# Patient Record
Sex: FEMALE | Race: BLACK OR AFRICAN AMERICAN | Employment: OTHER | URBAN - METROPOLITAN AREA
[De-identification: names, ages, dates, MRNs, and addresses within clinical notes are randomized per-mention and may not be internally consistent; named-entity substitution may affect disease eponyms.]

---

## 2017-10-19 ENCOUNTER — HOSPITAL ENCOUNTER (INPATIENT)
Facility: HOSPITAL | Age: 67
LOS: 10 days | Discharge: HOME HEALTH | DRG: 234 | End: 2017-10-31
Attending: EMERGENCY MEDICINE | Admitting: THORACIC SURGERY (CARDIOTHORACIC VASCULAR SURGERY)
Payer: MEDICARE

## 2017-10-19 ENCOUNTER — APPOINTMENT (OUTPATIENT)
Dept: GENERAL RADIOLOGY | Facility: HOSPITAL | Age: 67
DRG: 234 | End: 2017-10-19
Payer: MEDICARE

## 2017-10-19 DIAGNOSIS — R07.9 ACUTE CHEST PAIN: Primary | ICD-10-CM

## 2017-10-19 DIAGNOSIS — I48.91 ATRIAL FIBRILLATION, UNSPECIFIED TYPE (HCC): ICD-10-CM

## 2017-10-19 DIAGNOSIS — I48.0 PAROXYSMAL ATRIAL FIBRILLATION (HCC): ICD-10-CM

## 2017-10-19 PROCEDURE — 71010 XR CHEST AP PORTABLE  (CPT=71010): CPT | Performed by: EMERGENCY MEDICINE

## 2017-10-20 ENCOUNTER — APPOINTMENT (OUTPATIENT)
Dept: INTERVENTIONAL RADIOLOGY/VASCULAR | Facility: HOSPITAL | Age: 67
DRG: 234 | End: 2017-10-20
Attending: INTERNAL MEDICINE
Payer: MEDICARE

## 2017-10-20 ENCOUNTER — APPOINTMENT (OUTPATIENT)
Dept: CV DIAGNOSTICS | Facility: HOSPITAL | Age: 67
DRG: 234 | End: 2017-10-20
Attending: HOSPITALIST
Payer: MEDICARE

## 2017-10-20 PROCEDURE — 99222 1ST HOSP IP/OBS MODERATE 55: CPT | Performed by: HOSPITALIST

## 2017-10-20 PROCEDURE — B2111ZZ FLUOROSCOPY OF MULTIPLE CORONARY ARTERIES USING LOW OSMOLAR CONTRAST: ICD-10-PCS | Performed by: INTERNAL MEDICINE

## 2017-10-20 PROCEDURE — 4A023N7 MEASUREMENT OF CARDIAC SAMPLING AND PRESSURE, LEFT HEART, PERCUTANEOUS APPROACH: ICD-10-PCS | Performed by: INTERNAL MEDICINE

## 2017-10-20 PROCEDURE — B41F1ZZ FLUOROSCOPY OF RIGHT LOWER EXTREMITY ARTERIES USING LOW OSMOLAR CONTRAST: ICD-10-PCS | Performed by: INTERNAL MEDICINE

## 2017-10-20 PROCEDURE — 93306 TTE W/DOPPLER COMPLETE: CPT | Performed by: HOSPITALIST

## 2017-10-20 RX ORDER — HEPARIN SODIUM 5000 [USP'U]/ML
INJECTION, SOLUTION INTRAVENOUS; SUBCUTANEOUS
Status: COMPLETED
Start: 2017-10-20 | End: 2017-10-20

## 2017-10-20 RX ORDER — MORPHINE SULFATE 4 MG/ML
1 INJECTION, SOLUTION INTRAMUSCULAR; INTRAVENOUS EVERY 2 HOUR PRN
Status: DISCONTINUED | OUTPATIENT
Start: 2017-10-20 | End: 2017-10-22

## 2017-10-20 RX ORDER — DIPHENHYDRAMINE HYDROCHLORIDE 50 MG/ML
INJECTION INTRAMUSCULAR; INTRAVENOUS
Status: COMPLETED
Start: 2017-10-20 | End: 2017-10-20

## 2017-10-20 RX ORDER — MULTIVITAMIN
1 TABLET ORAL DAILY
COMMUNITY
End: 2017-11-09

## 2017-10-20 RX ORDER — ATORVASTATIN CALCIUM 20 MG/1
20 TABLET, FILM COATED ORAL NIGHTLY
Status: DISCONTINUED | OUTPATIENT
Start: 2017-10-20 | End: 2017-10-21

## 2017-10-20 RX ORDER — HEPARIN SODIUM AND DEXTROSE 10000; 5 [USP'U]/100ML; G/100ML
12 INJECTION INTRAVENOUS ONCE
Status: COMPLETED | OUTPATIENT
Start: 2017-10-20 | End: 2017-10-20

## 2017-10-20 RX ORDER — CYCLOBENZAPRINE HCL 5 MG
5 TABLET ORAL NIGHTLY
COMMUNITY
End: 2017-11-09

## 2017-10-20 RX ORDER — TRAZODONE HYDROCHLORIDE 50 MG/1
50 TABLET ORAL NIGHTLY PRN
Status: DISCONTINUED | OUTPATIENT
Start: 2017-10-20 | End: 2017-10-24

## 2017-10-20 RX ORDER — FLUTICASONE PROPIONATE 50 MCG
1 SPRAY, SUSPENSION (ML) NASAL DAILY
Status: DISCONTINUED | OUTPATIENT
Start: 2017-10-20 | End: 2017-10-31

## 2017-10-20 RX ORDER — ENOXAPARIN SODIUM 100 MG/ML
40 INJECTION SUBCUTANEOUS DAILY
Status: DISCONTINUED | OUTPATIENT
Start: 2017-10-20 | End: 2017-10-20

## 2017-10-20 RX ORDER — CHLORTHALIDONE 25 MG/1
25 TABLET ORAL NIGHTLY
COMMUNITY
End: 2017-10-31

## 2017-10-20 RX ORDER — AMLODIPINE BESYLATE 5 MG/1
5 TABLET ORAL DAILY
Status: DISCONTINUED | OUTPATIENT
Start: 2017-10-20 | End: 2017-10-24

## 2017-10-20 RX ORDER — CLONIDINE HYDROCHLORIDE 0.1 MG/1
0.1 TABLET ORAL NIGHTLY
Status: DISCONTINUED | OUTPATIENT
Start: 2017-10-20 | End: 2017-10-24

## 2017-10-20 RX ORDER — FLECAINIDE ACETATE 100 MG/1
100 TABLET ORAL 2 TIMES DAILY
COMMUNITY
End: 2017-10-31

## 2017-10-20 RX ORDER — LANSOPRAZOLE 30 MG/1
30 CAPSULE, DELAYED RELEASE ORAL
COMMUNITY
End: 2017-11-09

## 2017-10-20 RX ORDER — ACETAMINOPHEN 325 MG/1
650 TABLET ORAL EVERY 6 HOURS PRN
Status: DISCONTINUED | OUTPATIENT
Start: 2017-10-20 | End: 2017-10-22

## 2017-10-20 RX ORDER — FLECAINIDE ACETATE 100 MG/1
100 TABLET ORAL DAILY
Status: DISCONTINUED | OUTPATIENT
Start: 2017-10-20 | End: 2017-10-20

## 2017-10-20 RX ORDER — MIDAZOLAM HYDROCHLORIDE 1 MG/ML
INJECTION INTRAMUSCULAR; INTRAVENOUS
Status: COMPLETED
Start: 2017-10-20 | End: 2017-10-20

## 2017-10-20 RX ORDER — METOLAZONE 2.5 MG/1
2.5 TABLET ORAL DAILY
Status: DISCONTINUED | OUTPATIENT
Start: 2017-10-20 | End: 2017-10-21

## 2017-10-20 RX ORDER — MORPHINE SULFATE 4 MG/ML
4 INJECTION, SOLUTION INTRAMUSCULAR; INTRAVENOUS EVERY 2 HOUR PRN
Status: DISCONTINUED | OUTPATIENT
Start: 2017-10-20 | End: 2017-10-22

## 2017-10-20 RX ORDER — ASPIRIN 81 MG/1
81 TABLET, CHEWABLE ORAL DAILY
Status: DISCONTINUED | OUTPATIENT
Start: 2017-10-20 | End: 2017-10-20

## 2017-10-20 RX ORDER — ASPIRIN 81 MG/1
324 TABLET, CHEWABLE ORAL DAILY
Status: DISCONTINUED | OUTPATIENT
Start: 2017-10-20 | End: 2017-10-29

## 2017-10-20 RX ORDER — SODIUM CHLORIDE 9 MG/ML
INJECTION, SOLUTION INTRAVENOUS CONTINUOUS
Status: DISCONTINUED | OUTPATIENT
Start: 2017-10-20 | End: 2017-10-20

## 2017-10-20 RX ORDER — CALCIUM CARBONATE 200(500)MG
500 TABLET,CHEWABLE ORAL 3 TIMES DAILY PRN
Status: DISCONTINUED | OUTPATIENT
Start: 2017-10-20 | End: 2017-10-24

## 2017-10-20 RX ORDER — CLONIDINE HYDROCHLORIDE 0.1 MG/1
0.1 TABLET ORAL 2 TIMES DAILY
COMMUNITY
End: 2017-10-31

## 2017-10-20 RX ORDER — SODIUM CHLORIDE 9 MG/ML
INJECTION, SOLUTION INTRAVENOUS CONTINUOUS
Status: ACTIVE | OUTPATIENT
Start: 2017-10-20 | End: 2017-10-20

## 2017-10-20 RX ORDER — POTASSIUM CHLORIDE 20 MEQ/1
40 TABLET, EXTENDED RELEASE ORAL EVERY 4 HOURS
Status: DISPENSED | OUTPATIENT
Start: 2017-10-20 | End: 2017-10-20

## 2017-10-20 RX ORDER — ASPIRIN 81 MG/1
324 TABLET, CHEWABLE ORAL ONCE
Status: DISCONTINUED | OUTPATIENT
Start: 2017-10-20 | End: 2017-10-24 | Stop reason: HOSPADM

## 2017-10-20 RX ORDER — ONDANSETRON 2 MG/ML
8 INJECTION INTRAMUSCULAR; INTRAVENOUS EVERY 6 HOURS PRN
Status: DISCONTINUED | OUTPATIENT
Start: 2017-10-20 | End: 2017-10-24

## 2017-10-20 RX ORDER — MORPHINE SULFATE 4 MG/ML
2 INJECTION, SOLUTION INTRAMUSCULAR; INTRAVENOUS EVERY 2 HOUR PRN
Status: DISCONTINUED | OUTPATIENT
Start: 2017-10-20 | End: 2017-10-22

## 2017-10-20 RX ORDER — HEPARIN SODIUM AND DEXTROSE 10000; 5 [USP'U]/100ML; G/100ML
INJECTION INTRAVENOUS CONTINUOUS
Status: DISCONTINUED | OUTPATIENT
Start: 2017-10-20 | End: 2017-10-21

## 2017-10-20 RX ORDER — CYCLOBENZAPRINE HCL 10 MG
5 TABLET ORAL 3 TIMES DAILY PRN
Status: DISCONTINUED | OUTPATIENT
Start: 2017-10-20 | End: 2017-10-31

## 2017-10-20 RX ORDER — LIDOCAINE HYDROCHLORIDE 10 MG/ML
INJECTION, SOLUTION INFILTRATION; PERINEURAL
Status: COMPLETED
Start: 2017-10-20 | End: 2017-10-20

## 2017-10-20 RX ORDER — ATENOLOL 50 MG/1
50 TABLET ORAL
Status: DISCONTINUED | OUTPATIENT
Start: 2017-10-20 | End: 2017-10-21

## 2017-10-20 RX ORDER — HEPARIN SODIUM 5000 [USP'U]/ML
60 INJECTION INTRAVENOUS; SUBCUTANEOUS ONCE
Status: COMPLETED | OUTPATIENT
Start: 2017-10-20 | End: 2017-10-20

## 2017-10-20 RX ORDER — CHLORTHALIDONE 50 MG/1
25 TABLET ORAL NIGHTLY
Status: DISCONTINUED | OUTPATIENT
Start: 2017-10-20 | End: 2017-10-24

## 2017-10-20 RX ORDER — HYDRALAZINE HYDROCHLORIDE 20 MG/ML
10 INJECTION INTRAMUSCULAR; INTRAVENOUS
Status: DISCONTINUED | OUTPATIENT
Start: 2017-10-20 | End: 2017-10-22

## 2017-10-20 NOTE — ED NOTES
Pt sleeping on the cart, pt and family informed of transfer to floor, pt condition stable, waiting test results, report to [employer].

## 2017-10-20 NOTE — PROGRESS NOTES
10/20/17 0218 10/20/17 0220 10/20/17 0222   Vital Signs   BP (!) 154/104 (!) 153/102 (!) 168/107   BP Location Left arm Left arm Left arm   BP Method Automatic Automatic Automatic   Patient Position Lying Sitting Standing   admission orthos

## 2017-10-20 NOTE — PLAN OF CARE
CARDIOVASCULAR - ADULT    • Maintains optimal cardiac output and hemodynamic stability Not Progressing          CARDIOVASCULAR - ADULT    • Absence of cardiac arrhythmias or at baseline Progressing        Patient/Family Goals    • Patient/Family Long Term

## 2017-10-20 NOTE — H&P
[facility]  History and Physical     Anselm Manny Patient Status:  Emergency    1950 MRN [id number]   Location [facility] Attending Marina Still MD   Hosp Day # 0 PCP Yee Goldman M.D.      Chief Complaint: comprehensive 14 point review of systems was completed. Pertinent positives and negatives noted in the HPI.     Physical Exam:    BP (!) 167/117   Pulse 79   Temp (!) 97 °F (36.1 °C)   Resp 18   Ht 5' 3\" (1.6 m)   Wt 167 lb (75.8 kg)   SpO2 97%   BMI 29 full  · Rangel: no    Plan of care discussed with patient and er md Cyn Ca MD  10/20/2017    Addendum:  Trop elevated on repeat lab; start heparin drip

## 2017-10-20 NOTE — CONSULTS
Cardiothoracic Surgery Consult   Consult  10/20/17  Dr Marco A Chavez  PCP: Dr Sandhya Cline  Diagnosis:CAD  73year old with CP  PMH: cerebral aneurysm coiled , a-fib, HTN, HTN, OA, sleep apnea  PSH: , hysterectomy, herb  Meds: see chart for full listi

## 2017-10-20 NOTE — ED PROVIDER NOTES
Patient Seen in: [facility] Emergency Department    History   Patient presents with:  Chest Pain Angina (cardiovascular)    Stated Complaint: chest pain     HPI    Patient is a 81-year-old woman with history of paroxysmal atrial fibrillation intracr Review of Systems    Positive for stated complaint: chest pain   Other systems are as noted in HPI. Constitutional and vital signs reviewed. All other systems reviewed and negative except as noted above.     PSFH elements reviewed from today and Normal    Narrative: The aPTT Heparin Therapeutic Range is approximately 65- 104 seconds. The therapeutic range has been validated against 0.3-0.7 heparin anti-Xa units/mL. CBC WITH DIFFERENTIAL WITH PLATELET    Narrative:      The following orders

## 2017-10-20 NOTE — PROGRESS NOTES
Cardiac cath with 95% mLAD; functionally occluded OM1 and 80-90% mRCA  Start statin and check lipid panel  Cont ASA and BB  [facility]S to see for consideration of CABG

## 2017-10-20 NOTE — PROCEDURES
[facility]    PATIENT'S NAME: Toribio Pugh   ATTENDING PHYSICIAN: Linell Mcburney, M.D.    OPERATING PHYSICIAN: Susana Guy MD   PATIENT ACCOUNT#:   [id number]    LOCATION:  [address] [place] [address]  MEDICAL RECORD #:   [id number]       DATE OF BIRTH:  [birth year] Perclose device was deployed for hemostasis. The IV was maintained by the RN and moderate conscious sedation of Versed and fentanyl was given.   The patient was assessed with monitoring of oxygen, heart rate, and blood pressure by the nurse and myself du

## 2017-10-20 NOTE — PLAN OF CARE
Assumed care of patient around 0230, alert and oriented X4, no c/o CP/SOB, but still has a headache, SpO2 98 % on RA  Rhythm/HR: NSR/SB 50-60s    Nursing navigator completed  Oriented into room  Medication list updated, many medications were changed and be

## 2017-10-20 NOTE — HISTORICAL OFFICE NOTE
KRISTEN SULTANA  : 1950  ACCOUNT:  [id number]  [phone]  PCP: Dr. Mishel Palmer     TODAY'S DATE: 2016  DICTATED BY:  Jacque Ramos M.D.]    CHIEF COMPLAINT: Cindi Sharma fibrillation.]    HPI:  [On 2016, Stacey Fuller, a 60year old female, discomfort. NEURO: no localized deficits. HEM/LYMPH: denies easy bruising. ALL: no new food or environmental allergies.       PAST HISTORY: cholecystectomy, , hysterectomy and cerebral aneurysm repair     PAST CV HISTORY: hypertension and left fibrillation.   We also discussed the role for ablation therapy as well.]    PRESCRIPTIONS:   01/27/16 Aspirin               325MG     one tablet daily                         01/27/16 Atenolol              50MG      one tablet twice daily

## 2017-10-20 NOTE — ED INITIAL ASSESSMENT (HPI)
Pt to ed from home with c/o cp,sob, tingly feeling to face, pt sx started tonight, pt has hx of present sx.

## 2017-10-20 NOTE — CONSULTS
[facility]  Report of Consultation    Delayfanny Hunag Patient Status:  Observation    1950 MRN [id number]   [facility] 8NE-A Attending Vasyl Malone MD   Hosp Day # 0 PCP Bonny Jain M.D.     Reason for Consultation:  Chest p Beta Blocker  •  AmLODIPine Besylate (NORVASC) tab 5 mg, 5 mg, Oral, Daily  •  Fluticasone Propionate (FLONASE) 50 MCG/ACT nasal spray 1 spray, 1 spray, Nasal, Daily  •  metolazone (ZAROXOLYN) tab 2.5 mg, 2.5 mg, Oral, Daily  •  Calcium Carbonate Antacid ( carotids 2+ no bruits. Cardiac: Regular rate and rhythm, S1, S2 normal,no rub or gallop. 1/6 SM  Lungs: Clear without wheezes, rales, rhonchi or dullness. Normal excursions and effort. Abdomen: Soft, non-tender.    Extremities: Without clubbing, cyanosis

## 2017-10-21 ENCOUNTER — APPOINTMENT (OUTPATIENT)
Dept: ULTRASOUND IMAGING | Facility: HOSPITAL | Age: 67
DRG: 234 | End: 2017-10-21
Attending: INTERNAL MEDICINE
Payer: MEDICARE

## 2017-10-21 ENCOUNTER — PRIOR ORIGINAL RECORDS (OUTPATIENT)
Dept: OTHER | Age: 67
End: 2017-10-21

## 2017-10-21 PROCEDURE — 93880 EXTRACRANIAL BILAT STUDY: CPT | Performed by: INTERNAL MEDICINE

## 2017-10-21 PROCEDURE — 99233 SBSQ HOSP IP/OBS HIGH 50: CPT | Performed by: HOSPITALIST

## 2017-10-21 RX ORDER — POTASSIUM CHLORIDE 20 MEQ/1
40 TABLET, EXTENDED RELEASE ORAL EVERY 4 HOURS
Status: COMPLETED | OUTPATIENT
Start: 2017-10-21 | End: 2017-10-21

## 2017-10-21 RX ORDER — MAGNESIUM OXIDE 400 MG (241.3 MG MAGNESIUM) TABLET
400 TABLET ONCE
Status: COMPLETED | OUTPATIENT
Start: 2017-10-21 | End: 2017-10-21

## 2017-10-21 RX ORDER — ATORVASTATIN CALCIUM 40 MG/1
40 TABLET, FILM COATED ORAL NIGHTLY
Status: DISCONTINUED | OUTPATIENT
Start: 2017-10-21 | End: 2017-10-31

## 2017-10-21 RX ORDER — ENOXAPARIN SODIUM 100 MG/ML
40 INJECTION SUBCUTANEOUS DAILY
Status: DISCONTINUED | OUTPATIENT
Start: 2017-10-21 | End: 2017-10-24

## 2017-10-21 NOTE — RESPIRATORY THERAPY NOTE
EITAN - Equipment Use Daily Summary:  · Set Mode CFLEX  · Usage in hours: 3:06  · 90% Pressure (EPAP) level: 8.0  · 90% Insp Pressure (IPAP):   · AHI: 12.6  · Supplemental Oxygen:   · Comments:

## 2017-10-21 NOTE — PROGRESS NOTES
[facility]  Progress Note     Main Joshrenata Patient Status:  Observation    1950 MRN [id number]   [facility] 8NE-A Attending Gerry Quiles MD   Hosp Day # 0 PCP Chapis Donohue M.D.      Chief Complaint: NSTEMI    S: Patient atorvastatin  20 mg Oral Nightly       ASSESSMENT / PLAN:     1. NSTEMI/CAD  1. Cath 10/20 with MVD  2. CVS saw: rec CABG in near future  3. Cont. ASA/BB/statin  4. Carotid US today  2. HTN  3. DL  1. statin  4. EITAN  5. PAF  1. Cont. Rate control  2.  Moises Ivan

## 2017-10-21 NOTE — PROGRESS NOTES
[facility]  Progress Note    Sonia Bryant Patient Status:  Observation    1950 MRN [id number]   [facility] 8NE-A Attending Melania Gonzalez MD   Hosp Day # 0 PCP Yee Goldman M.D.        Assessment and Plan:  Patient Active Pro oriented in no apparent distress. HEENT: No focal deficits. Neck: No JVD  Cardiac: Regular rate and rhythm, S1, S2 EGHWAQ,0/8 systolic  Lungs: Clear  Abdomen: Soft, non-tender. Extremities: no edema  Neurologic: Alert and oriented, normal affect.   Skin

## 2017-10-21 NOTE — PLAN OF CARE
B/P ELEVATED. METOPROLOL BID STARTED. WILL CONTINUE TO MONITOR. PRN HYDRALAZINE AS NEEDED.  CONTINUE TO MONITOR CLOSELY

## 2017-10-22 ENCOUNTER — ANESTHESIA EVENT (OUTPATIENT)
Dept: CARDIAC SURGERY | Facility: HOSPITAL | Age: 67
DRG: 234 | End: 2017-10-22
Payer: MEDICARE

## 2017-10-22 PROCEDURE — 99232 SBSQ HOSP IP/OBS MODERATE 35: CPT | Performed by: HOSPITALIST

## 2017-10-22 RX ORDER — HYDRALAZINE HYDROCHLORIDE 20 MG/ML
10 INJECTION INTRAMUSCULAR; INTRAVENOUS EVERY 6 HOURS PRN
Status: DISCONTINUED | OUTPATIENT
Start: 2017-10-22 | End: 2017-10-24

## 2017-10-22 RX ORDER — MAGNESIUM OXIDE 400 MG (241.3 MG MAGNESIUM) TABLET
400 TABLET ONCE
Status: COMPLETED | OUTPATIENT
Start: 2017-10-22 | End: 2017-10-22

## 2017-10-22 RX ORDER — IBUPROFEN 400 MG/1
400 TABLET ORAL EVERY 6 HOURS PRN
Status: DISCONTINUED | OUTPATIENT
Start: 2017-10-22 | End: 2017-10-24

## 2017-10-22 RX ORDER — IBUPROFEN 600 MG/1
600 TABLET ORAL EVERY 6 HOURS PRN
Status: DISCONTINUED | OUTPATIENT
Start: 2017-10-22 | End: 2017-10-24

## 2017-10-22 RX ORDER — SODIUM CHLORIDE 9 MG/ML
INJECTION, SOLUTION INTRAVENOUS CONTINUOUS
Status: DISCONTINUED | OUTPATIENT
Start: 2017-10-22 | End: 2017-10-24

## 2017-10-22 RX ORDER — ACETAMINOPHEN 500 MG
1000 TABLET ORAL EVERY 8 HOURS PRN
Status: DISCONTINUED | OUTPATIENT
Start: 2017-10-22 | End: 2017-10-24

## 2017-10-22 NOTE — PLAN OF CARE
PT HEADACHE IMPROVED WITH IBUPROFEN. C/O OF MILD NECK STIFFNESS AS WELL, HEATING PAD PLACED TO NECK. PT REPORTS NOT SLEEPING WELL LAST 2 NIGHTS. CONTINUE TO MONITOR CLOSELY.

## 2017-10-22 NOTE — PROGRESS NOTES
[facility]  Progress Note     Jose Carly Patient Status:  Observation    1950 MRN [id number]   [facility] 8NE-A Attending Roswell Sicard, MD   Hosp Day # 1 PCP Dariusz Kapoor M.D.      Chief Complaint: NSTEMI    S: Patient Oral Daily   • Fluticasone Propionate  1 spray Nasal Daily   • CloNIDine HCl  0.1 mg Oral Nightly   • chlorthalidone  25 mg Oral Nightly   • aspirin  324 mg Oral Daily   • aspirin  324 mg Oral Once       ASSESSMENT / PLAN:     1. NSTEMI/CAD  1.  Cath 10/20

## 2017-10-22 NOTE — PLAN OF CARE
Assumed care of patient around 1930, alert and oriented X4, no c/o CP/SOB, SpO2 97 % on RA, uses CPAP at night.   Rhythm/HR: NSR 60s  Right groin site-MARGIE,soft    Resting comfortably, will continue to monitor      CARDIOVASCULAR - ADULT    • Maintains optim

## 2017-10-22 NOTE — PROGRESS NOTES
[facility] Cardiology Progress Note    Subjective:  Has a terrible HA, has not slept. May be related to lack of caffeine.      Objective:  /72 (BP Location: Right arm)   Pulse 69   Temp 98.1 °F (36.7 °C) (Oral)   Resp 18   Ht 160 cm (5' 3\")   Wt 164 lb

## 2017-10-22 NOTE — RESPIRATORY THERAPY NOTE
EITAN - Equipment Use Daily Summary:  · Set Mode CFLEX  · Usage in hours: 6:41  · 90% Pressure (EPAP) level:   · 90% Insp Pressure (IPAP): 10.7  · AHI: 18.8  · Supplemental Oxygen:  · Comments:

## 2017-10-23 ENCOUNTER — ANESTHESIA (OUTPATIENT)
Dept: CARDIAC SURGERY | Facility: HOSPITAL | Age: 67
DRG: 234 | End: 2017-10-23
Payer: MEDICARE

## 2017-10-23 PROCEDURE — 99232 SBSQ HOSP IP/OBS MODERATE 35: CPT | Performed by: HOSPITALIST

## 2017-10-23 RX ORDER — ASPIRIN 81 MG/1
81 TABLET, CHEWABLE ORAL DAILY
COMMUNITY
End: 2017-10-31

## 2017-10-23 RX ORDER — POTASSIUM CHLORIDE 20 MEQ/1
40 TABLET, EXTENDED RELEASE ORAL 3 TIMES DAILY
COMMUNITY
End: 2017-11-09 | Stop reason: DRUGHIGH

## 2017-10-23 RX ORDER — MAGNESIUM OXIDE 400 MG (241.3 MG MAGNESIUM) TABLET
400 TABLET ONCE
Status: COMPLETED | OUTPATIENT
Start: 2017-10-23 | End: 2017-10-23

## 2017-10-23 NOTE — PROGRESS NOTES
[facility]  Progress Note     Marlin Cervantes Patient Status:  Observation    1950 MRN [id number]   [facility] 8NE-A Attending Sylvain Stearns MD   Hosp Day # 2 PCP Diandra Kaur M.D.      Chief Complaint: NSTEMI    S: Patient Once       ASSESSMENT / PLAN:     1. NSTEMI/CAD  1. Cath 10/20 with MVD  2. CVS saw: rec CABG in near future  3. Cont. ASA/BB/statin  4. Carotid US neg for sig stenosis  2. HTN  1. Cont.  Lopressor, norvac, hygroten and clonidine  2. stable  3. DL  1. stati

## 2017-10-23 NOTE — PROGRESS NOTES
[facility]  Cardiology Progress Note    Subjective:  No chest pain or shortness of breath.     Objective:  /81 (BP Location: Right arm)   Pulse 67   Temp 98 °F (36.7 °C) (Oral)   Resp 16   Ht 160 cm (5' 3\")   Wt 164 lb 10.9 oz (74.7 kg)   SpO2 9

## 2017-10-23 NOTE — PROGRESS NOTES
Met with patient and  to discuss pre op teaching, post op expectations, all questions answered, binder given. Discussed all consents, pt had no further questions, consents signed. D/w Dr. Franky Barriga for CABG in am, first case.   Vamsi Ko RN  Clin

## 2017-10-23 NOTE — ANESTHESIA PREPROCEDURE EVALUATION
PRE-OP EVALUATION    Patient Name: Burton Mohs    Pre-op Diagnosis: CAD    Procedure(s):  1st case, 0730 incision time    Surgeon(s) and Role:     * Payam Pradhan MD - Primary    Pre-op vitals reviewed.   Temp: 98 °F (36.7 °C)  Pulse: 67  Resp: 18  BP: Intravenous Once   [COMPLETED] heparin (PORCINE) 22975jitrr/250mL infusion INITIAL DOSE 12 Units/kg/hr Intravenous Once   aspirin chewable tab 324 mg 324 mg Oral Daily   aspirin chewable tab 324 mg 324 mg Oral Once   [COMPLETED] Lidocaine HCl (XYLOCAINE) 1 reviewed.   Exercise tolerance: good     MET: >4      (+) hypertension   (+) hyperlipidemia  (+) CAD  (+) past MI           (+) dysrhythmias (pafib with bursts of A tach) and atrial fibrillation                  Endo/Other                           (+) arth on Admission:  **None**

## 2017-10-24 ENCOUNTER — SURGERY (OUTPATIENT)
Age: 67
End: 2017-10-24

## 2017-10-24 ENCOUNTER — APPOINTMENT (OUTPATIENT)
Dept: GENERAL RADIOLOGY | Facility: HOSPITAL | Age: 67
DRG: 234 | End: 2017-10-24
Attending: THORACIC SURGERY (CARDIOTHORACIC VASCULAR SURGERY)
Payer: MEDICARE

## 2017-10-24 ENCOUNTER — ANESTHESIA EVENT (OUTPATIENT)
Dept: CARDIAC SURGERY | Facility: HOSPITAL | Age: 67
DRG: 234 | End: 2017-10-24
Payer: MEDICARE

## 2017-10-24 ENCOUNTER — ANESTHESIA (OUTPATIENT)
Dept: CARDIAC SURGERY | Facility: HOSPITAL | Age: 67
DRG: 234 | End: 2017-10-24
Payer: MEDICARE

## 2017-10-24 ENCOUNTER — APPOINTMENT (OUTPATIENT)
Dept: GENERAL RADIOLOGY | Facility: HOSPITAL | Age: 67
DRG: 234 | End: 2017-10-24
Attending: PHYSICIAN ASSISTANT
Payer: MEDICARE

## 2017-10-24 PROCEDURE — 30233N1 TRANSFUSION OF NONAUTOLOGOUS RED BLOOD CELLS INTO PERIPHERAL VEIN, PERCUTANEOUS APPROACH: ICD-10-PCS | Performed by: THORACIC SURGERY (CARDIOTHORACIC VASCULAR SURGERY)

## 2017-10-24 PROCEDURE — 06BQ4ZZ EXCISION OF LEFT SAPHENOUS VEIN, PERCUTANEOUS ENDOSCOPIC APPROACH: ICD-10-PCS | Performed by: THORACIC SURGERY (CARDIOTHORACIC VASCULAR SURGERY)

## 2017-10-24 PROCEDURE — 30233R1 TRANSFUSION OF NONAUTOLOGOUS PLATELETS INTO PERIPHERAL VEIN, PERCUTANEOUS APPROACH: ICD-10-PCS | Performed by: THORACIC SURGERY (CARDIOTHORACIC VASCULAR SURGERY)

## 2017-10-24 PROCEDURE — 02JA0ZZ INSPECTION OF HEART, OPEN APPROACH: ICD-10-PCS | Performed by: THORACIC SURGERY (CARDIOTHORACIC VASCULAR SURGERY)

## 2017-10-24 PROCEDURE — 5A1221Z PERFORMANCE OF CARDIAC OUTPUT, CONTINUOUS: ICD-10-PCS | Performed by: THORACIC SURGERY (CARDIOTHORACIC VASCULAR SURGERY)

## 2017-10-24 PROCEDURE — 71010 XR CHEST AP PORTABLE  (CPT=71010): CPT | Performed by: PHYSICIAN ASSISTANT

## 2017-10-24 PROCEDURE — 71010 XR CHEST AP PORTABLE  (CPT=71010): CPT | Performed by: THORACIC SURGERY (CARDIOTHORACIC VASCULAR SURGERY)

## 2017-10-24 PROCEDURE — 021209W BYPASS CORONARY ARTERY, THREE ARTERIES FROM AORTA WITH AUTOLOGOUS VENOUS TISSUE, OPEN APPROACH: ICD-10-PCS | Performed by: THORACIC SURGERY (CARDIOTHORACIC VASCULAR SURGERY)

## 2017-10-24 PROCEDURE — 06BP0ZZ EXCISION OF RIGHT SAPHENOUS VEIN, OPEN APPROACH: ICD-10-PCS | Performed by: THORACIC SURGERY (CARDIOTHORACIC VASCULAR SURGERY)

## 2017-10-24 PROCEDURE — 99232 SBSQ HOSP IP/OBS MODERATE 35: CPT | Performed by: HOSPITALIST

## 2017-10-24 PROCEDURE — 30233K1 TRANSFUSION OF NONAUTOLOGOUS FROZEN PLASMA INTO PERIPHERAL VEIN, PERCUTANEOUS APPROACH: ICD-10-PCS | Performed by: THORACIC SURGERY (CARDIOTHORACIC VASCULAR SURGERY)

## 2017-10-24 RX ORDER — MORPHINE SULFATE 4 MG/ML
8 INJECTION, SOLUTION INTRAMUSCULAR; INTRAVENOUS
Status: DISCONTINUED | OUTPATIENT
Start: 2017-10-24 | End: 2017-10-25

## 2017-10-24 RX ORDER — DEXTROSE AND SODIUM CHLORIDE 5; .45 G/100ML; G/100ML
INJECTION, SOLUTION INTRAVENOUS CONTINUOUS
Status: ACTIVE | OUTPATIENT
Start: 2017-10-24 | End: 2017-10-25

## 2017-10-24 RX ORDER — ONDANSETRON 2 MG/ML
4 INJECTION INTRAMUSCULAR; INTRAVENOUS EVERY 6 HOURS PRN
Status: DISCONTINUED | OUTPATIENT
Start: 2017-10-24 | End: 2017-10-31

## 2017-10-24 RX ORDER — ALBUMIN, HUMAN INJ 5% 5 %
250 SOLUTION INTRAVENOUS ONCE AS NEEDED
Status: COMPLETED | OUTPATIENT
Start: 2017-10-24 | End: 2017-10-24

## 2017-10-24 RX ORDER — BACITRACIN 50000 [USP'U]/1
INJECTION, POWDER, LYOPHILIZED, FOR SOLUTION INTRAMUSCULAR AS NEEDED
Status: DISCONTINUED | OUTPATIENT
Start: 2017-10-24 | End: 2017-10-24 | Stop reason: HOSPADM

## 2017-10-24 RX ORDER — MIDAZOLAM HYDROCHLORIDE 1 MG/ML
1 INJECTION INTRAMUSCULAR; INTRAVENOUS EVERY 30 MIN PRN
Status: DISCONTINUED | OUTPATIENT
Start: 2017-10-24 | End: 2017-10-24

## 2017-10-24 RX ORDER — HYDROCODONE BITARTRATE AND ACETAMINOPHEN 10; 325 MG/1; MG/1
2 TABLET ORAL EVERY 4 HOURS PRN
Status: DISCONTINUED | OUTPATIENT
Start: 2017-10-24 | End: 2017-10-25

## 2017-10-24 RX ORDER — POLYETHYLENE GLYCOL 3350 17 G/17G
1 POWDER, FOR SOLUTION ORAL DAILY PRN
Status: DISCONTINUED | OUTPATIENT
Start: 2017-10-24 | End: 2017-10-31

## 2017-10-24 RX ORDER — CHLORHEXIDINE GLUCONATE 0.12 MG/ML
15 RINSE ORAL
Status: DISCONTINUED | OUTPATIENT
Start: 2017-10-24 | End: 2017-10-24

## 2017-10-24 RX ORDER — NITROGLYCERIN 20 MG/100ML
INJECTION INTRAVENOUS CONTINUOUS PRN
Status: DISCONTINUED | OUTPATIENT
Start: 2017-10-24 | End: 2017-10-26

## 2017-10-24 RX ORDER — CHLORHEXIDINE GLUCONATE 0.12 MG/ML
15 RINSE ORAL
Status: DISCONTINUED | OUTPATIENT
Start: 2017-10-24 | End: 2017-10-25

## 2017-10-24 RX ORDER — SODIUM CHLORIDE 9 MG/ML
INJECTION, SOLUTION INTRAVENOUS CONTINUOUS
Status: DISCONTINUED | OUTPATIENT
Start: 2017-10-24 | End: 2017-10-27

## 2017-10-24 RX ORDER — SCOLOPAMINE TRANSDERMAL SYSTEM 1 MG/1
1 PATCH, EXTENDED RELEASE TRANSDERMAL ONCE
Status: COMPLETED | OUTPATIENT
Start: 2017-10-24 | End: 2017-10-27

## 2017-10-24 RX ORDER — SODIUM CHLORIDE 9 MG/ML
INJECTION, SOLUTION INTRAVENOUS ONCE
Status: DISCONTINUED | OUTPATIENT
Start: 2017-10-24 | End: 2017-10-26

## 2017-10-24 RX ORDER — POTASSIUM CHLORIDE 29.8 MG/ML
40 INJECTION INTRAVENOUS AS NEEDED
Status: DISCONTINUED | OUTPATIENT
Start: 2017-10-24 | End: 2017-10-31

## 2017-10-24 RX ORDER — MORPHINE SULFATE 4 MG/ML
4 INJECTION, SOLUTION INTRAMUSCULAR; INTRAVENOUS
Status: DISCONTINUED | OUTPATIENT
Start: 2017-10-24 | End: 2017-10-25

## 2017-10-24 RX ORDER — ASPIRIN 325 MG
325 TABLET ORAL ONCE
Status: COMPLETED | OUTPATIENT
Start: 2017-10-24 | End: 2017-10-26

## 2017-10-24 RX ORDER — MORPHINE SULFATE 4 MG/ML
2 INJECTION, SOLUTION INTRAMUSCULAR; INTRAVENOUS
Status: DISCONTINUED | OUTPATIENT
Start: 2017-10-24 | End: 2017-10-25

## 2017-10-24 RX ORDER — DOCUSATE SODIUM 100 MG/1
100 CAPSULE, LIQUID FILLED ORAL 2 TIMES DAILY
Status: DISCONTINUED | OUTPATIENT
Start: 2017-10-24 | End: 2017-10-31

## 2017-10-24 RX ORDER — DOBUTAMINE HYDROCHLORIDE 200 MG/100ML
INJECTION INTRAVENOUS CONTINUOUS PRN
Status: DISCONTINUED | OUTPATIENT
Start: 2017-10-24 | End: 2017-10-26

## 2017-10-24 RX ORDER — TEMAZEPAM 15 MG/1
15 CAPSULE ORAL NIGHTLY PRN
Status: DISCONTINUED | OUTPATIENT
Start: 2017-10-24 | End: 2017-10-25

## 2017-10-24 RX ORDER — MIDAZOLAM HYDROCHLORIDE 1 MG/ML
1 INJECTION INTRAMUSCULAR; INTRAVENOUS EVERY 30 MIN PRN
Status: DISCONTINUED | OUTPATIENT
Start: 2017-10-24 | End: 2017-10-26

## 2017-10-24 RX ORDER — PANTOPRAZOLE SODIUM 40 MG/1
40 TABLET, DELAYED RELEASE ORAL
Status: DISCONTINUED | OUTPATIENT
Start: 2017-10-24 | End: 2017-10-31

## 2017-10-24 RX ORDER — POTASSIUM CHLORIDE 14.9 MG/ML
20 INJECTION INTRAVENOUS AS NEEDED
Status: DISCONTINUED | OUTPATIENT
Start: 2017-10-24 | End: 2017-10-31

## 2017-10-24 RX ORDER — HYDROCODONE BITARTRATE AND ACETAMINOPHEN 10; 325 MG/1; MG/1
1 TABLET ORAL EVERY 4 HOURS PRN
Status: DISCONTINUED | OUTPATIENT
Start: 2017-10-24 | End: 2017-10-25

## 2017-10-24 RX ORDER — DEXTROSE MONOHYDRATE 25 G/50ML
50 INJECTION, SOLUTION INTRAVENOUS
Status: DISCONTINUED | OUTPATIENT
Start: 2017-10-24 | End: 2017-10-25

## 2017-10-24 RX ORDER — DEXMEDETOMIDINE HYDROCHLORIDE 4 UG/ML
INJECTION, SOLUTION INTRAVENOUS CONTINUOUS
Status: DISCONTINUED | OUTPATIENT
Start: 2017-10-24 | End: 2017-10-25

## 2017-10-24 RX ORDER — CEFAZOLIN SODIUM 1 G/3ML
INJECTION, POWDER, FOR SOLUTION INTRAMUSCULAR; INTRAVENOUS
Status: DISCONTINUED | OUTPATIENT
Start: 2017-10-24 | End: 2017-10-24 | Stop reason: HOSPADM

## 2017-10-24 RX ORDER — BISACODYL 10 MG
10 SUPPOSITORY, RECTAL RECTAL
Status: DISCONTINUED | OUTPATIENT
Start: 2017-10-24 | End: 2017-10-31

## 2017-10-24 RX ORDER — DEXTROSE AND SODIUM CHLORIDE 5; .45 G/100ML; G/100ML
INJECTION, SOLUTION INTRAVENOUS CONTINUOUS
Status: ACTIVE | OUTPATIENT
Start: 2017-10-24 | End: 2017-10-24

## 2017-10-24 RX ORDER — DEXMEDETOMIDINE HYDROCHLORIDE 4 UG/ML
INJECTION, SOLUTION INTRAVENOUS CONTINUOUS
Status: DISCONTINUED | OUTPATIENT
Start: 2017-10-24 | End: 2017-10-24 | Stop reason: SDUPTHER

## 2017-10-24 RX ORDER — ASPIRIN 300 MG
300 SUPPOSITORY, RECTAL RECTAL ONCE
Status: COMPLETED | OUTPATIENT
Start: 2017-10-24 | End: 2017-10-26

## 2017-10-24 RX ORDER — IPRATROPIUM BROMIDE AND ALBUTEROL SULFATE 2.5; .5 MG/3ML; MG/3ML
3 SOLUTION RESPIRATORY (INHALATION) EVERY 4 HOURS PRN
Status: DISCONTINUED | OUTPATIENT
Start: 2017-10-24 | End: 2017-10-31

## 2017-10-24 NOTE — PROGRESS NOTES
[facility]  Progress Note    Sandi Mathew Patient Status:  Inpatient    1950 MRN [id number]   [facility] 6NE-A Attending Stephane Wagoner MD   Hosp Day # 3 PCP Kate Alvarez M.D. Subjective:   Intubated, sedated    Objecti Transdermal Once   • sodium chloride   Intravenous Once   • atorvastatin  40 mg Oral Nightly   • metoprolol tartrate  25 mg Oral 2x Daily(Beta Blocker)   • Fluticasone Propionate  1 spray Nasal Daily   • aspirin  324 mg Oral Daily     • EPINEPHrine (ADRENA bedside.   CCT 35 minutes    Martha Rojas MD

## 2017-10-24 NOTE — ANESTHESIA POSTPROCEDURE EVALUATION
[address] Patient Status:  Inpatient   Age/Gender 73year old female MRN [id number]   [facility] 6NE-A Attending Bibi Pro MD   [facility] Day # 3 PCP Sheila Flannery M.D.        Anesthesia Post-op Note    Procedure(s):

## 2017-10-24 NOTE — PLAN OF CARE
CARDIOVASCULAR - ADULT    • Maintains optimal cardiac output and hemodynamic stability Progressing          A/O.   RA. CPAP noc. IS 1000. SR on tele. Continent B & B . Up ad agustín. Pt c/o pain to R ac IV site.  IV site changed per pt request.  Plans for C

## 2017-10-24 NOTE — ANESTHESIA PREPROCEDURE EVALUATION
PRE-OP EVALUATION    Patient Name: Venkat Loo    Pre-op Diagnosis: post op bleed    Procedure(s):  Mediastinal exploration    Surgeon(s) and Role:     * Fabiola Ulloa MD - Primary    Pre-op vitals reviewed.   Temp: 97.8 °F (36.6 °C)  Pulse: 107  Resp BID   [MAR Hold] magnesium hydroxide (MILK OF MAGNESIA) 400 MG/5ML suspension 10 mL 10 mL Oral BID PRN   [MAR Hold] PEG 3350 (MIRALAX) powder packet 17 g 1 packet Oral Daily PRN   [MAR Hold] bisacodyl (DULCOLAX) rectal suppository 10 mg 10 mg Rectal Daily Hold] glucose (DEX4) oral liquid 30 g 30 g Oral Q15 Min PRN   Or      [MAR Hold] Glucose-Vitamin C (DEX-4) 4-0.006 g chewable tab 8 tablet 8 tablet Oral Q15 Min PRN   scopolamine (TRANSDERM-SCOP) 1.5 mg patch 1 patch Transdermal Once   [MAR Hold] 0.9%  NaC (SUBLIMAZE) 0.05 MG/ML injection      [COMPLETED] Midazolam HCl (VERSED) 2 MG/2ML injection      [COMPLETED] iohexol (OMNIPAQUE) 350 MG/ML injection 150 mL 150 mL Injection ONCE PRN   [] 0.9%  NaCl infusion  Intravenous Continuous       Outpatient M tobacco: Never Used    Alcohol use No       Drug use: No     Available pre-op labs reviewed.     Lab Results  Component Value Date   WBC 11.5 10/24/2017   RBC 2.86 (L) 10/24/2017   HGB 9.0 (L) 10/24/2017   HCT 25.4 (L) 10/24/2017   MCV 88.8 10/24/2017   [facility]

## 2017-10-24 NOTE — DIETARY NOTE
Nutrition Short Note    Dietitian consult received per cardiac rehab protocol. Pt to be educated by cardiac rehab staff and encouraged to attend outpatient classes taught by RD. RD available PRN.     Jay Hester, LIS, LDN

## 2017-10-24 NOTE — RESPIRATORY THERAPY NOTE
EITAN - Equipment Use Daily Summary:                  . Set Mode:                . Usage in hours:                . 90% Pressure (EPAP) level:                . 90% Insp. Pressure (IPAP): Dolly Wayne AHI:                .  Supplemental Oxygen:

## 2017-10-24 NOTE — PROGRESS NOTES
[facility]  Progress Note     Genaro Sesay Patient Status:  Observation    1950 MRN [id number]   [facility] 8NE-A Attending Virginia Lott MD   Hosp Day # 3 PCP Ricardo Messina M.D.      Chief Complaint: NSTEMI    S: Patient (PROTONIX) IV push  40 mg Intravenous QAM AC    Or   • Pantoprazole Sodium  40 mg Oral QAM AC   • Chlorhexidine Gluconate  15 mL Mouth/[email or web address] [email or web address]   • scopolamine  1 patch Transdermal Once   • sodium chloride   Intravenous Once   • coagulation fac

## 2017-10-24 NOTE — ANESTHESIA POSTPROCEDURE EVALUATION
[address] Patient Status:  Inpatient   Age/Gender 73year old female MRN [id number]   [facility] 6NE-A Attending Malcolm Sanchez MD   [address] Day # 3 PCP Anna Roth M.D.        Anesthesia Post-op Note    Procedure(s):

## 2017-10-25 ENCOUNTER — APPOINTMENT (OUTPATIENT)
Dept: GENERAL RADIOLOGY | Facility: HOSPITAL | Age: 67
DRG: 234 | End: 2017-10-25
Attending: THORACIC SURGERY (CARDIOTHORACIC VASCULAR SURGERY)
Payer: MEDICARE

## 2017-10-25 ENCOUNTER — APPOINTMENT (OUTPATIENT)
Dept: GENERAL RADIOLOGY | Facility: HOSPITAL | Age: 67
DRG: 234 | End: 2017-10-25
Attending: PHYSICIAN ASSISTANT
Payer: MEDICARE

## 2017-10-25 PROBLEM — Z95.1 S/P CABG (CORONARY ARTERY BYPASS GRAFT): Status: ACTIVE | Noted: 2017-10-25

## 2017-10-25 PROCEDURE — 71010 XR CHEST AP PORTABLE  (CPT=71010): CPT | Performed by: THORACIC SURGERY (CARDIOTHORACIC VASCULAR SURGERY)

## 2017-10-25 PROCEDURE — 71010 XR CHEST AP PORTABLE  (CPT=71010): CPT | Performed by: PHYSICIAN ASSISTANT

## 2017-10-25 PROCEDURE — 99232 SBSQ HOSP IP/OBS MODERATE 35: CPT | Performed by: HOSPITALIST

## 2017-10-25 RX ORDER — FUROSEMIDE 10 MG/ML
40 INJECTION INTRAMUSCULAR; INTRAVENOUS
Status: DISCONTINUED | OUTPATIENT
Start: 2017-10-25 | End: 2017-10-26

## 2017-10-25 RX ORDER — AMIODARONE HYDROCHLORIDE 200 MG/1
400 TABLET ORAL DAILY
Status: DISCONTINUED | OUTPATIENT
Start: 2017-10-27 | End: 2017-10-29

## 2017-10-25 RX ORDER — DILTIAZEM HCL-SODIUM CHLORIDE IV SOLN 125 MG/125ML-0.9% 125-0.9/125 MG/ML-%
10 SOLUTION INTRAVENOUS CONTINUOUS
Status: DISCONTINUED | OUTPATIENT
Start: 2017-10-25 | End: 2017-10-27

## 2017-10-25 RX ORDER — DEXTROSE MONOHYDRATE 25 G/50ML
50 INJECTION, SOLUTION INTRAVENOUS
Status: DISCONTINUED | OUTPATIENT
Start: 2017-10-25 | End: 2017-10-31

## 2017-10-25 RX ORDER — ACETAMINOPHEN 10 MG/ML
1000 INJECTION, SOLUTION INTRAVENOUS EVERY 6 HOURS
Status: COMPLETED | OUTPATIENT
Start: 2017-10-25 | End: 2017-10-26

## 2017-10-25 RX ORDER — DILTIAZEM HYDROCHLORIDE 5 MG/ML
10 INJECTION INTRAVENOUS ONCE
Status: COMPLETED | OUTPATIENT
Start: 2017-10-25 | End: 2017-10-25

## 2017-10-25 RX ORDER — AMIODARONE HYDROCHLORIDE 200 MG/1
400 TABLET ORAL EVERY 8 HOURS
Status: COMPLETED | OUTPATIENT
Start: 2017-10-25 | End: 2017-10-26

## 2017-10-25 NOTE — CONSULTS
[facility]  Progress Note    Main Kaye Patient Status:  Inpatient    1950 MRN [id number]   [facility] 6NE-A Attending Kiersten Cody, [address] [facility] Day # 4 PCP Chapis Donohue M.D.        Assessment/Plan: 74 yo admitted with NSTEMI 7. 45   ISTAT Blood Gas PCO2 34 (L) 35 - 45 mmHg   ISTAT Blood Gas PO2 41 (L) 80 - 105 mmHg   ISTAT Blood Gas TCO2 23 22 - 32 mmol/L   ISTAT Blood Gas HCO3 21.7 (L) 22.0 - 26.0 mEq/L   ISTAT Blood Gas O2 Saturation 87 (L) 92 - 100 %   ISTAT Sample Type Mixe ISTAT ACTIVATED CLOTTING TIME   Collection Time: 10/24/17 11:09 AM   Result Value Ref Range   ISAT Activated Clotting Time 120 74 - 137 seconds   -POCT ISTAT CG8 CARTRIDGE   Collection Time: 10/24/17 11:10 AM   Result Value Ref Range   ISTAT Sodium 141 136 DIFFERENTIAL   Collection Time: 10/24/17 12:00 PM   Result Value Ref Range   WBC 11.5 4.0 - 13.0 x10(3) uL   RBC 2.86 (L) 3.80 - 5.10 x10(6)uL   HGB 9.0 (L) 12.0 - 16.0 g/dL   HCT 25.4 (L) 34.0 - 50.0 %   PLT 96.0 (L) 150.0 - 450.0 10(3)uL   MCV 88.8 81.0 Hematocrit 30 (L) 37 - 54 %   ISTAT Glucose 260 (H) 65 - 99 mg/dL   ISTAT Blood Gas pH 7.24 (L) 7.35 - 7.45   ISTAT Blood Gas PCO2 36 35 - 45 mmHg   ISTAT Blood Gas PO2 287 (H) 80 - 105 mmHg   ISTAT Blood Gas TCO2 16 (L) 22 - 32 mmol/L   ISTAT Blood Gas HC 0.60 x10(3) uL   Eosinophil Absolute 0.01 0.00 - 0.30 x10(3) uL   Basophil Absolute 0.03 0.00 - 0.10 x10(3) uL   Immature Granulocyte Absolute 0.08 0.00 - 1.00 x10(3) uL   Neutrophil % 85.5 %   Lymphocyte % 9.8 %   Monocyte % 3.9 %   Eosinophil % 0.1 %   B Collection Time: 10/24/17  9:05 PM   Result Value Ref Range   ABG pH 7.48 (H) 7.35 - 7.45   ABG pCO2 34 (L) 35 - 45 mm Hg   ABG pO2 108 (H) 80 - 105 mm Hg   ABG HCO3 24.6 22.0 - 26.0 mEq/L   ABG Base Excess 1.3    ABG O2 Saturation 97 92 - 100 %   Calcul Product Status Presumed Transfused    Expiration Date [id number]    Blood Type Barcode 9500    -PREPARE RBC   Collection Time: 10/25/17 12:30 AM   Result Value Ref Range   Blood Product [id number]    Unit Number [id number]    UNIT ABO O    UNIT RH Basophil % 0.1 %   Immature Granulocyte % 0.5 %   -SCAN SLIDE   Collection Time: 10/25/17  3:59 AM   Result Value Ref Range   Slide Review Platelets reviewed on smear    -POCT GLUCOSE   Collection Time: 10/25/17  4:02 AM   Result Value Ref Range   POC Gl

## 2017-10-25 NOTE — OCCUPATIONAL THERAPY NOTE
OCCUPATIONAL THERAPY EVALUATION - INPATIENT     Room Number: 0963/8796-M  Evaluation Date: 10/25/2017  Type of Evaluation: Initial  Presenting Problem: CABG    Physician Order: IP Consult to Occupational Therapy  Reason for Therapy: ADL/IADL Dysfunction an techniques;Relaxation;Repositioning    COGNITION  Overall Cognitive Status:  WFL - within functional limits    VISION  Current Vision: no visual deficits    PERCEPTION  Overall Perception Status:   WFL - within functional limits    SENSATION  Light touch: mobility, education, and t/f. Patient End of Session: Up in chair;Needs met;Call light within reach;RN aware of session/findings; All patient questions and concerns addressed; Family present;SCDs in place    ASSESSMENT     Patient is a 73year old female ad will perform all ADLs: with supervision    Functional Transfer Goals  Patient will perform all functional transfers:  with supervision    UE Exercise Program Goal  Patient will be independent with bilateral AROM HEP (home exercise program).     Additional G

## 2017-10-25 NOTE — PROGRESS NOTES
[facility]  CV Surgery Progress Note    Pina Hu Patient Status:  Inpatient    1950 MRN [id number]   [facility] 6NE-A Attending Lazarus Handy, [address] [facility] Day # 4 PCP Susan Landa M.D.      Subjective:  Pt seen sitting up in

## 2017-10-25 NOTE — PROGRESS NOTES
[facility]  Progress Note    Magda Miriams Patient Status:  Inpatient    1950 MRN [id number]   [facility] 6NE-A Attending Jessenia Reed, [address] [facility] Day # 4 PCP Lorri Najjar, M.D.        Assessment and Plan:  Patient Active Proble non-tender. Extremities: tr edema  Neurologic: Alert and oriented, normal affect. Skin: Warm and dry.      Laboratory/Data:    Labs:    Lab Results  Component Value Date   WBC 15.4 10/25/2017   HGB 10.1 10/25/2017   HCT 28.0 10/25/2017   PLT 93.0 10/25/2 0.1-4 mcg/kg/min Intravenous Continuous PRN   aspirin 300 MG rectal suppository 300 mg 300 mg Rectal Once   Or      aspirin tab 325 mg 325 mg Per NG Tube Once   docusate sodium (COLACE) cap 100 mg 100 mg Oral BID   Or      docusate sodium (COLACE) liquid 1 Cyclobenzaprine HCl (cyclobenzaprine) tab 5 mg 5 mg Oral TID PRN   aspirin chewable tab 324 mg 324 mg Oral Daily       Kelli Live MD  10/25/2017  2:58 PM

## 2017-10-25 NOTE — OPERATIVE REPORT
[facility]    PATIENT'S NAME: Comfort Barakat   ATTENDING PHYSICIAN: Rayna Escalera.  Sharron Carpio PHYSICIAN: Afua Martinez MD   PATIENT ACCOUNT#:   [id number]    LOCATION:  [address] [place] [address]  MEDICAL RECORD #:   [id number]       DATE OF BIRTH:  [birth year] reclosed. She was returned to the ICU. As of this dictation at 5:15 p.m, she is stable without any further bleeding. The patient's family was updated by me.       Dictated By Stephan Jacobs MD  d: 10/24/2017 17:19:06  t: 10/25/2017 10:11:12  [place] [id number]

## 2017-10-25 NOTE — PROGRESS NOTES
[facility]  Progress Note     Vero Huang Patient Status:  Observation    1950 MRN [id number]   [facility] 8NE-A Attending Vasyl Malone MD   Hosp Day # 4 PCP Bonny Jain M.D.      Chief Complaint: NSTEMI    S: Patient 1 Application Nasal BID   • ceFAZolin  2 g Intravenous Q8H   • pantoprazole (PROTONIX) IV push  40 mg Intravenous QAM AC    Or   • Pantoprazole Sodium  40 mg Oral QAM AC   • scopolamine  1 patch Transdermal Once   • sodium chloride   Intravenous Once   • s

## 2017-10-25 NOTE — OPERATIVE REPORT
[address]    PATIENT'S NAME: Kristine Ta   ATTENDING PHYSICIAN: Lina Olivera.  [facility] PHYSICIAN: Matthew Jane MD   PATIENT ACCOUNT#:   [id number]    LOCATION:  [address] [place] [address]  MEDICAL RECORD #:   [id number]       DATE OF BIRTH:  [birth year]23 cardiopulmonary bypass. On bypass, the patient was cooled to 32 degrees centigrade. The aorta was crossclamped. Blood cardioplegia was infused to achieve electromechanical arrest of the heart. The right coronary artery was dissected out extensively.   Gary Meza Pranay Nunes MD  d: 10/24/2017 17:19:06  t: 10/25/2017 09:58:28  [facility] [id number]  /

## 2017-10-25 NOTE — CONSULTS
[address]    PATIENT'S NAME: Rakesh Cortes   ATTENDING PHYSICIAN: Brandt Zheng. Emma Epstein PHYSICIAN: Jarod Adams M.D.    PATIENT ACCOUNT#:   [de-identified]    LOCATION:  [address] [place] [address]  MEDICAL RECORD #:   [id number]       DATE OF BIRTH Patient has a face mask on. Normocephalic, atraumatic. NECK:  Supple. LUNGS:  Clear bilaterally anteriorly. HEART:  Regular rate and rhythm. ABDOMEN:  Soft, nontender. Normoactive bowel sounds. EXTREMITIES:  Trace edema diffusely.   NEUROLOGIC:  Kika

## 2017-10-25 NOTE — PHYSICAL THERAPY NOTE
PHYSICAL THERAPY EVALUATION - INPATIENT     Room Number: 0042/7507-L  Evaluation Date: 10/25/2017  Type of Evaluation: Initial  Physician Order: PT Eval and Treat    Presenting Problem: s/p CABG 10/24/17  Reason for Therapy: Mobility Dysfunction and Berdie Conquest STRENGTH ASSESSMENT  Upper extremity ROM and strength are within functional limits     Lower extremity ROM is within functional limits     Lower extremity strength is within functional limits except for the following:    Right Hip flexion  4+/5  Left Hip f transfer set up to maintain sternal precautions. Pt sit-stand with HHA/MOD assist for force generation. Pt ambulated 3ft with HHA/MIN assist for balance.  Pt ambulates with slow george, shuffle gait pattern, poor foot clearance, and decreased limb advancem functional mobility deficits in order to return to independent prior level of function. DISCHARGE RECOMMENDATIONS  PT Discharge Recommendations: Home with home health PT    PLAN  PT Treatment Plan: Bed mobility; Endurance; Energy conservation;Patient educa

## 2017-10-26 PROCEDURE — 99232 SBSQ HOSP IP/OBS MODERATE 35: CPT | Performed by: HOSPITALIST

## 2017-10-26 PROCEDURE — 05H533Z INSERTION OF INFUSION DEVICE INTO RIGHT SUBCLAVIAN VEIN, PERCUTANEOUS APPROACH: ICD-10-PCS | Performed by: PHYSICIAN ASSISTANT

## 2017-10-26 RX ORDER — WARFARIN SODIUM 2.5 MG/1
2.5 TABLET ORAL
Status: COMPLETED | OUTPATIENT
Start: 2017-10-26 | End: 2017-10-26

## 2017-10-26 RX ORDER — SCOLOPAMINE TRANSDERMAL SYSTEM 1 MG/1
1 PATCH, EXTENDED RELEASE TRANSDERMAL
Status: DISCONTINUED | OUTPATIENT
Start: 2017-10-27 | End: 2017-10-26

## 2017-10-26 RX ORDER — METOPROLOL TARTRATE 50 MG/1
50 TABLET, FILM COATED ORAL
Status: DISCONTINUED | OUTPATIENT
Start: 2017-10-26 | End: 2017-10-27

## 2017-10-26 RX ORDER — DIPHENHYDRAMINE HYDROCHLORIDE 50 MG/ML
12.5 INJECTION INTRAMUSCULAR; INTRAVENOUS EVERY 4 HOURS PRN
Status: DISCONTINUED | OUTPATIENT
Start: 2017-10-26 | End: 2017-10-31

## 2017-10-26 RX ORDER — MORPHINE SULFATE 4 MG/ML
INJECTION, SOLUTION INTRAMUSCULAR; INTRAVENOUS
Status: DISPENSED
Start: 2017-10-26 | End: 2017-10-27

## 2017-10-26 RX ORDER — DIPHENHYDRAMINE HCL 25 MG
25 CAPSULE ORAL EVERY 4 HOURS PRN
Status: DISCONTINUED | OUTPATIENT
Start: 2017-10-26 | End: 2017-10-31

## 2017-10-26 RX ORDER — SODIUM CHLORIDE 0.9 % (FLUSH) 0.9 %
10 SYRINGE (ML) INJECTION EVERY 12 HOURS
Status: DISCONTINUED | OUTPATIENT
Start: 2017-10-26 | End: 2017-10-31

## 2017-10-26 RX ORDER — MORPHINE SULFATE 4 MG/ML
2 INJECTION, SOLUTION INTRAMUSCULAR; INTRAVENOUS EVERY 4 HOURS PRN
Status: DISCONTINUED | OUTPATIENT
Start: 2017-10-26 | End: 2017-10-26

## 2017-10-26 RX ORDER — SCOLOPAMINE TRANSDERMAL SYSTEM 1 MG/1
1 PATCH, EXTENDED RELEASE TRANSDERMAL
Status: DISCONTINUED | OUTPATIENT
Start: 2017-10-26 | End: 2017-10-31

## 2017-10-26 NOTE — CDS QUERY
Potential for Impaired Tissue Perfusion  CLINICAL DOCUMENTATION CLARIFICATION FORM    Dear Provider,   Clinical information (provided below) suggests Potential for Impaired Tissue Perfusion.  For accurate ICD-10-CM code assignment to reflect severity of ill

## 2017-10-26 NOTE — PHYSICAL THERAPY NOTE
PHYSICAL THERAPY TREATMENT NOTE - INPATIENT    Room Number: 1201/7712-I     Session: 1  Number of Visits to Meet Established Goals: 5    Presenting Problem: s/p CABG 10/24/17     History related to current admission: Pt is a 73 y.o.  Female admitted 10/19/ (including adjusting bedclothes, sheets and blankets)?: A Lot   -   Sitting down on and standing up from a chair with arms (e.g., wheelchair, bedside commode, etc.): A Little   -   Moving from lying on back to sitting on the side of the bed?: A Little   Ho recommendations with /    ASSESSMENT   Pt progressing with functional mobility and endurance. Pt with dizziness limiting mobility this session. Pt able to recall 1/3 sternal precautions.  Pt continues to present with the following i

## 2017-10-26 NOTE — PROGRESS NOTES
[facility]  Progress Note    Marielle Don Patient Status:  Inpatient    1950 MRN [id number]   [facility] 6NE-A Attending Yana Ordaz, [address] [facility] Day # 5 PCP Kaylin Brown M.D. Subjective:  Pt with some nausea still.  Pain POD#2   - HD stable   - mild Vol OL - will hold diuretics today given Cr bump and positional dizziness.    - PAF - rates better on Cardizem, Amio initiated   - Poor IV access - Midline   - TCP - stable, HIPA pending   - Pain control/IS/ambulation      Yefri

## 2017-10-26 NOTE — OCCUPATIONAL THERAPY NOTE
OCCUPATIONAL THERAPY TREATMENT NOTE - INPATIENT     Room Number: 1642/2021-N  Session: 1   Number of Visits to Meet Established Goals: 5    Presenting Problem: CABG    History related to current admission: Pt is a 73year old female admit on 10/19/2017 for as brushing teeth?: None  -   Eating meals?: None    AM-PAC Score:  Score: 18  Approx Degree of Impairment: 46.65%  Standardized Score (AM-PAC Scale): 38.66  CMS Modifier (G-Code): CK    FUNCTIONAL TRANSFER ASSESSMENT  Supine to Sit : Minimum assistance  S independent with bilateral AROM HEP (home exercise program).     Additional Goals:  Pt will verbalize knowledge of sternal precautions independently  Pt will demonstrate ability to follow sternal precautions with independently  Pt will complete all ADLs an

## 2017-10-26 NOTE — CONSULTS
[facility]  Progress Note    Main Kaye Patient Status:  Inpatient    1950 MRN [id number]   [facility] 6NE-A Attending Kiersten Cody, [address] [facility] Day # 5 PCP Chapis Donohue M.D.        Assessment/Plan: 72 yo admitted with NSTEMI Duration 90 ms   Q-T Interval 354 ms   QTC Calculation (Bezet) 546 ms   P Axis  degrees   R Axis 6 degrees   T Axis 217 degrees   -POCT GLUCOSE   Collection Time: 10/25/17  8:25 PM   Result Value Ref Range   POC Glucose 191 (H) 65 - 99 mg/dL   -POCT GLUCOS Eosinophil Absolute 0.00 0.00 - 0.30 x10(3) uL   Basophil Absolute 0.02 0.00 - 0.10 x10(3) uL   Immature Granulocyte Absolute 0.17 0.00 - 1.00 x10(3) uL   Neutrophil % 86.7 %   Lymphocyte % 8.7 %   Monocyte % 3.6 %   Eosinophil % 0.0 %   Basophil % 0.1 %

## 2017-10-26 NOTE — PROGRESS NOTES
[facility]  Progress Note    Jeremias Piña Patient Status:  Inpatient    1950 MRN [id number]   [facility] 6NE-A Attending Shanna Ackerman MD   [facility] Day # 5 PCP Ty Mares M.D. Subjective:  Denies any complaints.  States docusate sodium  100 mg Oral BID   • mupirocin  1 Application Nasal BID   • pantoprazole (PROTONIX) IV push  40 mg Intravenous QAM AC    Or   • Pantoprazole Sodium  40 mg Oral QAM AC   • scopolamine  1 patch Transdermal Once   • sodium chloride   Intraveno

## 2017-10-26 NOTE — PROGRESS NOTES
[facility]  Progress Note     Marielle Don Patient Status:  Observation    1950 MRN [id number]   [facility] 8NE-A Attending Cheri To MD   Hosp Day # 5 PCP Kaylin Brown M.D.      Chief Complaint: NSTEMI    S: Patient mg Intravenous BID (Diuretic)   • Insulin Aspart Pen  1-20 Units Subcutaneous TID AC   • [START ON 10/27/2017] amiodarone HCl  400 mg Oral Daily   • docusate sodium  100 mg Oral BID    Or   • docusate sodium  100 mg Oral BID   • mupirocin  1 Application Na

## 2017-10-26 NOTE — CM/SW NOTE
Received order for [facility] post surgery. Pt came in with cp and had open heart surgery    Met with pt and  at bedside. Pt normally independent with adl's. No hx of rehab or HH.  will be home with pt. Gave CV binder to pt.   said he got one b

## 2017-10-27 PROCEDURE — 99232 SBSQ HOSP IP/OBS MODERATE 35: CPT | Performed by: HOSPITALIST

## 2017-10-27 RX ORDER — SODIUM CHLORIDE 9 MG/ML
INJECTION, SOLUTION INTRAVENOUS ONCE
Status: COMPLETED | OUTPATIENT
Start: 2017-10-27 | End: 2017-10-27

## 2017-10-27 RX ORDER — WARFARIN SODIUM 2.5 MG/1
2.5 TABLET ORAL
Status: DISCONTINUED | OUTPATIENT
Start: 2017-10-27 | End: 2017-10-27

## 2017-10-27 RX ORDER — WARFARIN SODIUM 5 MG/1
5 TABLET ORAL
Status: COMPLETED | OUTPATIENT
Start: 2017-10-27 | End: 2017-10-27

## 2017-10-27 RX ORDER — ACETAMINOPHEN 500 MG
1000 TABLET ORAL EVERY 6 HOURS PRN
Status: DISCONTINUED | OUTPATIENT
Start: 2017-10-27 | End: 2017-10-31

## 2017-10-27 RX ORDER — ACETAMINOPHEN 325 MG/1
650 TABLET ORAL EVERY 6 HOURS PRN
Status: DISCONTINUED | OUTPATIENT
Start: 2017-10-27 | End: 2017-10-27

## 2017-10-27 NOTE — CM/SW NOTE
TRISTAN spoke to UR who got updated from [employer]. Gwen Hemphill also saw that [employer] came up when they verified Medicare but they called and confirmed that this policy had termed.     SW/CM to confirm with pt or family that they were able to

## 2017-10-27 NOTE — CM/SW NOTE
[facility] informed sw that when they verified pt's Medicare it was showing Group 's as primary. Pt needs to call Medicare to inform them that pt no longer has this insurance. SW met with pt and her .  Pt states the Group 's Ins

## 2017-10-27 NOTE — OCCUPATIONAL THERAPY NOTE
OCCUPATIONAL THERAPY TREATMENT NOTE - INPATIENT     Room Number: 8609/5972-Z  Session: 2   Number of Visits to Meet Established Goals: 5    Presenting Problem: CABG    History related to current admission: Pt is a 73year old female admit on 10/19/2017 for brushing teeth?: A Little  -   Eating meals?: None    AM-PAC Score:  Score: 15  Approx Degree of Impairment: 56.46%  Standardized Score (AM-PAC Scale): 34.69  CMS Modifier (G-Code): CK    FUNCTIONAL TRANSFER ASSESSMENT  Supine to Sit : Moderate assistance conservation/work simplification techniques;ADL training;IADL training;Continued evaluation; Compensatory technique education; Neuromuscluar reeducation;Patient/Family training;Patient/Family education;Cognitive reorientation; Functional transfer training;UE

## 2017-10-27 NOTE — PROGRESS NOTES
[place] [facility]  Progress Note     Viviane Quintero Patient Status:  Observation    1950 MRN [id number]   [facility] 8NE-A Attending Gualberto Shea MD   Hosp Day # 6 PCP Rhina Clifton M.D.      Chief Complaint: NSTEMI    S: Patient Daily(Beta Blocker)   • DilTIAZem HCl  30 mg Oral 4 times per day   • Insulin Aspart Pen  1-50 Units Subcutaneous TID CC and HS   • scopolamine  1 patch Transdermal Q72H   • amiodarone HCl  400 mg Oral Daily   • docusate sodium  100 mg Oral BID   • mupiroc

## 2017-10-27 NOTE — PROGRESS NOTES
[facility]  Progress Note    Savanah Mathur Patient Status:  Inpatient    1950 MRN [id number]   [facility] 6[facility]A Attending Ashley Gottlieb, [address] [facility] Day # 6 PCP Marilynn Gentile M.D.      Subjective: doing well, minimal incisional ch 10/26/17   0425  10/27/17   0920   NA  145*   --   141  143   K  3.2*  4.3  3.9  3.5*   CL  113*   --   107  108   CO2  25.0   --   26.0  27.0   BUN  16   --   29*  52*   CREATSERUM  0.95   --   1.61*  1.63*   CA  8.6   --   8.7  9.1   MG  1.8   --    -- carotid bifurcation based on velocity criteria.     Dictated by: Pawan Rowley MD on 10/21/2017 at 14:54     Approved by: Pawan Rowley MD            Xr Chest Ap Portable  (cpt=71010)    Result Date: 10/25/2017  PROCEDURE:  XR CHEST AP PORTABLE (CPT=71010)  TECHN Approved by: Vickey Werner MD            Xr Chest Ap Portable  (cpt=71010)    Result Date: 10/24/2017  PROCEDURE:  XR CHEST AP PORTABLE (CPT=71010)  TECHNIQUE:  AP chest radiograph was obtained.   COMPARISON:  [facility] XR CHEST AP PORTABLE  (CPT=71010), 1 10/24/2017 at 14:33     Approved by: Aram Russell MD            Xr Chest Ap Portable  (cpt=71010)    Result Date: 10/24/2017  PROCEDURE:  XR CHEST AP PORTABLE (CPT=71010)  TECHNIQUE:  AP chest radiograph was obtained.   COMPARISON:  [facility] , XR CHEST AP PO 1/23/2016, 20:17. INDICATIONS:  chest pain  PATIENT STATED HISTORY: (As transcribed by Technologist)  Anterior left sided chest pain tonight. No trouble breathing at this time. FINDINGS:  Cardiac size and pulmonary vasculature are within normal limits. (MIRALAX) powder packet 17 g 1 packet Oral Daily PRN   bisacodyl (DULCOLAX) rectal suppository 10 mg 10 mg Rectal Daily PRN   ondansetron HCl (ZOFRAN) injection 4 mg 4 mg Intravenous Q6H PRN   potassium chloride IVPB premix 20 mEq 20 mEq Intravenous PRN needed  · ECG in am (QT)    D/w pt and the Nurses    Janett Reyes M.D., FDEMETRIOCGaboC.   Advanced Heart Failure  [facility]    10/27/2017  1:51 PM

## 2017-10-27 NOTE — PROGRESS NOTES
[facility]  CV Surgery Progress Note    Zacarias Lion Patient Status:  Inpatient    1950 MRN [id number]   [facility] 6NE-A Attending Harris Haywood, [address] [facility] Day # 6 PCP Kathryn Medina M.D. Subjective:  Pt seen in bed.  She r

## 2017-10-27 NOTE — PROGRESS NOTES
[facility]  Progress Note    Burton Mohs Patient Status:  Inpatient    1950 MRN [id number]   [facility] 6NE-A Attending Ginette Wilcox, [address] [facility] Day # 6 PCP Dell Vargas M.D.       SUBJECTIVE:  No acute events overnight.   No Medications:  acetaminophen (TYLENOL EXTRA STRENGTH) tab 1,000 mg 1,000 mg Oral Q6H PRN   Normal Saline Flush 0.9 % injection 10 mL 10 mL Intravenous Q12H   Metoprolol Tartrate (LOPRESSOR) tab 50 mg 50 mg Oral 2x Daily(Beta Blocker)   DilTIAZem HCl (CARDIZ ipratropium-albuterol (DUONEB) nebulizer solution 3 mL 3 mL Nebulization Q4H PRN   atorvastatin (LIPITOR) tab 40 mg 40 mg Oral Nightly   Fluticasone Propionate (FLONASE) 50 MCG/ACT nasal spray 1 spray 1 spray Nasal Daily   Cyclobenzaprine HCl (cyclobenza

## 2017-10-27 NOTE — PHYSICAL THERAPY NOTE
PHYSICAL THERAPY TREATMENT NOTE - INPATIENT    Room Number: 5557/2126-Y     Session: 2  Number of Visits to Meet Established Goals: 5    Presenting Problem: s/p CABG 10/24/17     History related to current admission: Pt is a 73 y.o.  Female admitted 10/19/ bedclothes, sheets and blankets)?: A Little   -   Sitting down on and standing up from a chair with arms (e.g., wheelchair, bedside commode, etc.): A Little   -   Moving from lying on back to sitting on the side of the bed?: A Little   How much help from a impairments, and decreased knowledge of sternal precautions. These impairments and comorbidities manifest themselves as functional limitations in independent bed mobility, transfers, and gait.  The patient is below baseline and would benefit from skilled in

## 2017-10-28 PROCEDURE — 99232 SBSQ HOSP IP/OBS MODERATE 35: CPT | Performed by: HOSPITALIST

## 2017-10-28 RX ORDER — WARFARIN SODIUM 5 MG/1
5 TABLET ORAL NIGHTLY
Status: DISCONTINUED | OUTPATIENT
Start: 2017-10-28 | End: 2017-10-30

## 2017-10-28 RX ORDER — TRAMADOL HYDROCHLORIDE 50 MG/1
50 TABLET ORAL EVERY 6 HOURS PRN
Status: DISCONTINUED | OUTPATIENT
Start: 2017-10-28 | End: 2017-10-31

## 2017-10-28 RX ORDER — DILTIAZEM HYDROCHLORIDE 120 MG/1
120 CAPSULE, EXTENDED RELEASE ORAL DAILY
Status: DISCONTINUED | OUTPATIENT
Start: 2017-10-29 | End: 2017-10-29

## 2017-10-28 RX ORDER — POTASSIUM CHLORIDE 20 MEQ/1
40 TABLET, EXTENDED RELEASE ORAL EVERY 4 HOURS
Status: COMPLETED | OUTPATIENT
Start: 2017-10-28 | End: 2017-10-28

## 2017-10-28 NOTE — PROGRESS NOTES
[facility]  Progress Note    viky DelarosaHoliness Patient Status:  Inpatient    1950 MRN [id number]   [facility] 8NE-A Attending Pao Quach, [address] [facility] Day # 7 PCP Sj Shearer M.D.        Assessment and Plan:  Patient Active Proble Wt Readings from Last 3 Encounters:  10/27/17 : 172 lb 14.4 oz (78.4 kg)  04/07/16 : 170 lb (77.1 kg)  01/23/16 : 161 lb 13.1 oz (73.4 kg)      Allergies:    Iodine [Radiology C*      Lisinopril              Rash    Physical Exam:  Blood pressure 119 Glucose-Vitamin C (DEX-4) 4-0.006 g chewable tab 4 tablet 4 tablet Oral Q15 Min PRN   Or      dextrose 50% injection 50 mL 50 mL Intravenous Q15 Min PRN   Or      glucose (DEX4) oral liquid 30 g 30 g Oral Q15 Min PRN   Or      Glucose-Vitamin C (DEX-4) 4

## 2017-10-28 NOTE — PROGRESS NOTES
[facility]  Progress Note     Sandi Quincy Patient Status:  Observation    1950 MRN [id number]   [facility] 8NE-A Attending Stephane Wagoner MD   Hosp Day # 7 PCP Kate Alvarez M.D.      Chief Complaint: NSTEMI    S: Patient 30 mg Oral 4 times per day   • Insulin Aspart Pen  1-50 Units Subcutaneous TID CC and HS   • scopolamine  1 patch Transdermal Q72H   • amiodarone HCl  400 mg Oral Daily   • docusate sodium  100 mg Oral BID   • mupirocin  1 Application Nasal BID   • Pantopr

## 2017-10-28 NOTE — PLAN OF CARE
Problem: CARDIOVASCULAR - ADULT  Goal: Maintains optimal cardiac output and hemodynamic stability  INTERVENTIONS:  - Monitor vital signs, rhythm, and trends  - Monitor for bleeding, hypotension and signs of decreased cardiac output  - Evaluate effectivenes breathe, Incentive Spirometry  - Assess the need for suctioning and perform as needed  - Assess and instruct to report SOB or any respiratory difficulty  - Respiratory Therapy support as indicated  - Manage/alleviate anxiety  - Monitor for signs/symptoms o Promote increasing activity/tolerance for mobility and gait  - Educate and engage patient/family in tolerated activity level and precautions     Outcome: Progressing      Problem: Impaired Activities of Daily Living  Goal: Achieve highest/safest level of i

## 2017-10-28 NOTE — PROGRESS NOTES
[facility]   [facility] Progress Note    [facility] Patient Status:  Inpatient    1950 MRN [id number]   [facility] 8NE-A Attending Hina Ardon, [address] [facility] Day # 7 PCP Socorro Pallas, M.D.      Subjective:  Pt up to chair; ambulatin 25 mg Oral Q4H PRN   glucose (DEX4) oral liquid 15 g 15 g Oral Q15 Min PRN   Or      Glucose-Vitamin C (DEX-4) 4-0.006 g chewable tab 4 tablet 4 tablet Oral Q15 Min PRN   Or      dextrose 50% injection 50 mL 50 mL Intravenous Q15 Min PRN   Or      glucose +BS  Extremities:warm, dry, no edema  Pulses: intact  Incisions: sternal incision CDI       Assessment/Plan:  Patient Active Problem List:     Acute chest pain     NSTEMI (non-ST elevated myocardial infarction) ([address]     CAD in native artery     Paroxysmal

## 2017-10-28 NOTE — PLAN OF CARE
Patient is alert and oriented. On room air. NSR for now on tele was Afib before. Mid sternal incision and leg donor sites painted with betadine. Patient has pain that is being controlled by oral pain medication. Patient ambulates with walker and SBA.    P

## 2017-10-28 NOTE — PROGRESS NOTES
[facility]  Progress Note    Mavis Flores Patient Status:  Inpatient    1950 MRN [id number]   [facility] 6NE-A Attending Manjula Campo, [address] [facility] Day # 7 PCP Ovi Vaughn M.D.       SUBJECTIVE:  No acute events overnight.   No 10/26/17  0711 10/26/17  1452 10/26/17  1726 10/26/17  2104 10/27/17  0718   PGLU 164* 195* 223* 237* 230* 284* 223* 205* 165* 136* 119* 121* 125* 131* 126* 120* 111* 111* 109* 106* 105* 128* 191* 177* 169* 178* 132* 124* 108* 108*       Meds:     Current IVPB premix 2 g 2 g Intravenous PRN   mupirocin (BACTROBAN) 2% nasal ointment OINT 1 Application 1 Application Nasal BID   Pantoprazole Sodium (PROTONIX) EC tab 40 mg 40 mg Oral QAM AC   ipratropium-albuterol (DUONEB) nebulizer solution 3 mL 3 mL Nebulizat

## 2017-10-29 ENCOUNTER — PRIOR ORIGINAL RECORDS (OUTPATIENT)
Dept: OTHER | Age: 67
End: 2017-10-29

## 2017-10-29 PROCEDURE — 99232 SBSQ HOSP IP/OBS MODERATE 35: CPT | Performed by: HOSPITALIST

## 2017-10-29 RX ORDER — WARFARIN SODIUM 5 MG/1
5 TABLET ORAL
Status: DISCONTINUED | OUTPATIENT
Start: 2017-10-29 | End: 2017-10-30

## 2017-10-29 RX ORDER — ALPRAZOLAM 0.25 MG/1
0.12 TABLET ORAL ONCE
Status: COMPLETED | OUTPATIENT
Start: 2017-10-29 | End: 2017-10-29

## 2017-10-29 RX ORDER — POTASSIUM CHLORIDE 20 MEQ/1
40 TABLET, EXTENDED RELEASE ORAL ONCE
Status: COMPLETED | OUTPATIENT
Start: 2017-10-29 | End: 2017-10-29

## 2017-10-29 RX ORDER — AMIODARONE HYDROCHLORIDE 200 MG/1
200 TABLET ORAL DAILY
Status: DISCONTINUED | OUTPATIENT
Start: 2017-10-30 | End: 2017-10-31

## 2017-10-29 RX ORDER — ASPIRIN 81 MG/1
81 TABLET, CHEWABLE ORAL DAILY
Status: DISCONTINUED | OUTPATIENT
Start: 2017-10-30 | End: 2017-10-31

## 2017-10-29 NOTE — PROGRESS NOTES
· [facility] Progress Note     Subjective:  Was just up in halls - doing well    Objective:  100/76  Afebrile  SR  I/O - 880     BUN/cr 34/1.06  INR 2.32  Hgb 9  plt 104    Cardiac:S1 S2 regular  Lungs: clear, decreased BS bases  Abdomen:soft

## 2017-10-29 NOTE — PROGRESS NOTES
[facility]   [facility] Progress Note    Brie Kern Patient Status:  Inpatient    1950 MRN [id number]   [facility] 8NE-A Attending Kirill Leonardo, [address] [facility] Day # 8 PCP Radha Can M.D.      Subjective:  Pt up to chair and recent tablet Oral Q15 Min PRN   Or      dextrose 50% injection 50 mL 50 mL Intravenous Q15 Min PRN   Or      glucose (DEX4) oral liquid 30 g 30 g Oral Q15 Min PRN   Or      Glucose-Vitamin C (DEX-4) 4-0.006 g chewable tab 8 tablet 8 tablet Oral Q15 Min PRN   ami native artery     Paroxysmal atrial fibrillation (HCC)     Stress hyperglycemia     Hypernatremia     Acute blood loss anemia     S/P CABG (coronary artery bypass graft)     ELEAZAR (acute kidney injury) ([facility] [address]      POD# 5 s/p CABG  - Pt is HD Stable  - Boston Cunningham

## 2017-10-29 NOTE — PROGRESS NOTES
[facility]  Progress Note    Maged Homar Patient Status:  Inpatient    1950 MRN [id number]   [facility] 6NE-A Attending Ward Colon, [address] [facility] Day # 8 PCP Candelario Siu M.D.       SUBJECTIVE:  No acute events overnight.   No 10/26/17  0201 10/26/17  0711 10/26/17  1452 10/26/17  1726 10/26/17  2104 10/27/17  0718   PGLU 164* 195* 223* 237* 230* 284* 223* 205* 165* 136* 119* 121* 125* 131* 126* 120* 111* 111* 109* 106* 105* 128* 191* 177* 169* 178* 132* 124* 108* 108*       Med IVPB premix 1 g 1 g Intravenous PRN   magnesium sulfate IVPB premix 2 g 2 g Intravenous PRN   Pantoprazole Sodium (PROTONIX) EC tab 40 mg 40 mg Oral QAM AC   ipratropium-albuterol (DUONEB) nebulizer solution 3 mL 3 mL Nebulization Q4H PRN   atorvastatin (L

## 2017-10-29 NOTE — PHYSICAL THERAPY NOTE
PHYSICAL THERAPY TREATMENT NOTE - INPATIENT    Room Number: 8390/2404-F     Session: 3   Number of Visits to Meet Established Goals: 5     History related to current admission: Pt is a 73 y.o. Female admitted 10/19/17 with chest pain.  Pt presenting with S (including adjusting bedclothes, sheets and blankets)?: A Little   -   Sitting down on and standing up from a chair with arms (e.g., wheelchair, bedside commode, etc.): A Little   -   Moving from lying on back to sitting on the side of the bed?: A Little within reach;RN aware of session/findings; All patient questions and concerns addressed; Family present    ASSESSMENT   Patient cooperative and motivated with PT.   Required cues for pursed lip breathing technique throughout session and to eliminate verbaliza

## 2017-10-29 NOTE — PROGRESS NOTES
[facility]  Progress Note     Genaro Sesay Patient Status:  Observation    1950 MRN [id number]   [facility] 8NE-A Attending Virginia Lott MD   Hosp Day # 8 PCP Ricardo Messina M.D.      Chief Complaint: NSTEMI    S: Patient Intravenous Q12H   • scopolamine  1 patch Transdermal Q72H   • amiodarone HCl  400 mg Oral Daily   • docusate sodium  100 mg Oral BID   • Pantoprazole Sodium  40 mg Oral QAM AC   • atorvastatin  40 mg Oral Nightly   • Fluticasone Propionate  1 spray Nasal

## 2017-10-30 ENCOUNTER — APPOINTMENT (OUTPATIENT)
Dept: CV DIAGNOSTICS | Facility: HOSPITAL | Age: 67
DRG: 234 | End: 2017-10-30
Attending: PHYSICIAN ASSISTANT
Payer: MEDICARE

## 2017-10-30 ENCOUNTER — APPOINTMENT (OUTPATIENT)
Dept: GENERAL RADIOLOGY | Facility: HOSPITAL | Age: 67
DRG: 234 | End: 2017-10-30
Attending: THORACIC SURGERY (CARDIOTHORACIC VASCULAR SURGERY)
Payer: MEDICARE

## 2017-10-30 ENCOUNTER — APPOINTMENT (OUTPATIENT)
Dept: GENERAL RADIOLOGY | Facility: HOSPITAL | Age: 67
DRG: 234 | End: 2017-10-30
Attending: PHYSICIAN ASSISTANT
Payer: MEDICARE

## 2017-10-30 ENCOUNTER — APPOINTMENT (OUTPATIENT)
Dept: ULTRASOUND IMAGING | Facility: HOSPITAL | Age: 67
DRG: 234 | End: 2017-10-30
Attending: NURSE PRACTITIONER
Payer: MEDICARE

## 2017-10-30 PROCEDURE — 93306 TTE W/DOPPLER COMPLETE: CPT | Performed by: PHYSICIAN ASSISTANT

## 2017-10-30 PROCEDURE — 71020 XR CHEST PA + LAT CHEST (CPT=71020): CPT | Performed by: THORACIC SURGERY (CARDIOTHORACIC VASCULAR SURGERY)

## 2017-10-30 PROCEDURE — 71035 XR CHEST DECUBITUS (CPT=71035): CPT | Performed by: THORACIC SURGERY (CARDIOTHORACIC VASCULAR SURGERY)

## 2017-10-30 PROCEDURE — 93971 EXTREMITY STUDY: CPT | Performed by: NURSE PRACTITIONER

## 2017-10-30 PROCEDURE — 99232 SBSQ HOSP IP/OBS MODERATE 35: CPT | Performed by: HOSPITALIST

## 2017-10-30 RX ORDER — DILTIAZEM HYDROCHLORIDE 120 MG/1
120 CAPSULE, EXTENDED RELEASE ORAL DAILY
Status: DISCONTINUED | OUTPATIENT
Start: 2017-10-30 | End: 2017-10-30

## 2017-10-30 RX ORDER — DILTIAZEM HYDROCHLORIDE 60 MG/1
60 TABLET, FILM COATED ORAL ONCE
Status: COMPLETED | OUTPATIENT
Start: 2017-10-30 | End: 2017-10-30

## 2017-10-30 RX ORDER — METOPROLOL TARTRATE 5 MG/5ML
5 INJECTION INTRAVENOUS ONCE
Status: COMPLETED | OUTPATIENT
Start: 2017-10-30 | End: 2017-10-30

## 2017-10-30 RX ORDER — DILTIAZEM HYDROCHLORIDE 180 MG/1
180 CAPSULE, EXTENDED RELEASE ORAL DAILY
Status: DISCONTINUED | OUTPATIENT
Start: 2017-10-31 | End: 2017-10-31

## 2017-10-30 RX ORDER — FUROSEMIDE 10 MG/ML
40 INJECTION INTRAMUSCULAR; INTRAVENOUS ONCE
Status: COMPLETED | OUTPATIENT
Start: 2017-10-30 | End: 2017-10-30

## 2017-10-30 RX ORDER — TRAZODONE HYDROCHLORIDE 50 MG/1
25 TABLET ORAL NIGHTLY PRN
Status: DISCONTINUED | OUTPATIENT
Start: 2017-10-30 | End: 2017-10-31

## 2017-10-30 RX ORDER — METOPROLOL TARTRATE 100 MG/1
100 TABLET ORAL
Status: DISCONTINUED | OUTPATIENT
Start: 2017-10-30 | End: 2017-10-31

## 2017-10-30 RX ORDER — TRAMADOL HYDROCHLORIDE 50 MG/1
50 TABLET ORAL EVERY 6 HOURS PRN
Qty: 30 TABLET | Refills: 0 | Status: SHIPPED | OUTPATIENT
Start: 2017-10-30 | End: 2019-04-18 | Stop reason: ALTCHOICE

## 2017-10-30 NOTE — CARDIAC REHAB
Patient seen by Cardiac Rehab for CAD education post CABG/ MI  Education completed with patient - family present. Phase 2 appointment made.

## 2017-10-30 NOTE — PROGRESS NOTES
10/29/17 2010   Clinical Encounter Type   Visited With Patient and family together   Continue Visiting No   Referral From Patient;Nurse   Referral To    [facility] Encounters   [facility] Needs Prayer   Patient Spiritual Encounters   Spiritual Ass

## 2017-10-30 NOTE — PLAN OF CARE
Stat echo shows a large left pleural effusion. Will attempt to diurese. D/W Dr. Tamera Duane and Jessica Singh.    2:56 PM

## 2017-10-30 NOTE — PROGRESS NOTES
[facility]  Cardiology Progress Note    Berniece Merlin Patient Status:  Inpatient    1950 MRN [id number]   [facility] 8NE-A Attending Pollo De Anda, [address] [facility] Day # 9 PCP Zari Hernadez M.D.      Subjective:  Called by RN to see p scopolamine  1 patch Transdermal Q72H   • docusate sodium  100 mg Oral BID   • Pantoprazole Sodium  40 mg Oral QAM AC   • atorvastatin  40 mg Oral Nightly   • Fluticasone Propionate  1 spray Nasal Daily          Assessment:  · CAD/NSTEMI s/p CABG 10/24 wit

## 2017-10-30 NOTE — PROGRESS NOTES
Met with patient,  and daughter to discuss discharge instructions, activity restrictions and recommendations, follow up visits, all questions answered, encouraged to call with any questions or concerns.      POD #6 s/p CABG  Pt remains in afib, HR 11

## 2017-10-30 NOTE — PLAN OF CARE
Problem: CARDIOVASCULAR - ADULT  Goal: Maintains optimal cardiac output and hemodynamic stability  INTERVENTIONS:  - Monitor vital signs, rhythm, and trends  - Monitor for bleeding, hypotension and signs of decreased cardiac output  - Evaluate effectivenes breathe, Incentive Spirometry  - Assess the need for suctioning and perform as needed  - Assess and instruct to report SOB or any respiratory difficulty  - Respiratory Therapy support as indicated  - Manage/alleviate anxiety  - Monitor for signs/symptoms o Promote increasing activity/tolerance for mobility and gait  - Educate and engage patient/family in tolerated activity level and precautions  - up to chair with rw and assist  - ambulate with rw and assist      Outcome: Progressing      Problem: Impaired A

## 2017-10-30 NOTE — PROGRESS NOTES
[facility]  Progress Note     Bertram Freed Patient Status:  Observation    1950 MRN [id number]   [facility] 8NE-A Attending Shaka Agustin MD   Hosp Day # 9 PCP Keith Cyr M.D.      Chief Complaint: NSTEMI    S: Patient Oral Daily   • Warfarin Sodium  5 mg Oral Nightly   • Normal Saline Flush  10 mL Intravenous Q12H   • scopolamine  1 patch Transdermal Q72H   • docusate sodium  100 mg Oral BID   • Pantoprazole Sodium  40 mg Oral QAM AC   • atorvastatin  40 mg Oral Nightly

## 2017-10-30 NOTE — PROGRESS NOTES
10/30/17 180   Clinical Encounter Type   Visited With Patient and family together  ( at bedside)   Routine Visit Follow-up   Continue Visiting No   Patient's Supportive Strategies/Resources  provided emotonal support, including active li

## 2017-10-30 NOTE — HOME CARE LIAISON
DIAGNOSES AND PERTINENT MEDICAL HISTORY:  ACUTE CHEST PAIN, CABG X3 (10/24/17)    FACILITY NAME AND DC DATE:  [address] VISIT WITH:  TNL MET WITH PATIENT, SPOUSE AND DAUGHTER AT BEDSIDE    SERVICES ORDERED:  KRISTIE PT    VERIFIED P

## 2017-10-30 NOTE — PHYSICAL THERAPY NOTE
Spoke to Juan Jewell - Pt with elevated HR this AM - requesting PT re-attempt later in the day when managed. Will f/u for d/c education, review HEP and therex as time permits.

## 2017-10-31 ENCOUNTER — PRIOR ORIGINAL RECORDS (OUTPATIENT)
Dept: OTHER | Age: 67
End: 2017-10-31

## 2017-10-31 VITALS
OXYGEN SATURATION: 94 % | BODY MASS INDEX: 30.31 KG/M2 | WEIGHT: 164.69 LBS | TEMPERATURE: 98 F | HEIGHT: 62 IN | HEART RATE: 76 BPM | SYSTOLIC BLOOD PRESSURE: 113 MMHG | DIASTOLIC BLOOD PRESSURE: 68 MMHG | RESPIRATION RATE: 20 BRPM

## 2017-10-31 PROCEDURE — 05PY33Z REMOVAL OF INFUSION DEVICE FROM UPPER VEIN, PERCUTANEOUS APPROACH: ICD-10-PCS | Performed by: PHYSICIAN ASSISTANT

## 2017-10-31 PROCEDURE — 99239 HOSP IP/OBS DSCHRG MGMT >30: CPT | Performed by: HOSPITALIST

## 2017-10-31 RX ORDER — POTASSIUM CHLORIDE 20 MEQ/1
40 TABLET, EXTENDED RELEASE ORAL EVERY 4 HOURS
Status: COMPLETED | OUTPATIENT
Start: 2017-10-31 | End: 2017-10-31

## 2017-10-31 RX ORDER — WARFARIN SODIUM 5 MG/1
5 TABLET ORAL NIGHTLY
Status: DISCONTINUED | OUTPATIENT
Start: 2017-10-31 | End: 2017-10-31

## 2017-10-31 RX ORDER — WARFARIN SODIUM 5 MG/1
5 TABLET ORAL NIGHTLY
Qty: 30 TABLET | Refills: 5 | Status: SHIPPED | OUTPATIENT
Start: 2017-10-31 | End: 2019-03-06 | Stop reason: ALTCHOICE

## 2017-10-31 RX ORDER — ATORVASTATIN CALCIUM 40 MG/1
40 TABLET, FILM COATED ORAL NIGHTLY
Qty: 30 TABLET | Refills: 5 | Status: SHIPPED | OUTPATIENT
Start: 2017-10-31

## 2017-10-31 RX ORDER — DILTIAZEM HYDROCHLORIDE 240 MG/1
240 CAPSULE, COATED, EXTENDED RELEASE ORAL DAILY
Qty: 30 CAPSULE | Refills: 5 | Status: SHIPPED | OUTPATIENT
Start: 2017-11-01 | End: 2017-11-14

## 2017-10-31 RX ORDER — DILTIAZEM HYDROCHLORIDE 60 MG/1
60 TABLET, FILM COATED ORAL ONCE
Status: COMPLETED | OUTPATIENT
Start: 2017-10-31 | End: 2017-10-31

## 2017-10-31 RX ORDER — DILTIAZEM HYDROCHLORIDE 240 MG/1
240 CAPSULE, COATED, EXTENDED RELEASE ORAL DAILY
Status: DISCONTINUED | OUTPATIENT
Start: 2017-11-01 | End: 2017-10-31

## 2017-10-31 RX ORDER — AMIODARONE HYDROCHLORIDE 200 MG/1
TABLET ORAL
Qty: 30 TABLET | Refills: 5 | Status: SHIPPED | OUTPATIENT
Start: 2017-10-31 | End: 2019-09-17

## 2017-10-31 RX ORDER — FUROSEMIDE 20 MG/1
20 TABLET ORAL DAILY
Status: DISCONTINUED | OUTPATIENT
Start: 2017-10-31 | End: 2017-10-31

## 2017-10-31 RX ORDER — FUROSEMIDE 20 MG/1
20 TABLET ORAL DAILY
Qty: 5 TABLET | Refills: 0 | Status: SHIPPED | OUTPATIENT
Start: 2017-10-31 | End: 2017-11-05

## 2017-10-31 RX ORDER — AMIODARONE HYDROCHLORIDE 200 MG/1
200 TABLET ORAL DAILY
Qty: 30 TABLET | Refills: 5 | Status: SHIPPED | OUTPATIENT
Start: 2017-11-01 | End: 2017-10-31

## 2017-10-31 RX ORDER — ASPIRIN 81 MG/1
81 TABLET, CHEWABLE ORAL DAILY
Qty: 30 TABLET | Refills: 5 | Status: SHIPPED | COMMUNITY
Start: 2017-11-01 | End: 2020-02-26

## 2017-10-31 RX ORDER — METOPROLOL TARTRATE 100 MG/1
100 TABLET ORAL
Qty: 60 TABLET | Refills: 5 | Status: SHIPPED | OUTPATIENT
Start: 2017-10-31 | End: 2019-08-26

## 2017-10-31 NOTE — OCCUPATIONAL THERAPY NOTE
OCCUPATIONAL THERAPY TREATMENT NOTE - INPATIENT     Room Number: 0660/6583-V  Session: 3  Number of Visits to Meet Established Goals: 5    Presenting Problem: CABG    History related to current admission: Pt is a 73year old female admit on 10/19/2017 for urinal? : A Little  -   Putting on and taking off regular upper body clothing?: A Little  -   Taking care of personal grooming such as brushing teeth?: A Little  -   Eating meals?: None    AM-PAC Score:  Score: 19  Approx Degree of Impairment: 42.8%  Stand function. OT Discharge Recommendations: Home;24 hour care/supervision  OT Device Recommendations: TBD    PLAN  OT Treatment Plan: Balance activities; Energy conservation/work simplification techniques;ADL training;IADL training;Continued evaluation; Compe

## 2017-10-31 NOTE — PROGRESS NOTES
[facility]  Progress Note     Mavis Mark Patient Status:  Observation    1950 MRN [id number]   [facility] 8NE-A Attending Todd Hernandez MD   Hosp Day # 10 PCP Ovi Vaughn M.D.      Chief Complaint: NSTEMI    S: Patient Daily(Beta Blocker)   • aspirin  81 mg Oral Daily   • amiodarone HCl  200 mg Oral Daily   • Normal Saline Flush  10 mL Intravenous Q12H   • scopolamine  1 patch Transdermal Q72H   • docusate sodium  100 mg Oral BID   • Pantoprazole Sodium  40 mg Oral QAM A

## 2017-10-31 NOTE — PROGRESS NOTES
[facility]  CV Surgery Progress Note    Berniece Merlin Patient Status:  Inpatient    1950 MRN [id number]   [facility] 8NE-A Attending [place]o De Anda, [address] [facility] Day # 10 PCP Zari Hernadez M.D.      Pt seen and examined by Dr Sara Mercedes th

## 2017-10-31 NOTE — PROGRESS NOTES
[facility]  Cardiology Progress Note    Marielle Don Patient Status:  Inpatient    1950 MRN [id number]   [facility] 8NE-A Attending Yana Ordaz, [address] [facility] Day # 10 PCP Kaylin Brown M.D.      Subjective:  States SOB is much b The atrium was moderately dilated. 5. Tricuspid valve: There was moderate regurgitation. Impressions:  This study is compared with previous dated 10/20/17: Large  left sided pleural effusion now noted.  LVEF is low normal with no evidence  of pericardial to go home. Ok to [employer] with holter monitor.   Continue coumadin      Jorje Pollock MD  [facility]  Cardiac Electrophysiolgy

## 2017-10-31 NOTE — PLAN OF CARE
Patient tele dc'd. IV discontinued with catheter intact. Pt denies cp, sob, dizziness or palpitations. Pt denies calf tenderness. Pt discharge and post open heart instructions instructions reviewed with patient and spouse verbalized understanding.  Pt medic

## 2017-10-31 NOTE — DISCHARGE SUMMARY
[facility] PSYCHIATRIC [place] HOSPITALIST  DISCHARGE SUMMARY     Keegan Huntley Patient Status:  Inpatient    1950 MRN [id number]   [facility] 8NE-A Attending No att. providers found   Hosp Day # 10 PCP Elijah Hendricks M.D.      Date of Admission: 10/19/2017 restarted on coumadin for anticoagulation. Patient was seen by PT and HHPT was recommended and arranged. Patient was discharged home in good condition with cardiology follow-up.     Procedures during hospitalization:   • Cardiac cath  · CABG    Incidental o tablet  Refills:  5        CONTINUE taking these medications      Instructions Prescription details   Cyclobenzaprine HCl 5 MG Tabs  Commonly known as:  FLEXERIL      Take 5 mg by mouth nightly.    Refills:  0     Fluticasone Propionate 50 MCG/ACT Susp  Com 1:30 w/Katie TONG for Dr. Maldonado Chaudhari, [place] [address]  On 11/8/2017  For a chest xray, central scheduling will call you to arrange the appointment    Mikel Montez

## 2017-10-31 NOTE — PHYSICAL THERAPY NOTE
PHYSICAL THERAPY TREATMENT NOTE - INPATIENT    Room Number: 0634/8366-N     Session: 4  Number of Visits to Meet Established Goals: 5     History related to current admission: Pt is a 73 y.o. Female admitted 10/19/17 with chest pain.  Pt presenting with ST patient currently have. ..  -   Turning over in bed (including adjusting bedclothes, sheets and blankets)?: A Little   -   Sitting down on and standing up from a chair with arms (e.g., wheelchair, bedside commode, etc.): A Little   -   Moving from lying on chest press and trunk rotation   Position Sitting     Repetitions   10   Sets   1     Patient End of Session: Up in chair;Needs met;Call light within reach;RN aware of session/findings; All patient questions and concerns addressed; Family present    ASSESSME

## 2017-11-01 NOTE — CM/SW NOTE
11/01/17 0700   Discharge disposition   Discharged to: Home-Health   Name of Facillity/Home Care/Hospice Residential   Discharge transportation Private car

## 2017-11-03 ENCOUNTER — HOSPITAL ENCOUNTER (OUTPATIENT)
Dept: LAB | Facility: HOSPITAL | Age: 67
Discharge: HOME | End: 2017-11-03
Attending: INTERNAL MEDICINE
Payer: MEDICARE

## 2017-11-03 ENCOUNTER — TELEPHONE (OUTPATIENT)
Dept: CARDIOLOGY UNIT | Facility: HOSPITAL | Age: 67
End: 2017-11-03

## 2017-11-03 ENCOUNTER — HOSPITAL ENCOUNTER (OUTPATIENT)
Dept: CV DIAGNOSTICS | Facility: HOSPITAL | Age: 67
Discharge: HOME | End: 2017-11-03
Attending: INTERNAL MEDICINE
Payer: MEDICARE

## 2017-11-03 DIAGNOSIS — I48.0 PAROXYSMAL ATRIAL FIBRILLATION (HCC): ICD-10-CM

## 2017-11-03 DIAGNOSIS — I48.91 ATRIAL FIBRILLATION, UNSPECIFIED TYPE (HCC): ICD-10-CM

## 2017-11-03 PROCEDURE — 85610 PROTHROMBIN TIME: CPT | Performed by: INTERNAL MEDICINE

## 2017-11-03 PROCEDURE — 36415 COLL VENOUS BLD VENIPUNCTURE: CPT | Performed by: INTERNAL MEDICINE

## 2017-11-03 PROCEDURE — 93226 XTRNL ECG REC<48 HR SCAN A/R: CPT | Performed by: INTERNAL MEDICINE

## 2017-11-03 PROCEDURE — 93225 XTRNL ECG REC<48 HRS REC: CPT | Performed by: INTERNAL MEDICINE

## 2017-11-03 PROCEDURE — 93227 XTRNL ECG REC<48 HR R&I: CPT | Performed by: INTERNAL MEDICINE

## 2017-11-07 ENCOUNTER — PRIOR ORIGINAL RECORDS (OUTPATIENT)
Dept: OTHER | Age: 67
End: 2017-11-07

## 2017-11-08 ENCOUNTER — HOSPITAL ENCOUNTER (OUTPATIENT)
Dept: GENERAL RADIOLOGY | Facility: HOSPITAL | Age: 67
Discharge: HOME | End: 2017-11-08
Attending: THORACIC SURGERY (CARDIOTHORACIC VASCULAR SURGERY)
Payer: MEDICARE

## 2017-11-08 ENCOUNTER — HOSPITAL ENCOUNTER (OUTPATIENT)
Dept: LAB | Facility: HOSPITAL | Age: 67
Discharge: HOME | End: 2017-11-08
Attending: THORACIC SURGERY (CARDIOTHORACIC VASCULAR SURGERY)
Payer: MEDICARE

## 2017-11-08 ENCOUNTER — PRIOR ORIGINAL RECORDS (OUTPATIENT)
Dept: OTHER | Age: 67
End: 2017-11-08

## 2017-11-08 DIAGNOSIS — Z98.890 HISTORY OF OPEN HEART SURGERY: ICD-10-CM

## 2017-11-08 DIAGNOSIS — I48.0 PAROXYSMAL ATRIAL FIBRILLATION (HCC): ICD-10-CM

## 2017-11-08 PROCEDURE — 71035 XR CHEST DECUBITUS (CPT=71035): CPT | Performed by: THORACIC SURGERY (CARDIOTHORACIC VASCULAR SURGERY)

## 2017-11-08 PROCEDURE — 85610 PROTHROMBIN TIME: CPT

## 2017-11-08 PROCEDURE — 71020 XR CHEST PA + LAT CHEST (CPT=71020): CPT | Performed by: THORACIC SURGERY (CARDIOTHORACIC VASCULAR SURGERY)

## 2017-11-09 ENCOUNTER — TELEPHONE (OUTPATIENT)
Dept: CARDIOLOGY UNIT | Facility: HOSPITAL | Age: 67
End: 2017-11-09

## 2017-11-09 VITALS — HEIGHT: 62 IN | BODY MASS INDEX: 30.18 KG/M2 | WEIGHT: 164 LBS

## 2017-11-09 RX ORDER — POTASSIUM CHLORIDE 20 MEQ/1
20 TABLET, EXTENDED RELEASE ORAL 3 TIMES DAILY
Status: ON HOLD | COMMUNITY
End: 2020-03-24

## 2017-11-09 RX ORDER — FUROSEMIDE 40 MG/1
40 TABLET ORAL DAILY
COMMUNITY
End: 2019-08-26

## 2017-11-09 NOTE — PROGRESS NOTES
CXR reviewed, large left pleural effusion. D/w Leah Kenneth TERRELL and AURORA Hernandez, will need left mikayla, ok to hold coumadin, scheduled for Monday. D/w patient, her  and her daughter all questions answered they are in agreement.  Advised to hold

## 2017-11-10 ENCOUNTER — APPOINTMENT (OUTPATIENT)
Dept: ULTRASOUND IMAGING | Facility: HOSPITAL | Age: 67
End: 2017-11-10
Attending: EMERGENCY MEDICINE
Payer: MEDICARE

## 2017-11-10 ENCOUNTER — APPOINTMENT (OUTPATIENT)
Dept: GENERAL RADIOLOGY | Facility: HOSPITAL | Age: 67
End: 2017-11-10
Attending: RADIOLOGY
Payer: MEDICARE

## 2017-11-10 ENCOUNTER — APPOINTMENT (OUTPATIENT)
Dept: CT IMAGING | Facility: HOSPITAL | Age: 67
End: 2017-11-10
Attending: EMERGENCY MEDICINE
Payer: MEDICARE

## 2017-11-10 ENCOUNTER — APPOINTMENT (OUTPATIENT)
Dept: GENERAL RADIOLOGY | Facility: HOSPITAL | Age: 67
End: 2017-11-10
Attending: EMERGENCY MEDICINE
Payer: MEDICARE

## 2017-11-10 ENCOUNTER — HOSPITAL ENCOUNTER (EMERGENCY)
Facility: HOSPITAL | Age: 67
Discharge: HOME | End: 2017-11-10
Attending: EMERGENCY MEDICINE
Payer: MEDICARE

## 2017-11-10 VITALS
WEIGHT: 160 LBS | HEIGHT: 63 IN | HEART RATE: 69 BPM | SYSTOLIC BLOOD PRESSURE: 142 MMHG | BODY MASS INDEX: 28.35 KG/M2 | DIASTOLIC BLOOD PRESSURE: 91 MMHG | TEMPERATURE: 98 F | RESPIRATION RATE: 19 BRPM | OXYGEN SATURATION: 97 %

## 2017-11-10 DIAGNOSIS — J90 PLEURAL EFFUSION: Primary | ICD-10-CM

## 2017-11-10 PROCEDURE — 71010 XR CHEST AP PORTABLE  (CPT=71010): CPT | Performed by: RADIOLOGY

## 2017-11-10 PROCEDURE — 85610 PROTHROMBIN TIME: CPT | Performed by: EMERGENCY MEDICINE

## 2017-11-10 PROCEDURE — 99285 EMERGENCY DEPT VISIT HI MDM: CPT

## 2017-11-10 PROCEDURE — 85025 COMPLETE CBC W/AUTO DIFF WBC: CPT | Performed by: EMERGENCY MEDICINE

## 2017-11-10 PROCEDURE — 83880 ASSAY OF NATRIURETIC PEPTIDE: CPT | Performed by: EMERGENCY MEDICINE

## 2017-11-10 PROCEDURE — 93005 ELECTROCARDIOGRAM TRACING: CPT

## 2017-11-10 PROCEDURE — 93971 EXTREMITY STUDY: CPT | Performed by: EMERGENCY MEDICINE

## 2017-11-10 PROCEDURE — 80053 COMPREHEN METABOLIC PANEL: CPT | Performed by: EMERGENCY MEDICINE

## 2017-11-10 PROCEDURE — 84484 ASSAY OF TROPONIN QUANT: CPT | Performed by: EMERGENCY MEDICINE

## 2017-11-10 PROCEDURE — 71275 CT ANGIOGRAPHY CHEST: CPT | Performed by: EMERGENCY MEDICINE

## 2017-11-10 PROCEDURE — 85378 FIBRIN DEGRADE SEMIQUANT: CPT | Performed by: EMERGENCY MEDICINE

## 2017-11-10 PROCEDURE — 93010 ELECTROCARDIOGRAM REPORT: CPT

## 2017-11-10 PROCEDURE — 32555 ASPIRATE PLEURA W/ IMAGING: CPT | Performed by: EMERGENCY MEDICINE

## 2017-11-10 PROCEDURE — 83735 ASSAY OF MAGNESIUM: CPT | Performed by: EMERGENCY MEDICINE

## 2017-11-10 PROCEDURE — 96374 THER/PROPH/DIAG INJ IV PUSH: CPT

## 2017-11-10 PROCEDURE — 71010 XR CHEST AP PORTABLE  (CPT=71010): CPT | Performed by: EMERGENCY MEDICINE

## 2017-11-10 PROCEDURE — 96375 TX/PRO/DX INJ NEW DRUG ADDON: CPT

## 2017-11-10 RX ORDER — DIPHENHYDRAMINE HYDROCHLORIDE 50 MG/ML
25 INJECTION INTRAMUSCULAR; INTRAVENOUS ONCE
Status: COMPLETED | OUTPATIENT
Start: 2017-11-10 | End: 2017-11-10

## 2017-11-10 RX ORDER — POTASSIUM CHLORIDE 20 MEQ/1
40 TABLET, EXTENDED RELEASE ORAL ONCE
Status: COMPLETED | OUTPATIENT
Start: 2017-11-10 | End: 2017-11-10

## 2017-11-10 RX ORDER — METHYLPREDNISOLONE SODIUM SUCCINATE 125 MG/2ML
125 INJECTION, POWDER, LYOPHILIZED, FOR SOLUTION INTRAMUSCULAR; INTRAVENOUS ONCE
Status: COMPLETED | OUTPATIENT
Start: 2017-11-10 | End: 2017-11-10

## 2017-11-10 RX ORDER — LABETALOL HYDROCHLORIDE 5 MG/ML
20 INJECTION, SOLUTION INTRAVENOUS ONCE
Status: COMPLETED | OUTPATIENT
Start: 2017-11-10 | End: 2017-11-10

## 2017-11-10 NOTE — PROCEDURES
Lt US thora. 700 ml thin dk blood tinged fluid. No request for testing. Comp-none.       Tino Benson  11/10/2017

## 2017-11-10 NOTE — ED INITIAL ASSESSMENT (HPI)
CABG on 10/24 with Dr. Kita Hemphill. Pt was unable to sleep d/t CHRISTOPHER last night. Daughter states that she has a paracentesis scheduled on Monday, 11/13 d/t fluid in her lungs. Pt denies CP.

## 2017-11-10 NOTE — ED PROVIDER NOTES
Patient Seen in: [facility] Emergency Department    History   Patient presents with:  Dyspnea CHRISTOPHER SOB (respiratory)    Stated Complaint: CHRISTOPHER    HPI    41-year-old -American female complaining of shortness of breath this patient is about 2-1/2 noted above.     Physical Exam   ED Triage Vitals [11/10/17 0943]  BP: (!) 174/110  Pulse: 72  Resp: 24  Temp: 98.3 °F (36.8 °C)  Temp src: Temporal  SpO2: 100 %  O2 Device: None (Room air)    Current:/91   Pulse 69   Temp 98.3 °F (36.8 °C) (Temporal) patient.    CBC W/ DIFFERENTIAL - Abnormal; Notable for the following:     RBC 3.26 (*)     HGB 10.0 (*)     HCT 30.8 (*)     RDW 17.0 (*)     RDW-SD 56.8 (*)     Eosinophil Absolute 0.74 (*)     All other components within normal limits   TROPONIN I - Norm [id number]  [phone]    In 3 days      Deandre Grande MD  [address]   [phone]              Medications Prescribed:  Current Discharge Medication List

## 2017-11-10 NOTE — IMAGING NOTE
LVM for pt to call back, attempting to provide pre-procedure instructions and find out when she last took blood thinners.

## 2017-11-10 NOTE — HISTORICAL OFFICE NOTE
KRISTEN SULTANA  : 1950  ACCOUNT:  [id number]  [phone]  PCP: Dr. Kaylin Brown     TODAY'S DATE: 2017  DICTATED BY:  ALYCIA Solis ]    CHIEF COMPLAINT: [Hospital Discharge.]    HPI:    [On 2017, Conrad Crumbly, a 73year old femal and left cardiac catheterization 2012    FAMILY HISTORY: Negative for premature CAD. Negative for AAA. SOCIAL HISTORY: SMOKING: Never used tobacco. denies smoking. CAFFEINE: no caffeine. ALCOHOL: denies drinking.  EXERCISE: minimal. DIET: no special diet with a BMP in one week. 4. Hypertension. We are adding furosemide. 5. Followup in one week. ASSESSMENT:  1. CAD, of native vessels  2. Dyspnea  3. History of CABG, 10/2017  4. Hypertension  5. Pleural Effusion, Not Elsewhere Classified  6.  Coumadin

## 2017-11-10 NOTE — CDS QUERY
Potential for Impaired Tissue Perfusion  CLINICAL DOCUMENTATION CLARIFICATION FORM     Dear Provider,   Clinical information (provided below) suggests Potential for Impaired Tissue Perfusion.  For accurate ICD-10-CM code assignment to reflect severity of il

## 2017-11-13 ENCOUNTER — HOSPITAL ENCOUNTER (OUTPATIENT)
Dept: ULTRASOUND IMAGING | Facility: HOSPITAL | Age: 67
Discharge: HOME | End: 2017-11-13
Attending: THORACIC SURGERY (CARDIOTHORACIC VASCULAR SURGERY)
Payer: MEDICARE

## 2017-11-13 ENCOUNTER — HOSPITAL ENCOUNTER (OUTPATIENT)
Facility: HOSPITAL | Age: 67
Setting detail: OBSERVATION
Discharge: HOME | End: 2017-11-14
Attending: EMERGENCY MEDICINE | Admitting: HOSPITALIST
Payer: MEDICARE

## 2017-11-13 ENCOUNTER — APPOINTMENT (OUTPATIENT)
Dept: GENERAL RADIOLOGY | Facility: HOSPITAL | Age: 67
End: 2017-11-13
Attending: EMERGENCY MEDICINE
Payer: MEDICARE

## 2017-11-13 ENCOUNTER — APPOINTMENT (OUTPATIENT)
Dept: CT IMAGING | Facility: HOSPITAL | Age: 67
End: 2017-11-13
Attending: EMERGENCY MEDICINE
Payer: MEDICARE

## 2017-11-13 ENCOUNTER — APPOINTMENT (OUTPATIENT)
Dept: LAB | Facility: HOSPITAL | Age: 67
End: 2017-11-13
Attending: THORACIC SURGERY (CARDIOTHORACIC VASCULAR SURGERY)
Payer: MEDICARE

## 2017-11-13 ENCOUNTER — PRIOR ORIGINAL RECORDS (OUTPATIENT)
Dept: OTHER | Age: 67
End: 2017-11-13

## 2017-11-13 DIAGNOSIS — D72.829 LEUKOCYTOSIS, UNSPECIFIED TYPE: ICD-10-CM

## 2017-11-13 DIAGNOSIS — J90 PLEURAL EFFUSION: Primary | ICD-10-CM

## 2017-11-13 DIAGNOSIS — R41.82 ALTERED MENTAL STATUS, UNSPECIFIED ALTERED MENTAL STATUS TYPE: Primary | ICD-10-CM

## 2017-11-13 PROBLEM — R53.1 WEAKNESS: Status: ACTIVE | Noted: 2017-11-13

## 2017-11-13 PROCEDURE — 71010 XR CHEST AP PORTABLE  (CPT=71010): CPT | Performed by: EMERGENCY MEDICINE

## 2017-11-13 PROCEDURE — 99225 SUBSEQUENT OBSERVATION CARE: CPT | Performed by: HOSPITALIST

## 2017-11-13 PROCEDURE — 70450 CT HEAD/BRAIN W/O DYE: CPT | Performed by: EMERGENCY MEDICINE

## 2017-11-13 RX ORDER — WARFARIN SODIUM 5 MG/1
5 TABLET ORAL NIGHTLY
Status: DISCONTINUED | OUTPATIENT
Start: 2017-11-13 | End: 2017-11-14

## 2017-11-13 RX ORDER — ATORVASTATIN CALCIUM 40 MG/1
40 TABLET, FILM COATED ORAL NIGHTLY
Status: DISCONTINUED | OUTPATIENT
Start: 2017-11-13 | End: 2017-11-14

## 2017-11-13 RX ORDER — SODIUM CHLORIDE 9 MG/ML
INJECTION, SOLUTION INTRAVENOUS CONTINUOUS
Status: DISCONTINUED | OUTPATIENT
Start: 2017-11-13 | End: 2017-11-14

## 2017-11-13 RX ORDER — METOPROLOL TARTRATE 100 MG/1
100 TABLET ORAL
Status: DISCONTINUED | OUTPATIENT
Start: 2017-11-13 | End: 2017-11-14

## 2017-11-13 RX ORDER — DILTIAZEM HYDROCHLORIDE 240 MG/1
240 CAPSULE, COATED, EXTENDED RELEASE ORAL DAILY
Status: DISCONTINUED | OUTPATIENT
Start: 2017-11-13 | End: 2017-11-14

## 2017-11-13 RX ORDER — ONDANSETRON 2 MG/ML
4 INJECTION INTRAMUSCULAR; INTRAVENOUS EVERY 6 HOURS PRN
Status: DISCONTINUED | OUTPATIENT
Start: 2017-11-13 | End: 2017-11-14

## 2017-11-13 RX ORDER — ASPIRIN 81 MG/1
81 TABLET, CHEWABLE ORAL DAILY
Status: DISCONTINUED | OUTPATIENT
Start: 2017-11-13 | End: 2017-11-14

## 2017-11-13 RX ORDER — POTASSIUM CHLORIDE 20 MEQ/1
40 TABLET, EXTENDED RELEASE ORAL EVERY 4 HOURS
Status: COMPLETED | OUTPATIENT
Start: 2017-11-13 | End: 2017-11-14

## 2017-11-13 RX ORDER — ACETAMINOPHEN 325 MG/1
650 TABLET ORAL EVERY 6 HOURS PRN
Status: DISCONTINUED | OUTPATIENT
Start: 2017-11-13 | End: 2017-11-14

## 2017-11-13 RX ORDER — HYDRALAZINE HYDROCHLORIDE 25 MG/1
25 TABLET, FILM COATED ORAL EVERY 8 HOURS SCHEDULED
Status: DISCONTINUED | OUTPATIENT
Start: 2017-11-13 | End: 2017-11-14

## 2017-11-13 RX ORDER — AMIODARONE HYDROCHLORIDE 200 MG/1
200 TABLET ORAL DAILY
Status: DISCONTINUED | OUTPATIENT
Start: 2017-11-13 | End: 2017-11-14

## 2017-11-13 RX ORDER — ENOXAPARIN SODIUM 100 MG/ML
40 INJECTION SUBCUTANEOUS DAILY
Status: DISCONTINUED | OUTPATIENT
Start: 2017-11-13 | End: 2017-11-14

## 2017-11-13 NOTE — ED PROVIDER NOTES
Patient Seen in: [facility] Emergency Department    History   Patient presents with:  Hypertension (cardiovascular)  Eye Visual Problem (opthalmic)    Stated Complaint: HTN, blurrd vision    HPI    18-year-old female comes in the hospital the chief Review of Systems    Positive for stated complaint: HTN, blurrd vision  Other systems are as noted in HPI. Constitutional and vital signs reviewed. All other systems reviewed and negative except as noted above.     Physical Exam   ED Triage Kay The following orders were created for panel order CBC WITH DIFFERENTIAL WITH PLATELET.   Procedure                               Abnormality         Status                     ---------                               -----------         ------

## 2017-11-13 NOTE — HISTORICAL OFFICE NOTE
KRISTEN SULTANA  : 1950  ACCOUNT:  [id number]  [phone]  PCP: Dr. Bonny Jain     TODAY'S DATE: 2017  DICTATED BY:  ALYCIA Rodriguez ]     CHIEF COMPLAINT: [Hospital Discharge.]     HPI:    [On 2017, Luwana Baumgarten, a 73year old fem hypertension and left cardiac catheterization 2012     FAMILY HISTORY: Negative for premature CAD. Negative for AAA.     SOCIAL HISTORY: SMOKING: Never used tobacco. denies smoking. CAFFEINE: no caffeine. ALCOHOL: denies drinking.  EXERCISE: minimal. DIET: daily with potassium 20 mEq with a BMP in one week. 4.    Hypertension. We are adding furosemide. 5.    Followup in one week.       ASSESSMENT:  1. CAD, of native vessels  2. Dyspnea  3. History of CABG, 10/2017  4. Hypertension  5.  Pleural Effusion, No

## 2017-11-13 NOTE — H&P
[facility] HOSPITALIST  History and Physical     Zacarias Lion Patient Status:  Emergency    1950 MRN [id number]   Location [facility] Attending Mirza Friend MD   Hosp Day # 0 PCP Kathryn Medina M.D.      Chief Complaint Chloride ER 20 MEQ Oral Tab CR Take 20 mEq by mouth 3 (three) times daily. Disp:  Rfl:    Warfarin Sodium 5 MG Oral Tab Take 1 tablet (5 mg total) by mouth nightly.  Or as directed by S coumadin clinic Disp: 30 tablet Rfl: 5   atorvastatin 40 MG Oral Tab 13.2*   HGB  10.5*   MCV  95.0   PLT  213.0       Recent Labs   Lab  11/13/17   1119   GLU  97   BUN  15   CREATSERUM  1.14*   CA  8.7   ALB  2.9*   NA  145*   K  3.5*   CL  111   CO2  26.0   ALKPHO  81   AST  13*   ALT  31   BILT  0.6   TP  6.5       Martha

## 2017-11-13 NOTE — ED INITIAL ASSESSMENT (HPI)
Has been having blurred vision and elevated bp since yesterday, tired. Pt was here on Friday for shortness of breath, had fluid drained from lungs, has been off her coumadin since.   Had a triple bypass surgery at the end of october

## 2017-11-13 NOTE — PROGRESS NOTES
[facility] Cardiology Progress Note    Patient seen and examined.  Chart reviewed.  Discussed with family at bedside. Recent CABG, returns with altered mental status and generalized fatigue.   On Friday had OP left thoracenthesis with 700 cc thin dark blood tin ~150/90's range   - start hydralazine 25 mg PO TID  5. HLP - statin    Will follow,    Everett Tinsley MD

## 2017-11-14 ENCOUNTER — APPOINTMENT (OUTPATIENT)
Dept: CV DIAGNOSTICS | Facility: HOSPITAL | Age: 67
End: 2017-11-14
Attending: INTERNAL MEDICINE
Payer: MEDICARE

## 2017-11-14 ENCOUNTER — APPOINTMENT (OUTPATIENT)
Dept: GENERAL RADIOLOGY | Facility: HOSPITAL | Age: 67
End: 2017-11-14
Attending: PHYSICIAN ASSISTANT
Payer: MEDICARE

## 2017-11-14 ENCOUNTER — PRIOR ORIGINAL RECORDS (OUTPATIENT)
Dept: OTHER | Age: 67
End: 2017-11-14

## 2017-11-14 VITALS
TEMPERATURE: 98 F | HEIGHT: 63 IN | DIASTOLIC BLOOD PRESSURE: 91 MMHG | RESPIRATION RATE: 20 BRPM | HEART RATE: 62 BPM | BODY MASS INDEX: 29.1 KG/M2 | OXYGEN SATURATION: 100 % | SYSTOLIC BLOOD PRESSURE: 145 MMHG | WEIGHT: 164.25 LBS

## 2017-11-14 PROBLEM — D72.829 LEUKOCYTOSIS: Status: ACTIVE | Noted: 2017-11-13

## 2017-11-14 PROCEDURE — 93307 TTE W/O DOPPLER COMPLETE: CPT | Performed by: INTERNAL MEDICINE

## 2017-11-14 PROCEDURE — 71035 XR CHEST DECUBITUS (CPT=71035): CPT | Performed by: PHYSICIAN ASSISTANT

## 2017-11-14 PROCEDURE — 99217 OBSERVATION CARE DISCHARGE: CPT | Performed by: HOSPITALIST

## 2017-11-14 RX ORDER — ARIPIPRAZOLE 15 MG/1
40 TABLET ORAL ONCE
Status: COMPLETED | OUTPATIENT
Start: 2017-11-14 | End: 2017-11-14

## 2017-11-14 RX ORDER — HYDRALAZINE HYDROCHLORIDE 25 MG/1
25 TABLET, FILM COATED ORAL EVERY 8 HOURS SCHEDULED
Qty: 180 TABLET | Refills: 0 | Status: SHIPPED | OUTPATIENT
Start: 2017-11-14 | End: 2019-09-03

## 2017-11-14 RX ORDER — FUROSEMIDE 10 MG/ML
40 INJECTION INTRAMUSCULAR; INTRAVENOUS ONCE
Status: COMPLETED | OUTPATIENT
Start: 2017-11-14 | End: 2017-11-14

## 2017-11-14 NOTE — PROGRESS NOTES
Order to transfer patient to [facility]. Report given to [facility] MAGI.

## 2017-11-14 NOTE — PLAN OF CARE
DX: Altered Mental Status; Leukocytosis; Weakness; Blurry vision    Data:  Pt lying in bed.  @ bedside. Pt a&o x 4. Pt incontinent of urine because was waiting for call-light to be answered for too long.   Per pt's  she is not ever incontin

## 2017-11-14 NOTE — PROGRESS NOTES
Pt arrived from [facility] in stable condition. Pt a/o, but flat affect. SR on tele. No c/o any pain or dizziness. BP improved and is 157/93. Tolerating room air and no c/o sob. IVF started as ordered. Potassium to be replaced per electrolyte protocol.   On

## 2017-11-14 NOTE — PHYSICAL THERAPY NOTE
PHYSICAL THERAPY EVALUATION - INPATIENT     Room Number: 3807/5051-P  Evaluation Date: 11/14/2017  Type of Evaluation: Initial  Physician Order: PT Eval and Treat    Presenting Problem: AMS  Reason for Therapy: Mobility Dysfunction and Discharge Planni is go home    OBJECTIVE  Precautions: Sternal;Cardiac  Fall Risk: Standard fall risk    WEIGHT BEARING RESTRICTION  Weight Bearing Restriction: None                PAIN ASSESSMENT  Ratin  Location: denies       COGNITION  · Overall Cognitive Status:  U Martha       AM-PAC Score:  Raw Score: 19   PT Approx Degree of Impairment Score: 41.77%   Standardized Score (AM-PAC Scale): 45.44   CMS Modifier (G-Code): CK    FUNCTIONAL ABILITY STATUS  Gait Assessment   Gait Assistance: Supervision  Distance (ft): 200 baseline and would benefit from skilled inpatient PT to address the above deficits to assist patient in returning to prior to level of function.   DISCHARGE RECOMMENDATIONS  PT Discharge Recommendations: 24 hour care/supervision;Home with home health PT

## 2017-11-14 NOTE — PROGRESS NOTES
[facility]  Progress Note    Keegan Huntley Patient Status:  Observation    1950 MRN [id number]   [facility] 8NE-A Attending Raina Ugarte, [address] Day # 0 PCP Elijah Hendricks M.D.      Subjective:  Pt has some dyspnea although b soft  Extremities: no appreciable edema    Assessment/Plan: S/P CABG   - Surgically stable   - Pleural Effusions - s/p thoracentesis, recheck decubitus series today   - PAF to SR on Amio, warfarin   - Encouraged IS   - Pain control/IS/ambulation   - Dispo

## 2017-11-14 NOTE — PLAN OF CARE
FOR MRI review    Component Name Value Ref Range   REPORT STATUS PATIENT:   [id number] - SULTANAReynaldoarjuaquin Hillary  EXAM:   MRAH2 - MRA HEAD WITHOUT CONTRAST  SIGNED Leonidas Isabel MD    DATE OF EXAM:   05/25/2002      HISTORY:  CEREBRAL ANEURYSM.  PATIENT CARDENAS IMAGES.  CONTRAST  ENHANCED  IMAGES DID NOT DEMONSTRATE ANY ENHANCEMENT OF THE      VESSEL IN THIS  REGION.   A      CONVENTIONAL DIGITAL ANGIOGRAPHY CAN BE PERFORMED IF

## 2017-11-14 NOTE — PROGRESS NOTES
[facility] HOSPITALIST  Progress Note     Luciano Duglasstarr Patient Status:  Observation    1950 MRN [id number]   [facility] 8NE-A Attending Hernando Phelps, [address] [facility] Day # 0 PCP Ashlie Mccullough M.D.      Chief Complaint: Weakness    S: Patient 100 mg Oral 2x Daily(Beta Blocker)   • Warfarin Sodium  5 mg Oral Nightly   • enoxaparin  40 mg Subcutaneous Daily   • hydrALAzine HCl  25 mg Oral Q8H [facility]       ASSESSMENT / PLAN:     1. General weakness/fatigue  1. Unclear etiology  2.  No evidence of acut

## 2017-11-14 NOTE — PROGRESS NOTES
[facility] Cardiology Progress Note    Patient seen and examined.  Chart reviewed.  Discussed with family at bedside. No chest pain or shortness of breath. Still with no energy.     /82 (BP Location: Left arm)   Pulse 66   Temp 97.6 °F (36.4 °C) (Oral) has an appt with APN in our office tomorrow.     Hank Canavan MD

## 2017-11-14 NOTE — RESPIRATORY THERAPY NOTE
EITAN - Equipment Use Daily Summary:                  . Set Mode: AUTO CPAP WITH C-FLEX                . Usage in hours: 5.6                . 90% Pressure (EPAP) level: 5.0                . 90% Insp. Pressure (IPAP): Caden Spaulding  AHI: 54.8

## 2017-11-15 NOTE — DISCHARGE SUMMARY
[facility] HOSPITALIST  DISCHARGE SUMMARY     Venkat Loo Patient Status:  Observation    1950 MRN [id number]   [facility] 8NE-A Attending No att. providers found   Hosp Day # 0 PCP Nicol Davis M.D.      Date of Admission: 2017 steroids and lasix per cardiothoracic surgery. Patient discharged home in good condition.      Procedures during hospitalization:   • None    Incidental or significant findings and recommendations (brief descriptions):  • None    Lab/Test results pending at medications    DilTIAZem HCl ER Coated Beads 240 MG Cp24  Commonly known as:  CARDIZEM CD              Where to Get Your Medications      Please  your prescriptions at the location directed by your doctor or nurse    Bring a paper prescription for e

## 2017-11-15 NOTE — PLAN OF CARE
Maintains optimal cardiac output and hemodynamic stability Adequate for Discharge      Absence of cardiac arrhythmias or at baseline Adequate for Discharge      Achieve highest/safest level of mobility/gait Adequate for Discharge        Plan to discharge h

## 2017-11-16 ENCOUNTER — PRIOR ORIGINAL RECORDS (OUTPATIENT)
Dept: OTHER | Age: 67
End: 2017-11-16

## 2017-11-16 ENCOUNTER — HOSPITAL ENCOUNTER (OUTPATIENT)
Dept: LAB | Facility: HOSPITAL | Age: 67
Discharge: HOME | End: 2017-11-16
Attending: INTERNAL MEDICINE
Payer: MEDICARE

## 2017-11-16 DIAGNOSIS — I48.0 PAROXYSMAL ATRIAL FIBRILLATION (HCC): ICD-10-CM

## 2017-11-16 PROCEDURE — 85610 PROTHROMBIN TIME: CPT

## 2017-11-20 ENCOUNTER — HOSPITAL ENCOUNTER (OUTPATIENT)
Dept: GENERAL RADIOLOGY | Facility: HOSPITAL | Age: 67
Discharge: HOME | End: 2017-11-20
Attending: THORACIC SURGERY (CARDIOTHORACIC VASCULAR SURGERY)
Payer: MEDICARE

## 2017-11-20 DIAGNOSIS — Z98.890 HISTORY OF OPEN HEART SURGERY: ICD-10-CM

## 2017-11-20 PROCEDURE — 71035 XR CHEST DECUBITUS (CPT=71035): CPT | Performed by: THORACIC SURGERY (CARDIOTHORACIC VASCULAR SURGERY)

## 2017-11-20 PROCEDURE — 71020 XR CHEST PA + LAT CHEST (CPT=71020): CPT | Performed by: THORACIC SURGERY (CARDIOTHORACIC VASCULAR SURGERY)

## 2017-11-27 ENCOUNTER — PRIOR ORIGINAL RECORDS (OUTPATIENT)
Dept: OTHER | Age: 67
End: 2017-11-27

## 2017-11-28 LAB
BUN: 16 MG/DL
BUN: 34 MG/DL
CALCIUM: 8.6 MG/DL
CALCIUM: 8.6 MG/DL
CHLORIDE: 107 MEQ/L
CHLORIDE: 110 MEQ/L
CHOLESTEROL, TOTAL: 192 MG/DL
CREATININE, SERUM: 1.06 MG/DL
CREATININE, SERUM: 1.2 MG/DL
GLUCOSE: 100 MG/DL
GLUCOSE: 98 MG/DL
HDL CHOLESTEROL: 57 MG/DL
HEMATOCRIT: 27.3 %
HEMATOCRIT: 28.3 %
HEMOGLOBIN: 9 G/DL
HEMOGLOBIN: 9.3 G/DL
LDL CHOLESTEROL: 115 MG/DL
MAGNESIUM: 2.1 MG/DL
PLATELETS: 104 K/UL
PLATELETS: 125 K/UL
POTASSIUM, SERUM: 3.3 MEQ/L
POTASSIUM, SERUM: 3.8 MEQ/L
POTASSIUM, SERUM: 3.9 MEQ/L
RED BLOOD COUNT: 2.92 X 10-6/U
RED BLOOD COUNT: 2.99 X 10-6/U
SODIUM: 142 MEQ/L
SODIUM: 143 MEQ/L
TRIGLYCERIDES: 100 MG/DL
WHITE BLOOD COUNT: 13.7 X 10-3/U
WHITE BLOOD COUNT: 15.1 X 10-3/U

## 2017-11-28 NOTE — CONSULTS
[facility]  Report of Consultation    Vipul Box Patient Status:  Observation    1950 MRN [id number]   [facility] 8NE-A Attending No att. providers found   Hosp Day # 0 PCP Christel Valdes M.D.     Reason for Consultation:  Dy changes,  Respiratory: Denies cough, wheezing but has been short of breath  CV: Denies chest pain, palpitations, orthopnea, PND or dizziness. Musculoskeletal: No joint pain, stiffness or swelling. GI: No nausea, vomiting or diarrhea. No blood in stools.

## 2017-11-29 ENCOUNTER — PRIOR ORIGINAL RECORDS (OUTPATIENT)
Dept: OTHER | Age: 67
End: 2017-11-29

## 2017-12-04 ENCOUNTER — HOSPITAL ENCOUNTER (OUTPATIENT)
Dept: ULTRASOUND IMAGING | Facility: HOSPITAL | Age: 67
Discharge: HOME | End: 2017-12-04
Attending: INTERNAL MEDICINE
Payer: MEDICARE

## 2017-12-04 DIAGNOSIS — M79.662 PAIN OF LEFT CALF: ICD-10-CM

## 2017-12-04 DIAGNOSIS — I70.213 ATHEROSCLEROSIS OF BOTH LOWER EXTREMITIES WITH INTERMITTENT CLAUDICATION (HCC): ICD-10-CM

## 2017-12-04 PROCEDURE — 93971 EXTREMITY STUDY: CPT | Performed by: INTERNAL MEDICINE

## 2017-12-05 ENCOUNTER — PRIOR ORIGINAL RECORDS (OUTPATIENT)
Dept: OTHER | Age: 67
End: 2017-12-05

## 2017-12-06 LAB
ALBUMIN: 2.9 G/DL
ALKALINE PHOSPHATATE(ALK PHOS): 81 IU/L
BILIRUBIN TOTAL: 0.6 MG/DL
BUN: 15 MG/DL
CALCIUM: 8.7 MG/DL
CHLORIDE: 111 MEQ/L
CREATININE, SERUM: 1.14 MG/DL
FREE T4: 1.5 MG/DL
GLUCOSE: 97 MG/DL
HEMATOCRIT: 31.5 %
HEMATOCRIT: 32.3 %
HEMOGLOBIN: 10.2 G/DL
HEMOGLOBIN: 10.5 G/DL
PLATELETS: 169 K/UL
PLATELETS: 213 K/UL
POTASSIUM, SERUM: 3.5 MEQ/L
POTASSIUM, SERUM: 3.7 MEQ/L
PROTEIN, TOTAL: 6.5 G/DL
RED BLOOD COUNT: 3.29 X 10-6/U
RED BLOOD COUNT: 3.4 X 10-6/U
SGOT (AST): 13 IU/L
SGPT (ALT): 31 IU/L
SODIUM: 145 MEQ/L
THYROID STIMULATING HORMONE: 0.81 MLU/L
WHITE BLOOD COUNT: 12.1 X 10-3/U
WHITE BLOOD COUNT: 13.2 X 10-3/U

## 2017-12-21 ENCOUNTER — CARDPULM VISIT (OUTPATIENT)
Dept: CARDIAC REHAB | Facility: HOSPITAL | Age: 67
End: 2017-12-21
Attending: INTERNAL MEDICINE
Payer: COMMERCIAL

## 2017-12-21 VITALS
HEIGHT: 63 IN | BODY MASS INDEX: 28.53 KG/M2 | OXYGEN SATURATION: 97 % | SYSTOLIC BLOOD PRESSURE: 118 MMHG | DIASTOLIC BLOOD PRESSURE: 80 MMHG | WEIGHT: 161 LBS | HEART RATE: 62 BPM
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
## 2018-01-03 ENCOUNTER — CARDPULM VISIT (OUTPATIENT)
Dept: CARDIAC REHAB | Facility: HOSPITAL | Age: 68
End: 2018-01-03
Attending: INTERNAL MEDICINE
Payer: MEDICARE

## 2018-01-03 PROCEDURE — 93798 PHYS/QHP OP CAR RHAB W/ECG: CPT

## 2018-01-05 ENCOUNTER — CARDPULM VISIT (OUTPATIENT)
Dept: CARDIAC REHAB | Facility: HOSPITAL | Age: 68
End: 2018-01-05
Attending: INTERNAL MEDICINE
Payer: MEDICARE

## 2018-01-05 PROCEDURE — 93798 PHYS/QHP OP CAR RHAB W/ECG: CPT

## 2018-01-08 ENCOUNTER — CARDPULM VISIT (OUTPATIENT)
Dept: CARDIAC REHAB | Facility: HOSPITAL | Age: 68
End: 2018-01-08
Attending: INTERNAL MEDICINE
Payer: MEDICARE

## 2018-01-08 PROCEDURE — 93798 PHYS/QHP OP CAR RHAB W/ECG: CPT

## 2018-01-10 ENCOUNTER — CARDPULM VISIT (OUTPATIENT)
Dept: CARDIAC REHAB | Facility: HOSPITAL | Age: 68
End: 2018-01-10
Attending: INTERNAL MEDICINE
Payer: MEDICARE

## 2018-01-10 PROCEDURE — 93798 PHYS/QHP OP CAR RHAB W/ECG: CPT

## 2018-01-12 ENCOUNTER — CARDPULM VISIT (OUTPATIENT)
Dept: CARDIAC REHAB | Facility: HOSPITAL | Age: 68
End: 2018-01-12
Attending: INTERNAL MEDICINE
Payer: MEDICARE

## 2018-01-12 PROCEDURE — 93798 PHYS/QHP OP CAR RHAB W/ECG: CPT

## 2018-01-15 ENCOUNTER — APPOINTMENT (OUTPATIENT)
Dept: CARDIAC REHAB | Facility: HOSPITAL | Age: 68
End: 2018-01-15
Attending: INTERNAL MEDICINE
Payer: MEDICARE

## 2018-01-17 ENCOUNTER — APPOINTMENT (OUTPATIENT)
Dept: CARDIAC REHAB | Facility: HOSPITAL | Age: 68
End: 2018-01-17
Attending: INTERNAL MEDICINE
Payer: MEDICARE

## 2018-01-17 PROCEDURE — 93798 PHYS/QHP OP CAR RHAB W/ECG: CPT

## 2018-01-19 ENCOUNTER — CARDPULM VISIT (OUTPATIENT)
Dept: CARDIAC REHAB | Facility: HOSPITAL | Age: 68
End: 2018-01-19
Attending: INTERNAL MEDICINE
Payer: MEDICARE

## 2018-01-19 PROCEDURE — 93798 PHYS/QHP OP CAR RHAB W/ECG: CPT

## 2018-01-22 ENCOUNTER — CARDPULM VISIT (OUTPATIENT)
Dept: CARDIAC REHAB | Facility: HOSPITAL | Age: 68
End: 2018-01-22
Attending: INTERNAL MEDICINE
Payer: MEDICARE

## 2018-01-22 ENCOUNTER — PRIOR ORIGINAL RECORDS (OUTPATIENT)
Dept: OTHER | Age: 68
End: 2018-01-22

## 2018-01-22 PROCEDURE — 93798 PHYS/QHP OP CAR RHAB W/ECG: CPT

## 2018-01-24 ENCOUNTER — APPOINTMENT (OUTPATIENT)
Dept: CARDIAC REHAB | Facility: HOSPITAL | Age: 68
End: 2018-01-24
Attending: INTERNAL MEDICINE
Payer: MEDICARE

## 2018-01-24 ENCOUNTER — HOSPITAL ENCOUNTER (OUTPATIENT)
Dept: LAB | Facility: HOSPITAL | Age: 68
Discharge: HOME | End: 2018-01-24
Attending: INTERNAL MEDICINE
Payer: MEDICARE

## 2018-01-24 ENCOUNTER — PRIOR ORIGINAL RECORDS (OUTPATIENT)
Dept: OTHER | Age: 68
End: 2018-01-24

## 2018-01-24 DIAGNOSIS — I48.0 PAROXYSMAL ATRIAL FIBRILLATION (HCC): ICD-10-CM

## 2018-01-24 LAB — POC INR: 2.7 (ref 0.8–1.3)

## 2018-01-24 PROCEDURE — 85610 PROTHROMBIN TIME: CPT

## 2018-01-24 PROCEDURE — 93798 PHYS/QHP OP CAR RHAB W/ECG: CPT

## 2018-01-26 ENCOUNTER — CARDPULM VISIT (OUTPATIENT)
Dept: CARDIAC REHAB | Facility: HOSPITAL | Age: 68
End: 2018-01-26
Attending: INTERNAL MEDICINE
Payer: MEDICARE

## 2018-01-26 PROCEDURE — 93798 PHYS/QHP OP CAR RHAB W/ECG: CPT

## 2018-01-29 ENCOUNTER — CARDPULM VISIT (OUTPATIENT)
Dept: CARDIAC REHAB | Facility: HOSPITAL | Age: 68
End: 2018-01-29
Attending: INTERNAL MEDICINE
Payer: MEDICARE

## 2018-01-29 PROCEDURE — 93798 PHYS/QHP OP CAR RHAB W/ECG: CPT

## 2018-01-31 ENCOUNTER — CARDPULM VISIT (OUTPATIENT)
Dept: CARDIAC REHAB | Facility: HOSPITAL | Age: 68
End: 2018-01-31
Attending: INTERNAL MEDICINE
Payer: MEDICARE

## 2018-01-31 PROCEDURE — 93798 PHYS/QHP OP CAR RHAB W/ECG: CPT

## 2018-02-02 ENCOUNTER — CARDPULM VISIT (OUTPATIENT)
Dept: CARDIAC REHAB | Facility: HOSPITAL | Age: 68
End: 2018-02-02
Attending: INTERNAL MEDICINE
Payer: MEDICARE

## 2018-02-02 ENCOUNTER — PRIOR ORIGINAL RECORDS (OUTPATIENT)
Dept: OTHER | Age: 68
End: 2018-02-02

## 2018-02-02 PROCEDURE — 93798 PHYS/QHP OP CAR RHAB W/ECG: CPT

## 2018-02-05 ENCOUNTER — APPOINTMENT (OUTPATIENT)
Dept: CARDIAC REHAB | Facility: HOSPITAL | Age: 68
End: 2018-02-05
Attending: INTERNAL MEDICINE
Payer: MEDICARE

## 2018-02-05 ENCOUNTER — PRIOR ORIGINAL RECORDS (OUTPATIENT)
Dept: OTHER | Age: 68
End: 2018-02-05

## 2018-02-05 PROCEDURE — 93798 PHYS/QHP OP CAR RHAB W/ECG: CPT

## 2018-02-06 ENCOUNTER — PRIOR ORIGINAL RECORDS (OUTPATIENT)
Dept: OTHER | Age: 68
End: 2018-02-06

## 2018-02-07 ENCOUNTER — HOSPITAL ENCOUNTER (OUTPATIENT)
Dept: LAB | Facility: HOSPITAL | Age: 68
Discharge: HOME | End: 2018-02-07
Attending: INTERNAL MEDICINE
Payer: MEDICARE

## 2018-02-07 ENCOUNTER — CARDPULM VISIT (OUTPATIENT)
Dept: CARDIAC REHAB | Facility: HOSPITAL | Age: 68
End: 2018-02-07
Attending: INTERNAL MEDICINE
Payer: MEDICARE

## 2018-02-07 ENCOUNTER — PRIOR ORIGINAL RECORDS (OUTPATIENT)
Dept: OTHER | Age: 68
End: 2018-02-07

## 2018-02-07 LAB
ALBUMIN SERPL-MCNC: 3.8 G/DL (ref 3.5–4.8)
ALP LIVER SERPL-CCNC: 97 U/L (ref 55–142)
ALT SERPL-CCNC: 107 U/L (ref 14–54)
AST SERPL-CCNC: 43 U/L (ref 15–41)
BASOPHILS # BLD AUTO: 0.03 X10(3) UL (ref 0–0.1)
BASOPHILS NFR BLD AUTO: 0.4 %
BETA NATRIURETIC PEPTIDE: 945 PG/ML (ref 2–99)
BILIRUB SERPL-MCNC: 0.7 MG/DL (ref 0.1–2)
BUN BLD-MCNC: 18 MG/DL (ref 8–20)
CALCIUM BLD-MCNC: 9.7 MG/DL (ref 8.3–10.3)
CHLORIDE: 112 MMOL/L (ref 101–111)
CO2: 27 MMOL/L (ref 22–32)
CREAT BLD-MCNC: 1.29 MG/DL (ref 0.55–1.02)
EOSINOPHIL # BLD AUTO: 0.2 X10(3) UL (ref 0–0.3)
EOSINOPHIL NFR BLD AUTO: 2.3 %
ERYTHROCYTE [DISTWIDTH] IN BLOOD BY AUTOMATED COUNT: 14.8 % (ref 11.5–16)
GLUCOSE BLD-MCNC: 82 MG/DL (ref 70–99)
HCT VFR BLD AUTO: 39.1 % (ref 34–50)
HGB BLD-MCNC: 12.6 G/DL (ref 12–16)
IMMATURE GRANULOCYTE COUNT: 0.03 X10(3) UL (ref 0–1)
IMMATURE GRANULOCYTE RATIO %: 0.4 %
LYMPHOCYTES # BLD AUTO: 3.48 X10(3) UL (ref 0.9–4)
LYMPHOCYTES NFR BLD AUTO: 40.8 %
M PROTEIN MFR SERPL ELPH: 7.2 G/DL (ref 6.1–8.3)
MCH RBC QN AUTO: 30.8 PG (ref 27–33.2)
MCHC RBC AUTO-ENTMCNC: 32.2 G/DL (ref 31–37)
MCV RBC AUTO: 95.6 FL (ref 81–100)
MONOCYTES # BLD AUTO: 0.68 X10(3) UL (ref 0.1–0.6)
MONOCYTES NFR BLD AUTO: 8 %
NEUTROPHIL ABS PRELIM: 4.11 X10 (3) UL (ref 1.3–6.7)
NEUTROPHILS # BLD AUTO: 4.11 X10(3) UL (ref 1.3–6.7)
NEUTROPHILS NFR BLD AUTO: 48.1 %
PLATELET # BLD AUTO: 117 10(3)UL (ref 150–450)
POTASSIUM SERPL-SCNC: 4.5 MMOL/L (ref 3.6–5.1)
RBC # BLD AUTO: 4.09 X10(6)UL (ref 3.8–5.1)
RED CELL DISTRIBUTION WIDTH-SD: 51.8 FL (ref 35.1–46.3)
SODIUM SERPL-SCNC: 145 MMOL/L (ref 136–144)
TSI SER-ACNC: 0.73 MIU/ML (ref 0.35–5.5)
WBC # BLD AUTO: 8.5 X10(3) UL (ref 4–13)

## 2018-02-07 PROCEDURE — 80053 COMPREHEN METABOLIC PANEL: CPT | Performed by: INTERNAL MEDICINE

## 2018-02-07 PROCEDURE — 83880 ASSAY OF NATRIURETIC PEPTIDE: CPT | Performed by: INTERNAL MEDICINE

## 2018-02-07 PROCEDURE — 84443 ASSAY THYROID STIM HORMONE: CPT | Performed by: INTERNAL MEDICINE

## 2018-02-07 PROCEDURE — 93798 PHYS/QHP OP CAR RHAB W/ECG: CPT

## 2018-02-07 PROCEDURE — 85025 COMPLETE CBC W/AUTO DIFF WBC: CPT | Performed by: INTERNAL MEDICINE

## 2018-02-07 PROCEDURE — 36415 COLL VENOUS BLD VENIPUNCTURE: CPT | Performed by: INTERNAL MEDICINE

## 2018-02-09 ENCOUNTER — CARDPULM VISIT (OUTPATIENT)
Dept: CARDIAC REHAB | Facility: HOSPITAL | Age: 68
End: 2018-02-09
Attending: INTERNAL MEDICINE
Payer: MEDICARE

## 2018-02-09 ENCOUNTER — HOSPITAL ENCOUNTER (OUTPATIENT)
Dept: CT IMAGING | Facility: HOSPITAL | Age: 68
Discharge: HOME | End: 2018-02-09
Attending: INTERNAL MEDICINE
Payer: MEDICARE

## 2018-02-09 DIAGNOSIS — I25.10 CVD (CARDIOVASCULAR DISEASE): ICD-10-CM

## 2018-02-09 DIAGNOSIS — I48.0 AF (PAROXYSMAL ATRIAL FIBRILLATION) (HCC): ICD-10-CM

## 2018-02-09 PROCEDURE — 70450 CT HEAD/BRAIN W/O DYE: CPT | Performed by: INTERNAL MEDICINE

## 2018-02-09 PROCEDURE — 93798 PHYS/QHP OP CAR RHAB W/ECG: CPT

## 2018-02-12 ENCOUNTER — APPOINTMENT (OUTPATIENT)
Dept: CARDIAC REHAB | Facility: HOSPITAL | Age: 68
End: 2018-02-12
Attending: INTERNAL MEDICINE
Payer: MEDICARE

## 2018-02-12 PROCEDURE — 93798 PHYS/QHP OP CAR RHAB W/ECG: CPT

## 2018-02-13 ENCOUNTER — PRIOR ORIGINAL RECORDS (OUTPATIENT)
Dept: OTHER | Age: 68
End: 2018-02-13

## 2018-02-14 ENCOUNTER — CARDPULM VISIT (OUTPATIENT)
Dept: CARDIAC REHAB | Facility: HOSPITAL | Age: 68
End: 2018-02-14
Attending: INTERNAL MEDICINE
Payer: MEDICARE

## 2018-02-14 PROCEDURE — 93798 PHYS/QHP OP CAR RHAB W/ECG: CPT

## 2018-02-15 ENCOUNTER — PRIOR ORIGINAL RECORDS (OUTPATIENT)
Dept: OTHER | Age: 68
End: 2018-02-15

## 2018-02-16 ENCOUNTER — CARDPULM VISIT (OUTPATIENT)
Dept: CARDIAC REHAB | Facility: HOSPITAL | Age: 68
End: 2018-02-16
Attending: INTERNAL MEDICINE
Payer: MEDICARE

## 2018-02-16 PROCEDURE — 93798 PHYS/QHP OP CAR RHAB W/ECG: CPT

## 2018-02-19 ENCOUNTER — CARDPULM VISIT (OUTPATIENT)
Dept: CARDIAC REHAB | Facility: HOSPITAL | Age: 68
End: 2018-02-19
Attending: INTERNAL MEDICINE
Payer: MEDICARE

## 2018-02-19 LAB
ALBUMIN: 3.8 G/DL
ALKALINE PHOSPHATATE(ALK PHOS): 97 IU/L
BILIRUBIN TOTAL: 0.7 MG/DL
BNP: 945 PMOL/L
BUN: 18 MG/DL
CALCIUM: 9.7 MG/DL
CHLORIDE: 112 MEQ/L
CREATININE, SERUM: 1.29 MG/DL
GLUCOSE: 82 MG/DL
HEMATOCRIT: 39.1 %
HEMOGLOBIN: 12.6 G/DL
PLATELETS: 117 K/UL
POTASSIUM, SERUM: 4.5 MEQ/L
PROTEIN, TOTAL: 7.2 G/DL
RED BLOOD COUNT: 4.09 X 10-6/U
SGOT (AST): 43 IU/L
SGPT (ALT): 107 IU/L
SODIUM: 145 MEQ/L
THYROID STIMULATING HORMONE: 0.73 MLU/L
WHITE BLOOD COUNT: 8.5 X 10-3/U

## 2018-02-19 PROCEDURE — 93798 PHYS/QHP OP CAR RHAB W/ECG: CPT

## 2018-02-21 ENCOUNTER — CARDPULM VISIT (OUTPATIENT)
Dept: CARDIAC REHAB | Facility: HOSPITAL | Age: 68
End: 2018-02-21
Attending: INTERNAL MEDICINE
Payer: MEDICARE

## 2018-02-21 ENCOUNTER — PRIOR ORIGINAL RECORDS (OUTPATIENT)
Dept: OTHER | Age: 68
End: 2018-02-21

## 2018-02-21 PROCEDURE — 93798 PHYS/QHP OP CAR RHAB W/ECG: CPT

## 2018-02-23 ENCOUNTER — PRIOR ORIGINAL RECORDS (OUTPATIENT)
Dept: OTHER | Age: 68
End: 2018-02-23

## 2018-02-23 ENCOUNTER — CARDPULM VISIT (OUTPATIENT)
Dept: CARDIAC REHAB | Facility: HOSPITAL | Age: 68
End: 2018-02-23
Attending: INTERNAL MEDICINE
Payer: MEDICARE

## 2018-02-23 PROCEDURE — 93798 PHYS/QHP OP CAR RHAB W/ECG: CPT

## 2018-02-26 ENCOUNTER — APPOINTMENT (OUTPATIENT)
Dept: CARDIAC REHAB | Facility: HOSPITAL | Age: 68
End: 2018-02-26
Attending: INTERNAL MEDICINE
Payer: MEDICARE

## 2018-02-28 ENCOUNTER — CARDPULM VISIT (OUTPATIENT)
Dept: CARDIAC REHAB | Facility: HOSPITAL | Age: 68
End: 2018-02-28
Attending: INTERNAL MEDICINE
Payer: MEDICARE

## 2018-02-28 ENCOUNTER — PRIOR ORIGINAL RECORDS (OUTPATIENT)
Dept: OTHER | Age: 68
End: 2018-02-28

## 2018-02-28 PROCEDURE — 93798 PHYS/QHP OP CAR RHAB W/ECG: CPT

## 2018-03-02 ENCOUNTER — CARDPULM VISIT (OUTPATIENT)
Dept: CARDIAC REHAB | Facility: HOSPITAL | Age: 68
End: 2018-03-02
Attending: INTERNAL MEDICINE
Payer: MEDICARE

## 2018-03-02 ENCOUNTER — HOSPITAL ENCOUNTER (OUTPATIENT)
Dept: LAB | Facility: HOSPITAL | Age: 68
Discharge: HOME | End: 2018-03-02
Attending: INTERNAL MEDICINE
Payer: MEDICARE

## 2018-03-02 DIAGNOSIS — I48.0 PAROXYSMAL ATRIAL FIBRILLATION (HCC): ICD-10-CM

## 2018-03-02 LAB — POC INR: 1.2 (ref 0.8–1.3)

## 2018-03-02 PROCEDURE — 85610 PROTHROMBIN TIME: CPT

## 2018-03-02 PROCEDURE — 93798 PHYS/QHP OP CAR RHAB W/ECG: CPT

## 2018-03-05 ENCOUNTER — CARDPULM VISIT (OUTPATIENT)
Dept: CARDIAC REHAB | Facility: HOSPITAL | Age: 68
End: 2018-03-05
Attending: INTERNAL MEDICINE
Payer: MEDICARE

## 2018-03-05 ENCOUNTER — PRIOR ORIGINAL RECORDS (OUTPATIENT)
Dept: OTHER | Age: 68
End: 2018-03-05

## 2018-03-05 PROCEDURE — 93798 PHYS/QHP OP CAR RHAB W/ECG: CPT

## 2018-03-06 ENCOUNTER — CARDPULM VISIT (OUTPATIENT)
Dept: CARDIAC REHAB | Facility: HOSPITAL | Age: 68
End: 2018-03-06
Attending: INTERNAL MEDICINE
Payer: MEDICARE

## 2018-03-06 PROCEDURE — 93798 PHYS/QHP OP CAR RHAB W/ECG: CPT

## 2018-03-07 ENCOUNTER — APPOINTMENT (OUTPATIENT)
Dept: CARDIAC REHAB | Facility: HOSPITAL | Age: 68
End: 2018-03-07
Attending: INTERNAL MEDICINE
Payer: MEDICARE

## 2018-03-07 PROCEDURE — 93798 PHYS/QHP OP CAR RHAB W/ECG: CPT

## 2018-03-09 ENCOUNTER — APPOINTMENT (OUTPATIENT)
Dept: CARDIAC REHAB | Facility: HOSPITAL | Age: 68
End: 2018-03-09
Attending: INTERNAL MEDICINE
Payer: MEDICARE

## 2018-03-09 PROCEDURE — 93798 PHYS/QHP OP CAR RHAB W/ECG: CPT

## 2018-03-12 ENCOUNTER — APPOINTMENT (OUTPATIENT)
Dept: CARDIAC REHAB | Facility: HOSPITAL | Age: 68
End: 2018-03-12
Attending: INTERNAL MEDICINE
Payer: MEDICARE

## 2018-03-14 ENCOUNTER — APPOINTMENT (OUTPATIENT)
Dept: CARDIAC REHAB | Facility: HOSPITAL | Age: 68
End: 2018-03-14
Attending: INTERNAL MEDICINE
Payer: MEDICARE

## 2018-03-14 ENCOUNTER — HOSPITAL ENCOUNTER (OUTPATIENT)
Dept: LAB | Facility: HOSPITAL | Age: 68
Discharge: HOME | End: 2018-03-14
Attending: INTERNAL MEDICINE
Payer: MEDICARE

## 2018-03-14 ENCOUNTER — PRIOR ORIGINAL RECORDS (OUTPATIENT)
Dept: OTHER | Age: 68
End: 2018-03-14

## 2018-03-14 DIAGNOSIS — I48.0 PAROXYSMAL ATRIAL FIBRILLATION (HCC): ICD-10-CM

## 2018-03-14 LAB — POC INR: 2.3 (ref 0.8–1.3)

## 2018-03-14 PROCEDURE — 93798 PHYS/QHP OP CAR RHAB W/ECG: CPT

## 2018-03-14 PROCEDURE — 85610 PROTHROMBIN TIME: CPT

## 2018-03-16 ENCOUNTER — CARDPULM VISIT (OUTPATIENT)
Dept: CARDIAC REHAB | Facility: HOSPITAL | Age: 68
End: 2018-03-16
Attending: INTERNAL MEDICINE
Payer: MEDICARE

## 2018-03-16 ENCOUNTER — APPOINTMENT (OUTPATIENT)
Dept: LAB | Facility: HOSPITAL | Age: 68
End: 2018-03-16
Attending: INTERNAL MEDICINE
Payer: MEDICARE

## 2018-03-16 ENCOUNTER — HOSPITAL ENCOUNTER (OUTPATIENT)
Dept: GENERAL RADIOLOGY | Facility: HOSPITAL | Age: 68
Discharge: HOME | End: 2018-03-16
Attending: INTERNAL MEDICINE
Payer: MEDICARE

## 2018-03-16 DIAGNOSIS — R06.00 DYSPNEA, PAROXYSMAL NOCTURNAL: ICD-10-CM

## 2018-03-16 PROCEDURE — 71046 X-RAY EXAM CHEST 2 VIEWS: CPT | Performed by: INTERNAL MEDICINE

## 2018-03-16 PROCEDURE — 93798 PHYS/QHP OP CAR RHAB W/ECG: CPT

## 2018-03-19 ENCOUNTER — APPOINTMENT (OUTPATIENT)
Dept: CARDIAC REHAB | Facility: HOSPITAL | Age: 68
End: 2018-03-19
Attending: INTERNAL MEDICINE
Payer: MEDICARE

## 2018-03-19 PROCEDURE — 93798 PHYS/QHP OP CAR RHAB W/ECG: CPT

## 2018-03-21 ENCOUNTER — APPOINTMENT (OUTPATIENT)
Dept: CARDIAC REHAB | Facility: HOSPITAL | Age: 68
End: 2018-03-21
Attending: INTERNAL MEDICINE
Payer: MEDICARE

## 2018-03-21 PROCEDURE — 93798 PHYS/QHP OP CAR RHAB W/ECG: CPT

## 2018-03-23 ENCOUNTER — APPOINTMENT (OUTPATIENT)
Dept: CARDIAC REHAB | Facility: HOSPITAL | Age: 68
End: 2018-03-23
Attending: INTERNAL MEDICINE
Payer: MEDICARE

## 2018-03-23 ENCOUNTER — HOSPITAL ENCOUNTER (OUTPATIENT)
Dept: LAB | Facility: HOSPITAL | Age: 68
Discharge: HOME | End: 2018-03-23
Attending: INTERNAL MEDICINE
Payer: MEDICARE

## 2018-03-23 ENCOUNTER — PRIOR ORIGINAL RECORDS (OUTPATIENT)
Dept: OTHER | Age: 68
End: 2018-03-23

## 2018-03-23 LAB
ALBUMIN SERPL-MCNC: 3.8 G/DL (ref 3.5–4.8)
ALP LIVER SERPL-CCNC: 93 U/L (ref 55–142)
ALT SERPL-CCNC: 75 U/L (ref 14–54)
AST SERPL-CCNC: 49 U/L (ref 15–41)
BASOPHILS # BLD AUTO: 0.03 X10(3) UL (ref 0–0.1)
BASOPHILS NFR BLD AUTO: 0.6 %
BILIRUB SERPL-MCNC: 0.5 MG/DL (ref 0.1–2)
BUN BLD-MCNC: 17 MG/DL (ref 8–20)
CALCIUM BLD-MCNC: 9.2 MG/DL (ref 8.3–10.3)
CHLORIDE: 104 MMOL/L (ref 101–111)
CO2: 32 MMOL/L (ref 22–32)
CREAT BLD-MCNC: 1.41 MG/DL (ref 0.55–1.02)
EOSINOPHIL # BLD AUTO: 0.16 X10(3) UL (ref 0–0.3)
EOSINOPHIL NFR BLD AUTO: 3.2 %
ERYTHROCYTE [DISTWIDTH] IN BLOOD BY AUTOMATED COUNT: 15.1 % (ref 11.5–16)
FREE T4: 1.4 NG/DL (ref 0.9–1.8)
GLUCOSE BLD-MCNC: 85 MG/DL (ref 70–99)
HCT VFR BLD AUTO: 41.1 % (ref 34–50)
HGB BLD-MCNC: 13.1 G/DL (ref 12–16)
IMMATURE GRANULOCYTE COUNT: 0.01 X10(3) UL (ref 0–1)
IMMATURE GRANULOCYTE RATIO %: 0.2 %
LYMPHOCYTES # BLD AUTO: 2.25 X10(3) UL (ref 0.9–4)
LYMPHOCYTES NFR BLD AUTO: 44.6 %
M PROTEIN MFR SERPL ELPH: 7.6 G/DL (ref 6.1–8.3)
MCH RBC QN AUTO: 30 PG (ref 27–33.2)
MCHC RBC AUTO-ENTMCNC: 31.9 G/DL (ref 31–37)
MCV RBC AUTO: 94.3 FL (ref 81–100)
MONOCYTES # BLD AUTO: 0.42 X10(3) UL (ref 0.1–1)
MONOCYTES NFR BLD AUTO: 8.3 %
NEUTROPHIL ABS PRELIM: 2.18 X10 (3) UL (ref 1.3–6.7)
NEUTROPHILS # BLD AUTO: 2.18 X10(3) UL (ref 1.3–6.7)
NEUTROPHILS NFR BLD AUTO: 43.1 %
PLATELET # BLD AUTO: 168 10(3)UL (ref 150–450)
POTASSIUM SERPL-SCNC: 3.6 MMOL/L (ref 3.6–5.1)
PRO-BETA NATRIURETIC PEPTIDE: 371 PG/ML (ref ?–125)
RBC # BLD AUTO: 4.36 X10(6)UL (ref 3.8–5.1)
RED CELL DISTRIBUTION WIDTH-SD: 52.2 FL (ref 35.1–46.3)
SODIUM SERPL-SCNC: 142 MMOL/L (ref 136–144)
TSI SER-ACNC: 0.65 MIU/ML (ref 0.35–5.5)
WBC # BLD AUTO: 5.1 X10(3) UL (ref 4–13)

## 2018-03-23 PROCEDURE — 83880 ASSAY OF NATRIURETIC PEPTIDE: CPT | Performed by: INTERNAL MEDICINE

## 2018-03-23 PROCEDURE — 36415 COLL VENOUS BLD VENIPUNCTURE: CPT | Performed by: INTERNAL MEDICINE

## 2018-03-23 PROCEDURE — 80053 COMPREHEN METABOLIC PANEL: CPT | Performed by: INTERNAL MEDICINE

## 2018-03-23 PROCEDURE — 93798 PHYS/QHP OP CAR RHAB W/ECG: CPT

## 2018-03-23 PROCEDURE — 85025 COMPLETE CBC W/AUTO DIFF WBC: CPT | Performed by: INTERNAL MEDICINE

## 2018-03-23 PROCEDURE — 84439 ASSAY OF FREE THYROXINE: CPT | Performed by: INTERNAL MEDICINE

## 2018-03-23 PROCEDURE — 84443 ASSAY THYROID STIM HORMONE: CPT | Performed by: INTERNAL MEDICINE

## 2018-03-26 ENCOUNTER — CARDPULM VISIT (OUTPATIENT)
Dept: CARDIAC REHAB | Facility: HOSPITAL | Age: 68
End: 2018-03-26
Attending: INTERNAL MEDICINE
Payer: MEDICARE

## 2018-03-26 ENCOUNTER — PRIOR ORIGINAL RECORDS (OUTPATIENT)
Dept: OTHER | Age: 68
End: 2018-03-26

## 2018-03-26 PROCEDURE — 93798 PHYS/QHP OP CAR RHAB W/ECG: CPT

## 2018-03-28 ENCOUNTER — APPOINTMENT (OUTPATIENT)
Dept: CARDIAC REHAB | Facility: HOSPITAL | Age: 68
End: 2018-03-28
Attending: INTERNAL MEDICINE
Payer: MEDICARE

## 2018-03-30 ENCOUNTER — APPOINTMENT (OUTPATIENT)
Dept: CARDIAC REHAB | Facility: HOSPITAL | Age: 68
End: 2018-03-30
Attending: INTERNAL MEDICINE
Payer: MEDICARE

## 2018-03-30 LAB
ALBUMIN: 3.8 G/DL
ALKALINE PHOSPHATATE(ALK PHOS): 93 IU/L
BILIRUBIN TOTAL: 0.5 MG/DL
BUN: 17 MG/DL
CALCIUM: 9.2 MG/DL
CHLORIDE: 104 MEQ/L
CREATININE, SERUM: 1.41 MG/DL
FREE T4: 1.4 MG/DL
GLUCOSE: 85 MG/DL
HEMATOCRIT: 41.1 %
HEMOGLOBIN: 13.1 G/DL
PLATELETS: 168 K/UL
POTASSIUM, SERUM: 3.6 MEQ/L
PROBNP: 371 PG/ML
PROTEIN, TOTAL: 7.6 G/DL
RED BLOOD COUNT: 4.36 X 10-6/U
SGOT (AST): 49 IU/L
SGPT (ALT): 75 IU/L
SODIUM: 142 MEQ/L
THYROID STIMULATING HORMONE: 0.65 MLU/L
WHITE BLOOD COUNT: 5.1 X 10-3/U

## 2018-04-10 ENCOUNTER — PRIOR ORIGINAL RECORDS (OUTPATIENT)
Dept: OTHER | Age: 68
End: 2018-04-10

## 2018-04-18 ENCOUNTER — HOSPITAL ENCOUNTER (OUTPATIENT)
Dept: LAB | Facility: HOSPITAL | Age: 68
Discharge: HOME | End: 2018-04-18
Attending: INTERNAL MEDICINE
Payer: MEDICARE

## 2018-04-18 DIAGNOSIS — I48.0 PAROXYSMAL ATRIAL FIBRILLATION (HCC): ICD-10-CM

## 2018-04-18 PROCEDURE — 85610 PROTHROMBIN TIME: CPT

## 2018-04-25 ENCOUNTER — PRIOR ORIGINAL RECORDS (OUTPATIENT)
Dept: OTHER | Age: 68
End: 2018-04-25

## 2018-04-25 ENCOUNTER — HOSPITAL ENCOUNTER (OUTPATIENT)
Dept: LAB | Facility: HOSPITAL | Age: 68
Discharge: HOME | End: 2018-04-25
Attending: INTERNAL MEDICINE
Payer: MEDICARE

## 2018-04-25 DIAGNOSIS — I48.0 PAROXYSMAL ATRIAL FIBRILLATION (HCC): ICD-10-CM

## 2018-04-25 PROCEDURE — 85610 PROTHROMBIN TIME: CPT

## 2018-05-07 ENCOUNTER — MYAURORA ACCOUNT LINK (OUTPATIENT)
Dept: OTHER | Age: 68
End: 2018-05-07

## 2018-05-07 ENCOUNTER — PRIOR ORIGINAL RECORDS (OUTPATIENT)
Dept: OTHER | Age: 68
End: 2018-05-07

## 2018-05-10 ENCOUNTER — PRIOR ORIGINAL RECORDS (OUTPATIENT)
Dept: OTHER | Age: 68
End: 2018-05-10

## 2018-05-11 ENCOUNTER — PRIOR ORIGINAL RECORDS (OUTPATIENT)
Dept: OTHER | Age: 68
End: 2018-05-11

## 2018-05-25 ENCOUNTER — HOSPITAL ENCOUNTER (OUTPATIENT)
Dept: LAB | Facility: HOSPITAL | Age: 68
Discharge: HOME | End: 2018-05-25
Attending: INTERNAL MEDICINE
Payer: MEDICARE

## 2018-05-25 DIAGNOSIS — I48.0 PAROXYSMAL ATRIAL FIBRILLATION (HCC): ICD-10-CM

## 2018-05-25 PROCEDURE — 85610 PROTHROMBIN TIME: CPT

## 2018-06-01 ENCOUNTER — PRIOR ORIGINAL RECORDS (OUTPATIENT)
Dept: OTHER | Age: 68
End: 2018-06-01

## 2018-06-04 ENCOUNTER — HOSPITAL ENCOUNTER (OUTPATIENT)
Dept: LAB | Facility: HOSPITAL | Age: 68
Discharge: HOME | End: 2018-06-04
Attending: INTERNAL MEDICINE
Payer: MEDICARE

## 2018-06-04 DIAGNOSIS — I48.0 PAROXYSMAL ATRIAL FIBRILLATION (HCC): ICD-10-CM

## 2018-06-04 PROCEDURE — 85610 PROTHROMBIN TIME: CPT

## 2018-06-06 ENCOUNTER — PRIOR ORIGINAL RECORDS (OUTPATIENT)
Dept: OTHER | Age: 68
End: 2018-06-06

## 2018-06-07 ENCOUNTER — HOSPITAL ENCOUNTER (OUTPATIENT)
Dept: CV DIAGNOSTICS | Facility: HOSPITAL | Age: 68
Discharge: HOME | End: 2018-06-07
Attending: INTERNAL MEDICINE
Payer: MEDICARE

## 2018-06-07 ENCOUNTER — HOSPITAL ENCOUNTER (OUTPATIENT)
Dept: CV DIAGNOSTICS | Facility: HOSPITAL | Age: 68
End: 2018-06-07
Attending: INTERNAL MEDICINE
Payer: MEDICARE

## 2018-06-07 DIAGNOSIS — R06.00 DYSPNEA, UNSPECIFIED TYPE: ICD-10-CM

## 2018-06-07 PROCEDURE — 94621 CARDIOPULM EXERCISE TESTING: CPT | Performed by: INTERNAL MEDICINE

## 2018-06-29 ENCOUNTER — RT VISIT (OUTPATIENT)
Dept: RESPIRATORY THERAPY | Facility: HOSPITAL | Age: 68
End: 2018-06-29
Attending: INTERNAL MEDICINE
Payer: MEDICARE

## 2018-06-29 ENCOUNTER — HOSPITAL ENCOUNTER (OUTPATIENT)
Dept: CT IMAGING | Facility: HOSPITAL | Age: 68
Discharge: HOME | End: 2018-06-29
Attending: INTERNAL MEDICINE
Payer: MEDICARE

## 2018-06-29 DIAGNOSIS — R06.00 DOE (DYSPNEA ON EXERTION): ICD-10-CM

## 2018-06-29 DIAGNOSIS — I27.23 PULMONARY HYPERTENSION DUE TO LUNG DISEASES AND HYPOXIA (HCC): ICD-10-CM

## 2018-06-29 LAB — CREAT SERPL-MCNC: 1.3 MG/DL (ref 0.55–1.02)

## 2018-06-29 PROCEDURE — 94726 PLETHYSMOGRAPHY LUNG VOLUMES: CPT

## 2018-06-29 PROCEDURE — 94729 DIFFUSING CAPACITY: CPT

## 2018-06-29 PROCEDURE — 82565 ASSAY OF CREATININE: CPT

## 2018-06-29 PROCEDURE — 71260 CT THORAX DX C+: CPT | Performed by: INTERNAL MEDICINE

## 2018-06-29 PROCEDURE — 94010 BREATHING CAPACITY TEST: CPT

## 2018-07-03 NOTE — PROCEDURES
Spirometry should be interpreted with caution   The patient was not able to reliably  perform maneuvers and reproduce these results. This lessens the reliability of the results which reflect the patient's best effort.      Spirometry  with no evidence of

## 2018-08-29 ENCOUNTER — PRIOR ORIGINAL RECORDS (OUTPATIENT)
Dept: OTHER | Age: 68
End: 2018-08-29

## 2018-10-06 ENCOUNTER — PRIOR ORIGINAL RECORDS (OUTPATIENT)
Dept: OTHER | Age: 68
End: 2018-10-06

## 2018-10-08 ENCOUNTER — PRIOR ORIGINAL RECORDS (OUTPATIENT)
Dept: OTHER | Age: 68
End: 2018-10-08

## 2018-10-17 LAB
ALBUMIN: 4.1 G/DL
ALKALINE PHOSPHATATE(ALK PHOS): 63 IU/L
BILIRUBIN TOTAL: 0.7 MG/DL
BUN: 17 MG/DL
CALCIUM: 9.7 MG/DL
CHLORIDE: 110 MEQ/L
CHOLESTEROL, TOTAL: 142 MG/DL
CREATININE, SERUM: 1.48 MG/DL
GLOBULIN: 2.6 G/DL
GLUCOSE: 80 MG/DL
HDL CHOLESTEROL: 68 MG/DL
HEMATOCRIT: 36.4 %
HEMOGLOBIN A1C: 5.3 %
HEMOGLOBIN: 12.2 G/DL
LDL CHOLESTEROL: 58 MG/DL
PLATELETS: 91 K/UL
POTASSIUM, SERUM: 4.6 MEQ/L
PROTEIN, TOTAL: 6.7 G/DL
RED BLOOD COUNT: 3.9 X 10-6/U
SGOT (AST): 18 IU/L
SGPT (ALT): 18 IU/L
SODIUM: 143 MEQ/L
THYROID STIMULATING HORMONE: 1.24 MLU/L
TRIGLYCERIDES: 77 MG/DL
WHITE BLOOD COUNT: 6.1 X 10-3/U

## 2018-11-28 ENCOUNTER — HOSPITAL ENCOUNTER (OUTPATIENT)
Age: 68
Discharge: HOME | End: 2018-11-28
Payer: MEDICARE

## 2018-11-28 ENCOUNTER — APPOINTMENT (OUTPATIENT)
Dept: GENERAL RADIOLOGY | Age: 68
End: 2018-11-28
Attending: PHYSICIAN ASSISTANT
Payer: MEDICARE

## 2018-11-28 VITALS
RESPIRATION RATE: 18 BRPM | HEIGHT: 62.5 IN | BODY MASS INDEX: 29.25 KG/M2 | HEART RATE: 70 BPM | WEIGHT: 163 LBS | DIASTOLIC BLOOD PRESSURE: 90 MMHG | TEMPERATURE: 97 F | OXYGEN SATURATION: 96 % | SYSTOLIC BLOOD PRESSURE: 162 MMHG

## 2018-11-28 DIAGNOSIS — R07.89 CHEST WALL DISCOMFORT: ICD-10-CM

## 2018-11-28 DIAGNOSIS — R06.01 ORTHOPNEA: ICD-10-CM

## 2018-11-28 DIAGNOSIS — R79.89 ELEVATED SERUM CREATININE: ICD-10-CM

## 2018-11-28 DIAGNOSIS — J40 WHEEZY BRONCHITIS: Primary | ICD-10-CM

## 2018-11-28 PROCEDURE — 85025 COMPLETE CBC W/AUTO DIFF WBC: CPT | Performed by: PHYSICIAN ASSISTANT

## 2018-11-28 PROCEDURE — 36415 COLL VENOUS BLD VENIPUNCTURE: CPT

## 2018-11-28 PROCEDURE — 94640 AIRWAY INHALATION TREATMENT: CPT

## 2018-11-28 PROCEDURE — 80047 BASIC METABLC PNL IONIZED CA: CPT

## 2018-11-28 PROCEDURE — 85378 FIBRIN DEGRADE SEMIQUANT: CPT | Performed by: PHYSICIAN ASSISTANT

## 2018-11-28 PROCEDURE — 71046 X-RAY EXAM CHEST 2 VIEWS: CPT | Performed by: PHYSICIAN ASSISTANT

## 2018-11-28 PROCEDURE — 84484 ASSAY OF TROPONIN QUANT: CPT

## 2018-11-28 PROCEDURE — 99215 OFFICE O/P EST HI 40 MIN: CPT

## 2018-11-28 PROCEDURE — 93010 ELECTROCARDIOGRAM REPORT: CPT

## 2018-11-28 PROCEDURE — 93005 ELECTROCARDIOGRAM TRACING: CPT

## 2018-11-28 RX ORDER — PREDNISONE 20 MG/1
40 TABLET ORAL DAILY
Qty: 8 TABLET | Refills: 0 | Status: SHIPPED | OUTPATIENT
Start: 2018-11-28 | End: 2018-12-02

## 2018-11-28 RX ORDER — IPRATROPIUM BROMIDE AND ALBUTEROL SULFATE 2.5; .5 MG/3ML; MG/3ML
3 SOLUTION RESPIRATORY (INHALATION) ONCE
Status: COMPLETED | OUTPATIENT
Start: 2018-11-28 | End: 2018-11-28

## 2018-11-28 RX ORDER — PREDNISONE 20 MG/1
60 TABLET ORAL ONCE
Status: COMPLETED | OUTPATIENT
Start: 2018-11-28 | End: 2018-11-28

## 2018-11-28 RX ORDER — ALBUTEROL SULFATE 90 UG/1
2 AEROSOL, METERED RESPIRATORY (INHALATION) EVERY 4 HOURS PRN
Qty: 1 INHALER | Refills: 0 | Status: SHIPPED | OUTPATIENT
Start: 2018-11-28 | End: 2018-12-28

## 2018-11-28 RX ORDER — AMOXICILLIN AND CLAVULANATE POTASSIUM 875; 125 MG/1; MG/1
1 TABLET, FILM COATED ORAL 2 TIMES DAILY
Qty: 20 TABLET | Refills: 0 | Status: SHIPPED | OUTPATIENT
Start: 2018-11-28 | End: 2018-12-08

## 2018-11-28 NOTE — ED INITIAL ASSESSMENT (HPI)
Cough - x 1 week. Denies fever. Pt took tussin,   Chest hurts when coughing. Denies any history of bronchitis or pneumonia. Took tylenol the other night. No medication taken today. Pt ahs appt with PCP tomorrow.  Pt was admitted  At [facility]

## 2018-11-28 NOTE — ED PROVIDER NOTES
Patient Seen in: [facility]    History   Patient presents with:  Cough    Stated Complaint: 1 week cough,chest congestion,hard to brethe when laying down    HPI    15-year-old female here with complaint of a one-week history of 1.3      Smokeless tobacco: Never Used    Alcohol use: No    Drug use: No      Review of Systems    Positive for stated complaint: 1 week cough,chest congestion,hard to brethe when laying down  Other systems are as noted in HPI.   Constitutional and vital s normal limits   POCT ISTAT CHEM8 CARTRIDGE - Abnormal; Notable for the following components:    ISTAT Sodium 146 (*)     ISTAT Creatinine 1.50 (*)     All other components within normal limits   D-DIMER (POC) - Normal   ISTAT TROPONIN - Normal   CBC W AUTO March ( others may have been done that we do not have access to). Close follow-up with your primary care physician. If anything changes i.e. increased shortness of breath chest pain etc. down 911 or go to the emergency room.       The patient is in good c

## 2019-01-01 ENCOUNTER — EXTERNAL RECORD (OUTPATIENT)
Dept: HEALTH INFORMATION MANAGEMENT | Facility: OTHER | Age: 69
End: 2019-01-01

## 2019-01-13 ENCOUNTER — PRIOR ORIGINAL RECORDS (OUTPATIENT)
Dept: OTHER | Age: 69
End: 2019-01-13

## 2019-01-13 ENCOUNTER — APPOINTMENT (OUTPATIENT)
Dept: GENERAL RADIOLOGY | Facility: HOSPITAL | Age: 69
End: 2019-01-13
Attending: EMERGENCY MEDICINE
Payer: MEDICARE

## 2019-01-13 ENCOUNTER — APPOINTMENT (OUTPATIENT)
Dept: CT IMAGING | Facility: HOSPITAL | Age: 69
End: 2019-01-13
Attending: EMERGENCY MEDICINE
Payer: MEDICARE

## 2019-01-13 ENCOUNTER — HOSPITAL ENCOUNTER (EMERGENCY)
Facility: HOSPITAL | Age: 69
Discharge: HOME | End: 2019-01-13
Attending: EMERGENCY MEDICINE
Payer: MEDICARE

## 2019-01-13 VITALS
RESPIRATION RATE: 17 BRPM | OXYGEN SATURATION: 95 % | TEMPERATURE: 99 F | DIASTOLIC BLOOD PRESSURE: 101 MMHG | BODY MASS INDEX: 30.36 KG/M2 | SYSTOLIC BLOOD PRESSURE: 189 MMHG | WEIGHT: 165 LBS | HEART RATE: 53 BPM | HEIGHT: 62 IN

## 2019-01-13 DIAGNOSIS — M54.9 BACK PAIN WITHOUT RADIATION: Primary | ICD-10-CM

## 2019-01-13 LAB
ALBUMIN SERPL-MCNC: 3.2 G/DL (ref 3.1–4.5)
ALBUMIN/GLOB SERPL: 1.1 {RATIO} (ref 1–2)
ALP LIVER SERPL-CCNC: 79 U/L (ref 55–142)
ALT SERPL-CCNC: 43 U/L (ref 14–54)
ANION GAP SERPL CALC-SCNC: 8 MMOL/L (ref 0–18)
APTT PPP: 29.8 SECONDS (ref 26.1–34.6)
AST SERPL-CCNC: 46 U/L (ref 15–41)
BASOPHILS # BLD AUTO: 0.02 X10(3) UL (ref 0–0.1)
BASOPHILS NFR BLD AUTO: 0.3 %
BILIRUB SERPL-MCNC: 0.3 MG/DL (ref 0.1–2)
BILIRUB UR QL STRIP.AUTO: NEGATIVE
BUN BLD-MCNC: 18 MG/DL (ref 8–20)
BUN/CREAT SERPL: 12.1 (ref 10–20)
CALCIUM BLD-MCNC: 8.4 MG/DL (ref 8.3–10.3)
CHLORIDE SERPL-SCNC: 114 MMOL/L (ref 101–111)
CLARITY UR REFRACT.AUTO: CLEAR
CO2 SERPL-SCNC: 23 MMOL/L (ref 22–32)
COLOR UR AUTO: YELLOW
CREAT BLD-MCNC: 1.49 MG/DL (ref 0.55–1.02)
EOSINOPHIL # BLD AUTO: 0.16 X10(3) UL (ref 0–0.3)
EOSINOPHIL NFR BLD AUTO: 2.4 %
ERYTHROCYTE [DISTWIDTH] IN BLOOD BY AUTOMATED COUNT: 13.9 % (ref 11.5–16)
GLOBULIN PLAS-MCNC: 3 G/DL (ref 2.8–4.4)
GLUCOSE BLD-MCNC: 93 MG/DL (ref 70–99)
GLUCOSE UR STRIP.AUTO-MCNC: NEGATIVE MG/DL
HCT VFR BLD AUTO: 34.2 % (ref 34–50)
HGB BLD-MCNC: 11 G/DL (ref 12–16)
IMMATURE GRANULOCYTE COUNT: 0.02 X10(3) UL (ref 0–1)
IMMATURE GRANULOCYTE RATIO %: 0.3 %
INR BLD: 1.04 (ref 0.9–1.1)
KETONES UR STRIP.AUTO-MCNC: NEGATIVE MG/DL
LEUKOCYTE ESTERASE UR QL STRIP.AUTO: NEGATIVE
LIPASE: 92 U/L (ref 73–393)
LYMPHOCYTES # BLD AUTO: 2.67 X10(3) UL (ref 0.9–4)
LYMPHOCYTES NFR BLD AUTO: 39.4 %
M PROTEIN MFR SERPL ELPH: 6.2 G/DL (ref 6.4–8.2)
MCH RBC QN AUTO: 30.5 PG (ref 27–33.2)
MCHC RBC AUTO-ENTMCNC: 32.2 G/DL (ref 31–37)
MCV RBC AUTO: 94.7 FL (ref 81–100)
MONOCYTES # BLD AUTO: 0.56 X10(3) UL (ref 0.1–1)
MONOCYTES NFR BLD AUTO: 8.3 %
NEUTROPHIL ABS PRELIM: 3.34 X10 (3) UL (ref 1.3–6.7)
NEUTROPHILS # BLD AUTO: 3.34 X10(3) UL (ref 1.3–6.7)
NEUTROPHILS NFR BLD AUTO: 49.3 %
NITRITE UR QL STRIP.AUTO: NEGATIVE
OSMOLALITY SERPL CALC.SUM OF ELEC: 302 MOSM/KG (ref 275–295)
PH UR STRIP.AUTO: 6 [PH] (ref 4.5–8)
PLATELET # BLD AUTO: 108 10(3)UL (ref 150–450)
POTASSIUM SERPL-SCNC: 4.2 MMOL/L (ref 3.6–5.1)
PRO-BETA NATRIURETIC PEPTIDE: 1239 PG/ML (ref ?–125)
PROT UR STRIP.AUTO-MCNC: NEGATIVE MG/DL
PSA SERPL DL<=0.01 NG/ML-MCNC: 14 SECONDS (ref 12.4–14.7)
RBC # BLD AUTO: 3.61 X10(6)UL (ref 3.8–5.1)
RBC UR QL AUTO: NEGATIVE
RED CELL DISTRIBUTION WIDTH-SD: 47.8 FL (ref 35.1–46.3)
SODIUM SERPL-SCNC: 145 MMOL/L (ref 136–144)
SP GR UR STRIP.AUTO: 1.01 (ref 1–1.03)
UROBILINOGEN UR STRIP.AUTO-MCNC: <2 MG/DL
WBC # BLD AUTO: 6.8 X10(3) UL (ref 4–13)

## 2019-01-13 PROCEDURE — 85610 PROTHROMBIN TIME: CPT | Performed by: EMERGENCY MEDICINE

## 2019-01-13 PROCEDURE — 74176 CT ABD & PELVIS W/O CONTRAST: CPT | Performed by: EMERGENCY MEDICINE

## 2019-01-13 PROCEDURE — 83880 ASSAY OF NATRIURETIC PEPTIDE: CPT | Performed by: EMERGENCY MEDICINE

## 2019-01-13 PROCEDURE — 85025 COMPLETE CBC W/AUTO DIFF WBC: CPT | Performed by: EMERGENCY MEDICINE

## 2019-01-13 PROCEDURE — 99285 EMERGENCY DEPT VISIT HI MDM: CPT

## 2019-01-13 PROCEDURE — 93010 ELECTROCARDIOGRAM REPORT: CPT

## 2019-01-13 PROCEDURE — 81003 URINALYSIS AUTO W/O SCOPE: CPT | Performed by: EMERGENCY MEDICINE

## 2019-01-13 PROCEDURE — 83690 ASSAY OF LIPASE: CPT | Performed by: EMERGENCY MEDICINE

## 2019-01-13 PROCEDURE — 71046 X-RAY EXAM CHEST 2 VIEWS: CPT | Performed by: EMERGENCY MEDICINE

## 2019-01-13 PROCEDURE — 85730 THROMBOPLASTIN TIME PARTIAL: CPT | Performed by: EMERGENCY MEDICINE

## 2019-01-13 PROCEDURE — 80053 COMPREHEN METABOLIC PANEL: CPT | Performed by: EMERGENCY MEDICINE

## 2019-01-13 PROCEDURE — 96375 TX/PRO/DX INJ NEW DRUG ADDON: CPT

## 2019-01-13 PROCEDURE — 96374 THER/PROPH/DIAG INJ IV PUSH: CPT

## 2019-01-13 PROCEDURE — 93005 ELECTROCARDIOGRAM TRACING: CPT

## 2019-01-13 RX ORDER — HYDROCODONE BITARTRATE AND ACETAMINOPHEN 5; 325 MG/1; MG/1
1-2 TABLET ORAL EVERY 4 HOURS PRN
Qty: 10 TABLET | Refills: 0 | Status: SHIPPED | OUTPATIENT
Start: 2019-01-13 | End: 2019-01-20

## 2019-01-13 RX ORDER — HYDROMORPHONE HYDROCHLORIDE 1 MG/ML
0.5 INJECTION, SOLUTION INTRAMUSCULAR; INTRAVENOUS; SUBCUTANEOUS EVERY 30 MIN PRN
Status: DISCONTINUED | OUTPATIENT
Start: 2019-01-13 | End: 2019-01-13

## 2019-01-13 RX ORDER — ONDANSETRON 2 MG/ML
4 INJECTION INTRAMUSCULAR; INTRAVENOUS ONCE
Status: COMPLETED | OUTPATIENT
Start: 2019-01-13 | End: 2019-01-13

## 2019-01-13 RX ORDER — FUROSEMIDE 10 MG/ML
40 INJECTION INTRAMUSCULAR; INTRAVENOUS ONCE
Status: COMPLETED | OUTPATIENT
Start: 2019-01-13 | End: 2019-01-13

## 2019-01-14 LAB
ATRIAL RATE: 62 BPM
P AXIS: 29 DEGREES
P-R INTERVAL: 170 MS
Q-T INTERVAL: 466 MS
QRS DURATION: 104 MS
QTC CALCULATION (BEZET): 472 MS
R AXIS: 5 DEGREES
T AXIS: 228 DEGREES
VENTRICULAR RATE: 62 BPM

## 2019-01-14 NOTE — ED INITIAL ASSESSMENT (HPI)
Pt to ED c/o mid back pain radiating into bilateral flank this pain began today after bending while cooking with the oven. Pt states she also had CP last night that was intermittent. Denies CP at this time but + CHRISTOPHER.

## 2019-01-30 ENCOUNTER — MYAURORA ACCOUNT LINK (OUTPATIENT)
Dept: OTHER | Age: 69
End: 2019-01-30

## 2019-01-30 ENCOUNTER — PRIOR ORIGINAL RECORDS (OUTPATIENT)
Dept: OTHER | Age: 69
End: 2019-01-30

## 2019-01-31 LAB
ALBUMIN: 3.2 G/DL
ALKALINE PHOSPHATATE(ALK PHOS): 79 IU/L
BILIRUBIN TOTAL: 0.3 MG/DL
BUN: 18 MG/DL
CALCIUM: 8.4 MG/DL
CHLORIDE: 114 MEQ/L
CREATININE, SERUM: 1.49 MG/DL
GLOBULIN: 3 G/DL
GLUCOSE: 93 MG/DL
HEMATOCRIT: 34.2 %
HEMOGLOBIN: 11 G/DL
PLATELETS: 108 K/UL
POTASSIUM, SERUM: 4.2 MEQ/L
PROBNP: 1239 PG/ML
PROTEIN, TOTAL: 6.2 G/DL
RED BLOOD COUNT: 3.61 X 10-6/U
SGOT (AST): 46 IU/L
SGPT (ALT): 43 IU/L
SODIUM: 145 MEQ/L
WHITE BLOOD COUNT: 6.8 X 10-3/U

## 2019-02-28 VITALS
HEIGHT: 65 IN | HEART RATE: 63 BPM | WEIGHT: 163 LBS | DIASTOLIC BLOOD PRESSURE: 80 MMHG | SYSTOLIC BLOOD PRESSURE: 126 MMHG | BODY MASS INDEX: 27.16 KG/M2

## 2019-02-28 VITALS
HEIGHT: 63 IN | BODY MASS INDEX: 29.95 KG/M2 | DIASTOLIC BLOOD PRESSURE: 98 MMHG | HEART RATE: 84 BPM | WEIGHT: 169 LBS | SYSTOLIC BLOOD PRESSURE: 172 MMHG

## 2019-02-28 VITALS
WEIGHT: 170 LBS | SYSTOLIC BLOOD PRESSURE: 110 MMHG | DIASTOLIC BLOOD PRESSURE: 72 MMHG | HEART RATE: 64 BPM | BODY MASS INDEX: 30.12 KG/M2 | HEIGHT: 63 IN

## 2019-02-28 VITALS
HEIGHT: 63 IN | BODY MASS INDEX: 31.89 KG/M2 | SYSTOLIC BLOOD PRESSURE: 152 MMHG | HEART RATE: 64 BPM | WEIGHT: 180 LBS | DIASTOLIC BLOOD PRESSURE: 80 MMHG

## 2019-02-28 VITALS
HEIGHT: 63 IN | WEIGHT: 167 LBS | HEART RATE: 70 BPM | DIASTOLIC BLOOD PRESSURE: 74 MMHG | BODY MASS INDEX: 29.59 KG/M2 | SYSTOLIC BLOOD PRESSURE: 122 MMHG

## 2019-02-28 VITALS
HEIGHT: 63 IN | WEIGHT: 176 LBS | SYSTOLIC BLOOD PRESSURE: 150 MMHG | BODY MASS INDEX: 31.18 KG/M2 | DIASTOLIC BLOOD PRESSURE: 90 MMHG | HEART RATE: 64 BPM

## 2019-02-28 VITALS
SYSTOLIC BLOOD PRESSURE: 118 MMHG | BODY MASS INDEX: 29.06 KG/M2 | WEIGHT: 164 LBS | DIASTOLIC BLOOD PRESSURE: 70 MMHG | HEIGHT: 63 IN | HEART RATE: 68 BPM

## 2019-02-28 VITALS
DIASTOLIC BLOOD PRESSURE: 78 MMHG | SYSTOLIC BLOOD PRESSURE: 136 MMHG | WEIGHT: 164 LBS | HEIGHT: 63 IN | HEART RATE: 60 BPM | BODY MASS INDEX: 29.06 KG/M2

## 2019-02-28 VITALS
WEIGHT: 160 LBS | HEART RATE: 64 BPM | HEIGHT: 65 IN | SYSTOLIC BLOOD PRESSURE: 108 MMHG | BODY MASS INDEX: 26.66 KG/M2 | DIASTOLIC BLOOD PRESSURE: 70 MMHG

## 2019-02-28 VITALS
HEART RATE: 62 BPM | DIASTOLIC BLOOD PRESSURE: 88 MMHG | BODY MASS INDEX: 31.18 KG/M2 | WEIGHT: 176 LBS | SYSTOLIC BLOOD PRESSURE: 142 MMHG | HEIGHT: 63 IN

## 2019-03-14 ENCOUNTER — HOSPITAL ENCOUNTER (OUTPATIENT)
Dept: CT IMAGING | Facility: HOSPITAL | Age: 69
Discharge: HOME | End: 2019-03-14
Attending: INTERNAL MEDICINE
Payer: MEDICARE

## 2019-03-14 ENCOUNTER — HOSPITAL ENCOUNTER (OUTPATIENT)
Dept: CV DIAGNOSTICS | Facility: HOSPITAL | Age: 69
Discharge: HOME | End: 2019-03-14
Attending: INTERNAL MEDICINE
Payer: MEDICARE

## 2019-03-14 DIAGNOSIS — R06.02 SHORTNESS OF BREATH: ICD-10-CM

## 2019-03-14 DIAGNOSIS — I48.0 AF (PAROXYSMAL ATRIAL FIBRILLATION) (HCC): ICD-10-CM

## 2019-03-14 PROCEDURE — 93306 TTE W/DOPPLER COMPLETE: CPT | Performed by: INTERNAL MEDICINE

## 2019-03-14 PROCEDURE — 71250 CT THORAX DX C-: CPT | Performed by: INTERNAL MEDICINE

## 2019-03-21 NOTE — PROGRESS NOTES
Patient notified of results, verbalizes understanding. All questions answered.    Pt also inquiring about Echo results, advised to contact Dr. Linette Subramanian, verbalized understanding

## 2019-04-07 ENCOUNTER — HOSPITAL ENCOUNTER (EMERGENCY)
Facility: HOSPITAL | Age: 69
Discharge: HOME | End: 2019-04-07
Attending: EMERGENCY MEDICINE
Payer: MEDICARE

## 2019-04-07 ENCOUNTER — APPOINTMENT (OUTPATIENT)
Dept: GENERAL RADIOLOGY | Facility: HOSPITAL | Age: 69
End: 2019-04-07
Attending: EMERGENCY MEDICINE
Payer: MEDICARE

## 2019-04-07 ENCOUNTER — APPOINTMENT (OUTPATIENT)
Dept: CT IMAGING | Facility: HOSPITAL | Age: 69
End: 2019-04-07
Attending: EMERGENCY MEDICINE
Payer: MEDICARE

## 2019-04-07 VITALS
SYSTOLIC BLOOD PRESSURE: 176 MMHG | TEMPERATURE: 98 F | HEART RATE: 65 BPM | WEIGHT: 171.94 LBS | OXYGEN SATURATION: 95 % | BODY MASS INDEX: 30.46 KG/M2 | RESPIRATION RATE: 16 BRPM | HEIGHT: 63 IN | DIASTOLIC BLOOD PRESSURE: 120 MMHG

## 2019-04-07 DIAGNOSIS — S40.021A CONTUSION OF RIGHT UPPER EXTREMITY, INITIAL ENCOUNTER: ICD-10-CM

## 2019-04-07 DIAGNOSIS — S20.211A CONTUSION OF RIGHT CHEST WALL, INITIAL ENCOUNTER: Primary | ICD-10-CM

## 2019-04-07 PROCEDURE — 80053 COMPREHEN METABOLIC PANEL: CPT | Performed by: EMERGENCY MEDICINE

## 2019-04-07 PROCEDURE — 99285 EMERGENCY DEPT VISIT HI MDM: CPT

## 2019-04-07 PROCEDURE — 96374 THER/PROPH/DIAG INJ IV PUSH: CPT

## 2019-04-07 PROCEDURE — 71250 CT THORAX DX C-: CPT | Performed by: EMERGENCY MEDICINE

## 2019-04-07 PROCEDURE — 85025 COMPLETE CBC W/AUTO DIFF WBC: CPT | Performed by: EMERGENCY MEDICINE

## 2019-04-07 PROCEDURE — 70450 CT HEAD/BRAIN W/O DYE: CPT | Performed by: EMERGENCY MEDICINE

## 2019-04-07 PROCEDURE — 73060 X-RAY EXAM OF HUMERUS: CPT | Performed by: EMERGENCY MEDICINE

## 2019-04-07 PROCEDURE — 74176 CT ABD & PELVIS W/O CONTRAST: CPT | Performed by: EMERGENCY MEDICINE

## 2019-04-07 RX ORDER — MORPHINE SULFATE 4 MG/ML
2 INJECTION, SOLUTION INTRAMUSCULAR; INTRAVENOUS ONCE
Status: COMPLETED | OUTPATIENT
Start: 2019-04-07 | End: 2019-04-07

## 2019-04-07 RX ORDER — HYDROCODONE BITARTRATE AND ACETAMINOPHEN 5; 325 MG/1; MG/1
1-2 TABLET ORAL EVERY 6 HOURS PRN
Qty: 10 TABLET | Refills: 0 | Status: SHIPPED | OUTPATIENT
Start: 2019-04-07 | End: 2019-04-14

## 2019-04-07 RX ORDER — HYDROCODONE BITARTRATE AND ACETAMINOPHEN 5; 325 MG/1; MG/1
1 TABLET ORAL ONCE
Status: COMPLETED | OUTPATIENT
Start: 2019-04-07 | End: 2019-04-07

## 2019-04-08 NOTE — ED NOTES
Pt states she has blood pressure medicine due for tonight. MD aware of elevated BP. Pt to take her BP meds tonight.

## 2019-04-08 NOTE — ED PROVIDER NOTES
Patient Seen in: [facility] Emergency Department    History   Patient presents with:  Fall (musculoskeletal, neurologic)    Stated Complaint: fall while trying to sit in chair, denies hitting head, c/o back, abd and R leg*    HPI    Patient is a 62- Vitals [04/07/19 1926]   BP (!) 185/99   Pulse 70   Resp 19   Temp 98.3 °F (36.8 °C)   Temp src Temporal   SpO2 99 %   O2 Device None (Room air)       Current:BP (!) 176/120   Pulse 65   Temp 98.3 °F (36.8 °C) (Temporal)   Resp 16   Ht 160 cm (5' 3\")   Wt All other components within normal limits   CBC WITH DIFFERENTIAL WITH PLATELET    Narrative: The following orders were created for panel order CBC WITH DIFFERENTIAL WITH PLATELET.   Procedure                               Abnormality         Status Interval development of age-indeterminate mild compression deformity of the superior endplate of the L2 vertebral body. Clinical correlation recommended. 2. No evidence of acute traumatic injury in the chest.  3. Stable ectasia of the ascending aorta.   4

## 2019-04-08 NOTE — ED NOTES
Prescription for norco was given. Pt was advised not to drink or drive while on norco. Pt verbalized understanding.
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
## 2019-04-13 ENCOUNTER — TELEPHONE (OUTPATIENT)
Dept: CARDIOLOGY | Age: 69
End: 2019-04-13

## 2019-04-18 RX ORDER — ATORVASTATIN CALCIUM 40 MG/1
TABLET, FILM COATED ORAL
Qty: 30 TABLET | Refills: 5 | Status: SHIPPED | OUTPATIENT
Start: 2019-04-18 | End: 2019-12-16 | Stop reason: SDUPTHER

## 2019-04-22 ENCOUNTER — TELEPHONE (OUTPATIENT)
Dept: CARDIOLOGY | Age: 69
End: 2019-04-22

## 2019-04-23 ENCOUNTER — APPOINTMENT (OUTPATIENT)
Dept: CARDIOLOGY | Age: 69
End: 2019-04-23

## 2019-04-25 RX ORDER — AMIODARONE HYDROCHLORIDE 200 MG/1
TABLET ORAL
Qty: 90 TABLET | Refills: 1 | Status: SHIPPED | OUTPATIENT
Start: 2019-04-25 | End: 2019-10-09 | Stop reason: CLARIF

## 2019-04-26 RX ORDER — HYDRALAZINE HYDROCHLORIDE 25 MG/1
50 TABLET, FILM COATED ORAL 3 TIMES DAILY
COMMUNITY
End: 2020-05-26 | Stop reason: SDUPTHER

## 2019-04-26 RX ORDER — ISOSORBIDE MONONITRATE 30 MG/1
30 TABLET, EXTENDED RELEASE ORAL DAILY
COMMUNITY
End: 2019-10-09 | Stop reason: CLARIF

## 2019-04-26 RX ORDER — CLONAZEPAM 0.5 MG/1
0.5 TABLET ORAL NIGHTLY PRN
COMMUNITY
End: 2020-07-31 | Stop reason: ALTCHOICE

## 2019-04-26 RX ORDER — FUROSEMIDE 40 MG/1
40 TABLET ORAL 2 TIMES DAILY
COMMUNITY
End: 2019-10-09 | Stop reason: CLARIF

## 2019-04-26 RX ORDER — LOVASTATIN 20 MG/1
20 TABLET ORAL NIGHTLY
COMMUNITY
End: 2019-10-09 | Stop reason: CLARIF

## 2019-04-26 RX ORDER — POTASSIUM CHLORIDE 20 MEQ/1
20 TABLET, EXTENDED RELEASE ORAL 3 TIMES DAILY
COMMUNITY
End: 2019-07-23 | Stop reason: SDUPTHER

## 2019-04-26 RX ORDER — LISINOPRIL 5 MG/1
5 TABLET ORAL AT BEDTIME
COMMUNITY
End: 2019-08-23 | Stop reason: SDUPTHER

## 2019-04-26 RX ORDER — METOPROLOL TARTRATE 100 MG/1
75 TABLET ORAL 2 TIMES DAILY
COMMUNITY
End: 2020-05-26 | Stop reason: SDUPTHER

## 2019-06-20 ENCOUNTER — TELEPHONE (OUTPATIENT)
Dept: CARDIOLOGY | Age: 69
End: 2019-06-20

## 2019-06-20 ENCOUNTER — HOSPITAL ENCOUNTER (OUTPATIENT)
Dept: CT IMAGING | Facility: HOSPITAL | Age: 69
Discharge: HOME | End: 2019-06-20
Attending: INTERNAL MEDICINE
Payer: MEDICARE

## 2019-06-20 DIAGNOSIS — R91.1 PULMONARY NODULE: ICD-10-CM

## 2019-06-20 PROCEDURE — 71250 CT THORAX DX C-: CPT | Performed by: INTERNAL MEDICINE

## 2019-06-24 NOTE — PROGRESS NOTES
Patient notified of results, verbalizes understanding. All questions answered.    Pt to call to schedule VQ scan

## 2019-07-16 ENCOUNTER — APPOINTMENT (OUTPATIENT)
Dept: CT IMAGING | Age: 69
End: 2019-07-16
Attending: FAMILY MEDICINE
Payer: MEDICARE

## 2019-07-16 ENCOUNTER — HOSPITAL ENCOUNTER (OUTPATIENT)
Age: 69
Discharge: HOME | End: 2019-07-16
Attending: FAMILY MEDICINE
Payer: MEDICARE

## 2019-07-16 ENCOUNTER — TELEPHONE (OUTPATIENT)
Dept: CARDIOLOGY | Age: 69
End: 2019-07-16

## 2019-07-16 ENCOUNTER — OFFICE VISIT (OUTPATIENT)
Dept: CARDIOLOGY | Age: 69
End: 2019-07-16

## 2019-07-16 VITALS
TEMPERATURE: 98 F | HEART RATE: 119 BPM | DIASTOLIC BLOOD PRESSURE: 128 MMHG | SYSTOLIC BLOOD PRESSURE: 164 MMHG | RESPIRATION RATE: 20 BRPM | OXYGEN SATURATION: 100 %

## 2019-07-16 VITALS
RESPIRATION RATE: 18 BRPM | DIASTOLIC BLOOD PRESSURE: 100 MMHG | SYSTOLIC BLOOD PRESSURE: 148 MMHG | HEIGHT: 63 IN | WEIGHT: 178 LBS | HEART RATE: 120 BPM | BODY MASS INDEX: 31.54 KG/M2

## 2019-07-16 DIAGNOSIS — D50.9 IRON DEFICIENCY ANEMIA, UNSPECIFIED IRON DEFICIENCY ANEMIA TYPE: ICD-10-CM

## 2019-07-16 DIAGNOSIS — R00.0 TACHYCARDIA: Primary | ICD-10-CM

## 2019-07-16 DIAGNOSIS — M54.2 NECK PAIN: ICD-10-CM

## 2019-07-16 DIAGNOSIS — R06.02 SHORTNESS OF BREATH: ICD-10-CM

## 2019-07-16 DIAGNOSIS — I27.0 PRIMARY PULMONARY HYPERTENSION (CMD): Primary | ICD-10-CM
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
## 2019-07-16 DIAGNOSIS — I27.20 PULMONARY HTN (CMD): ICD-10-CM

## 2019-07-16 PROCEDURE — 99214 OFFICE O/P EST MOD 30 MIN: CPT | Performed by: INTERNAL MEDICINE

## 2019-07-16 PROCEDURE — 99214 OFFICE O/P EST MOD 30 MIN: CPT

## 2019-07-16 PROCEDURE — 72125 CT NECK SPINE W/O DYE: CPT | Performed by: FAMILY MEDICINE

## 2019-07-16 PROCEDURE — 70450 CT HEAD/BRAIN W/O DYE: CPT | Performed by: FAMILY MEDICINE

## 2019-07-16 PROCEDURE — 93000 ELECTROCARDIOGRAM COMPLETE: CPT | Performed by: INTERNAL MEDICINE

## 2019-07-16 RX ORDER — CHOLECALCIFEROL (VITAMIN D3) 125 MCG
2000 CAPSULE ORAL DAILY
COMMUNITY
End: 2019-10-09 | Stop reason: CLARIF

## 2019-07-16 ASSESSMENT — NEW YORK HEART ASSOCIATION (NYHA) CLASSIFICATION: NYHA FUNCTIONAL CLASS: IV

## 2019-07-16 ASSESSMENT — ENCOUNTER SYMPTOMS
COUGH: 0
SUSPICIOUS LESIONS: 0
NEAR-SYNCOPE: 0
CHILLS: 0
SLEEP DISTURBANCES DUE TO BREATHING: 1
ALLERGIC/IMMUNOLOGIC NEGATIVE: 1
EYES NEGATIVE: 1
ENDOCRINE NEGATIVE: 1
PSYCHIATRIC NEGATIVE: 1
FEVER: 0
WEIGHT GAIN: 1
HEMATOCHEZIA: 0
GASTROINTESTINAL NEGATIVE: 1
BRUISES/BLEEDS EASILY: 0
NEUROLOGICAL NEGATIVE: 1
ORTHOPNEA: 1
ALLERGIC/IMMUNOLOGIC COMMENTS: NO NEW FOOD ALLERGIES
SNORING: 1
SYNCOPE: 0
HEMOPTYSIS: 0
SHORTNESS OF BREATH: 1
WEIGHT LOSS: 0

## 2019-07-16 ASSESSMENT — PATIENT HEALTH QUESTIONNAIRE - PHQ9
SUM OF ALL RESPONSES TO PHQ9 QUESTIONS 1 AND 2: 0
2. FEELING DOWN, DEPRESSED OR HOPELESS: NOT AT ALL
1. LITTLE INTEREST OR PLEASURE IN DOING THINGS: NOT AT ALL
SUM OF ALL RESPONSES TO PHQ9 QUESTIONS 1 AND 2: 0
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
## 2019-07-23 RX ORDER — POTASSIUM CHLORIDE 20 MEQ/1
TABLET, EXTENDED RELEASE ORAL
Qty: 270 TABLET | Refills: 0 | Status: SHIPPED | OUTPATIENT
Start: 2019-07-23 | End: 2020-05-26 | Stop reason: DRUGHIGH

## 2019-07-24 LAB
ANA SER QL IF: NEGATIVE
BUN SERPL-MCNC: 21 MG/DL (ref 7–25)
BUN/CREAT SERPL: 13 (CALC) (ref 6–22)
CALCIUM SERPL-MCNC: 9.5 MG/DL (ref 8.6–10.4)
CARDIOLIPIN IGG SER IA-ACNC: <14 GPL
CARDIOLIPIN IGM SER IA-ACNC: 24 MPL
CCP IGG SERPL-ACNC: <16 UNITS
CHLORIDE SERPL-SCNC: 109 MMOL/L (ref 98–110)
CO2 SERPL-SCNC: 28 MMOL/L (ref 20–32)
CREAT SERPL-MCNC: 1.57 MG/DL (ref 0.5–0.99)
ERYTHROCYTE [SEDIMENTATION RATE] IN BLOOD BY WESTERGREN METHOD: 6 MM/H
FERRITIN SERPL-MCNC: 45 NG/ML (ref 16–288)
FOLATE SERPL-MCNC: 8.8 NG/ML
GFRSERPLBLD MDRD-ARVRAT: 34 ML/MIN/1.73M2
GLUCOSE SERPL-MCNC: 90 MG/DL (ref 65–139)
IRON SATN MFR SERPL: 10 % (CALC) (ref 16–45)
IRON SERPL-MCNC: 36 MCG/DL (ref 45–160)
POTASSIUM SERPL-SCNC: 3.4 MMOL/L (ref 3.5–5.3)
RHEUMATOID FACT SERPL-ACNC: <14 IU/ML
SODIUM SERPL-SCNC: 145 MMOL/L (ref 135–146)
TIBC SERPL-MCNC: 367 MCG/DL (CALC) (ref 250–450)
VIT B12 SERPL-MCNC: 629 PG/ML (ref 200–1100)

## 2019-08-13 ENCOUNTER — TELEPHONE (OUTPATIENT)
Dept: CARDIOLOGY | Age: 69
End: 2019-08-13

## 2019-08-14 ENCOUNTER — HOSPITAL ENCOUNTER (OUTPATIENT)
Age: 69
Discharge: HOME | End: 2019-08-14
Attending: FAMILY MEDICINE
Payer: MEDICARE

## 2019-08-14 VITALS
OXYGEN SATURATION: 98 % | TEMPERATURE: 98 F | DIASTOLIC BLOOD PRESSURE: 99 MMHG | RESPIRATION RATE: 18 BRPM | HEART RATE: 120 BPM | SYSTOLIC BLOOD PRESSURE: 141 MMHG

## 2019-08-14 DIAGNOSIS — S13.9XXA NECK SPRAIN, INITIAL ENCOUNTER: Primary | ICD-10-CM

## 2019-08-14 PROCEDURE — 99214 OFFICE O/P EST MOD 30 MIN: CPT

## 2019-08-14 PROCEDURE — 99213 OFFICE O/P EST LOW 20 MIN: CPT

## 2019-08-14 RX ORDER — HYDROCODONE BITARTRATE AND ACETAMINOPHEN 5; 325 MG/1; MG/1
1 TABLET ORAL EVERY 6 HOURS PRN
Qty: 10 TABLET | Refills: 0 | Status: SHIPPED | OUTPATIENT
Start: 2019-08-14 | End: 2019-08-24
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
## 2019-08-19 PROBLEM — D50.9 IRON DEFICIENCY ANEMIA: Status: ACTIVE | Noted: 2019-08-19

## 2019-08-20 ENCOUNTER — TELEPHONE (OUTPATIENT)
Dept: CARDIOLOGY | Age: 69
End: 2019-08-20

## 2019-08-21 ENCOUNTER — TELEPHONE (OUTPATIENT)
Dept: CARDIOLOGY | Age: 69
End: 2019-08-21

## 2019-08-21 DIAGNOSIS — D50.8 OTHER IRON DEFICIENCY ANEMIA: Primary | ICD-10-CM

## 2019-08-26 ENCOUNTER — HOSPITAL ENCOUNTER (OUTPATIENT)
Dept: CARDIOLOGY CLINIC | Facility: HOSPITAL | Age: 69
Discharge: HOME | End: 2019-08-26
Attending: NURSE PRACTITIONER
Payer: MEDICARE

## 2019-08-26 VITALS
WEIGHT: 177.63 LBS | BODY MASS INDEX: 31 KG/M2 | SYSTOLIC BLOOD PRESSURE: 141 MMHG | RESPIRATION RATE: 18 BRPM | OXYGEN SATURATION: 98 % | DIASTOLIC BLOOD PRESSURE: 115 MMHG | HEART RATE: 108 BPM

## 2019-08-26 DIAGNOSIS — I48.0 PAF (PAROXYSMAL ATRIAL FIBRILLATION) (HCC): ICD-10-CM

## 2019-08-26 DIAGNOSIS — I50.31 ACUTE HEART FAILURE WITH PRESERVED EJECTION FRACTION (HCC): Primary | ICD-10-CM

## 2019-08-26 DIAGNOSIS — I10 BENIGN ESSENTIAL HTN: ICD-10-CM

## 2019-08-26 DIAGNOSIS — Z95.1 S/P CABG (CORONARY ARTERY BYPASS GRAFT): ICD-10-CM

## 2019-08-26 DIAGNOSIS — R06.00 DOE (DYSPNEA ON EXERTION): ICD-10-CM

## 2019-08-26 DIAGNOSIS — E66.9 OBESITY (BMI 30-39.9): ICD-10-CM

## 2019-08-26 LAB
ANION GAP SERPL CALC-SCNC: 5 MMOL/L (ref 0–18)
BUN BLD-MCNC: 14 MG/DL (ref 7–18)
BUN/CREAT SERPL: 9 (ref 10–20)
CALCIUM BLD-MCNC: 8.6 MG/DL (ref 8.5–10.1)
CHLORIDE SERPL-SCNC: 114 MMOL/L (ref 98–112)
CO2 SERPL-SCNC: 28 MMOL/L (ref 21–32)
CREAT BLD-MCNC: 1.55 MG/DL (ref 0.55–1.02)
DEPRECATED RDW RBC AUTO: 58.1 FL (ref 35.1–46.3)
ERYTHROCYTE [DISTWIDTH] IN BLOOD BY AUTOMATED COUNT: 18 % (ref 11–15)
GLUCOSE BLD-MCNC: 83 MG/DL (ref 70–99)
HCT VFR BLD AUTO: 32.7 % (ref 35–48)
HGB BLD-MCNC: 10.1 G/DL (ref 12–16)
MCH RBC QN AUTO: 27.5 PG (ref 26–34)
MCHC RBC AUTO-ENTMCNC: 30.9 G/DL (ref 31–37)
MCV RBC AUTO: 89.1 FL (ref 80–100)
NT-PROBNP SERPL-MCNC: 1689 PG/ML (ref ?–125)
OSMOLALITY SERPL CALC.SUM OF ELEC: 304 MOSM/KG (ref 275–295)
PLATELET # BLD AUTO: 128 10(3)UL (ref 150–450)
POTASSIUM SERPL-SCNC: 3.4 MMOL/L (ref 3.5–5.1)
RBC # BLD AUTO: 3.67 X10(6)UL (ref 3.8–5.3)
SODIUM SERPL-SCNC: 147 MMOL/L (ref 136–145)
WBC # BLD AUTO: 5.5 X10(3) UL (ref 4–11)

## 2019-08-26 PROCEDURE — 99215 OFFICE O/P EST HI 40 MIN: CPT | Performed by: NURSE PRACTITIONER

## 2019-08-26 RX ORDER — BUMETANIDE 0.25 MG/ML
1 INJECTION, SOLUTION INTRAMUSCULAR; INTRAVENOUS ONCE
Status: COMPLETED | OUTPATIENT
Start: 2019-08-26 | End: 2019-08-26

## 2019-08-26 RX ORDER — METOPROLOL TARTRATE 50 MG/1
75 TABLET, FILM COATED ORAL
Qty: 90 TABLET | Refills: 3 | Status: ON HOLD | OUTPATIENT
Start: 2019-08-26 | End: 2020-03-03

## 2019-08-26 RX ORDER — SPIRONOLACTONE 25 MG/1
12.5 TABLET ORAL DAILY
Qty: 15 TABLET | Refills: 1 | Status: SHIPPED | OUTPATIENT
Start: 2019-08-26 | End: 2019-10-28

## 2019-08-26 RX ORDER — LISINOPRIL 5 MG/1
TABLET ORAL
Qty: 90 TABLET | Refills: 1 | Status: SHIPPED | OUTPATIENT
Start: 2019-08-26 | End: 2019-10-09 | Stop reason: CLARIF

## 2019-08-26 RX ORDER — TORSEMIDE 20 MG/1
20 TABLET ORAL DAILY
Qty: 30 TABLET | Refills: 1 | Status: SHIPPED | OUTPATIENT
Start: 2019-08-26 | End: 2020-01-24

## 2019-08-26 RX ORDER — POTASSIUM CHLORIDE 20 MEQ/1
60 TABLET, EXTENDED RELEASE ORAL ONCE
Status: COMPLETED | OUTPATIENT
Start: 2019-08-26 | End: 2019-08-26

## 2019-08-26 RX ADMIN — BUMETANIDE 1 MG: 0.25 INJECTION, SOLUTION INTRAMUSCULAR; INTRAVENOUS at 12:08:00

## 2019-08-26 RX ADMIN — POTASSIUM CHLORIDE 60 MEQ: 20 TABLET, EXTENDED RELEASE ORAL at 12:06:00

## 2019-08-26 NOTE — PROGRESS NOTES
Welcomed pt. To the [facility], as this was her very first visit. Pt. Assessed. Weight _177.6lbs_. Pt. c/o _swollen ankles and feet. IV line established. Labs drawn and sent to lab. Reviewed the [employer] with pt.  Discussed the Salty Six, label reading, Stop

## 2019-08-26 NOTE — PROGRESS NOTES
[address]  Heart Failure Clinic Progress Note    Sandi Mathew is a 80year old female who presents to clinic at the request of Dr. Jamarcus Samano for APN assessment and management of chronic diastolic heart failure and is functional class 3b.   Patient has to Dr. Jose L Garcia this year d.t worsening PA pressures on echo and SOB. She saw Dr. Charly Scott in July who recommended additional [facility] workup with labs, doppler with bubbles, V/Q scan and RHC. Labs significant for iron deficiency anemia, CKD, hypokalemia. Echocardiogram:  TTE 3/2019  1. Procedure narrative: Transthoracic echocardiography for ventricular     function evaluation. Image quality was adequate. Intravenous contrast     (Definity) was administered. 2. Left ventricle:  The cavity size was n L2.  4. As above.  Continued clinical correlation recommended.     Education:  Patient and family instructed at length regarding clinic procedures, hours, purpose of clinic visits, sodium restricted diet, low sodium foods, fluid restriction, daily weights, specifically to heart failure, PH, EITAN, AF and TAD including but not limited to the importance of treatment of sleep apnea and rationale for all testing ordered and Venofer.      Joseph Garber NP   8/26/2019

## 2019-08-26 NOTE — PATIENT INSTRUCTIONS
Heart Failure Discharge Instructions    Call central scheduling to schedule V/Q scan and echo. I will have [facility] call you to schedule RHC. Increase Metoprolol to 75 mg twice a day.  You will need to  new prescription at the pharmacy   Call us if your Activity  · Pace your activities to avoid getting overtired  · Take rest periods as needed  · Elevate your feet to reduce ankle swelling when resting                             Signs of Worsening Heart Failure    You are entering the Yellow Zone - this

## 2019-08-29 ENCOUNTER — TELEPHONE (OUTPATIENT)
Dept: CARDIOLOGY | Age: 69
End: 2019-08-29

## 2019-09-03 ENCOUNTER — HOSPITAL ENCOUNTER (OUTPATIENT)
Dept: CARDIOLOGY CLINIC | Facility: HOSPITAL | Age: 69
Discharge: HOME | End: 2019-09-03
Attending: NURSE PRACTITIONER
Payer: MEDICARE

## 2019-09-03 VITALS
RESPIRATION RATE: 13 BRPM | SYSTOLIC BLOOD PRESSURE: 117 MMHG | DIASTOLIC BLOOD PRESSURE: 71 MMHG | WEIGHT: 170.31 LBS | OXYGEN SATURATION: 98 % | BODY MASS INDEX: 30 KG/M2 | HEART RATE: 61 BPM

## 2019-09-03 DIAGNOSIS — I10 BENIGN ESSENTIAL HTN: ICD-10-CM

## 2019-09-03 DIAGNOSIS — Z95.1 S/P CABG (CORONARY ARTERY BYPASS GRAFT): ICD-10-CM

## 2019-09-03 DIAGNOSIS — I48.0 PAF (PAROXYSMAL ATRIAL FIBRILLATION) (HCC): ICD-10-CM

## 2019-09-03 DIAGNOSIS — E87.6 HYPOKALEMIA: ICD-10-CM

## 2019-09-03 DIAGNOSIS — I50.31 ACUTE HEART FAILURE WITH PRESERVED EJECTION FRACTION (HCC): Primary | ICD-10-CM

## 2019-09-03 DIAGNOSIS — R06.00 DOE (DYSPNEA ON EXERTION): ICD-10-CM

## 2019-09-03 LAB
ANION GAP SERPL CALC-SCNC: 8 MMOL/L (ref 0–18)
BUN BLD-MCNC: 19 MG/DL (ref 7–18)
BUN/CREAT SERPL: 11.7 (ref 10–20)
CALCIUM BLD-MCNC: 9.1 MG/DL (ref 8.5–10.1)
CHLORIDE SERPL-SCNC: 109 MMOL/L (ref 98–112)
CO2 SERPL-SCNC: 29 MMOL/L (ref 21–32)
CREAT BLD-MCNC: 1.63 MG/DL (ref 0.55–1.02)
GLUCOSE BLD-MCNC: 105 MG/DL (ref 70–99)
OSMOLALITY SERPL CALC.SUM OF ELEC: 305 MOSM/KG (ref 275–295)
POTASSIUM SERPL-SCNC: 3.3 MMOL/L (ref 3.5–5.1)
SODIUM SERPL-SCNC: 146 MMOL/L (ref 136–145)

## 2019-09-03 PROCEDURE — 99214 OFFICE O/P EST MOD 30 MIN: CPT | Performed by: NURSE PRACTITIONER

## 2019-09-03 RX ORDER — CLONAZEPAM 0.5 MG/1
0.5 TABLET ORAL NIGHTLY PRN
COMMUNITY
End: 2020-02-26

## 2019-09-03 RX ORDER — POTASSIUM CHLORIDE 20 MEQ/1
60 TABLET, EXTENDED RELEASE ORAL ONCE
Status: COMPLETED | OUTPATIENT
Start: 2019-09-03 | End: 2019-09-03

## 2019-09-03 RX ORDER — HYDRALAZINE HYDROCHLORIDE 25 MG/1
50 TABLET, FILM COATED ORAL EVERY 8 HOURS SCHEDULED
Qty: 180 TABLET | Refills: 0 | Status: SHIPPED | COMMUNITY
Start: 2019-09-03 | End: 2019-12-16

## 2019-09-03 RX ORDER — MELATONIN
325
Qty: 30 TABLET | Refills: 4 | Status: SHIPPED | OUTPATIENT
Start: 2019-09-03 | End: 2019-09-17 | Stop reason: SINTOL

## 2019-09-03 RX ADMIN — POTASSIUM CHLORIDE 60 MEQ: 20 TABLET, EXTENDED RELEASE ORAL at 12:15:00

## 2019-09-03 NOTE — PROGRESS NOTES
Pt. Assessed. No s/s of shortness of breath, fatigue, chest pain or edema noted. Weight down 7 lbs at 170.3lbs. Reviewed current list of patient's allergies and medication; updated EMR. Labs ordered to assess kidney function.  IV started per policy for 2nd

## 2019-09-03 NOTE — PROGRESS NOTES
[facility] Cardiac Health Progress Note    Vero Huang is a 80year old female who presents to clinic for APN assessment and management of diastolic heart failure and is functional class 3b.      Subjective:   Since she was last seen an 0.10        Years: 43.00        Pack years: 4.3        Types: Cigarettes        Start date: 1974        Quit date: 2017        Years since quittin.0      Smokeless tobacco: Never Used      Tobacco comment: off and on smoker    Alcohol use:  No 3  •  Albuterol Sulfate  (90 Base) MCG/ACT Inhalation Aero Soln, Inhale 2 puffs into the lungs every 6 (six) hours as needed for Wheezing., Disp: 1 Inhaler, Rfl: 3  •  Apixaban (ELIQUIS OR), Take 5 mg by mouth 2 (two) times daily.  , Disp: , Rfl:   • iron deficiency. Last HGB 10.1. Iron sat 10% and Ferritin 45. Given one dose of IV Venofer and felt poorly after, will add oral iron   10. Hypokalemia- replete as needed      Plan:     · Start oral ferrous sulfate 325 mg daily.   · Potassium 60 meq PO x1,

## 2019-09-03 NOTE — PATIENT INSTRUCTIONS
Heart Failure Discharge Instructions    Start taking Ferrous Sulfate 325 mg daily  Take an additional 20 meq of potassium today. Have labs when you have RHC next week.       Activity: Regular exercise and activity is important for your overall health and trouble breathing  · You get more tired with regular activity, or are limiting activity because of shortness of breath or fatigue  · You are short of breath lying down, you need more pillows to breathe comfortably,  or wake up during the night short of alice

## 2019-09-10 ENCOUNTER — TELEPHONE (OUTPATIENT)
Dept: CARDIOLOGY CLINIC | Facility: HOSPITAL | Age: 69
End: 2019-09-10

## 2019-09-10 NOTE — HISTORICAL OFFICE NOTE
Formatting of this note might be different from the original.  [address]  Advanced Heart Failure Clinic Note    Pulmonary Hypertension Consult    Date of Visit: 7/16/2019  Patient Name: Caprice Parry Record Number: [id number]  Date of dyspnea. Negative for claudication, cyanosis, near-syncope and syncope. Respiratory: Positive for shortness of breath, sleep disturbances due to breathing and snoring. Negative for cough. Endocrine: Negative. Skin: Negative.    Musculoskeletal: Positi history, medications and allergies listed in the medical record as obtained by my nursing staff and support staff and agree with their documentation.     OBJECTIVE:  PHYSICAL EXAMINATION:  Vitals:   Visit Vitals  BP (!) 148/100 (BP Location: RUE, Patient Po Left ventricle: The cavity size was normal. Wall thickness was at the     upper limits of normal. Systolic function was at the lower limits of     normal. The estimated ejection fraction was 50-55%.   3. Regional wall motion abnormality: Mild hypokinesis of anti-inflammatory drugs) including but not limited to Ibuprofen, Advil and Aleve.      Pulmonary Hypertension  -Likely secondary  -Increase diuretics as above  -Will complete thorough evaluation  -Will need V/Q scan, Echo with bubble study  -Check EDMUNDO with

## 2019-09-10 NOTE — PROGRESS NOTES
Called patient and unable to leave message at number listed for outstanding labs ordered from [facility].

## 2019-09-11 ENCOUNTER — HOSPITAL ENCOUNTER (OUTPATIENT)
Dept: INTERVENTIONAL RADIOLOGY/VASCULAR | Facility: HOSPITAL | Age: 69
Discharge: HOSPICE | End: 2019-09-11
Attending: INTERNAL MEDICINE | Admitting: INTERNAL MEDICINE
Payer: MEDICARE

## 2019-09-11 VITALS
HEART RATE: 60 BPM | TEMPERATURE: 97 F | BODY MASS INDEX: 28.7 KG/M2 | RESPIRATION RATE: 34 BRPM | HEIGHT: 63 IN | WEIGHT: 162 LBS | DIASTOLIC BLOOD PRESSURE: 89 MMHG | SYSTOLIC BLOOD PRESSURE: 111 MMHG | OXYGEN SATURATION: 100 %

## 2019-09-11 DIAGNOSIS — I50.9 HEART FAILURE, UNSPECIFIED HF CHRONICITY, UNSPECIFIED HEART FAILURE TYPE (HCC): ICD-10-CM

## 2019-09-11 PROCEDURE — 76937 US GUIDE VASCULAR ACCESS: CPT

## 2019-09-11 PROCEDURE — 93451 RIGHT HEART CATH: CPT

## 2019-09-11 PROCEDURE — 76937 US GUIDE VASCULAR ACCESS: CPT | Performed by: INTERNAL MEDICINE

## 2019-09-11 PROCEDURE — 4A023N6 MEASUREMENT OF CARDIAC SAMPLING AND PRESSURE, RIGHT HEART, PERCUTANEOUS APPROACH: ICD-10-PCS | Performed by: INTERNAL MEDICINE

## 2019-09-11 PROCEDURE — 93451 RIGHT HEART CATH: CPT | Performed by: INTERNAL MEDICINE

## 2019-09-11 RX ORDER — LIDOCAINE HYDROCHLORIDE 10 MG/ML
INJECTION, SOLUTION EPIDURAL; INFILTRATION; INTRACAUDAL; PERINEURAL
Status: COMPLETED
Start: 2019-09-11 | End: 2019-09-11

## 2019-09-11 RX ORDER — HEPARIN SODIUM 5000 [USP'U]/ML
INJECTION, SOLUTION INTRAVENOUS; SUBCUTANEOUS
Status: COMPLETED
Start: 2019-09-11 | End: 2019-09-11

## 2019-09-11 RX ORDER — SODIUM CHLORIDE 9 MG/ML
INJECTION, SOLUTION INTRAVENOUS
Status: DISCONTINUED | OUTPATIENT
Start: 2019-09-12 | End: 2019-09-11 | Stop reason: HOSPADM

## 2019-09-11 NOTE — H&P
Pre Procedure H&P  Patient Name: Kathleen King    CHIEF COMPLAINT: presenting for scheduled RHC    HISTORY OF PRESENT ILLNESS: Kathleen King is a 80year old female who presents to the clinic with the following CV (cardiovascular) history:    Chronic HFp medical history, family history, social history, medications and allergies listed in the medical record as obtained by my nursing staff and support staff and agree with their documentation.     OBJECTIVE:  PHYSICAL EXAMINATION:  Vitals:   /88 (BP Loca valve: There was moderate regurgitation. 8. Pulmonary arteries: Systolic pressure was moderately to markedly     increased, in the range of 50mm Hg to 55mm Hg.     Impressions:  This study is compared with previous dated 11/14/17:   Compared  to the prior

## 2019-09-11 NOTE — PROCEDURES
Right Heart Catheterization with [place]     DESCRIPTION OF PROCEDURE:  After informed consent, patient had interrogation of the right neck with  ultrasound and a micropuncture set was used to effect venipuncture.  The ultrasound was used to define the re

## 2019-09-11 NOTE — PROGRESS NOTES
Pt A&O x 3 and ready to go home. VSS on RA. Pt's right neck dressing c/d/i with no signs of bleeding or hematoma. Pt has no complaints. Discharge instructions reviewed with pt. Dr. Charly Scott spoke with patient and  about results.  IV and tele d/jessica and

## 2019-09-16 ENCOUNTER — HOSPITAL ENCOUNTER (OUTPATIENT)
Dept: LAB | Facility: HOSPITAL | Age: 69
Discharge: HOME | End: 2019-09-16
Attending: NURSE PRACTITIONER
Payer: MEDICARE

## 2019-09-16 DIAGNOSIS — I50.9 CHF (CONGESTIVE HEART FAILURE) (HCC): ICD-10-CM

## 2019-09-16 LAB
ANION GAP SERPL CALC-SCNC: 6 MMOL/L (ref 0–18)
BUN BLD-MCNC: 15 MG/DL (ref 7–18)
BUN/CREAT SERPL: 10.3 (ref 10–20)
CALCIUM BLD-MCNC: 9.5 MG/DL (ref 8.5–10.1)
CHLORIDE SERPL-SCNC: 111 MMOL/L (ref 98–112)
CO2 SERPL-SCNC: 27 MMOL/L (ref 21–32)
CREAT BLD-MCNC: 1.45 MG/DL (ref 0.55–1.02)
GLUCOSE BLD-MCNC: 85 MG/DL (ref 70–99)
OSMOLALITY SERPL CALC.SUM OF ELEC: 298 MOSM/KG (ref 275–295)
POTASSIUM SERPL-SCNC: 4.5 MMOL/L (ref 3.5–5.1)
SODIUM SERPL-SCNC: 144 MMOL/L (ref 136–145)

## 2019-09-16 PROCEDURE — 36415 COLL VENOUS BLD VENIPUNCTURE: CPT | Performed by: NURSE PRACTITIONER

## 2019-09-16 PROCEDURE — 80048 BASIC METABOLIC PNL TOTAL CA: CPT | Performed by: NURSE PRACTITIONER

## 2019-09-17 ENCOUNTER — APPOINTMENT (OUTPATIENT)
Dept: LAB | Facility: HOSPITAL | Age: 69
End: 2019-09-17
Attending: NURSE PRACTITIONER
Payer: MEDICARE

## 2019-09-17 ENCOUNTER — HOSPITAL ENCOUNTER (OUTPATIENT)
Dept: CARDIOLOGY CLINIC | Facility: HOSPITAL | Age: 69
Discharge: HOME | End: 2019-09-17
Attending: NURSE PRACTITIONER
Payer: MEDICARE

## 2019-09-17 VITALS
HEART RATE: 59 BPM | DIASTOLIC BLOOD PRESSURE: 85 MMHG | BODY MASS INDEX: 29 KG/M2 | OXYGEN SATURATION: 100 % | WEIGHT: 166.5 LBS | SYSTOLIC BLOOD PRESSURE: 114 MMHG | RESPIRATION RATE: 14 BRPM

## 2019-09-17 DIAGNOSIS — Z95.1 S/P CABG (CORONARY ARTERY BYPASS GRAFT): ICD-10-CM

## 2019-09-17 DIAGNOSIS — E66.9 OBESITY (BMI 30-39.9): ICD-10-CM

## 2019-09-17 DIAGNOSIS — I48.0 PAF (PAROXYSMAL ATRIAL FIBRILLATION) (HCC): ICD-10-CM

## 2019-09-17 DIAGNOSIS — I50.32 CHRONIC HEART FAILURE WITH PRESERVED EJECTION FRACTION (HCC): Primary | ICD-10-CM

## 2019-09-17 DIAGNOSIS — I10 BENIGN ESSENTIAL HTN: ICD-10-CM

## 2019-09-17 PROCEDURE — 99214 OFFICE O/P EST MOD 30 MIN: CPT | Performed by: NURSE PRACTITIONER

## 2019-09-17 RX ORDER — AMIODARONE HYDROCHLORIDE 200 MG/1
200 TABLET ORAL DAILY
Qty: 30 TABLET | Refills: 5 | Status: SHIPPED | COMMUNITY
Start: 2019-09-17 | End: 2019-10-04 | Stop reason: ALTCHOICE

## 2019-09-17 NOTE — PATIENT INSTRUCTIONS
Heart Failure Discharge Instructions    Decrease Amiodarone to 200 mg daily     Activity: Regular exercise and activity is important for your overall health and to help keep your heart strong and functioning as well as possible.    Walk at a slow to [facility] AT [place] of shortness of breath or fatigue  · You are short of breath lying down, you need more pillows to breathe comfortably,  or wake up during the night short of breath  · You urinate less often during the day and more often at night  · You have a bloated feeli

## 2019-09-17 NOTE — PROGRESS NOTES
[facility] Cardiac Health Progress Note    Anne Vasquez is a 80year old female who presents to clinic for APN assessment and management of diastolic heart failure and is functional class 3b.      Subjective:   Since she was last seen sh Maternal Grandmother         breast   • Other (stroke) Brother 61      Social History    Tobacco Use      Smoking status: Former Smoker        Packs/day: 0.10        Years: 43.00        Pack years: 4.3        Types: Cigarettes        Start date: 1/18/1974 fluticasone-salmeterol (ADVAIR DISKUS) 250-50 MCG/DOSE Inhalation Aerosol Powder, Breath Activated, Inhale 1 puff into the lungs 2 (two) times daily. , Disp: 3 each, Rfl: 3  •  Albuterol Sulfate  (90 Base) MCG/ACT Inhalation Aero Soln, Inhale 2 puffs Advised to decrease Amiodarone to 200 mg daily. 4. Essential HTN- controlled  5. HL  6. EITAN on CPAP, poor compliance with CPAP. 7. PH, post-capillary, WHO Group II. Recent RHC with mPAP 19 mmHg, wedge 13 mmhg, RA 6 mmHg.    8. CKD Stage III, baseline cre

## 2019-09-17 NOTE — PROGRESS NOTES
Pt. assessed. No s/s of shortness of breath, fatigue, chest pain or edema noted. Weight down 4 lbs at 166.5 lbs. Reviewed current list of patient's allergies and medication; updated EMR.   Pt had labs completed yesterday that were reviewed to assess kidney

## 2019-09-18 ENCOUNTER — TELEPHONE (OUTPATIENT)
Dept: CARDIOLOGY | Age: 69
End: 2019-09-18

## 2019-10-09 ENCOUNTER — OFFICE VISIT (OUTPATIENT)
Dept: CARDIOLOGY | Age: 69
End: 2019-10-09

## 2019-10-09 VITALS
OXYGEN SATURATION: 100 % | HEIGHT: 66 IN | SYSTOLIC BLOOD PRESSURE: 132 MMHG | HEART RATE: 76 BPM | WEIGHT: 169 LBS | DIASTOLIC BLOOD PRESSURE: 78 MMHG | RESPIRATION RATE: 18 BRPM | BODY MASS INDEX: 27.16 KG/M2

## 2019-10-09 DIAGNOSIS — I27.20 PULMONARY HTN (CMD): Primary | ICD-10-CM

## 2019-10-09 PROCEDURE — 3075F SYST BP GE 130 - 139MM HG: CPT | Performed by: INTERNAL MEDICINE

## 2019-10-09 PROCEDURE — 3078F DIAST BP <80 MM HG: CPT | Performed by: INTERNAL MEDICINE

## 2019-10-09 PROCEDURE — 99214 OFFICE O/P EST MOD 30 MIN: CPT | Performed by: INTERNAL MEDICINE

## 2019-10-09 RX ORDER — TORSEMIDE 20 MG/1
20 TABLET ORAL DAILY
COMMUNITY
End: 2020-05-26 | Stop reason: DRUGHIGH

## 2019-10-09 RX ORDER — SPIRONOLACTONE 25 MG/1
25 TABLET ORAL DAILY
COMMUNITY
End: 2020-07-31 | Stop reason: ALTCHOICE

## 2019-10-09 RX ORDER — ALBUTEROL SULFATE 90 UG/1
2 AEROSOL, METERED RESPIRATORY (INHALATION) PRN
Refills: 3 | COMMUNITY
Start: 2019-08-16 | End: 2019-10-09 | Stop reason: CLARIF

## 2019-10-09 ASSESSMENT — ENCOUNTER SYMPTOMS
ORTHOPNEA: 1
SYNCOPE: 0
SNORING: 1
EYES NEGATIVE: 1
ALLERGIC/IMMUNOLOGIC NEGATIVE: 1
PSYCHIATRIC NEGATIVE: 1
BLOATING: 1
DIZZINESS: 1
SHORTNESS OF BREATH: 0
LOSS OF BALANCE: 1
WEIGHT GAIN: 0
LIGHT-HEADEDNESS: 1
SLEEP DISTURBANCES DUE TO BREATHING: 1
ENDOCRINE NEGATIVE: 1
COUGH: 0
NEAR-SYNCOPE: 0

## 2019-10-09 ASSESSMENT — PATIENT HEALTH QUESTIONNAIRE - PHQ9
SUM OF ALL RESPONSES TO PHQ9 QUESTIONS 1 AND 2: 0
1. LITTLE INTEREST OR PLEASURE IN DOING THINGS: NOT AT ALL
SUM OF ALL RESPONSES TO PHQ9 QUESTIONS 1 AND 2: 0
2. FEELING DOWN, DEPRESSED OR HOPELESS: NOT AT ALL

## 2019-10-10 ENCOUNTER — TELEPHONE (OUTPATIENT)
Dept: CARDIOLOGY | Age: 69
End: 2019-10-10

## 2019-10-10 DIAGNOSIS — I25.708 CORONARY ARTERY DISEASE OF BYPASS GRAFT OF NATIVE HEART WITH STABLE ANGINA PECTORIS (CMD): Primary | ICD-10-CM

## 2019-10-10 DIAGNOSIS — I50.9 HEART FAILURE, UNSPECIFIED HF CHRONICITY, UNSPECIFIED HEART FAILURE TYPE (CMD): ICD-10-CM

## 2019-10-11 ENCOUNTER — TELEPHONE (OUTPATIENT)
Dept: CARDIOLOGY | Age: 69
End: 2019-10-11

## 2019-10-14 ENCOUNTER — TELEPHONE (OUTPATIENT)
Dept: CARDIOLOGY | Age: 69
End: 2019-10-14

## 2019-10-14 RX ORDER — PREDNISONE 50 MG/1
50 TABLET ORAL DAILY
Qty: 3 TABLET | Refills: 0 | Status: SHIPPED | OUTPATIENT
Start: 2019-10-14 | End: 2020-05-26 | Stop reason: DRUGHIGH

## 2019-10-17 ENCOUNTER — TELEPHONE (OUTPATIENT)
Dept: CARDIOLOGY | Age: 69
End: 2019-10-17

## 2019-10-18 ENCOUNTER — TELEPHONE (OUTPATIENT)
Dept: CARDIOLOGY | Age: 69
End: 2019-10-18

## 2019-10-29 ENCOUNTER — APPOINTMENT (OUTPATIENT)
Dept: CARDIOLOGY | Age: 69
End: 2019-10-29

## 2019-10-29 RX ORDER — SPIRONOLACTONE 25 MG/1
TABLET ORAL
Qty: 15 TABLET | Refills: 1 | Status: SHIPPED | OUTPATIENT
Start: 2019-10-29 | End: 2019-12-16

## 2019-10-30 ENCOUNTER — HOSPITAL ENCOUNTER (OUTPATIENT)
Age: 69
Discharge: HOME | End: 2019-10-30
Payer: MEDICARE

## 2019-10-30 VITALS
OXYGEN SATURATION: 100 % | RESPIRATION RATE: 18 BRPM | HEART RATE: 90 BPM | TEMPERATURE: 99 F | DIASTOLIC BLOOD PRESSURE: 92 MMHG | SYSTOLIC BLOOD PRESSURE: 151 MMHG
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
## 2019-10-30 PROCEDURE — 99214 OFFICE O/P EST MOD 30 MIN: CPT

## 2019-10-30 PROCEDURE — 99213 OFFICE O/P EST LOW 20 MIN: CPT

## 2019-10-30 NOTE — ED PROVIDER NOTES
Patient Seen in: THE [facility]      History   Patient presents with:  Bite (integumentary)    Stated Complaint: patient has bug bites on chest and arm/believes they were bed bites     HPI  Michael Howe is a 72-year-old female who presents of Systems    Positive for stated complaint: patient has bug bites on chest and arm/believes they were bed bites   Other systems are as noted in HPI. Constitutional and vital signs reviewed.       All other systems reviewed and negative except as noted abo instructions are given and discussed. Discharge medications are discussed. The patient is in good condition throughout the visit today and remains so upon discharge. I discuss the plan of care with the patient, who expresses understanding.   All questions

## 2019-10-30 NOTE — ED INITIAL ASSESSMENT (HPI)
Noted multiple insect bites on upper body and bilateral arms for the last six days. Concerned it is from bed bugs as she stayed at a hotel in [place] last week for  of mother in law.

## 2019-11-07 ENCOUNTER — TELEPHONE (OUTPATIENT)
Dept: CARDIOLOGY | Age: 69
End: 2019-11-07

## 2019-11-08 ENCOUNTER — HOSPITAL ENCOUNTER (OUTPATIENT)
Dept: CT IMAGING | Facility: HOSPITAL | Age: 69
Discharge: HOME | End: 2019-11-08
Attending: INTERNAL MEDICINE
Payer: MEDICARE

## 2019-11-08 ENCOUNTER — HOSPITAL ENCOUNTER (OUTPATIENT)
Dept: NUCLEAR MEDICINE | Facility: HOSPITAL | Age: 69
Discharge: HOME | End: 2019-11-08
Attending: INTERNAL MEDICINE
Payer: MEDICARE

## 2019-11-08 ENCOUNTER — HOSPITAL ENCOUNTER (OUTPATIENT)
Dept: LAB | Facility: HOSPITAL | Age: 69
Discharge: HOME | End: 2019-11-08
Attending: NURSE PRACTITIONER
Payer: MEDICARE

## 2019-11-08 ENCOUNTER — HOSPITAL ENCOUNTER (OUTPATIENT)
Dept: CARDIOLOGY CLINIC | Facility: HOSPITAL | Age: 69
Discharge: HOME | End: 2019-11-08
Attending: NURSE PRACTITIONER
Payer: MEDICARE

## 2019-11-08 ENCOUNTER — HOSPITAL ENCOUNTER (OUTPATIENT)
Dept: GENERAL RADIOLOGY | Facility: HOSPITAL | Age: 69
Discharge: HOME | End: 2019-11-08
Attending: INTERNAL MEDICINE
Payer: MEDICARE

## 2019-11-08 VITALS
RESPIRATION RATE: 14 BRPM | BODY MASS INDEX: 33 KG/M2 | WEIGHT: 180.13 LBS | HEART RATE: 65 BPM | OXYGEN SATURATION: 99 % | DIASTOLIC BLOOD PRESSURE: 86 MMHG | SYSTOLIC BLOOD PRESSURE: 113 MMHG

## 2019-11-08 VITALS — HEART RATE: 65 BPM | SYSTOLIC BLOOD PRESSURE: 137 MMHG | RESPIRATION RATE: 16 BRPM | DIASTOLIC BLOOD PRESSURE: 86 MMHG

## 2019-11-08 DIAGNOSIS — Z95.1 S/P CABG (CORONARY ARTERY BYPASS GRAFT): ICD-10-CM

## 2019-11-08 DIAGNOSIS — N18.30 CKD (CHRONIC KIDNEY DISEASE) STAGE 3, GFR 30-59 ML/MIN (HCC): ICD-10-CM

## 2019-11-08 DIAGNOSIS — I50.32 CHRONIC HEART FAILURE WITH PRESERVED EJECTION FRACTION (HCC): Primary | ICD-10-CM

## 2019-11-08 DIAGNOSIS — G47.33 OSA (OBSTRUCTIVE SLEEP APNEA): ICD-10-CM

## 2019-11-08 DIAGNOSIS — I48.0 PAF (PAROXYSMAL ATRIAL FIBRILLATION) (HCC): ICD-10-CM

## 2019-11-08 DIAGNOSIS — I50.9 HEART FAILURE, UNSPECIFIED HF CHRONICITY, UNSPECIFIED HEART FAILURE TYPE (HCC): ICD-10-CM

## 2019-11-08 DIAGNOSIS — I27.20 PULMONARY HYPERTENSION (HCC): ICD-10-CM

## 2019-11-08 DIAGNOSIS — I50.9 CHF (CONGESTIVE HEART FAILURE) (HCC): ICD-10-CM

## 2019-11-08 DIAGNOSIS — I10 BENIGN ESSENTIAL HTN: ICD-10-CM

## 2019-11-08 DIAGNOSIS — D50.9 IRON DEFICIENCY ANEMIA, UNSPECIFIED IRON DEFICIENCY ANEMIA TYPE: ICD-10-CM

## 2019-11-08 DIAGNOSIS — I25.708: ICD-10-CM

## 2019-11-08 DIAGNOSIS — I27.20 PULMONARY HTN (HCC): ICD-10-CM

## 2019-11-08 DIAGNOSIS — E66.9 OBESITY (BMI 30-39.9): ICD-10-CM

## 2019-11-08 PROCEDURE — 85027 COMPLETE CBC AUTOMATED: CPT | Performed by: NURSE PRACTITIONER

## 2019-11-08 PROCEDURE — 75574 CT ANGIO HRT W/3D IMAGE: CPT | Performed by: INTERNAL MEDICINE

## 2019-11-08 PROCEDURE — 71046 X-RAY EXAM CHEST 2 VIEWS: CPT | Performed by: INTERNAL MEDICINE

## 2019-11-08 PROCEDURE — 99214 OFFICE O/P EST MOD 30 MIN: CPT | Performed by: NURSE PRACTITIONER

## 2019-11-08 PROCEDURE — 83880 ASSAY OF NATRIURETIC PEPTIDE: CPT | Performed by: NURSE PRACTITIONER

## 2019-11-08 PROCEDURE — 36415 COLL VENOUS BLD VENIPUNCTURE: CPT | Performed by: NURSE PRACTITIONER

## 2019-11-08 PROCEDURE — 80048 BASIC METABOLIC PNL TOTAL CA: CPT | Performed by: NURSE PRACTITIONER

## 2019-11-08 PROCEDURE — 78582 LUNG VENTILAT&PERFUS IMAGING: CPT | Performed by: INTERNAL MEDICINE

## 2019-11-08 RX ORDER — NITROGLYCERIN 0.4 MG/1
TABLET SUBLINGUAL
Status: DISCONTINUED
Start: 2019-11-08 | End: 2019-11-09

## 2019-11-08 RX ORDER — NITROGLYCERIN 0.4 MG/1
0.4 TABLET SUBLINGUAL ONCE
Status: COMPLETED | OUTPATIENT
Start: 2019-11-08 | End: 2019-11-08

## 2019-11-08 RX ORDER — METOPROLOL TARTRATE 5 MG/5ML
5 INJECTION INTRAVENOUS
Status: DISCONTINUED | OUTPATIENT
Start: 2019-11-08 | End: 2019-11-10

## 2019-11-08 RX ORDER — METOPROLOL TARTRATE 5 MG/5ML
INJECTION INTRAVENOUS
Status: DISCONTINUED
Start: 2019-11-08 | End: 2019-11-09

## 2019-11-08 RX ORDER — DILTIAZEM HYDROCHLORIDE 5 MG/ML
INJECTION INTRAVENOUS
Status: DISCONTINUED
Start: 2019-11-08 | End: 2019-11-09

## 2019-11-08 RX ORDER — DILTIAZEM HYDROCHLORIDE 5 MG/ML
5 INJECTION INTRAVENOUS
Status: DISCONTINUED | OUTPATIENT
Start: 2019-11-08 | End: 2019-11-10

## 2019-11-08 RX ADMIN — DILTIAZEM HYDROCHLORIDE 5 MG: 5 INJECTION INTRAVENOUS at 15:18:00

## 2019-11-08 RX ADMIN — DILTIAZEM HYDROCHLORIDE 5 MG: 5 INJECTION INTRAVENOUS at 15:23:00

## 2019-11-08 RX ADMIN — DILTIAZEM HYDROCHLORIDE 5 MG: 5 INJECTION INTRAVENOUS at 15:28:00

## 2019-11-08 RX ADMIN — METOPROLOL TARTRATE 5 MG: 5 INJECTION INTRAVENOUS at 15:08:00

## 2019-11-08 RX ADMIN — METOPROLOL TARTRATE 5 MG: 5 INJECTION INTRAVENOUS at 15:03:00

## 2019-11-08 RX ADMIN — NITROGLYCERIN 0.4 MG: 0.4 TABLET SUBLINGUAL at 13:41:00

## 2019-11-08 RX ADMIN — METOPROLOL TARTRATE 5 MG: 5 INJECTION INTRAVENOUS at 15:13:00

## 2019-11-08 RX ADMIN — DILTIAZEM HYDROCHLORIDE 5 MG: 5 INJECTION INTRAVENOUS at 15:33:00

## 2019-11-08 NOTE — IMAGING NOTE
Pt pre-medicated for contrast allergy, last dose of Prednisone and Benadryl @ 1400. Notified Dr Essie Napoles of Amlodipine allergy and ok to give Cardizem. 93 cc's Contrast  151 cc's Saline     Avg HR for CTA Gated = 58  Pt tolerated CTA Gated study well.

## 2019-11-08 NOTE — PROGRESS NOTES
Patient was assessed. No s/s of shortness of breath, fatigue, chest pain or edema noted. Weight at 180.1 lbs, up about 14 lbs since the last visit but Stated \"she has been eating more and eating at buffet\".  Reviewed current list of patient's allergies an

## 2019-11-08 NOTE — PATIENT INSTRUCTIONS
Heart Failure Discharge Instructions    · Take Torsemide 40mg (2 tablets) Saturday, Sunday and Monday only.   · We will see you on Monday     Activity: Regular exercise and activity is important for your overall health and to help keep your heart strong and tired with regular activity, or are limiting activity because of shortness of breath or fatigue  · You are short of breath lying down, you need more pillows to breathe comfortably,  or wake up during the night short of breath  · You urinate less often maria ti

## 2019-11-11 ENCOUNTER — HOSPITAL ENCOUNTER (OUTPATIENT)
Dept: CARDIOLOGY CLINIC | Facility: HOSPITAL | Age: 69
Discharge: HOME | End: 2019-11-11
Attending: NURSE PRACTITIONER
Payer: MEDICARE

## 2019-11-11 VITALS
HEART RATE: 86 BPM | SYSTOLIC BLOOD PRESSURE: 160 MMHG | WEIGHT: 182.19 LBS | BODY MASS INDEX: 33 KG/M2 | OXYGEN SATURATION: 100 % | DIASTOLIC BLOOD PRESSURE: 98 MMHG

## 2019-11-11 DIAGNOSIS — I50.31 ACUTE DIASTOLIC CONGESTIVE HEART FAILURE (HCC): Primary | ICD-10-CM

## 2019-11-11 DIAGNOSIS — I48.0 PAF (PAROXYSMAL ATRIAL FIBRILLATION) (HCC): ICD-10-CM

## 2019-11-11 DIAGNOSIS — N18.30 CKD (CHRONIC KIDNEY DISEASE) STAGE 3, GFR 30-59 ML/MIN (HCC): ICD-10-CM

## 2019-11-11 DIAGNOSIS — Z95.1 S/P CABG (CORONARY ARTERY BYPASS GRAFT): ICD-10-CM

## 2019-11-11 DIAGNOSIS — I27.20 PULMONARY HYPERTENSION (HCC): ICD-10-CM

## 2019-11-11 DIAGNOSIS — E66.9 OBESITY (BMI 30-39.9): ICD-10-CM

## 2019-11-11 DIAGNOSIS — I10 BENIGN ESSENTIAL HTN: ICD-10-CM

## 2019-11-11 DIAGNOSIS — D50.9 IRON DEFICIENCY ANEMIA, UNSPECIFIED IRON DEFICIENCY ANEMIA TYPE: ICD-10-CM

## 2019-11-11 DIAGNOSIS — G47.33 OSA (OBSTRUCTIVE SLEEP APNEA): ICD-10-CM

## 2019-11-11 PROCEDURE — 99214 OFFICE O/P EST MOD 30 MIN: CPT | Performed by: NURSE PRACTITIONER

## 2019-11-11 RX ORDER — BUMETANIDE 0.25 MG/ML
3 INJECTION, SOLUTION INTRAMUSCULAR; INTRAVENOUS ONCE
Status: COMPLETED | OUTPATIENT
Start: 2019-11-11 | End: 2019-11-11

## 2019-11-11 RX ADMIN — BUMETANIDE 3 MG: 0.25 INJECTION, SOLUTION INTRAMUSCULAR; INTRAVENOUS at 15:17:00

## 2019-11-11 NOTE — PATIENT INSTRUCTIONS
Heart Failure Discharge Instructions  Continue with same medications. Watch your Salt and fluid intake.     Activity: Regular exercise and activity is important for your overall health and to help keep your heart strong and functioning as well as possible are limiting activity because of shortness of breath or fatigue  · You are short of breath lying down, you need more pillows to breathe comfortably,  or wake up during the night short of breath  · You urinate less often during the day and more often at nig

## 2019-11-11 NOTE — PROGRESS NOTES
Patient assessed. Weight at 182.2 lbs, Up slightly. C/O bloating, stated \"she watches her salt intake but had a visit to buffet\". Reviewed current list of patient's allergies and medication; updated EMR. BMP drawn, IV established per protocol.  250 mg

## 2019-11-11 NOTE — PROGRESS NOTES
[facility] Cardiac Health Progress Note    Marielle Don is a 80year old female who presents to clinic for APN assessment and management of chronic HFpEF and is functional class 3.      Subjective:  She returns for assessment of volume a Family History   Problem Relation Age of Onset   • Cancer Father         back   • Cancer Maternal Grandmother         breast   • Other (stroke) Brother 61      Social History    Tobacco Use      Smoking status: Former Smoker        Packs/day: 0.10 hydrALAzine HCl 25 MG Oral Tab, Take 2 tablets (50 mg total) by mouth every 8 (eight) hours. , Disp: 180 tablet, Rfl: 0  •  torsemide 20 MG Oral Tab, Take 1 tablet (20 mg total) by mouth daily. , Disp: 30 tablet, Rfl: 1  •  Metoprolol Tartrate 50 MG Oral Ta Currently in sinus. 4. Essential HTN - SBP in the 140's   5. HL  6. EITAN on CPAP, reports wearing CPAP regularly. 7. PH, post-capillary, WHO Group II. Last RHC with mPAP 19 mmHg, wedge 13 mmhg, RA 6 mmHg. VQ scan with no perfusion defects.    8. CKD Stage

## 2019-11-13 DIAGNOSIS — I27.20 PULMONARY HTN (CMD): ICD-10-CM

## 2019-11-13 DIAGNOSIS — I25.708 CORONARY ARTERY DISEASE OF BYPASS GRAFT OF NATIVE HEART WITH STABLE ANGINA PECTORIS (CMD): ICD-10-CM

## 2019-11-13 DIAGNOSIS — I50.9 HEART FAILURE, UNSPECIFIED HF CHRONICITY, UNSPECIFIED HEART FAILURE TYPE (CMD): ICD-10-CM

## 2019-11-13 DIAGNOSIS — R06.02 SHORTNESS OF BREATH: ICD-10-CM

## 2019-11-13 PROCEDURE — X1094 NM LUNG VENTILATION AND PERFUSION: HCPCS | Performed by: INTERNAL MEDICINE

## 2019-12-02 ENCOUNTER — HOSPITAL ENCOUNTER (OUTPATIENT)
Dept: CARDIOLOGY CLINIC | Facility: HOSPITAL | Age: 69
Discharge: HOME | End: 2019-12-02
Attending: NURSE PRACTITIONER
Payer: MEDICARE

## 2019-12-02 VITALS
SYSTOLIC BLOOD PRESSURE: 168 MMHG | RESPIRATION RATE: 13 BRPM | WEIGHT: 177.19 LBS | DIASTOLIC BLOOD PRESSURE: 112 MMHG | HEART RATE: 54 BPM | OXYGEN SATURATION: 100 % | BODY MASS INDEX: 32 KG/M2

## 2019-12-02 DIAGNOSIS — I48.0 PAF (PAROXYSMAL ATRIAL FIBRILLATION) (HCC): ICD-10-CM

## 2019-12-02 DIAGNOSIS — D50.9 IRON DEFICIENCY ANEMIA, UNSPECIFIED IRON DEFICIENCY ANEMIA TYPE: ICD-10-CM

## 2019-12-02 DIAGNOSIS — I27.20 PULMONARY HYPERTENSION (HCC): ICD-10-CM

## 2019-12-02 DIAGNOSIS — N18.30 CKD (CHRONIC KIDNEY DISEASE) STAGE 3, GFR 30-59 ML/MIN (HCC): ICD-10-CM

## 2019-12-02 DIAGNOSIS — E66.9 OBESITY (BMI 30-39.9): ICD-10-CM

## 2019-12-02 DIAGNOSIS — G47.33 OSA (OBSTRUCTIVE SLEEP APNEA): ICD-10-CM

## 2019-12-02 DIAGNOSIS — I16.0 HYPERTENSIVE URGENCY: ICD-10-CM

## 2019-12-02 DIAGNOSIS — I50.31 ACUTE HEART FAILURE WITH PRESERVED EJECTION FRACTION (HCC): Primary | ICD-10-CM

## 2019-12-02 DIAGNOSIS — Z95.1 S/P CABG (CORONARY ARTERY BYPASS GRAFT): ICD-10-CM

## 2019-12-02 PROCEDURE — 99214 OFFICE O/P EST MOD 30 MIN: CPT | Performed by: NURSE PRACTITIONER

## 2019-12-02 RX ORDER — BUMETANIDE 0.25 MG/ML
4 INJECTION, SOLUTION INTRAMUSCULAR; INTRAVENOUS ONCE
Status: COMPLETED | OUTPATIENT
Start: 2019-12-02 | End: 2019-12-02

## 2019-12-02 RX ORDER — POTASSIUM CHLORIDE 20 MEQ/1
40 TABLET, EXTENDED RELEASE ORAL ONCE
Status: COMPLETED | OUTPATIENT
Start: 2019-12-02 | End: 2019-12-02

## 2019-12-02 RX ADMIN — POTASSIUM CHLORIDE 40 MEQ: 20 TABLET, EXTENDED RELEASE ORAL at 10:01:00

## 2019-12-02 RX ADMIN — BUMETANIDE 4 MG: 0.25 INJECTION, SOLUTION INTRAMUSCULAR; INTRAVENOUS at 09:14:00

## 2019-12-02 NOTE — PROGRESS NOTES
[facility] for Cardiac Health Progress Note    Brie Kern is a 80year old female who presents to clinic for APN assessment and management of chronic HFpEF and is functional class 3.      Subjective:  She returns for routine assessment of TOTAL ABDOM HYSTERECTOMY        Family History   Problem Relation Age of Onset   • Cancer Father         back   • Cancer Maternal Grandmother         breast   • Other (stroke) Brother 61      Social History    Tobacco Use      Smoking status: Former Smoker times daily. , Disp: 3 each, Rfl: 3  •  Albuterol Sulfate  (90 Base) MCG/ACT Inhalation Aero Soln, Inhale 2 puffs into the lungs every 6 (six) hours as needed for Wheezing., Disp: 1 Inhaler, Rfl: 3  •  Apixaban (ELIQUIS OR), Take 5 mg by mouth 2 (two poorly after. She stopped oral iron d/t intolerance.   10. Hx Hypokalemia    Plan:   · Diuril 500mg IVP x 1  · Bumex 4mg IVP x 1  · Kdur 40meq PO x 1  · Return to clinic in 2 weeks  · F/U with Dr. Arash Mitchell as planned in January.   · CHF discharge instructions

## 2019-12-02 NOTE — PATIENT INSTRUCTIONS
Heart Failure Discharge Instructions      Activity: Regular exercise and activity is important for your overall health and to help keep your heart strong and functioning as well as possible.    Walk at a slow to moderate pace for 15-20 minutes 3-5 days per fatigue  · You are short of breath lying down, you need more pillows to breathe comfortably,  or wake up during the night short of breath  · You urinate less often during the day and more often at night  · You have a bloated feeling, upset stomach, loss of

## 2019-12-02 NOTE — PROGRESS NOTES
Patient assessed. No C/O of Bilateral leg edema but SOB with slight activity. Weight at 177.2 lbs, down about 5 lbs, but evidence of abdominal bloating. Reviewed current list of patient's allergies and medication; updated EMR.  BMP drawn, IV established per

## 2019-12-16 ENCOUNTER — HOSPITAL ENCOUNTER (OUTPATIENT)
Dept: CARDIOLOGY CLINIC | Facility: HOSPITAL | Age: 69
Discharge: HOME | End: 2019-12-16
Attending: NURSE PRACTITIONER
Payer: MEDICARE

## 2019-12-16 VITALS
RESPIRATION RATE: 21 BRPM | OXYGEN SATURATION: 98 % | BODY MASS INDEX: 32 KG/M2 | SYSTOLIC BLOOD PRESSURE: 158 MMHG | WEIGHT: 175.81 LBS | DIASTOLIC BLOOD PRESSURE: 86 MMHG | HEART RATE: 61 BPM

## 2019-12-16 DIAGNOSIS — R06.00 DOE (DYSPNEA ON EXERTION): ICD-10-CM

## 2019-12-16 DIAGNOSIS — I50.31 ACUTE HEART FAILURE WITH PRESERVED EJECTION FRACTION (HCC): Primary | ICD-10-CM

## 2019-12-16 DIAGNOSIS — G47.33 OSA (OBSTRUCTIVE SLEEP APNEA): ICD-10-CM

## 2019-12-16 DIAGNOSIS — I48.0 PAF (PAROXYSMAL ATRIAL FIBRILLATION) (HCC): ICD-10-CM

## 2019-12-16 DIAGNOSIS — E66.9 OBESITY (BMI 30-39.9): ICD-10-CM

## 2019-12-16 DIAGNOSIS — N18.30 CKD (CHRONIC KIDNEY DISEASE) STAGE 3, GFR 30-59 ML/MIN (HCC): ICD-10-CM

## 2019-12-16 DIAGNOSIS — I10 BENIGN ESSENTIAL HTN: ICD-10-CM

## 2019-12-16 DIAGNOSIS — Z95.1 S/P CABG (CORONARY ARTERY BYPASS GRAFT): ICD-10-CM

## 2019-12-16 DIAGNOSIS — D50.9 IRON DEFICIENCY ANEMIA, UNSPECIFIED IRON DEFICIENCY ANEMIA TYPE: ICD-10-CM

## 2019-12-16 DIAGNOSIS — I27.20 PULMONARY HYPERTENSION (HCC): ICD-10-CM

## 2019-12-16 PROCEDURE — 99214 OFFICE O/P EST MOD 30 MIN: CPT | Performed by: NURSE PRACTITIONER

## 2019-12-16 RX ORDER — HYDRALAZINE HYDROCHLORIDE 25 MG/1
75 TABLET, FILM COATED ORAL EVERY 8 HOURS SCHEDULED
Qty: 270 TABLET | Refills: 2 | Status: SHIPPED | OUTPATIENT
Start: 2019-12-16 | End: 2020-02-06

## 2019-12-16 RX ORDER — SPIRONOLACTONE 25 MG/1
25 TABLET ORAL DAILY
Qty: 30 TABLET | Refills: 2 | Status: SHIPPED | OUTPATIENT
Start: 2019-12-16 | End: 2020-02-11

## 2019-12-16 NOTE — PROGRESS NOTES
[facility] Cardiac Health Progress Note    Zacarias Lion is a 80year old female who presents to clinic for APN assessment and management of chronic HFpEF and is functional class 3.      Subjective:  She returns for assessment of volume a Relation Age of Onset   • Cancer Father         back   • Cancer Maternal Grandmother         breast   • Other (stroke) Brother 61      Social History    Tobacco Use      Smoking status: Former Smoker        Packs/day: 0.10        Years: 43.00        Pack y clonazePAM 0.5 MG Oral Tab, Take 0.5 mg by mouth nightly as needed for Anxiety. , Disp: , Rfl:   •  hydrALAzine HCl 25 MG Oral Tab, Take 2 tablets (50 mg total) by mouth every 8 (eight) hours. , Disp: 180 tablet, Rfl: 0  •  torsemide 20 MG Oral Tab, Take 1 t patent bypass grafts. 3. AF, paroxysmal, in sinus. On Eliquis. 4. Essential HTN -poorly controlled on multiple visits now. 5. HL  6. EITAN on CPAP, reports wearing CPAP regularly. 7. PH, post-capillary, WHO Group II.  Last RHC with mPAP 19 mmHg, wedge

## 2019-12-16 NOTE — PATIENT INSTRUCTIONS
Heart Failure Discharge Instructions    Increase Aldactone to 25 mg daily. Increase Hydralazine to 75 mg three times a day. Have your blood work drawn the week on December 30th.       Activity: Regular exercise and activity is important for your overall have more trouble breathing  · You get more tired with regular activity, or are limiting activity because of shortness of breath or fatigue  · You are short of breath lying down, you need more pillows to breathe comfortably,  or wake up during the night sh

## 2019-12-16 NOTE — PROGRESS NOTES
Pt. Assessed. No s/s of shortness of breath, fatigue, chest pain or edema noted. Weight down 2 lbs stable at _175.8_ lbs. Reviewed current list of patient's allergies and medication; updated EMR. Labs ordered to assess kidney function.  Reviewed follow-up a

## 2019-12-17 RX ORDER — ATORVASTATIN CALCIUM 40 MG/1
TABLET, FILM COATED ORAL
Qty: 90 TABLET | Refills: 3 | Status: SHIPPED | OUTPATIENT
Start: 2019-12-17 | End: 2020-05-26 | Stop reason: SDUPTHER

## 2019-12-31 ENCOUNTER — TELEPHONE (OUTPATIENT)
Dept: CARDIOLOGY CLINIC | Facility: HOSPITAL | Age: 69
End: 2019-12-31

## 2019-12-31 NOTE — PROGRESS NOTES
RN attempted to reach patient to remind her of the BMP lab work that was ordered and due to be drawn 12/30. RN left a voicemail reminding patient to get it done ASAP.

## 2020-01-17 ENCOUNTER — HOSPITAL ENCOUNTER (OUTPATIENT)
Dept: CARDIOLOGY CLINIC | Facility: HOSPITAL | Age: 70
Discharge: HOME | End: 2020-01-17
Attending: NURSE PRACTITIONER
Payer: MEDICARE

## 2020-01-24 ENCOUNTER — HOSPITAL ENCOUNTER (OUTPATIENT)
Dept: CARDIOLOGY CLINIC | Facility: HOSPITAL | Age: 70
Discharge: HOME | End: 2020-01-24
Attending: NURSE PRACTITIONER
Payer: MEDICARE

## 2020-01-24 VITALS
OXYGEN SATURATION: 100 % | RESPIRATION RATE: 24 BRPM | SYSTOLIC BLOOD PRESSURE: 104 MMHG | HEART RATE: 74 BPM | DIASTOLIC BLOOD PRESSURE: 76 MMHG | WEIGHT: 167.13 LBS | BODY MASS INDEX: 31 KG/M2

## 2020-01-24 DIAGNOSIS — G47.33 OSA (OBSTRUCTIVE SLEEP APNEA): ICD-10-CM

## 2020-01-24 DIAGNOSIS — I27.20 PULMONARY HYPERTENSION (HCC): ICD-10-CM

## 2020-01-24 DIAGNOSIS — N18.30 CKD (CHRONIC KIDNEY DISEASE) STAGE 3, GFR 30-59 ML/MIN (HCC): ICD-10-CM

## 2020-01-24 DIAGNOSIS — R06.00 DOE (DYSPNEA ON EXERTION): ICD-10-CM

## 2020-01-24 DIAGNOSIS — Z95.1 S/P CABG (CORONARY ARTERY BYPASS GRAFT): ICD-10-CM

## 2020-01-24 DIAGNOSIS — E66.9 OBESITY (BMI 30-39.9): ICD-10-CM

## 2020-01-24 DIAGNOSIS — D50.9 IRON DEFICIENCY ANEMIA, UNSPECIFIED IRON DEFICIENCY ANEMIA TYPE: ICD-10-CM

## 2020-01-24 DIAGNOSIS — I10 BENIGN ESSENTIAL HTN: ICD-10-CM

## 2020-01-24 DIAGNOSIS — I50.32 CHRONIC HEART FAILURE WITH PRESERVED EJECTION FRACTION (HCC): Primary | ICD-10-CM

## 2020-01-24 DIAGNOSIS — I48.0 PAF (PAROXYSMAL ATRIAL FIBRILLATION) (HCC): ICD-10-CM

## 2020-01-24 LAB
ANION GAP SERPL CALC-SCNC: 7 MMOL/L (ref 0–18)
BUN BLD-MCNC: 21 MG/DL (ref 7–18)
BUN/CREAT SERPL: 10.4 (ref 10–20)
CALCIUM BLD-MCNC: 10.3 MG/DL (ref 8.5–10.1)
CHLORIDE SERPL-SCNC: 109 MMOL/L (ref 98–112)
CO2 SERPL-SCNC: 24 MMOL/L (ref 21–32)
CREAT BLD-MCNC: 2.02 MG/DL (ref 0.55–1.02)
GLUCOSE BLD-MCNC: 137 MG/DL (ref 70–99)
OSMOLALITY SERPL CALC.SUM OF ELEC: 295 MOSM/KG (ref 275–295)
PATIENT FASTING Y/N/NP: NO
POTASSIUM SERPL-SCNC: 4.2 MMOL/L (ref 3.5–5.1)
SODIUM SERPL-SCNC: 140 MMOL/L (ref 136–145)

## 2020-01-24 PROCEDURE — 99215 OFFICE O/P EST HI 40 MIN: CPT | Performed by: NURSE PRACTITIONER

## 2020-01-24 RX ORDER — TORSEMIDE 20 MG/1
TABLET ORAL
Qty: 30 TABLET | Refills: 1 | Status: SHIPPED | COMMUNITY
Start: 2020-01-24 | End: 2020-02-06

## 2020-01-24 NOTE — PROGRESS NOTES
[facility] for Cardiac Health Progress Note    Bertram Freed is a 77year old female who presents to clinic for APN assessment and management of chronic HFpEF heart failure and is functional class 3.      Subjective:  She returns for routine [facility] CVOR   • HYSTERECTOMY     • REMOVAL GALLBLADDER     • TOTAL ABDOM HYSTERECTOMY        Family History   Problem Relation Age of Onset   • Cancer Father         back   • Cancer Maternal Grandmother         breast   • Other (stroke) Brother 61      Social Blocker). , Disp: 90 tablet, Rfl: 3  •  fluticasone-salmeterol (ADVAIR DISKUS) 250-50 MCG/DOSE Inhalation Aerosol Powder, Breath Activated, Inhale 1 puff into the lungs 2 (two) times daily. , Disp: 3 each, Rfl: 3  •  Albuterol Sulfate  (90 Base) MCG/A ~1.3-1.5, today 2.02. Will be decreasing torsemide. 9. Anemia, iron deficiency - Last HGB 12.2. Iron sat 10% and Ferritin 45. Given one dose of IV Venofer and felt poorly after. She stopped oral iron d/t intolerance.   10. Hx Hypokalemia- today 4.2  11.  A

## 2020-01-24 NOTE — PROGRESS NOTES
Pt. Assessed. No s/s of shortness of breath, fatigue, chest pain or edema noted. Weight down 8 pounds at _167.6 lbs. Reviewed current list of patient's allergies and medication; updated EMR. Labs ordered to assess kidney function.  Reviewed follow-up appoin

## 2020-01-24 NOTE — PATIENT INSTRUCTIONS
Heart Failure Discharge Instructions    · Take 20mg of Torsemide on Tuesday, Thursday, Saturday and Sunday. · Take 10mg of Torsemide (1/2 tab) on Monday, Wednesday and Friday.     Activity: Regular exercise and activity is important for your overall health more trouble breathing  · You get more tired with regular activity, or are limiting activity because of shortness of breath or fatigue  · You are short of breath lying down, you need more pillows to breathe comfortably,  or wake up during the night short o

## 2020-02-06 ENCOUNTER — HOSPITAL ENCOUNTER (OUTPATIENT)
Dept: CARDIOLOGY CLINIC | Facility: HOSPITAL | Age: 70
Discharge: HOME | End: 2020-02-06
Attending: NURSE PRACTITIONER
Payer: MEDICARE

## 2020-02-06 VITALS
HEART RATE: 86 BPM | WEIGHT: 172.31 LBS | RESPIRATION RATE: 27 BRPM | DIASTOLIC BLOOD PRESSURE: 98 MMHG | SYSTOLIC BLOOD PRESSURE: 171 MMHG | BODY MASS INDEX: 32 KG/M2 | OXYGEN SATURATION: 100 %

## 2020-02-06 DIAGNOSIS — I27.20 PULMONARY HYPERTENSION (HCC): ICD-10-CM

## 2020-02-06 DIAGNOSIS — D50.9 IRON DEFICIENCY ANEMIA, UNSPECIFIED IRON DEFICIENCY ANEMIA TYPE: ICD-10-CM

## 2020-02-06 DIAGNOSIS — I50.32 CHRONIC HEART FAILURE WITH PRESERVED EJECTION FRACTION (HCC): ICD-10-CM

## 2020-02-06 DIAGNOSIS — N18.30 CKD (CHRONIC KIDNEY DISEASE) STAGE 3, GFR 30-59 ML/MIN (HCC): ICD-10-CM

## 2020-02-06 DIAGNOSIS — Z95.1 S/P CABG (CORONARY ARTERY BYPASS GRAFT): ICD-10-CM

## 2020-02-06 DIAGNOSIS — I50.31 ACUTE HEART FAILURE WITH PRESERVED EJECTION FRACTION (HCC): Primary | ICD-10-CM

## 2020-02-06 DIAGNOSIS — I48.0 PAF (PAROXYSMAL ATRIAL FIBRILLATION) (HCC): ICD-10-CM

## 2020-02-06 LAB
ANION GAP SERPL CALC-SCNC: 5 MMOL/L (ref 0–18)
BUN BLD-MCNC: 17 MG/DL (ref 7–18)
BUN/CREAT SERPL: 12.9 (ref 10–20)
CALCIUM BLD-MCNC: 9.3 MG/DL (ref 8.5–10.1)
CHLORIDE SERPL-SCNC: 110 MMOL/L (ref 98–112)
CO2 SERPL-SCNC: 26 MMOL/L (ref 21–32)
CREAT BLD-MCNC: 1.32 MG/DL (ref 0.55–1.02)
GLUCOSE BLD-MCNC: 71 MG/DL (ref 70–99)
OSMOLALITY SERPL CALC.SUM OF ELEC: 292 MOSM/KG (ref 275–295)
PATIENT FASTING Y/N/NP: NO
POTASSIUM SERPL-SCNC: 3.7 MMOL/L (ref 3.5–5.1)
SODIUM SERPL-SCNC: 141 MMOL/L (ref 136–145)

## 2020-02-06 PROCEDURE — 99215 OFFICE O/P EST HI 40 MIN: CPT | Performed by: NURSE PRACTITIONER

## 2020-02-06 RX ORDER — TORSEMIDE 20 MG/1
20 TABLET ORAL DAILY
Qty: 30 TABLET | Refills: 1 | Status: ON HOLD | COMMUNITY
Start: 2020-02-06 | End: 2020-03-24

## 2020-02-06 RX ORDER — HYDRALAZINE HYDROCHLORIDE 100 MG/1
100 TABLET, FILM COATED ORAL EVERY 8 HOURS SCHEDULED
Qty: 90 TABLET | Refills: 2 | Status: SHIPPED | OUTPATIENT
Start: 2020-02-06 | End: 2020-02-11

## 2020-02-06 RX ORDER — HYDRALAZINE HYDROCHLORIDE 20 MG/ML
20 INJECTION INTRAMUSCULAR; INTRAVENOUS ONCE
Status: COMPLETED | OUTPATIENT
Start: 2020-02-06 | End: 2020-02-06

## 2020-02-06 RX ADMIN — HYDRALAZINE HYDROCHLORIDE 20 MG: 20 INJECTION INTRAMUSCULAR; INTRAVENOUS at 12:12:00

## 2020-02-06 NOTE — PROGRESS NOTES
[facility] for Cardiac Health Progress Note    Genaro Sesay is a 77year old female who presents to clinic for APN assessment and management of chronic HFpEF heart failure and is functional class 3.      Subjective:  She returns for assessme HEART POST OP BLEED N/A 10/24/2017    Performed by Toi Wall MD at [address] [facility] CVOR   • HYSTERECTOMY     • REMOVAL GALLBLADDER     • TOTAL ABDOM HYSTERECTOMY        Family History   Problem Relation Age of Onset   • Cancer Father         back   • Cancer Materna Anxiety. , Disp: , Rfl:   •  Metoprolol Tartrate 50 MG Oral Tab, Take 1.5 tablets (75 mg total) by mouth 2x Daily(Beta Blocker). , Disp: 90 tablet, Rfl: 3  •  fluticasone-salmeterol (ADVAIR DISKUS) 250-50 MCG/DOSE Inhalation Aerosol Powder, Breath Activated, baseline creatinine ~1.3-1.5, improved to 1.32 on lower dose Torsemide. 9. Anemia, iron deficiency - Last HGB 12.2. Iron sat 10% and Ferritin 45. Given one dose of IV Venofer and felt poorly after. She stopped oral iron d/t intolerance.   10.  Hx Hypokale

## 2020-02-06 NOTE — PROGRESS NOTES
Pt. Assessed. Weight up 5 lbs at 172.3 lbs. Pt reports weight had been stable until eating fried shrimp last night. Pt's blood pressure consistently 170's/110's and APN notified. Labs ordered and drawn in the [facility].  Reviewed allergies and list of current

## 2020-02-06 NOTE — PATIENT INSTRUCTIONS
Heart Failure Discharge Instructions    Increase Hydralazine to 100 mg three times a day. Follow BP at home and call us is consistently >160/95. Increase Torsemide to 20 mg daily. Bring in blood pressure cuff to next follow up.      Activity: Regular ex pounds overnight or 3-5 pounds in 3-7 days  · You have more trouble breathing  · You get more tired with regular activity, or are limiting activity because of shortness of breath or fatigue  · You are short of breath lying down, you need more pillows to br

## 2020-02-11 ENCOUNTER — HOSPITAL ENCOUNTER (OUTPATIENT)
Dept: CARDIOLOGY CLINIC | Facility: HOSPITAL | Age: 70
Discharge: HOME | End: 2020-02-11
Attending: NURSE PRACTITIONER
Payer: MEDICARE

## 2020-02-11 VITALS
OXYGEN SATURATION: 100 % | SYSTOLIC BLOOD PRESSURE: 100 MMHG | RESPIRATION RATE: 13 BRPM | DIASTOLIC BLOOD PRESSURE: 64 MMHG | BODY MASS INDEX: 31 KG/M2 | WEIGHT: 170.31 LBS | HEART RATE: 57 BPM

## 2020-02-11 DIAGNOSIS — G47.33 OSA (OBSTRUCTIVE SLEEP APNEA): ICD-10-CM

## 2020-02-11 DIAGNOSIS — I10 BENIGN ESSENTIAL HTN: ICD-10-CM

## 2020-02-11 DIAGNOSIS — D50.9 IRON DEFICIENCY ANEMIA, UNSPECIFIED IRON DEFICIENCY ANEMIA TYPE: ICD-10-CM

## 2020-02-11 DIAGNOSIS — E66.9 OBESITY (BMI 30-39.9): ICD-10-CM

## 2020-02-11 DIAGNOSIS — I50.32 CHRONIC HEART FAILURE WITH PRESERVED EJECTION FRACTION (HCC): Primary | ICD-10-CM

## 2020-02-11 DIAGNOSIS — I48.0 PAF (PAROXYSMAL ATRIAL FIBRILLATION) (HCC): ICD-10-CM

## 2020-02-11 DIAGNOSIS — Z95.1 S/P CABG (CORONARY ARTERY BYPASS GRAFT): ICD-10-CM

## 2020-02-11 DIAGNOSIS — I27.20 PULMONARY HYPERTENSION (HCC): ICD-10-CM

## 2020-02-11 DIAGNOSIS — N18.30 CKD (CHRONIC KIDNEY DISEASE) STAGE 3, GFR 30-59 ML/MIN (HCC): ICD-10-CM

## 2020-02-11 LAB
ANION GAP SERPL CALC-SCNC: 4 MMOL/L (ref 0–18)
BUN BLD-MCNC: 18 MG/DL (ref 7–18)
BUN/CREAT SERPL: 13.2 (ref 10–20)
CALCIUM BLD-MCNC: 10 MG/DL (ref 8.5–10.1)
CHLORIDE SERPL-SCNC: 111 MMOL/L (ref 98–112)
CO2 SERPL-SCNC: 25 MMOL/L (ref 21–32)
CREAT BLD-MCNC: 1.36 MG/DL (ref 0.55–1.02)
DEPRECATED RDW RBC AUTO: 48.1 FL (ref 35.1–46.3)
ERYTHROCYTE [DISTWIDTH] IN BLOOD BY AUTOMATED COUNT: 14.1 % (ref 11–15)
GLUCOSE BLD-MCNC: 90 MG/DL (ref 70–99)
HCT VFR BLD AUTO: 41.6 % (ref 35–48)
HGB BLD-MCNC: 13.6 G/DL (ref 12–16)
MCH RBC QN AUTO: 30.6 PG (ref 26–34)
MCHC RBC AUTO-ENTMCNC: 32.7 G/DL (ref 31–37)
MCV RBC AUTO: 93.5 FL (ref 80–100)
OSMOLALITY SERPL CALC.SUM OF ELEC: 291 MOSM/KG (ref 275–295)
PATIENT FASTING Y/N/NP: NO
PLATELET # BLD AUTO: 130 10(3)UL (ref 150–450)
POTASSIUM SERPL-SCNC: 3.3 MMOL/L (ref 3.5–5.1)
RBC # BLD AUTO: 4.45 X10(6)UL (ref 3.8–5.3)
SODIUM SERPL-SCNC: 140 MMOL/L (ref 136–145)
WBC # BLD AUTO: 4.3 X10(3) UL (ref 4–11)

## 2020-02-11 PROCEDURE — 99215 OFFICE O/P EST HI 40 MIN: CPT | Performed by: NURSE PRACTITIONER

## 2020-02-11 RX ORDER — SPIRONOLACTONE 25 MG/1
37.5 TABLET ORAL DAILY
Qty: 30 TABLET | Refills: 2 | Status: ON HOLD | COMMUNITY
Start: 2020-02-11 | End: 2020-03-03

## 2020-02-11 RX ORDER — HYDRALAZINE HYDROCHLORIDE 25 MG/1
75 TABLET, FILM COATED ORAL EVERY 8 HOURS SCHEDULED
Status: SHIPPED | COMMUNITY
Start: 2020-02-11 | End: 2020-02-26

## 2020-02-11 RX ORDER — POTASSIUM CHLORIDE 20 MEQ/1
20 TABLET, EXTENDED RELEASE ORAL ONCE
Status: COMPLETED | OUTPATIENT
Start: 2020-02-11 | End: 2020-02-11

## 2020-02-11 RX ADMIN — POTASSIUM CHLORIDE 20 MEQ: 20 TABLET, EXTENDED RELEASE ORAL at 10:29:00

## 2020-02-11 NOTE — PATIENT INSTRUCTIONS
Heart Failure Discharge Instructions    · Decrease Hydralazine from 100mg three times per day to 75mg three times per day, take three 25mg tablets instead of the 100mg tablets. · Increase Spironolactone from 25mg daily to 37.5mg daily. Starting tomorrow. symptoms:  · You gain 2 or more pounds overnight or 3-5 pounds in 3-7 days  · You have more trouble breathing  · You get more tired with regular activity, or are limiting activity because of shortness of breath or fatigue  · You are short of breath lying d

## 2020-02-11 NOTE — PROGRESS NOTES
Pt. assessed. No s/s of shortness of breath, fatigue, chest pain or edema noted. Weight down 2 lbs at 170.3 lbs. Reviewed current list of patient's allergies and medication; updated EMR. Labs ordered and drawn in the [facility] to assess kidney function.   Initial

## 2020-02-11 NOTE — PROGRESS NOTES
[facility] for Cardiac Health Progress Note    Genaro Sesay is a 77year old female who presents to clinic for APN assessment and management of chronic HFpEF and is functional class 2.      Subjective:  She returns for routine assessment of • Cancer Father         back   • Cancer Maternal Grandmother         breast   • Other (stroke) Brother 61      Social History    Tobacco Use      Smoking status: Former Smoker        Packs/day: 0.10        Years: 43.00        Pack years: 4.3        Types Daily(Beta Blocker). , Disp: 90 tablet, Rfl: 3  •  fluticasone-salmeterol (ADVAIR DISKUS) 250-50 MCG/DOSE Inhalation Aerosol Powder, Breath Activated, Inhale 1 puff into the lungs 2 (two) times daily. , Disp: 3 each, Rfl: 3  •  Apixaban (ELIQUIS OR), Take 5 felt poorly after in past. She stopped oral iron due to intolerance.   10. Hypokalemia - due to recent increase in Torsemide. 11. Asthma - follows with  . 12. Loss of balance - encouraged to walk and be active.     Plan:   · Decrease Hydralazine f

## 2020-02-19 ENCOUNTER — HOSPITAL ENCOUNTER (OUTPATIENT)
Dept: LAB | Facility: HOSPITAL | Age: 70
Discharge: HOME | End: 2020-02-19
Attending: NURSE PRACTITIONER
Payer: MEDICARE

## 2020-02-19 ENCOUNTER — HOSPITAL ENCOUNTER (OUTPATIENT)
Dept: CARDIOLOGY CLINIC | Facility: HOSPITAL | Age: 70
Discharge: HOME | End: 2020-02-19
Attending: NURSE PRACTITIONER
Payer: MEDICARE

## 2020-02-19 VITALS
OXYGEN SATURATION: 95 % | SYSTOLIC BLOOD PRESSURE: 150 MMHG | HEART RATE: 70 BPM | RESPIRATION RATE: 17 BRPM | BODY MASS INDEX: 31 KG/M2 | WEIGHT: 171.69 LBS | DIASTOLIC BLOOD PRESSURE: 97 MMHG

## 2020-02-19 DIAGNOSIS — E66.9 OBESITY (BMI 30-39.9): ICD-10-CM

## 2020-02-19 DIAGNOSIS — I10 BENIGN ESSENTIAL HTN: ICD-10-CM

## 2020-02-19 DIAGNOSIS — G47.33 OSA (OBSTRUCTIVE SLEEP APNEA): ICD-10-CM

## 2020-02-19 DIAGNOSIS — D50.9 IRON DEFICIENCY ANEMIA, UNSPECIFIED IRON DEFICIENCY ANEMIA TYPE: ICD-10-CM

## 2020-02-19 DIAGNOSIS — I50.32 CHRONIC HEART FAILURE WITH PRESERVED EJECTION FRACTION (HCC): Primary | ICD-10-CM

## 2020-02-19 DIAGNOSIS — I27.20 PULMONARY HYPERTENSION (HCC): ICD-10-CM

## 2020-02-19 DIAGNOSIS — Z95.1 S/P CABG (CORONARY ARTERY BYPASS GRAFT): ICD-10-CM

## 2020-02-19 DIAGNOSIS — I50.9 CHF (CONGESTIVE HEART FAILURE) (HCC): ICD-10-CM

## 2020-02-19 DIAGNOSIS — I48.0 PAF (PAROXYSMAL ATRIAL FIBRILLATION) (HCC): ICD-10-CM

## 2020-02-19 DIAGNOSIS — N18.30 CKD (CHRONIC KIDNEY DISEASE) STAGE 3, GFR 30-59 ML/MIN (HCC): ICD-10-CM

## 2020-02-19 DIAGNOSIS — R00.2 HEART PALPITATIONS: ICD-10-CM

## 2020-02-19 LAB
ANION GAP SERPL CALC-SCNC: 4 MMOL/L (ref 0–18)
BUN BLD-MCNC: 14 MG/DL (ref 7–18)
BUN/CREAT SERPL: 10.4 (ref 10–20)
CALCIUM BLD-MCNC: 9.8 MG/DL (ref 8.5–10.1)
CHLORIDE SERPL-SCNC: 112 MMOL/L (ref 98–112)
CO2 SERPL-SCNC: 26 MMOL/L (ref 21–32)
CREAT BLD-MCNC: 1.35 MG/DL (ref 0.55–1.02)
GLUCOSE BLD-MCNC: 115 MG/DL (ref 70–99)
OSMOLALITY SERPL CALC.SUM OF ELEC: 295 MOSM/KG (ref 275–295)
PATIENT FASTING Y/N/NP: NO
POTASSIUM SERPL-SCNC: 3.9 MMOL/L (ref 3.5–5.1)
SODIUM SERPL-SCNC: 142 MMOL/L (ref 136–145)

## 2020-02-19 PROCEDURE — 99215 OFFICE O/P EST HI 40 MIN: CPT | Performed by: NURSE PRACTITIONER

## 2020-02-19 PROCEDURE — 80048 BASIC METABOLIC PNL TOTAL CA: CPT | Performed by: NURSE PRACTITIONER

## 2020-02-19 PROCEDURE — 36415 COLL VENOUS BLD VENIPUNCTURE: CPT | Performed by: NURSE PRACTITIONER

## 2020-02-19 RX ORDER — LISINOPRIL 10 MG/1
10 TABLET ORAL DAILY
Qty: 30 TABLET | Refills: 6 | Status: ON HOLD | OUTPATIENT
Start: 2020-02-19 | End: 2020-03-24

## 2020-02-19 NOTE — PROGRESS NOTES
Patient was assessed. No s/s of shortness of breath, fatigue, chest pain or edema noted. Weight stable at 171.7 lbs. Reviewed current list of patient's allergies and medication; updated EMR. Labs ordered to assess kidney function.   BP is elevated and per

## 2020-02-19 NOTE — PROGRESS NOTES
[facility] Cardiac Health Progress Note    Abbi Sweet is a 77year old female who presents to clinic for APN assessment and management of chronic  HFpEF and is functional class 2.      Subjective:  She returns for routine assessment of • Cancer Maternal Grandmother         breast   • Other (stroke) Brother 61      Social History    Tobacco Use      Smoking status: Former Smoker        Packs/day: 0.10        Years: 43.00        Pack years: 4.3        Types: Cigarettes        Start date: , Rfl:   •  spironolactone 25 MG Oral Tab, Take 1.5 tablets (37.5 mg total) by mouth daily. Take 30 minutes prior to Torsemide, Disp: 30 tablet, Rfl: 2  •  torsemide 20 MG Oral Tab, Take 1 tablet (20 mg total) by mouth daily. , Disp: 30 tablet, Rfl: 1  •  A angina; CTA coronaries with patent bypass grafts. 3. PAF - in sinus arrhythmia. On Eliquis. Recent palpitations likely afib. Will continue to monitor frequency of palpitations, may need an increase in Lopressor.   4. Essential HTN - not well controlled and

## 2020-02-19 NOTE — PATIENT INSTRUCTIONS
Heart Failure Discharge Instructions    · Start Lisinopril 10mg daily for blood pressure control. Activity: Regular exercise and activity is important for your overall health and to help keep your heart strong and functioning as well as possible.    Walk limiting activity because of shortness of breath or fatigue  · You are short of breath lying down, you need more pillows to breathe comfortably,  or wake up during the night short of breath  · You urinate less often during the day and more often at night

## 2020-02-25 DIAGNOSIS — I50.9 CHF (CONGESTIVE HEART FAILURE) (HCC): Primary | ICD-10-CM

## 2020-02-26 ENCOUNTER — HOSPITAL ENCOUNTER (INPATIENT)
Facility: HOSPITAL | Age: 70
LOS: 27 days | Discharge: NURSING, REHAB OR LONG-TERM CARE | DRG: 299 | End: 2020-03-24
Attending: EMERGENCY MEDICINE | Admitting: HOSPITALIST
Payer: MEDICARE

## 2020-02-26 ENCOUNTER — HOSPITAL ENCOUNTER (OUTPATIENT)
Dept: CARDIOLOGY CLINIC | Facility: HOSPITAL | Age: 70
Discharge: HOME | End: 2020-02-26
Attending: NURSE PRACTITIONER
Payer: MEDICARE

## 2020-02-26 ENCOUNTER — APPOINTMENT (OUTPATIENT)
Dept: CV DIAGNOSTICS | Facility: HOSPITAL | Age: 70
DRG: 299 | End: 2020-02-26
Attending: INTERNAL MEDICINE
Payer: MEDICARE

## 2020-02-26 ENCOUNTER — HOSPITAL ENCOUNTER (OUTPATIENT)
Dept: LAB | Facility: HOSPITAL | Age: 70
Discharge: HOME | End: 2020-02-26
Attending: NURSE PRACTITIONER
Payer: MEDICARE

## 2020-02-26 ENCOUNTER — APPOINTMENT (OUTPATIENT)
Dept: GENERAL RADIOLOGY | Facility: HOSPITAL | Age: 70
DRG: 299 | End: 2020-02-26
Attending: EMERGENCY MEDICINE
Payer: MEDICARE

## 2020-02-26 ENCOUNTER — APPOINTMENT (OUTPATIENT)
Dept: ULTRASOUND IMAGING | Facility: HOSPITAL | Age: 70
DRG: 299 | End: 2020-02-26
Attending: EMERGENCY MEDICINE
Payer: MEDICARE

## 2020-02-26 VITALS
HEART RATE: 70 BPM | WEIGHT: 171 LBS | RESPIRATION RATE: 18 BRPM | DIASTOLIC BLOOD PRESSURE: 89 MMHG | BODY MASS INDEX: 31 KG/M2 | OXYGEN SATURATION: 96 % | SYSTOLIC BLOOD PRESSURE: 193 MMHG

## 2020-02-26 DIAGNOSIS — Z95.1 S/P CABG (CORONARY ARTERY BYPASS GRAFT): ICD-10-CM

## 2020-02-26 DIAGNOSIS — I27.20 PULMONARY HYPERTENSION (HCC): ICD-10-CM

## 2020-02-26 DIAGNOSIS — R94.31 ABNORMAL EKG: ICD-10-CM

## 2020-02-26 DIAGNOSIS — N18.30 CKD (CHRONIC KIDNEY DISEASE) STAGE 3, GFR 30-59 ML/MIN (HCC): ICD-10-CM

## 2020-02-26 DIAGNOSIS — I10 BENIGN ESSENTIAL HTN: ICD-10-CM

## 2020-02-26 DIAGNOSIS — R07.9 CHEST PAIN OF UNCERTAIN ETIOLOGY: Primary | ICD-10-CM

## 2020-02-26 DIAGNOSIS — D50.9 IRON DEFICIENCY ANEMIA, UNSPECIFIED IRON DEFICIENCY ANEMIA TYPE: ICD-10-CM

## 2020-02-26 DIAGNOSIS — E66.9 OBESITY (BMI 30-39.9): ICD-10-CM

## 2020-02-26 DIAGNOSIS — R11.0 NAUSEA: ICD-10-CM

## 2020-02-26 DIAGNOSIS — I16.0 HYPERTENSIVE URGENCY: ICD-10-CM

## 2020-02-26 DIAGNOSIS — I50.9 CHF (CONGESTIVE HEART FAILURE) (HCC): ICD-10-CM

## 2020-02-26 DIAGNOSIS — I48.0 PAF (PAROXYSMAL ATRIAL FIBRILLATION) (HCC): ICD-10-CM

## 2020-02-26 DIAGNOSIS — I50.32 CHRONIC HEART FAILURE WITH PRESERVED EJECTION FRACTION (HCC): Primary | ICD-10-CM

## 2020-02-26 DIAGNOSIS — R07.9 CHEST PAIN, UNSPECIFIED TYPE: ICD-10-CM

## 2020-02-26 DIAGNOSIS — G47.33 OSA (OBSTRUCTIVE SLEEP APNEA): ICD-10-CM

## 2020-02-26 DIAGNOSIS — R07.89 ATYPICAL CHEST PAIN: ICD-10-CM

## 2020-02-26 LAB
ANION GAP SERPL CALC-SCNC: 5 MMOL/L (ref 0–18)
ATRIAL RATE: 78 BPM
BUN BLD-MCNC: 10 MG/DL (ref 7–18)
BUN/CREAT SERPL: 7.1 (ref 10–20)
CALCIUM BLD-MCNC: 9.5 MG/DL (ref 8.5–10.1)
CHLORIDE SERPL-SCNC: 110 MMOL/L (ref 98–112)
CO2 SERPL-SCNC: 26 MMOL/L (ref 21–32)
CREAT BLD-MCNC: 1.41 MG/DL (ref 0.55–1.02)
GLUCOSE BLD-MCNC: 128 MG/DL (ref 70–99)
NT-PROBNP SERPL-MCNC: 722 PG/ML (ref ?–125)
OSMOLALITY SERPL CALC.SUM OF ELEC: 293 MOSM/KG (ref 275–295)
P AXIS: 33 DEGREES
P-R INTERVAL: 166 MS
PATIENT FASTING Y/N/NP: NO
POTASSIUM SERPL-SCNC: 3.8 MMOL/L (ref 3.5–5.1)
Q-T INTERVAL: 424 MS
QRS DURATION: 94 MS
QTC CALCULATION (BEZET): 483 MS
R AXIS: 6 DEGREES
SODIUM SERPL-SCNC: 141 MMOL/L (ref 136–145)
T AXIS: 184 DEGREES
VENTRICULAR RATE: 78 BPM

## 2020-02-26 PROCEDURE — 99223 1ST HOSP IP/OBS HIGH 75: CPT | Performed by: INTERNAL MEDICINE

## 2020-02-26 PROCEDURE — 93306 TTE W/DOPPLER COMPLETE: CPT | Performed by: INTERNAL MEDICINE

## 2020-02-26 PROCEDURE — 99215 OFFICE O/P EST HI 40 MIN: CPT | Performed by: NURSE PRACTITIONER

## 2020-02-26 PROCEDURE — 71045 X-RAY EXAM CHEST 1 VIEW: CPT | Performed by: EMERGENCY MEDICINE

## 2020-02-26 PROCEDURE — 93970 EXTREMITY STUDY: CPT | Performed by: EMERGENCY MEDICINE

## 2020-02-26 PROCEDURE — 80048 BASIC METABOLIC PNL TOTAL CA: CPT | Performed by: NURSE PRACTITIONER

## 2020-02-26 PROCEDURE — 36415 COLL VENOUS BLD VENIPUNCTURE: CPT | Performed by: NURSE PRACTITIONER

## 2020-02-26 PROCEDURE — 5A09357 ASSISTANCE WITH RESPIRATORY VENTILATION, LESS THAN 24 CONSECUTIVE HOURS, CONTINUOUS POSITIVE AIRWAY PRESSURE: ICD-10-PCS | Performed by: INTERNAL MEDICINE

## 2020-02-26 PROCEDURE — 99223 1ST HOSP IP/OBS HIGH 75: CPT | Performed by: HOSPITALIST

## 2020-02-26 PROCEDURE — 83880 ASSAY OF NATRIURETIC PEPTIDE: CPT | Performed by: NURSE PRACTITIONER

## 2020-02-26 PROCEDURE — 80053 COMPREHEN METABOLIC PANEL: CPT | Performed by: NURSE PRACTITIONER

## 2020-02-26 RX ORDER — NITROGLYCERIN 20 MG/100ML
INJECTION INTRAVENOUS CONTINUOUS
Status: DISCONTINUED | OUTPATIENT
Start: 2020-02-26 | End: 2020-02-27

## 2020-02-26 RX ORDER — MORPHINE SULFATE 4 MG/ML
2 INJECTION, SOLUTION INTRAMUSCULAR; INTRAVENOUS ONCE
Status: COMPLETED | OUTPATIENT
Start: 2020-02-26 | End: 2020-02-26

## 2020-02-26 RX ORDER — ONDANSETRON 2 MG/ML
4 INJECTION INTRAMUSCULAR; INTRAVENOUS ONCE
Status: COMPLETED | OUTPATIENT
Start: 2020-02-26 | End: 2020-02-26

## 2020-02-26 RX ORDER — ATORVASTATIN CALCIUM 40 MG/1
40 TABLET, FILM COATED ORAL NIGHTLY
Status: DISCONTINUED | OUTPATIENT
Start: 2020-02-26 | End: 2020-03-07

## 2020-02-26 RX ORDER — PANTOPRAZOLE SODIUM 40 MG/1
40 TABLET, DELAYED RELEASE ORAL
Status: DISCONTINUED | OUTPATIENT
Start: 2020-02-27 | End: 2020-02-26

## 2020-02-26 RX ORDER — HYDRALAZINE HYDROCHLORIDE 100 MG/1
100 TABLET, FILM COATED ORAL 3 TIMES DAILY
COMMUNITY
End: 2020-02-26

## 2020-02-26 RX ORDER — ONDANSETRON 2 MG/ML
INJECTION INTRAMUSCULAR; INTRAVENOUS
Status: DISPENSED
Start: 2020-02-26 | End: 2020-02-26

## 2020-02-26 RX ORDER — HYDRALAZINE HYDROCHLORIDE 25 MG/1
75 TABLET, FILM COATED ORAL 3 TIMES DAILY
Status: ON HOLD | COMMUNITY
End: 2020-03-24

## 2020-02-26 RX ORDER — ACETAMINOPHEN 325 MG/1
650 TABLET ORAL EVERY 6 HOURS PRN
Status: DISCONTINUED | OUTPATIENT
Start: 2020-02-26 | End: 2020-03-24

## 2020-02-26 RX ORDER — ASPIRIN 81 MG/1
324 TABLET, CHEWABLE ORAL ONCE
Status: COMPLETED | OUTPATIENT
Start: 2020-02-26 | End: 2020-02-26

## 2020-02-26 RX ORDER — TORSEMIDE 20 MG/1
20 TABLET ORAL DAILY
Status: DISCONTINUED | OUTPATIENT
Start: 2020-02-26 | End: 2020-02-29

## 2020-02-26 RX ORDER — ASPIRIN 81 MG/1
81 TABLET, CHEWABLE ORAL DAILY
Status: DISCONTINUED | OUTPATIENT
Start: 2020-02-27 | End: 2020-02-28

## 2020-02-26 RX ORDER — METOCLOPRAMIDE HYDROCHLORIDE 5 MG/ML
10 INJECTION INTRAMUSCULAR; INTRAVENOUS EVERY 8 HOURS PRN
Status: DISCONTINUED | OUTPATIENT
Start: 2020-02-26 | End: 2020-02-27

## 2020-02-26 RX ORDER — ALBUTEROL SULFATE 90 UG/1
2 AEROSOL, METERED RESPIRATORY (INHALATION) EVERY 6 HOURS PRN
Status: DISCONTINUED | OUTPATIENT
Start: 2020-02-26 | End: 2020-03-24

## 2020-02-26 RX ORDER — PANTOPRAZOLE SODIUM 40 MG/1
40 TABLET, DELAYED RELEASE ORAL
Status: DISCONTINUED | OUTPATIENT
Start: 2020-02-26 | End: 2020-03-02

## 2020-02-26 RX ORDER — ONDANSETRON 2 MG/ML
4 INJECTION INTRAMUSCULAR; INTRAVENOUS EVERY 6 HOURS PRN
Status: DISCONTINUED | OUTPATIENT
Start: 2020-02-26 | End: 2020-03-24

## 2020-02-26 RX ORDER — MORPHINE SULFATE 4 MG/ML
4 INJECTION, SOLUTION INTRAMUSCULAR; INTRAVENOUS ONCE
Status: COMPLETED | OUTPATIENT
Start: 2020-02-26 | End: 2020-02-26

## 2020-02-26 RX ORDER — HYDRALAZINE HYDROCHLORIDE 20 MG/ML
10 INJECTION INTRAMUSCULAR; INTRAVENOUS ONCE
Status: COMPLETED | OUTPATIENT
Start: 2020-02-26 | End: 2020-02-26

## 2020-02-26 RX ORDER — LISINOPRIL 10 MG/1
10 TABLET ORAL DAILY
Status: DISCONTINUED | OUTPATIENT
Start: 2020-02-27 | End: 2020-02-28

## 2020-02-26 RX ORDER — MORPHINE SULFATE 4 MG/ML
INJECTION, SOLUTION INTRAMUSCULAR; INTRAVENOUS
Status: DISPENSED
Start: 2020-02-26 | End: 2020-02-26

## 2020-02-26 RX ADMIN — HYDRALAZINE HYDROCHLORIDE 10 MG: 20 INJECTION INTRAMUSCULAR; INTRAVENOUS at 09:00:00

## 2020-02-26 NOTE — PROGRESS NOTES
[facility]  Heart Failure Clinic Progress Note    Dayanara Parikh is a 77year old female who presents to clinic for APN assessment and management of chronic  HFpEF and is functional class 2.        Subjective:  Saran Levin returns for routine assessment of Latest Ref Range: 0 - 18 mmol/L 5   CALCULATED OSMOLALITY Latest Ref Range: 275 - 295 mOsm/kg 293   pro-BETA NATRIURETIC PEPTIDE Latest Ref Range: <125 pg/mL 722 (H)          No current facility-administered medications on file prior to encounter.    antonieta edema  Neuro:             A&O x 3, FLORES  Skin:                Pink, warm, dry    Education:  Patient instructed regarding sodium restricted diet, low sodium foods, fluid restriction, daily weights, medication regimen, s/s HF exacerbation and when to call AP

## 2020-02-26 NOTE — PROGRESS NOTES
RN [facility] the [facility]. Patient assessed. Patient immediately stated that she has 7/10 stabbing chest pain that started at 0730 this morning while the patient was brushing her teeth. The pain does not radiate.  The patient felt

## 2020-02-26 NOTE — H&P
[facility] H[facility]PITAL[facility]  History and Physical     [place] Sam Patient Status:  Emergency    1950 MRN [id number]   Location [address] [facility] Attending Zev Kumar MD   Hosp Day # 0 PCP Aranza Singh MD     Chief Complaint: • REMOVAL GALLBLADDER     • TOTAL ABDOM HYSTERECTOMY         Social History:  reports that she quit smoking about 2 years ago. Her smoking use included cigarettes. She started smoking about 46 years ago. She has a 4.30 pack-year smoking history.  She has total) by mouth nightly., Disp: 30 tablet, Rfl: 5        Review of Systems:   A comprehensive 14 point review of systems was completed. Pertinent positives and negatives noted in the HPI.     Physical Exam:    BP (!) 137/94 (BP Location: Right arm)   Pul EF  1. Torsemide  2. Spironolactone  6. Paroxysmal atrial fibrillation on DOAC  1. DOAC  2. Telemetry  7. Chronic kidney disease  1. Monitor UOP, creatinine and electrolytes  8. COPD  1. BD  9. EITAN  1.  EITAN protocol    Quality:  · DVT Prophylaxis: DOAC  · C

## 2020-02-26 NOTE — PLAN OF CARE
Received patient from ED NTG drip at 10 mcg/min. Complaints of midsternal pain. Echo called. Dr. Cheryl Mercer updated stat troponin ordered and 2 mg IVP morphine. NTG increased to 15 mcg/min. Troponin called to Dr. Cheryl Mercer. Orders for protonix IV.   Patient up

## 2020-02-26 NOTE — ED PROVIDER NOTES
Patient Seen in: [facility] 6ne-a      History   Patient presents with:  Chest Pain Angina    Stated Complaint: Chest pain    HPI    Patient is a pleasant 44-year-old woman referred in from the heart failure clinic for chest pain.   Chest pain is a s Years since quittin.5      Smokeless tobacco: Never Used      Tobacco comment: off and on smoker    Alcohol use: No    Drug use: No             Review of Systems    Positive for stated complaint: Chest pain  Other systems are as noted in HPI.   Constitu other components within normal limits   TROPONIN I - Normal   PROTHROMBIN TIME (PT) - Normal   MYOCARDIAL PROFILE - Normal   TROPONIN I - Normal   RAINBOW DRAW BLUE   RAINBOW DRAW LAVENDER   RAINBOW DRAW LIGHT GREEN   RAINBOW DRAW GOLD   MRSA CULTURE ONLY

## 2020-02-26 NOTE — ED INITIAL ASSESSMENT (HPI)
A/o x3, chest pain that began this morning accompanied by sweats, denies dizziness or blurry visison or CHRISTOPHER

## 2020-02-26 NOTE — ED NOTES
Nitro drip started at 1042 once us iv established, chart showing nitro not given mar shows given and handoff

## 2020-02-26 NOTE — CONSULTS
[facility]  Report of Consultation    Keegan Huntley Patient Status:  Emergency    1950 MRN [id number]   Location [address] [facility] Attending Cheyanne Rojo MD   Hosp Day # 0 PCP Lynn Hardy MD     Reason for [facility] REMOVAL GALLBLADDER     • TOTAL ABDOM HYSTERECTOMY       Family History   Problem Relation Age of Onset   • Cancer Father         back   • Cancer Maternal Grandmother         breast   • Other (stroke) Brother 61      reports that she quit smoking about 2 y PTT 24.6 02/26/2020    INR 1.01 02/26/2020    PTP 13.7 02/26/2020    DDIMER 1.70 02/26/2020    TROP <0.045 02/26/2020     Assessment/Plan:  Patient Active Problem List:     Acute chest pain     NSTEMI (non-ST elevated myocardial infarction) ([facility] [address]     Co discomfort improves. This may be from malignant hypertension but with her cardiac history, will evaluate for ischemia. Of note,her last CTA revealed patent grafts in late 2019.   Will monitor closely in [address]leetka [place], MD  2/26/2020  10:57 AM

## 2020-02-26 NOTE — ED NOTES
Pt vomiting s/p iv, md aware, pt cleaned and changed, denies abdominal pain, and nausea after vomiting, skin w/d, pale in color, no changes in cp

## 2020-02-26 NOTE — ED NOTES
Pt stating pain is back to 7, dr. Roberta Monzon calling ok with giving morphine to pt, marley chew aware, not wanting ntg drip titraded anylonger will medicate with morhine, vss, afib on the moitor

## 2020-02-26 NOTE — ED NOTES
man from cardiographics calling to ask if echo to be done in er or bedside, md stating that pt can go up to floor and have echo if report can be taken asap

## 2020-02-27 ENCOUNTER — ANESTHESIA EVENT (OUTPATIENT)
Dept: ENDOSCOPY | Facility: HOSPITAL | Age: 70
DRG: 299 | End: 2020-02-27
Payer: MEDICARE

## 2020-02-27 PROCEDURE — 99232 SBSQ HOSP IP/OBS MODERATE 35: CPT | Performed by: INTERNAL MEDICINE

## 2020-02-27 PROCEDURE — 99231 SBSQ HOSP IP/OBS SF/LOW 25: CPT | Performed by: HOSPITALIST

## 2020-02-27 RX ORDER — DILTIAZEM HYDROCHLORIDE 60 MG/1
60 TABLET, FILM COATED ORAL AS NEEDED
Status: COMPLETED | OUTPATIENT
Start: 2020-02-27 | End: 2020-02-29

## 2020-02-27 RX ORDER — POTASSIUM CHLORIDE 20 MEQ/1
40 TABLET, EXTENDED RELEASE ORAL ONCE
Status: COMPLETED | OUTPATIENT
Start: 2020-02-27 | End: 2020-02-27

## 2020-02-27 RX ORDER — METOPROLOL TARTRATE 5 MG/5ML
INJECTION INTRAVENOUS
Status: COMPLETED
Start: 2020-02-27 | End: 2020-02-27

## 2020-02-27 RX ORDER — METOCLOPRAMIDE HYDROCHLORIDE 5 MG/ML
5 INJECTION INTRAMUSCULAR; INTRAVENOUS EVERY 8 HOURS PRN
Status: DISCONTINUED | OUTPATIENT
Start: 2020-02-27 | End: 2020-03-24

## 2020-02-27 RX ORDER — METOPROLOL TARTRATE 5 MG/5ML
5 INJECTION INTRAVENOUS EVERY 6 HOURS PRN
Status: DISCONTINUED | OUTPATIENT
Start: 2020-02-27 | End: 2020-03-24

## 2020-02-27 RX ORDER — DILTIAZEM HYDROCHLORIDE 5 MG/ML
INJECTION INTRAVENOUS
Status: COMPLETED
Start: 2020-02-27 | End: 2020-02-27

## 2020-02-27 RX ORDER — DILTIAZEM HYDROCHLORIDE 5 MG/ML
5 INJECTION INTRAVENOUS ONCE
Status: DISCONTINUED | OUTPATIENT
Start: 2020-02-27 | End: 2020-03-03

## 2020-02-27 NOTE — ANESTHESIA PREPROCEDURE EVALUATION
PRE-OP EVALUATION    Patient Name: Savanah Mathur    Pre-op Diagnosis: Nausea [R11.0]  Atypical chest pain [R07.89]    Procedure(s):  ESOPHAGOGASTRODUODENOSCOPY (EGD)    Surgeon(s) and Role:     * Jame Falcon MD - Primary    Pre-op vitals reviewed mg, 40 mg, Oral, QAM AC        Outpatient Medications:  aspirin 81 MG Oral Tab, Take 81 mg by mouth daily. , Disp: , Rfl:   apixaban 2.5 MG Oral Tab, Take 2.5 mg by mouth 2 (two) times daily.   , Disp: , Rfl:   hydrALAzine HCl 25 MG Oral Tab, Take 75 mg by m AMS          (-) CVA     (-) seizures                     Past Surgical History:   Procedure Laterality Date   • BRAIN SURGERY      FOR ANEURYSM   • BYPASS SURGERY  10/24/2017    cabg x 3   •      • CABG  10/2017   • CHOLECYSTECTOMY     • procedure in MAC    Plan/risks discussed with: patient                Present on Admission:  **None**

## 2020-02-27 NOTE — PROGRESS NOTES
[facility] H[facility]PITAL[facility]  Progress Note     Mavis Him Patient Status:  Inpatient    1950 MRN [id number]   [facility] 6NE-A Attending Mercedes Marroquin MD   Hosp Day # 1 PCP Dutch Galvez MD     Chief Complaint: Chest pain    S: Patient s Labs   Lab 02/26/20  0940   PTP 13.7   INR 1.01       Recent Labs   Lab 02/26/20  0940 02/26/20  1342 02/27/20  0437   TROP <0.045  <0.045 <0.045 <0.045  <0.045   CK 99  --  74            Imaging: Imaging data reviewed in Epic.     Medications:   • Potassiu that will reasonably be expected to span two midnight's based on the clinical documentation in H+P. Based on patients current state of illness, I anticipate that, after discharge, patient will require TBD.

## 2020-02-27 NOTE — CONSULTS
[facility]                       Gastroenterology [address] [facility] Gastroenterology    Lukasz Garcia Patient Status:  Inpatient    1950 MRN [id number]   [facility] 6NE-A Attending Miguelito Aviles MD   [facility] Day # 1 PCP Linette Soliz PSHx:   Past Surgical History:   Procedure Laterality Date   • BRAIN SURGERY      FOR ANEURYSM   • BYPASS SURGERY  10/24/2017    cabg x 3   •      • CABG  10/2017   • CHOLECYSTECTOMY     • HEART CORONARY ARTERY BYPASS GRAFT N/A 10/24/2 Intravenous, QAM AC    Or  Pantoprazole Sodium (PROTONIX) EC tab 40 mg, 40 mg, Oral, QAM AC      Allergies:   Amlodipine              RASH, ITCHING, SWELLING  Radiology Contrast *    NAUSEA AND VOMITING    Comment:PT HAD VOMITING  SocHx: Quit smoking 2 yrs anicteric  Neck:  Supple without nuchal rigidity  CV: Regular rate and rhythm, with normal S1 and S2  Resp: Diminished breath sounds bilaterally without wheezes; rubs, rhonchi, or rales  Abdomen: Obese, soft, mild LUQ tenderness with moderate palpation, no after midnight  3. Protonix 40 mg daily   4. Pain management per PCP recommendations; antiemetics as needed    5. Add iron studies to blood in lab; CBC, BMP, Mg in AM  Thank you for the consultation, we will follow the patient with you.   SOPHIA Iniguez

## 2020-02-27 NOTE — PROGRESS NOTES
[facility]  Cardiology Critical Care Progress Note    Venkat Loo Patient Status:  Inpatient    1950 MRN [id number]   [facility] 6NE-A Attending Isabella Garcia MD   Hosp Day # 1 PCP Nancy Miller MD       Subjective:  No recu within the right mid lung.   No focal consolidation, pleural effusion, or   pneumothora    LE dplrs- no dvt    Medications:    • Potassium Chloride ER  40 mEq Oral Once   • iron sucrose  200 mg Intravenous Once   • apixaban  2.5 mg Oral BID   • atorvastatin

## 2020-02-27 NOTE — PLAN OF CARE
Assumed pt care around 1430. Assessment unchanged from am. Pt c/o constant CP under L breast, 6/10 nothing exacerbates or relieves pain. Incontinent of bladder, up w/ SBA. Pt education provided on medication and POC. Pt verbalized understanding.  All needs n/v  -protonix      - See additional Care Plan goals for specific interventions    Outcome: Progressing

## 2020-02-27 NOTE — PLAN OF CARE
Received patient at 0730 awake and alert. Complaints of mild pain under left breast.  No chest pain. D/C ntg drip. Up to bathroom with one person assist.  Patient flat affect. Incontinent of urine brief present. Updated  of plan of care.   Called

## 2020-02-27 NOTE — RESPIRATORY THERAPY NOTE
EITAN - Equipment Use Daily Summary:  · Set Mode   · Usage in hours: 7.5  · 90% Pressure (EPAP) level:   · 90% Insp Pressure (IPAP): 10.5  · AHI: 16  · Supplemental Oxygen:  · Comments:

## 2020-02-27 NOTE — PROGRESS NOTES
2000- received pt alert and orientated. Vss. ntg infusing as ordered. Will titrate accordingly to blood pressure. Pt does c/o some discomfort under her left breast, no apparent distress noted.

## 2020-02-28 ENCOUNTER — ANESTHESIA (OUTPATIENT)
Dept: ENDOSCOPY | Facility: HOSPITAL | Age: 70
DRG: 299 | End: 2020-02-28
Payer: MEDICARE

## 2020-02-28 PROCEDURE — 99233 SBSQ HOSP IP/OBS HIGH 50: CPT | Performed by: INTERNAL MEDICINE

## 2020-02-28 PROCEDURE — 99231 SBSQ HOSP IP/OBS SF/LOW 25: CPT | Performed by: HOSPITALIST

## 2020-02-28 PROCEDURE — 0DB58ZX EXCISION OF ESOPHAGUS, VIA NATURAL OR ARTIFICIAL OPENING ENDOSCOPIC, DIAGNOSTIC: ICD-10-PCS | Performed by: STUDENT IN AN ORGANIZED HEALTH CARE EDUCATION/TRAINING PROGRAM

## 2020-02-28 RX ORDER — SODIUM CHLORIDE 9 MG/ML
INJECTION, SOLUTION INTRAVENOUS CONTINUOUS PRN
Status: DISCONTINUED | OUTPATIENT
Start: 2020-02-28 | End: 2020-02-28 | Stop reason: SURG

## 2020-02-28 RX ORDER — LIDOCAINE HYDROCHLORIDE 10 MG/ML
INJECTION, SOLUTION EPIDURAL; INFILTRATION; INTRACAUDAL; PERINEURAL AS NEEDED
Status: DISCONTINUED | OUTPATIENT
Start: 2020-02-28 | End: 2020-02-28 | Stop reason: SURG

## 2020-02-28 RX ADMIN — LIDOCAINE HYDROCHLORIDE 50 MG: 10 INJECTION, SOLUTION EPIDURAL; INFILTRATION; INTRACAUDAL; PERINEURAL at 12:09:00

## 2020-02-28 RX ADMIN — SODIUM CHLORIDE: 9 INJECTION, SOLUTION INTRAVENOUS at 12:09:00

## 2020-02-28 RX ADMIN — SODIUM CHLORIDE: 9 INJECTION, SOLUTION INTRAVENOUS at 12:33:00

## 2020-02-28 NOTE — PROGRESS NOTES
[facility] H[facility]PITALIST  Progress Note     [facility] Patient Status:  Inpatient    1950 MRN [id number]   [facility] 6NE-A Attending Cholo Wells MD   Hosp Day # 2 PCP Sherman Ramirez MD     Chief Complaint: Chest pain    S: Patient s 02/26/20  0940 02/26/20  1342 02/27/20  0437   TROP <0.045  <0.045 <0.045 <0.045  <0.045   CK 99  --  74            Imaging: Imaging data reviewed in Epic.     Medications:   • amiodarone (CORDARONE) IV bolus  300 mg Intravenous Once   • apixaban  5 mg Oral

## 2020-02-28 NOTE — PROGRESS NOTES
[facility]  Cardiology Progress Note    Maged Graf Patient Status:  Inpatient    1950 MRN [id number]   [facility] 2NE-A Attending Sasha Rodríguez MD   Hosp Day # 2 PCP Tori Rowland MD     Subjective:  States no more chest pa Tartrate  75 mg Oral 2x Daily(Beta Blocker)   • torsemide  20 mg Oral Daily   • spironolactone  37.5 mg Oral Daily   • lidocaine-menthol  1 patch Transdermal Daily   • pantoprazole (PROTONIX) IV push  40 mg Intravenous QAM AC    Or   • Pantoprazole Sodium 60kg her dose should be 5 mg BID  - will give amiodarone 300 mg IV x 1 for attempted cardioversion, but will not start drip as currently do not know when we can resume anticoagulation  - continue rate control as well    2) CAD  - s/p CABG  - stop aspirin,

## 2020-02-28 NOTE — PLAN OF CARE
Received pt a/ox4, RA at 0700  Pt denies any pain at this time  Afib on tele, cardizem gtt at 5 ml, discontinued at 1530, amiodarone bolus given, pt converted and EKG completed  Pt had EGD today  Pt updated on plan of care and will continue to monitor    P

## 2020-02-28 NOTE — ANESTHESIA POSTPROCEDURE EVALUATION
1004 JULIANNA Cuevas Patient Status:  Inpatient   Age/Gender 77year old female MRN [id number]   Location [address] [place] [address]. [facility] [address] [facility] Se Day # 2 PCP Duran Molina MD       Anesthesia Post-op Note    Procedure(s):

## 2020-02-28 NOTE — PLAN OF CARE
Patient alert x3 with flat affect, on room air , during sllep cpap adjusted well ,denies any pain / distress, incontinence of bladder ,assisted  Walk to  brp , very weak , puirewick applied   After midnight ,250ml clear urin , npo maintained for Endoscopy See additional Care Plan goals for specific interventions    Outcome: Progressing

## 2020-02-28 NOTE — PAYOR COMM NOTE
--------------  CONTINUED STAY REVIEW    Danna Ohfernando [facility]  Subscriber #:  [id number]  Authorization Number: [id number]    Admit date: 2/26/20  Admit time: 26    Admitting Physician: Manuel Duque MD  Attending Physician:  Jazmine Vee MD  Primary C 1. Monitor UOP, creatinine and electrolytes  8. COPD  1. BD  9. Anemia  1. Check iron, ferritin - added  10. EITAN  1.  EITAN protocol   Quality:  · DVT Prophylaxis: DOAC  · CODE status: Full  · Rangel: No  · Central line: No  Will the patient be referred to TCC /88 (BP Location: Left arm)   Pulse 83   Temp 100.2 °F (37.9 °C) (Temporal)   Resp 19   Ht 5' 3\" (1.6 m)   Wt 160 lb (72.6 kg)   SpO2 100%   BMI 28.34 kg/m²   Impression:   1.   Atypical chest pain which progressed to LUQ pain: Cardiology evaluation Start: 02/26/20 0935 End: 02/26/20 9195        aspirin chewable tab 81 mg   Dose: 81 mg  Freq: Daily Route: OR  Start: 02/27/20 0930    1001-Given              (0930)-Not Given [C]                atorvastatin (LIPITOR) tab 40 mg   Dose: 40 mg  Freq: Nightl 1652-Given            0644-Given   1800              morphINE sulfate (PF) 4 MG/ML injection 2 mg   Dose: 2 mg  Freq: Once Route: IV  Start: 02/26/20 1430 End: 02/26/20 1425        morphINE sulfate (PF) 4 MG/ML injection 4 mg   Dose: 4 mg  Freq:  Once Rout 0700-Rate/Dose Verify          nitroGLYCERIN infusion 50mg in D5W 250ml   Rate: 1.5-120 mL/hr Dose: 5-400 mcg/min  Freq: Continuous Route: IV  Last Dose: Stopped (02/27/20 0900)  Start: 02/26/20 0942 End: 02/27/20 1112    0000-Rate/Dose Verify   0400-Rate

## 2020-02-28 NOTE — OPERATIVE REPORT
EGD operative report  Patient Name: Sonia Bryant  Procedure: Esophagogastroduodenoscopy with biopsy   Indication: atypical chest pain  Attending: Nidhi Chapman M.D. Consent:  The risks, benefits, and alternatives were discussed with the patient / POA. 1. Await pathology  2. Since patient is pain free, OK for discharge  3.  Will follow-up as outpatient with XR esophagram to evaluate possible extrinsic impression/compression    Yuly Soares MD

## 2020-02-28 NOTE — RESPIRATORY THERAPY NOTE
EITAN Equipment Usage Summary :            Set Mode :AUTO CPAP W FLEX          Usage in Hours:5;36          90% Pressure (EPAP) : 9           90% Insp Pressure (IPAP);           AHI : 7.5          Supplemental Oxygen LPM :          Auto cpap ( MAX PRESSURE )

## 2020-02-29 ENCOUNTER — APPOINTMENT (OUTPATIENT)
Dept: GENERAL RADIOLOGY | Facility: HOSPITAL | Age: 70
DRG: 299 | End: 2020-02-29
Attending: STUDENT IN AN ORGANIZED HEALTH CARE EDUCATION/TRAINING PROGRAM
Payer: MEDICARE

## 2020-02-29 ENCOUNTER — APPOINTMENT (OUTPATIENT)
Dept: GENERAL RADIOLOGY | Facility: HOSPITAL | Age: 70
DRG: 299 | End: 2020-02-29
Attending: HOSPITALIST
Payer: MEDICARE

## 2020-02-29 PROBLEM — Z99.89 OSA ON CPAP: Status: ACTIVE | Noted: 2020-02-29

## 2020-02-29 PROBLEM — G47.33 OSA ON CPAP: Status: ACTIVE | Noted: 2020-02-29

## 2020-02-29 PROCEDURE — 71045 X-RAY EXAM CHEST 1 VIEW: CPT | Performed by: HOSPITALIST

## 2020-02-29 PROCEDURE — 74246 X-RAY XM UPR GI TRC 2CNTRST: CPT | Performed by: STUDENT IN AN ORGANIZED HEALTH CARE EDUCATION/TRAINING PROGRAM

## 2020-02-29 PROCEDURE — 99233 SBSQ HOSP IP/OBS HIGH 50: CPT | Performed by: HOSPITALIST

## 2020-02-29 PROCEDURE — 99232 SBSQ HOSP IP/OBS MODERATE 35: CPT | Performed by: CLINICAL NURSE SPECIALIST

## 2020-02-29 RX ORDER — DILTIAZEM HYDROCHLORIDE 5 MG/ML
10 INJECTION INTRAVENOUS
Status: DISCONTINUED | OUTPATIENT
Start: 2020-02-29 | End: 2020-03-24

## 2020-02-29 RX ORDER — DILTIAZEM HYDROCHLORIDE 5 MG/ML
5 INJECTION INTRAVENOUS ONCE
Status: COMPLETED | OUTPATIENT
Start: 2020-02-29 | End: 2020-02-29

## 2020-02-29 RX ORDER — POTASSIUM CHLORIDE 20 MEQ/1
40 TABLET, EXTENDED RELEASE ORAL EVERY 4 HOURS
Status: COMPLETED | OUTPATIENT
Start: 2020-02-29 | End: 2020-02-29

## 2020-02-29 NOTE — PROGRESS NOTES
[facility] Cardiology Progress Note    Subjective:  No problems overnight, no pain this am, hoping to go home.      Objective:  /72 (BP Location: Right arm)   Pulse 110   Temp 98.8 °F (37.1 °C) (Oral)   Resp 18   Ht 160 cm (5' 3\")   Wt 175 lb 11.3 oz (7 anytime  · Follows long term with Dr. Jag Oseguera, follow with  Dr. Cierra Escalera for PAF.            Jorge Luis Lee, APRN  2/29/2020  7:29 AM

## 2020-02-29 NOTE — PROGRESS NOTES
[facility] HOSPITALIST  Progress Note     Jose Carroll Patient Status:  Inpatient    1950 MRN [id number]   [facility] 6NE-A Attending Jeffery Damico MD   Hosp Day # 3 PCP Dallin Beltre MD     Chief Complaint: Chest pain    S: Patient d Estimated Creatinine Clearance: 27 mL/min (A) (based on SCr of 1.65 mg/dL (H)).     Recent Labs   Lab 02/26/20  0940   PTP 13.7   INR 1.01       Recent Labs   Lab 02/26/20  0940 02/26/20  1342 02/27/20  0437   TROP <0.045  <0.045 <0.045 <0.045  <0.045

## 2020-02-29 NOTE — PLAN OF CARE
ALERT AND ORIENTED X4 ON TELE MONITOR HR 80'S SINUS RHYTHM. CPAP AT HS IN USED. DENIES ANY PAIN. UPDATED W/ POC AND VERBALIZED UNDERSTANDING. ALL NEEDS ATTENDED AND WILL CONTINUE TO MONITOR. CALL LIGHT WITHIN REACH.   Problem: CARDIOVASCULAR - ADULT  Goal:

## 2020-02-29 NOTE — RESPIRATORY THERAPY NOTE
EITAN : EQUIPMENT USE: DAILY SUMMARY                                            SET MODE:AUTO CPAP WITH CFLEX                                          USAGE IN HOURS: 0:48                                          90%

## 2020-02-29 NOTE — PROGRESS NOTES
Notified Dr. Lex Marrufo of temp of 100.5 axillary and repeated oral temp of 98.9. Chest xray completed and Dr. Lex Marrufo aware. Insrtucted patient and spouse on use of incentive spirometer.  goal 1000   Pt needs encouragement.   Notified respiratory therap

## 2020-03-01 ENCOUNTER — APPOINTMENT (OUTPATIENT)
Dept: CT IMAGING | Facility: HOSPITAL | Age: 70
DRG: 299 | End: 2020-03-01
Attending: HOSPITALIST
Payer: MEDICARE

## 2020-03-01 PROCEDURE — 74176 CT ABD & PELVIS W/O CONTRAST: CPT | Performed by: HOSPITALIST

## 2020-03-01 PROCEDURE — 99233 SBSQ HOSP IP/OBS HIGH 50: CPT | Performed by: HOSPITALIST

## 2020-03-01 PROCEDURE — 99233 SBSQ HOSP IP/OBS HIGH 50: CPT | Performed by: CLINICAL NURSE SPECIALIST

## 2020-03-01 RX ORDER — SODIUM CHLORIDE 9 MG/ML
INJECTION, SOLUTION INTRAVENOUS CONTINUOUS
Status: DISCONTINUED | OUTPATIENT
Start: 2020-03-01 | End: 2020-03-02

## 2020-03-01 RX ORDER — METOPROLOL TARTRATE 100 MG/1
100 TABLET ORAL
Status: DISCONTINUED | OUTPATIENT
Start: 2020-03-01 | End: 2020-03-09

## 2020-03-01 RX ORDER — DILTIAZEM HYDROCHLORIDE 60 MG/1
60 TABLET, FILM COATED ORAL EVERY 6 HOURS SCHEDULED
Status: DISCONTINUED | OUTPATIENT
Start: 2020-03-01 | End: 2020-03-03

## 2020-03-01 NOTE — PROGRESS NOTES
[facility] Cardiology Progress Note    Subjective:  No complaints. She denies sob, but in review with RN, sob with activity and high heart rates up to 140.      Objective:  /72 (BP Location: Left arm)   Pulse (!) 142   Temp 98.3 °F (36.8 °C) (Oral)   Res heart rates  · Continue to hold torsemide  · Continue eliquis  · Check thyroid    RCUZITO Todd  3/1/2020  7:49 AM

## 2020-03-01 NOTE — PLAN OF CARE
Received pt at 1930. A&O x4; flat affect. O2 sat 95% on CPAP. Tele rhythm afib. Scheduled & PRN cardizem given overnight. Pt incontinent at times. Pt tolerating ambulation fairly well. Family at bedside. POC monitor HR, monitor temp, encourage IS use.  Disc

## 2020-03-01 NOTE — PLAN OF CARE
ALERT AND ORIENTED X4 ON TELE MONITOR 'S AFIB. CPAP AT HS IN USED. DENIES ANY PAIN. UPDATED W/ POC AND  AT BEDSIDE VERBALIZED UNDERSTANDING. ALL NEEDS ATTENDED AND WILL CONTINUE TO MONITOR. CALL LIGHT WITHIN REACH.

## 2020-03-01 NOTE — RESPIRATORY THERAPY NOTE
EITAN : EQUIPMENT USE: DAILY SUMMARY                                            SET MODE:AUTO CPAP WITH CFLEX                                          USAGE IN HOURS:5:05                                          90%

## 2020-03-01 NOTE — DIETARY NOTE
Guy Soler 23     Admitting diagnosis:  Abnormal EKG [R94.31]  Chest pain of uncertain etiology [U67.98]    Ht: 160 cm (5' 3\")  Wt: 79 kg (174 lb 2.6 oz). This is 151% of IBW  Body mass index is 30.85 kg/m².   IBW:

## 2020-03-01 NOTE — PLAN OF CARE
Pt a/ox4. RA. Lungs clear. Pt is SOB with exertion. Pt also gets SOB only with her afib exceeds a rate of 130. Flat affect. Max temp today is 98.9. RA. Lungs clear. No cough. CPAP at night. Afib. RN discussed elevated HR with ALYCIA Phillips.  THOMAS Connor

## 2020-03-01 NOTE — PROGRESS NOTES
[facility] HOSPITALIST  Progress Note     Marielle Don Patient Status:  Inpatient    1950 MRN [id number]   [facility] 6NE-A Attending Kati Hernandez MD   [facility] Day # 4 PCP Rylan Hutton MD     Chief Complaint: Chest pain    S: Patient c ALT 11*  --   --   --   --    BILT 0.5  --   --   --   --    TP 7.7  --   --   --   --     < > = values in this interval not displayed. Estimated Creatinine Clearance: 28.9 mL/min (A) (based on SCr of 1.54 mg/dL (H)).     Recent Labs   Lab 02/26/20 Prophylaxis: SCDs  · CODE status: Full  · Rangel: No  · Central line: No    Will the patient be referred to TCC on discharge?: No  Estimated date of discharge: TBD? Discharge is dependent on: Clinical course  At this point Ms. Lucía Sterling is expected to be dis

## 2020-03-01 NOTE — PROGRESS NOTES
RN received the patient at 1700. Pt a/ox4. RA. Afib RVR in the 130s on tele which was making the patient shortly SOB. RN gave the patient her PO BB and Cardizem which significantly improved the patient's HR and breathing. RA. Lungs clear. No cough.  Pt yadira

## 2020-03-02 ENCOUNTER — APPOINTMENT (OUTPATIENT)
Dept: ULTRASOUND IMAGING | Facility: HOSPITAL | Age: 70
DRG: 299 | End: 2020-03-02
Attending: INTERNAL MEDICINE
Payer: MEDICARE

## 2020-03-02 PROCEDURE — 99233 SBSQ HOSP IP/OBS HIGH 50: CPT | Performed by: HOSPITALIST

## 2020-03-02 PROCEDURE — 99232 SBSQ HOSP IP/OBS MODERATE 35: CPT | Performed by: INTERNAL MEDICINE

## 2020-03-02 PROCEDURE — 76700 US EXAM ABDOM COMPLETE: CPT | Performed by: INTERNAL MEDICINE

## 2020-03-02 RX ORDER — SPIRONOLACTONE 25 MG/1
25 TABLET ORAL DAILY
Status: DISCONTINUED | OUTPATIENT
Start: 2020-03-02 | End: 2020-03-05

## 2020-03-02 RX ORDER — DIGOXIN 0.25 MG/ML
250 INJECTION INTRAMUSCULAR; INTRAVENOUS ONCE
Status: COMPLETED | OUTPATIENT
Start: 2020-03-02 | End: 2020-03-02

## 2020-03-02 RX ORDER — LISINOPRIL 10 MG/1
10 TABLET ORAL DAILY
Status: DISCONTINUED | OUTPATIENT
Start: 2020-03-02 | End: 2020-03-06

## 2020-03-02 RX ORDER — TORSEMIDE 20 MG/1
20 TABLET ORAL DAILY
Status: DISCONTINUED | OUTPATIENT
Start: 2020-03-02 | End: 2020-03-05

## 2020-03-02 RX ORDER — CYCLOBENZAPRINE HCL 5 MG
5 TABLET ORAL 3 TIMES DAILY PRN
Status: DISCONTINUED | OUTPATIENT
Start: 2020-03-02 | End: 2020-03-07

## 2020-03-02 NOTE — RESPIRATORY THERAPY NOTE
EITAN - Equipment Use Daily Summary:                  . Set Mode: AUTO CPAP WITH C-FLEX                . Usage in hours: 0:14                . 90% Pressure (EPAP) level: 6.0                . 90% Insp. Pressure (IPAP): Bryant Cornell  AHI: 20.1

## 2020-03-02 NOTE — CONSULTS
INFECTIOUS DISEASE CONSULTATION    Christina Merlin Patient Status:  Inpatient    1950 MRN [id number]   [facility] 2NE-A Attending Bryant Matt MD   [facility] Day # 5 PCP Genesis Bermudez, [facility] tobacco. She reports that she does not drink alcohol or use drugs.     Allergies:    Amlodipine              RASH, ITCHING, SWELLING    Comment:Patient has received & tolerated diltiazem in past  Radiology Contrast *    NAUSEA AND VOMITING    Comment:PT HAD tablets (37.5 mg total) by mouth daily. Take 30 minutes prior to Torsemide, Disp: 30 tablet, Rfl: 2  torsemide 20 MG Oral Tab, Take 1 tablet (20 mg total) by mouth daily. , Disp: 30 tablet, Rfl: 1  Albuterol Sulfate  (90 Base) MCG/ACT Inhalation Aero 02/29/20  0612 03/01/20  0558 03/02/20  0645   *   < > 115* 107* 115*   BUN 11   < > 20* 16 13   CREATSERUM 1.31*   < > 1.65* 1.54* 1.27*   GFRAA 48*   < > 37* 40* 50*   GFRNAA 42*   < > 32* 34* 43*   CA 9.4   < > 9.4 9.7 9.1   ALB 3.6  --   --   -- acidosis        ASSESSMENT/PLAN:  1. CAD/sp cabg chest pain ro for ischemia per cards  2. GI/sp EGD, on PPI  Can add US gallbladder for workup  3.  Elevated ESR/CRP, normal PCT, tm 99, no hemodynamic changes, off abx, workup in progress        Sly LU Pi

## 2020-03-02 NOTE — HOME CARE LIAISON
Received referral from [facility]U/Jen. Met with patient at the bedside and provided choice. Patient is agreeable to [facility]. Residential brochure provided with contact information. All questions addressed and answered.

## 2020-03-02 NOTE — PROGRESS NOTES
Gastroenterology Follow-Up Note      Sandi Mathew Patient Status:  Inpatient    1950 MRN [id number]   [facility] 2NE-A Attending Caitlyn Clayton MD   Hosp Day # 5 PCP Donnie Snowden MD     Chief Complaint/Reason for Follow Up: from gi standpoint  -ok to d/c ppi as no direct indication  -please call if develops overt gi bleeding    Distal esophageal compression  -seen on EGD/UGI study  -prior 2019 CT chest with ectatic thoracic aorta- possible cause  -no further work up at this t

## 2020-03-02 NOTE — PROGRESS NOTES
[facility]    Electrophysiology Follow Up Note      Maged Graf Patient Status:  Inpatient    1950 MRN [id number]   [facility] 2NE-A Attending Sasha Rodríguez MD   Hosp Day # 5 PCP Tori Rowland MD     Reason f Sodium (PROTONIX) 40 mg in Sodium Chloride 0.9 % 10 mL IV push, 40 mg, Intravenous, QAM AC **OR** Pantoprazole Sodium (PROTONIX) EC tab 40 mg, 40 mg, Oral, QAM AC      Physical Exam:  Blood pressure (!) 151/92, pulse 107, temperature 98.8 °F (37.1 °C), tem and abdominal pain, and has also had melena by report. Due to her reported melena, anticoagulation for AF was held starting yesterday. Chelsea Izabela   1) paroxysmal atrial fibrillation and atrial flutter  - currently atrial fibrillation  - continue diltiazem 60 mg PO

## 2020-03-02 NOTE — CM/SW NOTE
Care Progression Note:  Active Acute Medical Issue:   chest pain, afib with RVR, ?melena    Other Contributing Medical Factors/Dx.:   Hx CAD/CABG, chronic heart failure w/ preserved EF, CAF, COPD,anemia,EITAN    Length of stay: 5  Avoidable Delays: none  Dis

## 2020-03-02 NOTE — PLAN OF CARE
Received pt at 1930. A&O x4; flat affect. Tele rhythm afib. O2 sat 97% on RA. Pt denies c/o chest pain or SOB. Pt incontinent at times. Pt c/o sharp pain in her neck - tylenol given. Pt reassessed - pt denies pain. Will continue to monitor.  Pt tolerating a

## 2020-03-02 NOTE — PLAN OF CARE
Alert & oriented x4. A. Fib on tele (rate controlled at rest & with ambulation). DOAC currently on hold. Denies chest pain & SOB. Does verbalize left neck pain. Declines pain meds & warm pack.  Pt states that the pain has been there intermittently since her specific interventions    Outcome: Progressing

## 2020-03-02 NOTE — PROGRESS NOTES
[facility]PITAL[facility]  Progress Note     Vipul Box Patient Status:  Inpatient    1950 MRN [id number]   [facility] 6NE-A Attending Sarita Nguyen MD   Hosp Day # 5 PCP Kelsey Reyes MD     Chief Complaint: Chest pain    S: Patient c 139   K 4.1   < > 3.6 4.2 4.0      < > 106 107 111   CO2 22.0   < > 26.0 23.0 21.0   ALKPHO 79  --   --   --  57   AST 17  --   --   --  14*   ALT 11*  --   --   --  12*   BILT 0.5  --   --   --  1.1   TP 7.7  --   --   --  7.0    < > = values in thi SCDs  · CODE status: Full  · Rangel: No  · Central line: No    Will the patient be referred to TCC on discharge?: No  Estimated date of discharge: TBD? Discharge is dependent on: Clinical course  At this point Ms. Ferro Samples is expected to be discharge to:  [place]on Cote

## 2020-03-03 PROCEDURE — 99232 SBSQ HOSP IP/OBS MODERATE 35: CPT | Performed by: HOSPITALIST

## 2020-03-03 PROCEDURE — 99233 SBSQ HOSP IP/OBS HIGH 50: CPT | Performed by: INTERNAL MEDICINE

## 2020-03-03 RX ORDER — DILTIAZEM HYDROCHLORIDE 240 MG/1
240 CAPSULE, COATED, EXTENDED RELEASE ORAL DAILY
Status: DISCONTINUED | OUTPATIENT
Start: 2020-03-03 | End: 2020-03-04

## 2020-03-03 RX ORDER — POTASSIUM CHLORIDE 20 MEQ/1
40 TABLET, EXTENDED RELEASE ORAL EVERY 4 HOURS
Status: COMPLETED | OUTPATIENT
Start: 2020-03-03 | End: 2020-03-03

## 2020-03-03 RX ORDER — SPIRONOLACTONE 25 MG/1
25 TABLET ORAL DAILY
Refills: 0 | Status: SHIPPED | COMMUNITY
Start: 2020-03-04 | End: 2020-03-24

## 2020-03-03 RX ORDER — DIGOXIN 0.25 MG/ML
250 INJECTION INTRAMUSCULAR; INTRAVENOUS ONCE
Status: DISCONTINUED | OUTPATIENT
Start: 2020-03-03 | End: 2020-03-03

## 2020-03-03 RX ORDER — DILTIAZEM HYDROCHLORIDE 240 MG/1
240 CAPSULE, COATED, EXTENDED RELEASE ORAL DAILY
Qty: 30 CAPSULE | Refills: 3 | Status: SHIPPED | OUTPATIENT
Start: 2020-03-04 | End: 2020-03-24

## 2020-03-03 RX ORDER — DIGOXIN 0.25 MG/ML
250 INJECTION INTRAMUSCULAR; INTRAVENOUS ONCE
Status: COMPLETED | OUTPATIENT
Start: 2020-03-03 | End: 2020-03-03

## 2020-03-03 RX ORDER — METOPROLOL TARTRATE 100 MG/1
100 TABLET ORAL
Qty: 60 TABLET | Refills: 3 | Status: SHIPPED | OUTPATIENT
Start: 2020-03-03 | End: 2020-03-24

## 2020-03-03 NOTE — PLAN OF CARE
Recommend SALIMA upon D/C.  GERRY 11-14 days  Problem: Impaired Functional Mobility  Goal: Achieve highest/safest level of mobility/gait  Description  Interventions:  - Assess patient's functional ability and stability  - Promote increasing activity/tolerance f

## 2020-03-03 NOTE — DISCHARGE SUMMARY
[facility] HOSPITALIST  DISCHARGE SUMMARY     Marlin Cervantes Patient Status:  Inpatient    1950 MRN [id number]   [facility] 2NE-A Attending Army Fransisca MD   [address] Day # 6 PCP Jada Hightower MD     Date of Admission: 2020  Date of D work-up negative. ID consulted. Overall course unremarkable except for new onset AF - managed with AV nodals and DOAC. Home today. Lace+ Score: 49  59-90 High Risk  29-58 Medium Risk  0-28   Low Risk         TCM Follow-Up Recommendation:  LACE > 58:  Hig 250-50 MCG/DOSE Aepb  Commonly known as:  Advair Diskus      Inhale 1 puff into the lungs 2 (two) times daily. Quantity:  3 each  Refills:  3     hydrALAzine HCl 25 MG Tabs  Commonly known as:  APRESOLINE      Take 75 mg by mouth 3 (three) times daily. the staircase, and take the elevator to 2nd floor. The clinic is about 12 feet to the right, just inside the entrance to the hospital floor. It's across from the main desk you see as soon as you enter into the unit. Location Instructions:       Your ap

## 2020-03-03 NOTE — PROGRESS NOTES
[facility]  Cardiology Progress Note    Subjective:  No chest pain or shortness of breath. Afib rates improving slowly. Seems somewhat slower to respond today, but  states this happens sometimes when she is hospitalized. Afib rates improved. irregular  Lungs: Clear with normal excursion and effort. Abdomen: Soft, non-tender. Extremities: Without clubbing, cyanosis or edema. Peripheral pulses are 2+. Skin: Warm and dry.      • Potassium Chloride ER  40 mEq Oral Q4H   • spironolactone  25 mg reviewed. She is a 77year old woman admitted from CHF clinic. She has paroxysmal atrial fibrillation and atrial flutter. This hospitalization she has had intermittent RVR as well.  She was admitted with chest pain and abdominal pain, and has also had melen

## 2020-03-03 NOTE — PLAN OF CARE
AOx4. Quiet, Slow to respond. MD aware. Pt  states this is normal. Calm, cooperative. Up with standby assist and walker. Afib on cardiac monitor, at rest HR sustaining 80-90's, up to 130's while ambulating. D/w cardiology APN.  Pt transitioned t Problem: Patient/Family Goals  Goal: Patient/Family Long Term Goal  Description  Patient's Long Term Goal: home    Interventions:  - Follow up in heart failure clinic  - See additional Care Plan goals for specific interventions   Outcome: Progressing  Go

## 2020-03-03 NOTE — PROGRESS NOTES
03/03/20 1127   Mobility   O2 walk? Yes   SPO2 on Room Air at Rest 95   SPO2 Ambulation on Room Air 93      O2 walk. Remained above 90% O2 while ambulating.

## 2020-03-03 NOTE — RESPIRATORY THERAPY NOTE
EITAN Equipment Usage Summary :            Set Mode :AUTO CPAP W FLEX          Usage in Hours:00;18          90% Pressure (EPAP) : 6           90% Insp Pressure (IPAP);           AHI : 18.8          Supplemental Oxygen LPM :          Auto cpap ( MAX PRESSURE

## 2020-03-03 NOTE — PLAN OF CARE
Patient a&ox4, flat affect. RA. CPAP at [facility]. . Afib on tele. Denies any pain. Purewick in place. Up with walker and assist. Daily weight. Cbc and cmp in am. Patient updated on poc, questions answered. Will continue to monitor.

## 2020-03-03 NOTE — CM/SW NOTE
03/03/20 1300   CM/SW Referral Data   Referral Source Social Work (self-referral)   Reason for Referral Discharge planning   Informant Patient   Patient Info   Patient's Mental Status Alert;Oriented   Patient's [address]   Number of Levels

## 2020-03-03 NOTE — PHYSICAL THERAPY NOTE
PHYSICAL THERAPY EVALUATION - INPATIENT     Room Number: 9243/9948-E  Evaluation Date: 3/3/2020  Type of Evaluation: Initial  Physician Order: PT Eval and Treat    Presenting Problem: chest pain  Reason for Therapy: Mobility Dysfunction and Discharge No  Patient Owned Equipment: Rolling walker;Cane  Patient Regularly Uses: None    Prior Level of Holt: per pt and , she was ind with all functional mobility and ADL PTA. Pt able to negotiate stairs and all functional mobility without device. Impairment: 46.58%   Standardized Score (AM-PAC Scale): 43.63   CMS Modifier (G-Code): CK    FUNCTIONAL ABILITY STATUS  Gait Assessment   Gait Assistance: Contact guard assist  Distance (ft): 100  Assistive Device: Rolling walker  Pattern: Shuffle  Stoop/C Recommendations: Sub-acute rehabilitation(ELOS 11-14 days)    PLAN  PT Treatment Plan: Bed mobility; Endurance; Patient education; Family education;Gait training;Stoop training;Stair training;Transfer training;Balance training  Rehab Potential : Edgar Turk

## 2020-03-03 NOTE — PROGRESS NOTES
[facility]                INFECTIOUS DISEASE PROGRESS NOTE    [facility] Patient Status:  Inpatient    1950 MRN [id number]   [facility] 2NE-A Attending Prudencio Buerger, MD   Hosp Day # 6 PCP Aranza Singh MD       Subject Growth at 18-24 hrs. N/A   2.  BLOOD CULTURE     Status: None (Preliminary result)    Collection Time: 02/29/20 12:57 PM   Result Value Ref Range    Blood Culture Result No Growth 2 Days N/A       Problem list reviewed:  Patient Active Problem List:     Acu

## 2020-03-04 ENCOUNTER — APPOINTMENT (OUTPATIENT)
Dept: CT IMAGING | Facility: HOSPITAL | Age: 70
DRG: 299 | End: 2020-03-04
Attending: INTERNAL MEDICINE
Payer: MEDICARE

## 2020-03-04 ENCOUNTER — APPOINTMENT (OUTPATIENT)
Dept: CV DIAGNOSTICS | Facility: HOSPITAL | Age: 70
DRG: 299 | End: 2020-03-04
Attending: INTERNAL MEDICINE
Payer: MEDICARE

## 2020-03-04 ENCOUNTER — APPOINTMENT (OUTPATIENT)
Dept: GENERAL RADIOLOGY | Facility: HOSPITAL | Age: 70
DRG: 299 | End: 2020-03-04
Attending: HOSPITALIST
Payer: MEDICARE

## 2020-03-04 PROCEDURE — 99223 1ST HOSP IP/OBS HIGH 75: CPT | Performed by: OTHER

## 2020-03-04 PROCEDURE — 71045 X-RAY EXAM CHEST 1 VIEW: CPT | Performed by: HOSPITALIST

## 2020-03-04 PROCEDURE — 99233 SBSQ HOSP IP/OBS HIGH 50: CPT | Performed by: INTERNAL MEDICINE

## 2020-03-04 PROCEDURE — 99232 SBSQ HOSP IP/OBS MODERATE 35: CPT | Performed by: HOSPITALIST

## 2020-03-04 PROCEDURE — 70450 CT HEAD/BRAIN W/O DYE: CPT | Performed by: INTERNAL MEDICINE

## 2020-03-04 RX ORDER — MIDAZOLAM HYDROCHLORIDE 1 MG/ML
INJECTION INTRAMUSCULAR; INTRAVENOUS
Status: DISCONTINUED
Start: 2020-03-04 | End: 2020-03-04 | Stop reason: WASHOUT

## 2020-03-04 RX ORDER — DILTIAZEM HYDROCHLORIDE 180 MG/1
360 CAPSULE, EXTENDED RELEASE ORAL DAILY
Status: DISCONTINUED | OUTPATIENT
Start: 2020-03-04 | End: 2020-03-09

## 2020-03-04 RX ORDER — DILTIAZEM HYDROCHLORIDE 180 MG/1
360 CAPSULE, EXTENDED RELEASE ORAL DAILY
Status: DISCONTINUED | OUTPATIENT
Start: 2020-03-05 | End: 2020-03-04

## 2020-03-04 NOTE — PROGRESS NOTES
MRI ordered. Screening form completed. Contacted MRI dept. To check compatibility for pt history of cerebral aneurysm coil. MRI stated the patient is not compatible. This RN called through paging system and left a message for Dr Ailyn Estrada.     Pt had 4 loose

## 2020-03-04 NOTE — PROGRESS NOTES
AOx4. Quiet, Slow to respond. MD aware. Pt sent for CT scan and completed. Showing no change from last. On return pt nauseas and vomiting. Zofran IV given. Morning meds held due to vomiting. Notified Dr Analia Barrett. Afib on cardiac monitor, HR 's.  At

## 2020-03-04 NOTE — PROGRESS NOTES
[id number] [facility] Neurology Preliminary Note    Viviane Quintero Patient Status:  Inpatient    1950 MRN [id number]   [facility] 2NE-A Attending Jeffrey Porter MD   [facility] Day # 7 PCP Bethany Baird MD     REASON FOR EVALUATION: lethargy ENDOSCOPY   • HEART CORONARY ARTERY BYPASS GRAFT N/A 10/24/2017    Performed by Yuliana Herman MD at [address] N/A 10/24/2017    Performed by Yuliana Herman MD at [address]     • TOTAL ABDOM HYST Intravenous, Q8H PRN  metoprolol Tartrate (LOPRESSOR) 5 MG/5ML injection 5 mg, 5 mg, Intravenous, Q6H PRN  Albuterol Sulfate  (90 Base) MCG/ACT inhaler 2 puff, 2 puff, Inhalation, Q6H PRN  atorvastatin (LIPITOR) tab 40 mg, 40 mg, Oral, Nightly  Flut IMAGING:  MRI Brain pending if possible. Otherwise can repeat CTH on 3/5 am.    [facility] 3/4/20:  CONCLUSION:    1. No acute intracranial hemorrhage.   2. Findings most consistent with chronic small vessel ischemic change within the deep white matter

## 2020-03-04 NOTE — PLAN OF CARE
Tele monitoring. I/o. Daily weight. Npo after midnight for chelsea with dccv. Consent signed and placed in chart. Pt drowsy responds to verbal stimuli. Up times one walker. Eliquis resumed after GI work up. Up with assistance 1-2 walker.  PT recommended SALIMA pt 2335 by Darrell Gonzalez RN  Reactivated  Goal: Absence of cardiac arrhythmias or at baseline  Description  INTERVENTIONS:  - Continuous cardiac monitoring, monitor vital signs, obtain 12 lead EKG if indicated  - Evaluate effectiveness of antiarrhythmic a

## 2020-03-04 NOTE — PHYSICAL THERAPY NOTE
PHYSICAL THERAPY TREATMENT NOTE - INPATIENT    Room Number: 6057/1716-T     Session: 1   Number of Visits to Meet Established Goals: 5    Presenting Problem: chest pain     History related to current admission: pt admitted with chest pain and per  Restriction: None                PAIN ASSESSMENT   Ratin  Location: Denies       BALANCE                                                                                                                     Static Sitting: Fair  Dynamic Sitting: Fair - staff;Needs met;RN aware of session/findings; Family present; In bathroom - nursing staff aware(Rn staff in w/ pt at end of session)    ASSESSMENT   Cont to recommend SALIMA at this time. Per spouse, pt is worse today w/ gait than yesterday.   Reinforced w/ pt'

## 2020-03-04 NOTE — PROGRESS NOTES
[facility] H[facility]PITAL[facility]  Progress Note     Keegan Huntley Patient Status:  Inpatient    1950 MRN [id number]   [facility] 6NE-A Attending Felipa Sharp MD   [facility] Day # 7 PCP Lynn Hardy MD     Chief Complaint: Chest pain    S: more leth (A) (based on SCr of 2.07 mg/dL (H)). No results for input(s): PTP, INR in the last 168 hours. Recent Labs   Lab 02/26/20  1342 02/27/20  0437   TROP <0.045 <0.045  <0.045   CK  --  74            Imaging: Imaging data reviewed in Epic.     Medications 13. COPD  1. BD  14. Anemia, iron deficiency  1. Venofer x 3  15. EITAN   1. EITAN protocol  16.  Left sided abdominal pain, CT unremarkable    Quality:  · DVT Prophylaxis: SCDs  · CODE status: Full  · Rangel: No  · Central line: No    Will the patient be refe

## 2020-03-04 NOTE — PROGRESS NOTES
[facility]    Electrophysiology Follow Up Note      Bertram Freed Patient Status:  Inpatient    1950 MRN [id number]   [facility] 2NE-A Attending Bella Vo MD   Hosp Day # 7 PCP Juan Escoto MD     Reason fo  (90 Base) MCG/ACT inhaler 2 puff, 2 puff, Inhalation, Q6H PRN  •  atorvastatin (LIPITOR) tab 40 mg, 40 mg, Oral, Nightly  •  Fluticasone Furoate-Vilanterol (BREO ELLIPTA) 100-25 MCG/INH inhaler 1 puff, 1 puff, Inhalation, Daily  •  lidocaine-mentho with RVr      Assessment/Plan:  Marielle Don is a 77year old woman with CAD s/p CABG who was admitted from heart failure clinic for atypical chest pain. Since admission she has transitioned to atrial fibrillation with RVR.  Ventricular rates yesterday w [facility]

## 2020-03-05 ENCOUNTER — APPOINTMENT (OUTPATIENT)
Dept: CT IMAGING | Facility: HOSPITAL | Age: 70
DRG: 299 | End: 2020-03-05
Attending: NURSE PRACTITIONER
Payer: MEDICARE

## 2020-03-05 ENCOUNTER — APPOINTMENT (OUTPATIENT)
Dept: ULTRASOUND IMAGING | Facility: HOSPITAL | Age: 70
DRG: 299 | End: 2020-03-05
Attending: HOSPITALIST
Payer: MEDICARE

## 2020-03-05 PROCEDURE — 99233 SBSQ HOSP IP/OBS HIGH 50: CPT | Performed by: INTERNAL MEDICINE

## 2020-03-05 PROCEDURE — 76770 US EXAM ABDO BACK WALL COMP: CPT | Performed by: HOSPITALIST

## 2020-03-05 PROCEDURE — 95819 EEG AWAKE AND ASLEEP: CPT | Performed by: OTHER

## 2020-03-05 PROCEDURE — 99223 1ST HOSP IP/OBS HIGH 75: CPT | Performed by: INTERNAL MEDICINE

## 2020-03-05 PROCEDURE — 99232 SBSQ HOSP IP/OBS MODERATE 35: CPT | Performed by: OTHER

## 2020-03-05 PROCEDURE — 99232 SBSQ HOSP IP/OBS MODERATE 35: CPT | Performed by: HOSPITALIST

## 2020-03-05 PROCEDURE — 70450 CT HEAD/BRAIN W/O DYE: CPT | Performed by: NURSE PRACTITIONER

## 2020-03-05 RX ORDER — SODIUM CHLORIDE 9 MG/ML
INJECTION, SOLUTION INTRAVENOUS ONCE
Status: COMPLETED | OUTPATIENT
Start: 2020-03-05 | End: 2020-03-05

## 2020-03-05 RX ORDER — LOPERAMIDE HYDROCHLORIDE 2 MG/1
2 CAPSULE ORAL 4 TIMES DAILY PRN
Status: DISCONTINUED | OUTPATIENT
Start: 2020-03-05 | End: 2020-03-24

## 2020-03-05 RX ORDER — SODIUM CHLORIDE 9 MG/ML
INJECTION, SOLUTION INTRAVENOUS CONTINUOUS
Status: DISCONTINUED | OUTPATIENT
Start: 2020-03-05 | End: 2020-03-11

## 2020-03-05 NOTE — PLAN OF CARE
Received pt this morning, A&O X3, appropriate to name, time and place, states she feels better today, not in apparent distress, on RA. Crea=4.3, had 2 episode of diarrhea negative for c-diff, bp in the low to mid 80`s. Pt denies being dizzy.  EEG/CT brain d Goal: home    Interventions:  - Follow up in heart failure clinic  - See additional Care Plan goals for specific interventions    Outcome: Progressing  Goal: Patient/Family Short Term Goal  Description  Patient's Short Term Goal: Monitor GI distress    Int

## 2020-03-05 NOTE — PROCEDURES
ELECTROENCEPHALOGRAM REPORT      Patient Name: Lisa Sample   : 1950   Requesting Physician: Dr. Yee Choi   Date of Test: 3/5/2020   History: 77year old female chronic mild psychomotor slowing, with acute worsening of this and mild confusion

## 2020-03-05 NOTE — CONSULTS
[facility]    Report of Consultation    Savanah Mathur Patient Status:  Inpatient    1950 MRN [id number]   [facility] 2NE-A Attending Sofie Lin MD   [facility] Day # 7 PCP Bryan Banegas MD     Date of Admission:  2020  Date ESOPHAGOGASTRODUODENOSCOPY (EGD) N/A 2/28/2020    [facility] [address] [facility] N/A 10/24/2017    Performed by Mani Momin MD at [address] N/A 10/24/2017    Performed by Rian Reyna Furoate-Vilanterol (BREO ELLIPTA) 100-25 MCG/INH inhaler 1 puff, 1 puff, Inhalation, Daily  •  lidocaine-menthol 4-1 % 1 patch, 1 patch, Transdermal, Daily  •  acetaminophen (TYLENOL) tab 650 mg, 650 mg, Oral, Q6H PRN  •  ondansetron HCl (ZOFRAN) injection of the brain performed 7/16/2019.        On my review, there is considerable amount of CIWMD    TSH/T4 wnl      Assessment  Patient Active Problem List:     Acute chest pain     NSTEMI (non-ST elevated myocardial infarction) [facility])     Coronary artery diseas

## 2020-03-05 NOTE — PLAN OF CARE
Assumed care for pt at 19:30 on 3/4    A&Ox3, forgetful of date  Reports pain in Left abdomen, controlled with prn Tylenol  VSS on 94% on RA  Afib on tele, rates 60-100s  Eliquis given  Briefed for incontinence of stool  Up with 1-2 assist  Stool sample se

## 2020-03-05 NOTE — CONSULTS
[facility]  Report of Consultation    Festus Butt Patient Status:  Inpatient    1950 MRN [id number]   [facility] 2NE-A Attending Ana Johnson MD   [facility] Day # 8 PCP Yvonne Scott MD     Reason for Consultation:  ELEAZAR    Histor 10/24/2017    Performed by Dev López MD at [facility] CVOR   • HYSTERECTOMY     • REMOVAL GALLBLADDER     • TOTAL ABDOM HYSTERECTOMY       Family History   Problem Relation Age of Onset   • Cancer Father         back   • Cancer Maternal Grandmother         alice injection 4 mg, 4 mg, Intravenous, Q6H PRN  lidocaine-menthol 4-1 % External Patch, Place 1 patch onto the skin daily. Metoprolol Tartrate 100 MG Oral Tab, Take 1 tablet (100 mg total) by mouth 2x Daily(Beta Blocker).   spironolactone 25 MG Oral Tab, Take WBC 12.8 03/05/2020    HGB 11.7 03/05/2020    HCT 36.2 03/05/2020    .0 03/05/2020    CREATSERUM 4.92 03/05/2020    BUN 51 03/05/2020     03/05/2020    K 4.4 03/05/2020     03/05/2020    CO2 19.0 03/05/2020     03/05/2020    CA 9. stable until yesterday and now with anephric rate in rise in creat. This would typically be associated with ATN although no clear etiology is noted (contrast, shock, etc). She was hypotensive this AM but ELEAZAR preceeded this.   Given afib/RVR and the fact t

## 2020-03-05 NOTE — PROGRESS NOTES
[facility]  Progress Note     [facility] Patient Status:  Inpatient    1950 MRN [id number]   [facility] 6NE-A Attending Cholo Wells MD   [facility] Day # 8 PCP Sherman Ramirez MD     Chief Complaint: Chest pain    S: more awak ALT 12* 20  --   --   --   --    BILT 1.1 1.3  --   --   --   --    TP 7.0 7.8  --   --   --   --     < > = values in this interval not displayed. Estimated Creatinine Clearance: 9.1 mL/min (A) (based on SCr of 4.92 mg/dL (H)).     No results for in 14. Dyslipidemia  1. Aspirin  2. Lisinopril & Hydralazine stopped  3. Lipitor  4. Telemetry  5. Monitor hemodynamics  6. Cardiology on consult  15. COPD  1. BD  16. Anemia, iron deficiency  1. Venofer x 3  17. EITAN   1. EITAN protocol  18.  Left sided abdomi

## 2020-03-05 NOTE — PROGRESS NOTES
[facility]    Electrophysiology Follow Up Note      Linford Clamp Patient Status:  Inpatient    1950 MRN [id number]   [facility] 2NE-A Attending Norma Parekh MD   Hosp Day # 8 PCP Sandip Caballero MD     Reason fo Oral, Nightly  •  Fluticasone Furoate-Vilanterol (BREO ELLIPTA) 100-25 MCG/INH inhaler 1 puff, 1 puff, Inhalation, Daily  •  lidocaine-menthol 4-1 % 1 patch, 1 patch, Transdermal, Daily  •  acetaminophen (TYLENOL) tab 650 mg, 650 mg, Oral, Q6H PRN  •  onda who was admitted from heart failure clinic for atypical chest pain. Since admission she has transitioned to atrial fibrillation with RVR.      1) paroxysmal atrial fibrillation and atrial flutter  - currently atrial fibrillation  -Baskin Plenty was held and the

## 2020-03-05 NOTE — PLAN OF CARE
Received pt this morning, A&O X3,   Problem: CARDIOVASCULAR - ADULT  Goal: Maintains optimal cardiac output and hemodynamic stability  Description  INTERVENTIONS:  - Monitor vital signs, rhythm, and trends  - Monitor for bleeding, hypotension and signs of interventions     Outcome: Progressing

## 2020-03-05 NOTE — PROGRESS NOTES
[id number] [facility] Neurology Progress Note    Quinton Baron Patient Status:  Inpatient    1950 MRN [id number]   [facility] 2NE-A Attending Cheryl Vivas MD   [facility] Day # 8 PCP Rose Marie Corbett MD         Subjective:  Quinton Baron i 03/05/2020              Imaging/Diagnostic:    • dilTIAZem HCl ER Coated Beads  360 mg Oral Daily   • apixaban  5 mg Oral BID   • hydrALAzine HCl  75 mg Oral TID   • lisinopril  10 mg Oral Daily   • Metoprolol Tartrate  100 mg Oral 2x Daily(Beta Blocker) labs and imaging, and agree with above note with following additions:  S: reports more interactive today  O: BP (!) 113/95 (BP Location: Right arm)   Pulse 66   Temp 97.2 °F (36.2 °C) (Oral)   Resp 16   Ht 63\"   Wt 162 lb 14.7 oz (73.9 kg)   SpO2 9

## 2020-03-06 PROCEDURE — 99233 SBSQ HOSP IP/OBS HIGH 50: CPT | Performed by: INTERNAL MEDICINE

## 2020-03-06 PROCEDURE — 99233 SBSQ HOSP IP/OBS HIGH 50: CPT | Performed by: HOSPITALIST

## 2020-03-06 RX ORDER — CALCIUM CARBONATE 200(500)MG
500 TABLET,CHEWABLE ORAL
Status: DISCONTINUED | OUTPATIENT
Start: 2020-03-06 | End: 2020-03-24

## 2020-03-06 RX ORDER — MAGNESIUM HYDROXIDE/ALUMINUM HYDROXICE/SIMETHICONE 120; 1200; 1200 MG/30ML; MG/30ML; MG/30ML
30 SUSPENSION ORAL 4 TIMES DAILY PRN
Status: DISCONTINUED | OUTPATIENT
Start: 2020-03-06 | End: 2020-03-24

## 2020-03-06 RX ORDER — HEPARIN SODIUM AND DEXTROSE 10000; 5 [USP'U]/100ML; G/100ML
12 INJECTION INTRAVENOUS ONCE
Status: COMPLETED | OUTPATIENT
Start: 2020-03-06 | End: 2020-03-06

## 2020-03-06 RX ORDER — HEPARIN SODIUM AND DEXTROSE 10000; 5 [USP'U]/100ML; G/100ML
INJECTION INTRAVENOUS CONTINUOUS
Status: DISCONTINUED | OUTPATIENT
Start: 2020-03-06 | End: 2020-03-16

## 2020-03-06 NOTE — PROGRESS NOTES
Cardiology addendum    I spoke to Dr. Javon Burch. Etiology of ARF unclear - she may need biopsy. Will hold Eliquis and start IV Heparin. Reviewed with Dr. Gabrielle Reyes.     Tangela Grossman NP

## 2020-03-06 NOTE — PROGRESS NOTES
[facility]  Progress Note     Sandi Mathew Patient Status:  Inpatient    1950 MRN [id number]   [facility] 6NE-A Attending Caitlyn Clayton MD   [facility] Day # 9 PCP Donnie Snowden MD     Chief Complaint: Chest pain    S: still a b BILT 1.1 1.3  --   --   --  0.8   TP 7.0 7.8  --   --   --  8.5*    < > = values in this interval not displayed. Estimated Creatinine Clearance: 7.8 mL/min (A) (based on SCr of 5.69 mg/dL (H)).     No results for input(s): PTP, INR in the last 168 h consult  15. COPD  1. BD  16. Anemia, iron deficiency  1. Venofer x 3  17. EITAN   1. EITAN protocol  18.  Left sided abdominal pain, CT unremarkable    Quality:  · DVT Prophylaxis: SCDs  · CODE status: Full  · Rangel: No  · Central line: No    Will the patient

## 2020-03-06 NOTE — PROGRESS NOTES
[facility]  Progress Note    Deandre Hides Patient Status:  Inpatient    1950 MRN [id number]   [facility] 2NE-A Attending Austin Santacruz MD   [facility] Day # 9 PCP Jonathan Ramirez MD     Assessment:  1.   Persistent afib with RVR - rate 03/06/2020    CO2 20.0 03/06/2020    BUN 63 03/06/2020    CREATSERUM 5.69 03/06/2020     03/06/2020    CA 9.5 03/06/2020     03/06/2020     03/06/2020    ALB 2.9 03/06/2020        Lab Results   Component Value Date    TROP <0.045 02/27

## 2020-03-06 NOTE — PROGRESS NOTES
[facility]  Nephrology Progress Note    Vipul Box Patient Status:  Inpatient    1950 MRN [id number]   [facility] 2NE-A Attending Reilly Wood MD   [address] Day # 9 PCP Kelsey Reyes MD       SUBJECTIVE:  Up in chair, a bit mor 03/03/20  1705 03/04/20  0639 03/05/20  0553 03/05/20  1311 03/06/20  0630     --  136 132* 133* 134*   K 3.4* 4.4 3.9 3.9 4.4 3.8     --  108 103 104 102   CO2 24.0  --  22.0 18.0* 19.0* 20.0*   BUN 12  --  25* 47* 51* 63*   CREATSERUM 1.46* with anephric rate in rise in creat. This would typically be associated with ATN although no clear etiology is noted (contrast, shock, etc). She was hypotensive yesterday AM but ELEAZAR preceeded this.   Given afib/RVR and the fact that she was off [facility] would a

## 2020-03-06 NOTE — PLAN OF CARE
Received pt this morning in bed, Awake, alert, appropriate, drowsy at times, on RA, A-FIB per tele controlled rate,  IVF . 9ns @ 80cc/hr infusing well, denies any abdominal pain, abdomen is soft and non tender, bladder hjmq=548jl, creat=5.6, with low urine safety   Outcome: Progressing     Problem: Patient/Family Goals  Goal: Patient/Family Long Term Goal  Description  Patient's Long Term Goal: home    Interventions:  - Follow up in heart failure clinic  - See additional Care Plan goals for specific interven

## 2020-03-06 NOTE — PLAN OF CARE
Assumed care for pt at 19:30 on 3/5    A&Ox3, delayed responses   Reports pain in left abdomen, controlled with tylenol  BP meds held, VSS on RA, spO2 94%  Afib on tele  Eliquis given  Attempted to collect urine sample in toilet, no result.  Pt was picked u

## 2020-03-06 NOTE — DIETARY NOTE
Guy Soler 23     Admitting diagnosis:  Abnormal EKG [R94.31]  Chest pain of uncertain etiology [M38.21]    Ht: 160 cm (5' 3\")  Wt: 74.6 kg (164 lb 7.4 oz). This is 151% of IBW  Body mass index is 29.13 kg/m².   IB

## 2020-03-07 ENCOUNTER — APPOINTMENT (OUTPATIENT)
Dept: GENERAL RADIOLOGY | Facility: HOSPITAL | Age: 70
DRG: 299 | End: 2020-03-07
Attending: HOSPITALIST
Payer: MEDICARE

## 2020-03-07 PROCEDURE — 71045 X-RAY EXAM CHEST 1 VIEW: CPT | Performed by: HOSPITALIST

## 2020-03-07 PROCEDURE — 99232 SBSQ HOSP IP/OBS MODERATE 35: CPT | Performed by: NURSE PRACTITIONER

## 2020-03-07 PROCEDURE — 99232 SBSQ HOSP IP/OBS MODERATE 35: CPT | Performed by: HOSPITALIST

## 2020-03-07 PROCEDURE — 99232 SBSQ HOSP IP/OBS MODERATE 35: CPT | Performed by: INTERNAL MEDICINE

## 2020-03-07 NOTE — PROGRESS NOTES
[facility]  Nephrology Progress Note    Berniece Merlin Patient Status:  Inpatient    1950 MRN [id number]   [facility] 2NE-A Attending Hailee Desir MD   [address] Day # 10 PCP Genesis Bermudez MD       SUBJECTIVE:    No acute issues ove Net 2050.15 ml     Wt Readings from Last 6 Encounters:  03/07/20 : 168 lb 6.9 oz (76.4 kg)  03/04/20 : 168 lb 10.4 oz (76.5 kg)  02/26/20 : 171 lb (77.6 kg)  02/19/20 : 171 lb 11.2 oz (77.9 kg)  02/11/20 : 170 lb 4.8 oz (77.2 kg)  02/06/20 : 172 lb 4.8 o and GN w/u unremarkable thus far. Course most c/w ATN and will cont to follow closely.     Cr is now improving; pt complains of dysuria; UA from 3/6 shows pyruia  - will start on emperic abx  - send urine clx  - will hold off on renal bx if Cr continues to

## 2020-03-07 NOTE — SLP NOTE
ADULT SWALLOWING EVALUATION    ASSESSMENT    ASSESSMENT/OVERALL IMPRESSION:  B/S swallow eval completed upon receipt of MD order. Pt is a 77year old female admitted to [facility] 2/26/20 with chest pain. Pt sitting 90 degrees upright in chair.   Sister at antrum, fundus, cardia, and angularis were normal.  Duodenum: The duodenal bulb, post-bulbar duodenum, and descending duodenum were normal.        RECOMMENDATIONS   Diet Recommendations - Solids: Regular(pt encouraged to choose softer foods. extra sauces & pleural effusion or pneumothorax. CONCLUSION:  No consolidation. CT brain 3/5/20  FINDINGS:  No evidence of intracranial hemorrhage or extra-axial fluid collection.   Lucencies in the deep periventricular white matter are likely sequelae of chronic s

## 2020-03-07 NOTE — PLAN OF CARE
AOx4. Quiet and flat affect. Slow to respond. Forgetful. MD aware. Up with 1-2 with gait belt and walker. Afib on cardiac monitor, HR 's. On metoprolol and cardizem. Adequate saturation on RA. Lung sounds diminished, mild crackles at bases.  Evelena Nasuti electrolytes and administer replacement therapy as ordered   Outcome: Progressing     Problem: Impaired Functional Mobility  Goal: Achieve highest/safest level of mobility/gait  Description  Interventions:  - Assess patient's functional ability and stabili

## 2020-03-07 NOTE — PROGRESS NOTES
· [facility]      Subjective:  Up in chair, reviewed with daughter    Objective:  115/82  Afebrile  AFib 70-80s  I/o incomplete  BUN/cr 63/3.99  K 3.3    Neuro: awake/alert  HEENT: moist oral mucosa  Cardiac: S1 S2 irregular  Lungs: clear  Abd

## 2020-03-07 NOTE — PROGRESS NOTES
[facility]  Progress Note     Marielle Don Patient Status:  Inpatient    1950 MRN [id number]   [facility] 6NE-A Attending Kati Hernandez MD   Hosp Day # 10 PCP Rylan Hutton MD     Chief Complaint: Chest pain    S: bit more --    BILT 1.1 1.3  --   --  0.8  --    TP 7.0 7.8  --   --  8.5*  --     < > = values in this interval not displayed. Estimated Creatinine Clearance: 11.2 mL/min (A) (based on SCr of 3.99 mg/dL (H)).     No results for input(s): PTP, INR in the last unremarkable  14. CAD sp CABG   15. Essential hypertension   16. Dyslipidemia  1. Aspirin  2. Lisinopril & Hydralazine stopped  3. Lipitor  4. Telemetry  5. Monitor hemodynamics  6. Cardiology on consult  17. COPD  1. BD  18. Anemia, iron deficiency  1.  Ve

## 2020-03-07 NOTE — PLAN OF CARE
Assumed care for pt at 19:30 on 3/6    A&Ox3, forgetful of date. Delayed responses  Pt c/o abd pain, 8/10, tylenol given. Pt states cramp like pain, increased to 10/10. Maalox given. Pt appears comfortable in bed.    VSS on RA, spO2 96%   Afib on tele, rate mobility/gait  Description  Interventions:  - Assess patient's functional ability and stability  - Promote increasing activity/tolerance for mobility and gait  - Educate and engage patient/family in tolerated activity level and precautions  - Recommend use

## 2020-03-08 ENCOUNTER — APPOINTMENT (OUTPATIENT)
Dept: ULTRASOUND IMAGING | Facility: HOSPITAL | Age: 70
DRG: 299 | End: 2020-03-08
Attending: HOSPITALIST
Payer: MEDICARE

## 2020-03-08 PROCEDURE — 99232 SBSQ HOSP IP/OBS MODERATE 35: CPT | Performed by: NURSE PRACTITIONER

## 2020-03-08 PROCEDURE — 99233 SBSQ HOSP IP/OBS HIGH 50: CPT | Performed by: HOSPITALIST

## 2020-03-08 PROCEDURE — 99233 SBSQ HOSP IP/OBS HIGH 50: CPT | Performed by: INTERNAL MEDICINE

## 2020-03-08 PROCEDURE — 76700 US EXAM ABDOM COMPLETE: CPT | Performed by: HOSPITALIST

## 2020-03-08 RX ORDER — METOPROLOL TARTRATE 100 MG/1
100 TABLET ORAL ONCE
Status: DISCONTINUED | OUTPATIENT
Start: 2020-03-08 | End: 2020-03-13 | Stop reason: ALTCHOICE

## 2020-03-08 NOTE — PROGRESS NOTES
· [facility]      Subjective:  No complaints today. Had N/V last night.     Objective:  115/82  Afebrile  AFib 70-80s  I/o incomplete  BUN/cr 63/3.99  K 3.3    Neuro: awake/alert  HEENT: moist oral mucosa  Cardiac: S1 S2 irregular  Lungs: janes

## 2020-03-08 NOTE — PROGRESS NOTES
[facility]  Nephrology Progress Note    Main Kaye Patient Status:  Inpatient    1950 MRN [id number]   [facility] 2NE-A Attending Vanessa Bullock MD   [facility] Day # 11 PCP Duran Molina MD       SUBJECTIVE:    No acute issues ove (Last 24 hours) at 3/8/2020 1437  Last data filed at 3/8/2020 0826  Gross per 24 hour   Intake 985 ml   Output 200 ml   Net 785 ml     Wt Readings from Last 6 Encounters:  03/07/20 : 168 lb 6.9 oz (76.4 kg)  03/04/20 : 168 lb 10.4 oz (76.5 kg)  02/26/20 : onset of ELEAZAR, however U/s with no hydro. U/a bland and GN w/u unremarkable thus far. Course most c/w ATN and will cont to follow closely.     Cr is now improving; volume status stable  - continue gentle fluids  - on emperic abx  - will hold off on renal b

## 2020-03-08 NOTE — PLAN OF CARE
Assumed patient care at 0730. Vital signs stable. Patient alert and oriented x 4. Patient denies pain. Patient with complaints of nausea and vomiting - zofran and reglan administered as able. Patient vomiting small amounts at a time.   Unable to keep m RW for transfers and ambulation - assist of 2 for safety   Outcome: Progressing     Problem: Impaired Swallowing  Goal: Minimize aspiration risk  Description  Interventions:  - Patient should be alert and upright for all feedings (90 degrees preferred)  -

## 2020-03-08 NOTE — PLAN OF CARE
Rcv'd A/o/3  Flat effect  Denies pain Lidocaine patch noted on back  On tele with A-fib   Eliquis   Heparin gtt  IV fluids  Lung sounds diminished   coco refusing c-pap  High fall risk   Family member spending the night     Problem: CARDIOVASCULAR - ADUL stability  - Promote increasing activity/tolerance for mobility and gait  - Educate and engage patient/family in tolerated activity level and precautions  - Recommend use of  RW for transfers and ambulation - assist of 2 for safety   Outcome: Progressing

## 2020-03-08 NOTE — PROGRESS NOTES
[facility]  Progress Note     Tyson Singh Patient Status:  Inpatient    1950 MRN [id number]   [facility] 6NE-A Attending Kyle Márquez MD   [facility] Day # 6 PCP Rere House MD     Chief Complaint: Chest pain    S: bit more 88*   ALT 20  --   --  341*  --  180*   BILT 1.3  --   --  0.8  --  1.0   TP 7.8  --   --  8.5*  --  8.1    < > = values in this interval not displayed. Estimated Creatinine Clearance: 14.8 mL/min (A) (based on SCr of 3.01 mg/dL (H)).     No results f diarrhea  10. Hypotension with lactic acidosis, resolved  1. Monitor hemodynamics  11. L1 fracture d/t fall  1. Pain control  12. Melena- resolved EGD unremarkable  1. Resume AC  2. Monitor H&H  13. Chronic heart failure with preserved EF  1.  Resume Torsem

## 2020-03-09 ENCOUNTER — APPOINTMENT (OUTPATIENT)
Dept: GENERAL RADIOLOGY | Facility: HOSPITAL | Age: 70
DRG: 299 | End: 2020-03-09
Attending: INTERNAL MEDICINE
Payer: MEDICARE

## 2020-03-09 PROCEDURE — 99233 SBSQ HOSP IP/OBS HIGH 50: CPT | Performed by: INTERNAL MEDICINE

## 2020-03-09 PROCEDURE — 99232 SBSQ HOSP IP/OBS MODERATE 35: CPT | Performed by: INTERNAL MEDICINE

## 2020-03-09 PROCEDURE — 99233 SBSQ HOSP IP/OBS HIGH 50: CPT | Performed by: NURSE PRACTITIONER

## 2020-03-09 PROCEDURE — 71045 X-RAY EXAM CHEST 1 VIEW: CPT | Performed by: INTERNAL MEDICINE

## 2020-03-09 RX ORDER — HEPARIN SODIUM 5000 [USP'U]/ML
30 INJECTION INTRAVENOUS; SUBCUTANEOUS ONCE
Status: COMPLETED | OUTPATIENT
Start: 2020-03-09 | End: 2020-03-09

## 2020-03-09 RX ORDER — METOPROLOL TARTRATE 5 MG/5ML
7.5 INJECTION INTRAVENOUS EVERY 6 HOURS
Status: DISCONTINUED | OUTPATIENT
Start: 2020-03-09 | End: 2020-03-10

## 2020-03-09 NOTE — PHYSICAL THERAPY NOTE
PHYSICAL THERAPY TREATMENT NOTE - INPATIENT    Room Number: 9173/7151-L     Session: 2  Number of Visits to Meet Established Goals: 5    Presenting Problem: chest pain     History related to current admission: pt admitted with chest pain and per  h SUBJECTIVE  Pt mostly nonverbal.     Patient’s self-stated goal is -unable to state.     OBJECTIVE       WEIGHT BEARING RESTRICTION  Weight Bearing Restriction: None                PAIN ASSESSMENT   Ratin  Location: Denies       BALANCE performed sit to stand three times from chair, standing balance with focus on posture relaxation and reaching up with arms. During gait training, pt ambulated with RW, mod assist balance and rw management.  Chair follow and assist for IV pole and 6 L supp

## 2020-03-09 NOTE — PROGRESS NOTES
[facility]  Cardiology Progress Note    Subjective:  Still with n/v and abdominal pain.       Objective:  /83 (BP Location: Right arm)   Pulse 112   Temp 98.8 °F (37.1 °C) (Oral)   Resp 16   Ht 5' 3\" (1.6 m)   Wt 174 lb 13.2 oz (79.3 kg)   SpO2 Dr. Kiara Auguste. No need for renal biopsy so ok to resume Eliquis. Rates marginal.  Placed back on diltiazem infusion yesterday. · CAD CABG 2017.   Patent grafts by [facility] 2019  · Elevated LFTs, N/V - per IM, improving    Plan:    - Discussed with Dr. Elizabeth Barraza

## 2020-03-09 NOTE — PLAN OF CARE
Assumed patient care at 0730. Vital signs stable. Patient alert and oriented x 4 but slow to respond. Patient denies pain. Strict NPO today after speech eval.   Heart rate better controlled with scheduled IV metoprolol.   Patient only urinated 225ml tod Patient/Family Goals  Goal: Patient/Family Long Term Goal  Description  Patient's Long Term Goal: home    Interventions:  - Follow up in heart failure clinic  - See additional Care Plan goals for specific interventions    Outcome: Progressing  Goal: Patipelon

## 2020-03-09 NOTE — PLAN OF CARE
Rcv'd A/o/3  Fatigued  Denies pain or discomfort  Lidoderm patch on lt back  On tele with A-fib 90's-110's  Cardizem gtt  Heparin gtt  0.9 ns gtt  IV antibiotics  Lung sounds diminished bilateral  Shallow breather    noted  Fall risk      Problem: CARDI bites of food and small sips of liquid  - Offer pills one at a time, crush or deliver with applesauce as needed  - Discontinue feeding and notify MD (or speech pathologist) if coughing or persistent throat clearing or wet/gurgly vocal quality is noted  Out

## 2020-03-09 NOTE — PROGRESS NOTES
[facility]  Progress Note     Zacarias Lion Patient Status:  Inpatient    1950 MRN [id number]   [facility] 6NE-A Attending Benton Wang MD   [facility] Day # 15 PCP Otto Butt MD     Chief Complaint: Chest pain    S: Pt still 2.89 mg/dL (H)). No results for input(s): PTP, INR in the last 168 hours. No results for input(s): TROP, CK in the last 168 hours. Imaging: Imaging data reviewed in Epic.     Medications:   • azithromycin  500 mg Intravenous Q24H   • metoprolo 16. Dyslipidemia  1. Aspirin  2. Lisinopril & Hydralazine stopped  3. Lipitor  4. Telemetry  5. Monitor hemodynamics  6. Cardiology on consult  17. COPD  1. BD  18. Anemia, iron deficiency  1. Venofer x 3  19. EITAN   1. EITAN protocol  20.  Left sided abdomi

## 2020-03-09 NOTE — PROGRESS NOTES
[facility]  Nephrology Progress Note    Quinton Baron Patient Status:  Inpatient    1950 MRN [id number]   [facility] 2NE-A Attending Cheryl Vivas MD   [facility] Day # 15 PCP Rose Marie Corbett MD       SUBJECTIVE:  Up in chair, very slo --   --   --   --  14       Recent Labs   Lab 03/05/20  1311 03/06/20  0630 03/07/20  0601 03/08/20  0637 03/09/20  0559   * 134* 135* 137 138   K 4.4 3.8 3.3* 3.5 4.0    102 103 101 105   CO2 19.0* 20.0* 24.0 27.0 25.0   BUN 51* 63* 63* 65* 81 Base) MCG/ACT inhaler 2 puff, 2 puff, Inhalation, Q6H PRN  Fluticasone Furoate-Vilanterol (BREO ELLIPTA) 100-25 MCG/INH inhaler 1 puff, 1 puff, Inhalation, Daily  lidocaine-menthol 4-1 % 1 patch, 1 patch, Transdermal, Daily  acetaminophen (TYLENOL) tab 650

## 2020-03-09 NOTE — SLP NOTE
ADULT SWALLOWING EVALUATION    ASSESSMENT    ASSESSMENT/OVERALL IMPRESSION:  Pt seen this AM at bedside to assess swallow function. Pt seen lethargic and confused. Pt admitted with c/o chest pain, blurry vision, and SOB.  Pt hx significant for brain aneurys pain of uncertain etiology  Active Problems:    Atrial fibrillation (HCC)    Abnormal EKG    Fever    EITAN on CPAP    Hypotension    Lactic acidosis    Lethargy    Toxic metabolic encephalopathy    Diarrhea    CKD (chronic kidney disease) stage 3, GFR 30-59 Impaired  Mastication: Intact  Retention: Impaired    Pharyngeal Phase of Swallow: Within Functional Limits           (Please note: Silent aspiration cannot be evaluated clinically. Videofluoroscopic Swallow Study is required to rule-out silent aspiration.

## 2020-03-09 NOTE — PLAN OF CARE
Problem: Impaired Swallowing  Goal: Minimize aspiration risk  Description  Interventions:  -NPO  - Oral Care   Outcome: Progressing

## 2020-03-10 PROCEDURE — 99232 SBSQ HOSP IP/OBS MODERATE 35: CPT | Performed by: INTERNAL MEDICINE

## 2020-03-10 PROCEDURE — 99233 SBSQ HOSP IP/OBS HIGH 50: CPT | Performed by: INTERNAL MEDICINE

## 2020-03-10 RX ORDER — METOPROLOL TARTRATE 5 MG/5ML
7.5 INJECTION INTRAVENOUS EVERY 6 HOURS
Status: DISCONTINUED | OUTPATIENT
Start: 2020-03-10 | End: 2020-03-20

## 2020-03-10 RX ORDER — FUROSEMIDE 10 MG/ML
20 INJECTION INTRAMUSCULAR; INTRAVENOUS ONCE
Status: COMPLETED | OUTPATIENT
Start: 2020-03-10 | End: 2020-03-10

## 2020-03-10 RX ORDER — IPRATROPIUM BROMIDE AND ALBUTEROL SULFATE 2.5; .5 MG/3ML; MG/3ML
SOLUTION RESPIRATORY (INHALATION)
Status: COMPLETED
Start: 2020-03-10 | End: 2020-03-10

## 2020-03-10 NOTE — PROGRESS NOTES
[facility]    Electrophysiology Follow Up Note      Sonia Bryant Patient Status:  Inpatient    1950 MRN [id number]   [facility] 2NE-A Attending Medardo Brown MD   [facility] Day # 15 PCP Gio Horner MD     [facility] HCl (IMODIUM) cap 2 mg, 2 mg, Oral, QID PRN  •  0.9% NaCl infusion, , Intravenous, Continuous  •  dilTIAZem HCl (CARDIZEM) injection 10 mg, 10 mg, Intravenous, Q1H PRN  •  Metoclopramide HCl (REGLAN) injection 5 mg, 5 mg, Intravenous, Q8H PRN  •  metoprolo 31.2*   MCV 93.5 92.9 92.8 94.0   MCH 30.8 31.5 30.9 30.7   MCHC 33.0 33.9 33.3 32.7   RDW 14.9 14.6 14.7 15.0   NEPRELIM 7.17  --  10.13* 11.55*   WBC 10.1 10.8 12.3* 16.0*   .0 215.0 290.0 271.0       Recent Labs   Lab 03/09/20  0559 03/09/20  161 mental status     Fatigue     Benign essential HTN     Dyslipidemia     Altered mental status, unspecified altered mental status type     Leukocytosis     Weakness     RIVERA (dyspnea on exertion)     Chest pain of uncertain etiology     Abnormal EKG     [facility]

## 2020-03-10 NOTE — PROGRESS NOTES
[facility] Pharmacy Note:  Renal Adjustment for piperacillin/tazobactam (Liv Kaufman)    Zacarisa Lion is a 77year old female who has been prescribed piperacillin/tazobactam (ZOSYN) 3.375 g IVPB every 8 hrs.   CrCl is estimated creatinine clearance is 16.7 mL/min (A)

## 2020-03-10 NOTE — CONSULTS
[facility]  Report of Consultation    Luciano Morales Patient Status:  Inpatient    1950 MRN [id number]   [facility] 2NE-A Attending Memo Stout MD   [facility] Day # 15 PCP Bebe Nava MD     Reason for Consultation:  dyspnea 10/24/2017    Performed by Norma Vegas MD at [address] N/A 10/24/2017    Performed by Norma Vegas MD at [address]     • TOTAL ABDOM HYSTERECTOMY       Family History   Problem Relation Age injection 5 mg, 5 mg, Intravenous, Q8H PRN  •  metoprolol Tartrate (LOPRESSOR) 5 MG/5ML injection 5 mg, 5 mg, Intravenous, Q6H PRN  •  Albuterol Sulfate  (90 Base) MCG/ACT inhaler 2 puff, 2 puff, Inhalation, Q6H PRN  •  Fluticasone Furoate-Vilantero Lethargic, somewhat arousable, no focal deficits    Lab Data Review:  Recent Labs   Lab 03/07/20  0601 03/08/20  0638 03/09/20  0559   WBC 10.8 12.3* 16.0*   HGB 11.1* 11.2* 10.2*   HCT 32.7* 33.6* 31.2*   .0 290.0 271.0       Recent Labs   Lab 03/0 am  · Continue cardizem gtt for rate control  · Monitor MS closely  · Aspiration precautions  · Note plans for cardioversion.   Would recommend ensuring pt is afebrile without progressive respiratory infection prior to proceeding    Thank you for the [facility]

## 2020-03-10 NOTE — SLP NOTE
SPEECH DAILY NOTE - INPATIENT    ASSESSMENT & PLAN   ASSESSMENT  Pt seen this AM at bedside to re-assess swallow function for diet upgrade. Pt seen lethargic but able to follow one step commands when given visual cues. Pts  present at bedside.  Pts h you have any questions, please contact Arun Boyce

## 2020-03-10 NOTE — PLAN OF CARE
Worsening dyspnea per patient feels short of breath even at rest ; spo2 stable 92 on 4l/nc     No distress pulm consult -dr. Analy Singer sr covering -notified  Breath sounds rhonchii and crackles  seen by dr. Mani Roberson  Iv lasix 20 mg x 1 given

## 2020-03-10 NOTE — PROGRESS NOTES
[facility]  Progress Note     Vipul Box Patient Status:  Inpatient    1950 MRN [id number]   [facility] 6NE-A Attending Sarita Nguyen MD   [facility] Day # 15 PCP Kelsey Reyes MD     Chief Complaint: Chest pain    S: Pt witho --  180* 118*  --    BILT 0.8  --  1.0 0.6  --    TP 8.5*  --  8.1 7.8  --     < > = values in this interval not displayed. Estimated Creatinine Clearance: 16.7 mL/min (A) (based on SCr of 2.66 mg/dL (H)).     No results for input(s): PTP, INR in the neg, EGD unremarkable  14. CAD sp CABG   15. Essential hypertension   16. Dyslipidemia  1. Aspirin  2. Lisinopril & Hydralazine stopped  3. Lipitor  4. Telemetry  5. Monitor hemodynamics  6. Cardiology on consult  17. COPD  1. BD  18.  Anemia, iron deficien

## 2020-03-10 NOTE — PROGRESS NOTES
[facility]  Nephrology Progress Note    Irasema Rosales Patient Status:  Inpatient    1950 MRN [id number]   [facility] 2NE-A Attending Connor Gutierrez MD   [facility] Day # 15 PCP Mynor Martines MD       SUBJECTIVE:    No acute issues ove kg)   SpO2 90%   BMI 31.52 kg/m²   Temp (24hrs), Av.3 °F (36.8 °C), Min:97.2 °F (36.2 °C), Max:99.5 °F (37.5 °C)       Intake/Output Summary (Last 24 hours) at 3/10/2020 1106  Last data filed at 3/10/2020 0700  Gross per 24 hour   Intake 1556 ml   Outp with etiology not clear. Neuro eval ongoing    3. Afib/RVR- rated controlled. Again on AC- heparin but can likely transition to     4. Abnormal UA;clx negative    Questions/concerns were discussed with patient and/or family by bedside.     Naz Hayes

## 2020-03-10 NOTE — PROGRESS NOTES
Pt. on mod. high back , alert and o x 2-3 , still slow to respond , denies any pain or discomfort , denies SOB. On O2 at 4 li/min via nc. Tele shows Afib on the monitor , HR at 80'a to 120's. Still on IV Cardizem gtt and IVP Lopressor Q 6 hrs. [facility]

## 2020-03-11 ENCOUNTER — APPOINTMENT (OUTPATIENT)
Dept: CV DIAGNOSTICS | Facility: HOSPITAL | Age: 70
DRG: 299 | End: 2020-03-11
Attending: INTERNAL MEDICINE
Payer: MEDICARE

## 2020-03-11 ENCOUNTER — APPOINTMENT (OUTPATIENT)
Dept: ULTRASOUND IMAGING | Facility: HOSPITAL | Age: 70
DRG: 299 | End: 2020-03-11
Attending: INTERNAL MEDICINE
Payer: MEDICARE

## 2020-03-11 ENCOUNTER — APPOINTMENT (OUTPATIENT)
Dept: GENERAL RADIOLOGY | Facility: HOSPITAL | Age: 70
DRG: 299 | End: 2020-03-11
Attending: INTERNAL MEDICINE
Payer: MEDICARE

## 2020-03-11 PROCEDURE — 99233 SBSQ HOSP IP/OBS HIGH 50: CPT | Performed by: INTERNAL MEDICINE

## 2020-03-11 PROCEDURE — 71045 X-RAY EXAM CHEST 1 VIEW: CPT | Performed by: INTERNAL MEDICINE

## 2020-03-11 PROCEDURE — 93970 EXTREMITY STUDY: CPT | Performed by: INTERNAL MEDICINE

## 2020-03-11 PROCEDURE — 99232 SBSQ HOSP IP/OBS MODERATE 35: CPT | Performed by: INTERNAL MEDICINE

## 2020-03-11 PROCEDURE — 99232 SBSQ HOSP IP/OBS MODERATE 35: CPT | Performed by: HOSPITALIST

## 2020-03-11 RX ORDER — SODIUM CHLORIDE 9 MG/ML
INJECTION, SOLUTION INTRAVENOUS CONTINUOUS
Status: DISCONTINUED | OUTPATIENT
Start: 2020-03-11 | End: 2020-03-12

## 2020-03-11 RX ORDER — VANCOMYCIN HYDROCHLORIDE
15
Status: DISCONTINUED | OUTPATIENT
Start: 2020-03-13 | End: 2020-03-11

## 2020-03-11 RX ORDER — FAMOTIDINE 10 MG/ML
20 INJECTION, SOLUTION INTRAVENOUS DAILY
Status: DISCONTINUED | OUTPATIENT
Start: 2020-03-11 | End: 2020-03-19 | Stop reason: SDUPTHER

## 2020-03-11 RX ORDER — IPRATROPIUM BROMIDE AND ALBUTEROL SULFATE 2.5; .5 MG/3ML; MG/3ML
3 SOLUTION RESPIRATORY (INHALATION) ONCE
Status: DISCONTINUED | OUTPATIENT
Start: 2020-03-10 | End: 2020-03-17

## 2020-03-11 RX ORDER — VANCOMYCIN/0.9 % SOD CHLORIDE 1.75 G/5
25 PLASTIC BAG, INJECTION (ML) INTRAVENOUS ONCE
Status: COMPLETED | OUTPATIENT
Start: 2020-03-11 | End: 2020-03-11

## 2020-03-11 RX ORDER — IPRATROPIUM BROMIDE AND ALBUTEROL SULFATE 2.5; .5 MG/3ML; MG/3ML
3 SOLUTION RESPIRATORY (INHALATION)
Status: DISCONTINUED | OUTPATIENT
Start: 2020-03-11 | End: 2020-03-12

## 2020-03-11 RX ORDER — ACETAMINOPHEN 650 MG/1
650 SUPPOSITORY RECTAL EVERY 6 HOURS PRN
Status: DISCONTINUED | OUTPATIENT
Start: 2020-03-11 | End: 2020-03-24

## 2020-03-11 NOTE — PROGRESS NOTES
[facility]  Nephrology Progress Note    Quinton Baron Patient Status:  Inpatient    1950 MRN [id number]   [facility] 2NE-A Attending Cheryl Vivas MD   [facility] Day # 15 PCP Rose Marie Corbett MD       SUBJECTIVE:  Placed on bipap overn MCV 94.0 93.5 92.9 92.8 94.0   .0 208.0 215.0 290.0 271.0   BAND  --   --   --   --  14       Recent Labs   Lab 03/05/20  1311  03/07/20  0601 03/08/20  0637 03/09/20  0559 03/10/20  0551 03/11/20  0647   *   < > 135* 137 138 143 146*   K 4. (MAALOX) oral suspension 30 mL, 30 mL, Oral, QID PRN  Calcium Carbonate Antacid (TUMS) chewable tab 500 mg, 500 mg, Oral, Daily PRN  Loperamide HCl (IMODIUM) cap 2 mg, 2 mg, Oral, QID PRN  dilTIAZem HCl (CARDIZEM) injection 10 mg, 10 mg, Intravenous, Q1H P

## 2020-03-11 NOTE — PLAN OF CARE
Patient received drowsy, oriented x4 with delayed responses. Arrived on BiPAP 80% FiO2. Heparin gtt and cardizem gtt infusing. IVF increased per order. ID reconsulted and at bedside to evaluate patient. BLE doppler negative for DVT.  Remains in afib, HR 80-

## 2020-03-11 NOTE — PROGRESS NOTES
0535   Pt. With fever of 100.6 , tylenol supp administered. Pt. Been on BIPAP since midnight , rocky it well , O2 sat at mid 90's. Afib on the monitor 's to 120's. IV Zosyn infusing at this time. Will cont. Monitor.

## 2020-03-11 NOTE — PROGRESS NOTES
2000    Pt. On high fowlers position , having shallow labored breathing. RR 32 's. , O2 sat at 84 % at 8 li high flow. Adjusted O2 to 12 li , . PRN Albuterol puffs given. Will closely monitor. 2020   Pt. Still having SOB ; RT at bedside .  Nebs

## 2020-03-11 NOTE — SLP NOTE
Attempted to see for re-evaluation of swallow fxn. Pt with decline in respiratory status; now requiring BiPAP. Will re-attempt as available and appropriate.      Maribel Krishnamurthy MA, [phone] [facility]  Pager

## 2020-03-11 NOTE — PROGRESS NOTES
[facility] Dosing Service    Initial Pharmacokinetic Consult for Vancomycin Dosing     Magda Plasencia is a 77year old female who is being treated for pneumonia. She was transferred to ICU for worsening hypoxia.  CXR shows worsening infitrates and patie on Vancomycin to assess renal function. Pharmacy will continue to follow her and adjust as necessary.     Radha Mancuso, PharmD  3/11/2020  11:44 AM  [address] [place] [address] Extension: [phone]

## 2020-03-11 NOTE — PLAN OF CARE
Received w/ bipap patient sl drowsy easily arousable  follows commands  W/ cardizem drip infusing 15 mg/hour ; heparin drip at 10.5 cc/ hour  0.9 ns at  50cc/hr  maintain  npo  A fib on tele  rate 80's-118    Incontinent  bed bath given upon turning patien

## 2020-03-11 NOTE — PROGRESS NOTES
[facility]                INFECTIOUS DISEASE PROGRESS NOTE    Main Kaye Patient Status:  Inpatient    1950 MRN [id number]   [facility] 2NE-A Attending Cecilia Bonilla MD   [address] Day # 15 PCP Duran Molina MD       Antibi --  8.1 7.8  --   --     < > = values in this interval not displayed. No results found for: [facility] Encounter on 02/26/20   1.  URINE CULTURE, ROUTINE     Status: None    Collection Time: 03/07/20  4:30 PM   Result Value Ref Ra

## 2020-03-11 NOTE — CM/SW NOTE
Care Progression Note:  Active Acute Medical Issue:   Worsening hypoxia, requiring bipap over night-thus transferred to ICU this morning, multifocal pneumonia, ELEAZAR on CKD, lethargy-?etiology, Afib with atrial flutter-remains on heparin infusion, now defer

## 2020-03-11 NOTE — RESPIRATORY THERAPY NOTE
Patient is saturating 86% on a 40L 100% Vapotherm Cannula. Pulmonary consulted and BiPap will be initiated with the following settings IPAP 15 EPAP 5 100%.

## 2020-03-11 NOTE — RESPIRATORY THERAPY NOTE
ABG drawn. Results as follows: pH 7.45 CO2 31 pO2 58 HCO3- 21. Patient started on a high flow nasal Cannula at 8L. Patient saturating 94%.

## 2020-03-11 NOTE — PROGRESS NOTES
[facility] Pulmonary/Critical Care Progress Note     SUBJECTIVE/Interval history: All events, procedures, notes reviewed.  Worsening hypoxia overnight and work of breathing leading to escalation of o2 from nasal cannula heard  Abdomen:Soft, non-tender. No masses. No guarding. No rebound. Extremities: +BLE edema   Skin:No rashes or lesions.   Lymph nodes:Not examined          Lab Data Review:   Recent Labs   Lab 03/09/20  0559 03/10/20  0551 03/11/20  0647   * 11 Radiology:         • ipratropium-albuterol  3 mL Nebulization Once   • metoprolol Tartrate  7.5 mg Intravenous Q6H   • piperacillin-tazobactam  3.375 g Intravenous Q12H   • metoprolol tartrate  100 mg Oral Once   • Fluticasone Furoate-Vilanterol  1 puff In SCDs, heparin gtt, h2b  · Transfer to ICU    Javier Bay MD  [facility]  03/11/20  CCM time: 40minutes. The patient is critically ill and at high risk for clinical deterioration.

## 2020-03-11 NOTE — PROGRESS NOTES
[facility]  Progress Note     Steve Rueda Patient Status:  Inpatient    1950 MRN [id number]   [facility] 4SW-A Attending Ximena Harman MD   [address] [facility] Day # 15 PCP Michelle Ashley MD     Chief Complaint: sob    S: Patient is Wyatt Heredias 111 115*   CO2 20.0*   < > 27.0 25.0 24.0 24.0   ALKPHO 94  --  94 85  --   --    *  --  88* 42*  --   --    *  --  180* 118*  --   --    BILT 0.8  --  1.0 0.6  --   --    TP 8.5*  --  8.1 7.8  --   --     < > = values in this interval not di unremarkable  1. Resume AC  2. Monitor H&H  11. Chronic heart failure with preserved EF  1. Resume Torsemide / Spironolactone  12. Chest pain, atypical, trop neg, EGD unremarkable  13. CAD sp CABG   14. Essential hypertension   15. Dyslipidemia  1.  Aspirin

## 2020-03-11 NOTE — PROGRESS NOTES
03/11/20 0328   BiPAP   $ RT Standby Charge (per 15 min) 1   Device V60   BiPAP / CPAP CE# v248   Mode Spontaneous/Timed   Interface Full face mask   Mask Size Medium   Control Settings   Set Rate 16 breaths/min   Set IPAP 15   Set EPAP 5   Oxygen Perce

## 2020-03-11 NOTE — PROGRESS NOTES
[facility]    Electrophysiology Follow Up Note      Vero Huang Patient Status:  Inpatient    1950 MRN [id number]   [facility] 2NE-A Attending Aylin Meadows MD   [facility] Day # 15 PCP MD Magali Alcocer (CARDIZEM) injection 10 mg, 10 mg, Intravenous, Q1H PRN  •  Metoclopramide HCl (REGLAN) injection 5 mg, 5 mg, Intravenous, Q8H PRN  •  metoprolol Tartrate (LOPRESSOR) 5 MG/5ML injection 5 mg, 5 mg, Intravenous, Q6H PRN  •  Albuterol Sulfate  (90 Bas 16.0*   .0 215.0 290.0 271.0       Recent Labs   Lab 03/09/20  1617 03/10/20  0551 03/11/20  0647   PTT 63.5* 66.6* 51.8*             Data:    Telemetry: personally reviewed; atrial fibrillation with mild RVR    CXR: concerning for multifocal pneumo tract infection without hematuria          Ramy Nascimento MD  EP  [facility]

## 2020-03-12 ENCOUNTER — APPOINTMENT (OUTPATIENT)
Dept: GENERAL RADIOLOGY | Facility: HOSPITAL | Age: 70
DRG: 299 | End: 2020-03-12
Attending: NURSE PRACTITIONER
Payer: MEDICARE

## 2020-03-12 ENCOUNTER — APPOINTMENT (OUTPATIENT)
Dept: GENERAL RADIOLOGY | Facility: HOSPITAL | Age: 70
DRG: 299 | End: 2020-03-12
Attending: INTERNAL MEDICINE
Payer: MEDICARE

## 2020-03-12 PROCEDURE — 71045 X-RAY EXAM CHEST 1 VIEW: CPT | Performed by: INTERNAL MEDICINE

## 2020-03-12 PROCEDURE — 99232 SBSQ HOSP IP/OBS MODERATE 35: CPT | Performed by: HOSPITALIST

## 2020-03-12 PROCEDURE — 99233 SBSQ HOSP IP/OBS HIGH 50: CPT | Performed by: INTERNAL MEDICINE

## 2020-03-12 PROCEDURE — 74018 RADEX ABDOMEN 1 VIEW: CPT | Performed by: NURSE PRACTITIONER

## 2020-03-12 RX ORDER — DEXTROSE MONOHYDRATE 50 MG/ML
INJECTION, SOLUTION INTRAVENOUS CONTINUOUS
Status: DISCONTINUED | OUTPATIENT
Start: 2020-03-12 | End: 2020-03-14

## 2020-03-12 RX ORDER — BISACODYL 10 MG
10 SUPPOSITORY, RECTAL RECTAL
Status: DISCONTINUED | OUTPATIENT
Start: 2020-03-12 | End: 2020-03-24

## 2020-03-12 RX ORDER — SODIUM CHLORIDE 450 MG/100ML
INJECTION, SOLUTION INTRAVENOUS CONTINUOUS
Status: DISCONTINUED | OUTPATIENT
Start: 2020-03-12 | End: 2020-03-12

## 2020-03-12 RX ORDER — IPRATROPIUM BROMIDE AND ALBUTEROL SULFATE 2.5; .5 MG/3ML; MG/3ML
3 SOLUTION RESPIRATORY (INHALATION)
Status: DISCONTINUED | OUTPATIENT
Start: 2020-03-12 | End: 2020-03-14

## 2020-03-12 NOTE — PROGRESS NOTES
[facility]    Electrophysiology Follow Up Note      Bertram Freed Patient Status:  Inpatient    1950 MRN [id number]   [facility] 4SW-A Attending Martin Jarquin MD   [facility]osp Day # 13 PCP Juan Escoto MD     Reason for mg, Oral, Daily PRN  •  Loperamide HCl (IMODIUM) cap 2 mg, 2 mg, Oral, QID PRN  •  dilTIAZem HCl (CARDIZEM) injection 10 mg, 10 mg, Intravenous, Q1H PRN  •  Metoclopramide HCl (REGLAN) injection 5 mg, 5 mg, Intravenous, Q8H PRN  •  metoprolol Tartrate (LOP 31.6   RDW 14.7 15.0 16.2*   NEPRELIM 10.13* 11.55* 13.04*   WBC 12.3* 16.0* 17.5*   .0 271.0 243.0       Recent Labs   Lab 03/10/20  0551 03/11/20  0647 03/12/20  0506   PTT 66.6* 51.8* 54.6*             Data:    Telemetry: personally reviewed; atr ([address]     Oliguria     ATN (acute tubular necrosis) (HCC)     Transaminitis     Urinary tract infection without hematuria          Lucia Bower MD  EP  [facility]

## 2020-03-12 NOTE — PROGRESS NOTES
Received drowsy, following simple commands. As the night progressed, orientation improved. Oriented to person, place. Off with date/time and situation. Reoriented frequently. BIPAP 65%, titrated down to 40%. Frequent oral care done.   Afib 120-80, On Cardi

## 2020-03-12 NOTE — PROGRESS NOTES
[facility]  Progress Note    Vero Huang Patient Status:  Inpatient    1950 MRN [id number]   [facility] 4SW-A Attending Britta Berumen MD   [facility] Day # 13 PCP Ayesha Camara MD     Subjective:  Vero Huang is a(n) 77year old 290.0 271.0 243.0     Recent Labs   Lab 03/10/20  0551 03/11/20  0647 03/12/20  0506   * 118* 87   BUN 91* 100* 87*   CREATSERUM 2.66* 3.03* 2.47*   GFRAA 20* 18* 22*   GFRNAA 18* 15* 19*   CA 9.1 8.9 8.6    146* 150*   K 3.6 3.7 3.8

## 2020-03-12 NOTE — PROGRESS NOTES
[facility]  Nephrology Progress Note    Jeremias Piña Patient Status:  Inpatient    1950 MRN [id number]   [facility] 2NE-A Attending Lizz MD Luba   [address] Day # 13 PCP Jona Bellamy MD       SUBJECTIVE:  Remains on BiPAP this 10.1 10.8 12.3* 16.0* 17.5*   HGB 11.8* 11.1* 11.2* 10.2* 8.7*   MCV 93.5 92.9 92.8 94.0 97.2   .0 215.0 290.0 271.0 243.0   BAND  --   --   --  14 3       Recent Labs   Lab 03/05/20  1311  03/08/20  0705 03/09/20  0559 03/10/20  0551 03/11/20  5021 mL, Oral, QID PRN  Calcium Carbonate Antacid (TUMS) chewable tab 500 mg, 500 mg, Oral, Daily PRN  Loperamide HCl (IMODIUM) cap 2 mg, 2 mg, Oral, QID PRN  dilTIAZem HCl (CARDIZEM) injection 10 mg, 10 mg, Intravenous, Q1H PRN  Metoclopramide HCl (REGLAN) inj

## 2020-03-12 NOTE — PLAN OF CARE
Patient received drowsy, oriented x4 on BiPAP. Patient weaned to 40% vapotherm. Increased alertness this evening. Continues heparin drip and cardizem drip. ABD distention noted today. PRN suppository given for constipation. CC MD shepherd notified.  IVF changed

## 2020-03-12 NOTE — PROGRESS NOTES
[facility]                INFECTIOUS DISEASE PROGRESS NOTE    Bertram Freed Patient Status:  Inpatient    1950 MRN [id number]   [facility] 2NE-A Attending Chantal Clark MD   [facility] Day # 13 PCP Juan Escoto MD       Antibi 134*   < > 137 138 143 146* 150*   K 3.8   < > 3.5 4.0 3.6 3.7 3.8      < > 101 105 111 115* 124*   CO2 20.0*   < > 27.0 25.0 24.0 24.0 23.0   ALKPHO 94  --  94 85  --   --   --    *  --  88* 42*  --   --   --    *  --  180* 118*  -- (although patient denies any cough), ?  Aspiration pneumonia vs. Hospital acquired pneumonia  - Infiltrates noted on CXR; new since CXR on admission  - Low grade temps, increasing leukocytosis, PCT elevated at 2.40  - Blood cx repeated 3/11, negative so far

## 2020-03-12 NOTE — PROGRESS NOTES
[facility]  Progress Note     Anne Pedro Patient Status:  Inpatient    1950 MRN [id number]   [facility] 4SW-A Attending Robinson Dalton MD   [facility] Day # 13 PCP Chad Amin MD     Chief Complaint: sob    S: Patient awake, on 20.0*   < > 27.0 25.0 24.0 24.0 23.0   ALKPHO 94  --  94 85  --   --   --    *  --  88* 42*  --   --   --    *  --  180* 118*  --   --   --    BILT 0.8  --  1.0 0.6  --   --   --    TP 8.5*  --  8.1 7.8  --   --   --     < > = values in this constipation  1. suppository  16. Melena- resolved   1. EGD unremarkable  2. Watch hgb/stools on AC  17. EITAN   1. EITAN protocol  18. Dysphagia  1. Speech eval once more alert  19. L1 fracture d/t fall  1.  Pain control     Quality:  · DVT Prophylaxis: hepari

## 2020-03-12 NOTE — SLP NOTE
Attempted to see pt for speech therapy services- pt with increased oxygen requirements at this time. Pt not appropriate. RN aware and in agreement. Will continue to follow. Thank you.     Adrienne Hester M.S. [id number] [facility]  Pager [phone]

## 2020-03-13 PROCEDURE — 99232 SBSQ HOSP IP/OBS MODERATE 35: CPT | Performed by: HOSPITALIST

## 2020-03-13 PROCEDURE — 99233 SBSQ HOSP IP/OBS HIGH 50: CPT | Performed by: INTERNAL MEDICINE

## 2020-03-13 PROCEDURE — 99232 SBSQ HOSP IP/OBS MODERATE 35: CPT | Performed by: INTERNAL MEDICINE

## 2020-03-13 NOTE — SLP NOTE
SPEECH DAILY NOTE - INPATIENT    ASSESSMENT & PLAN   ASSESSMENT  Patient seen to assess for improvement in swallow function. Patient alert and up in bed with family at bedside.   She is currently receiving high flow high humidity oxygen support at 40LPM an

## 2020-03-13 NOTE — CM/SW NOTE
Care Progression Note:  Active Acute Medical Issue:   Acute respiratory failure/multifocal PNA- IV antibiotics, nebs, BIPAP overnight, o2 weaned to 5L this am. ST to eval later today. Improving. Afib w/ RVR- cardizem gtt, heparin gtt.  CLAUDIO/cardioversion on

## 2020-03-13 NOTE — DIETARY NOTE
Guy Soler 23     Admitting diagnosis:  Abnormal EKG [R94.31]  Chest pain of uncertain etiology [Q54.60]    Ht: 160 cm (5' 3\")  Wt: 84.2 kg (185 lb 10 oz). This is 151% of IBW  Body mass index is 32.88 kg/m².   IBW

## 2020-03-13 NOTE — PROGRESS NOTES
Received AO x3-4. A bit off with date/time. Following commands  On Vapotherm 40%/40L; switched to BIPAP 40% at bedtime  Afib with Cardizem and Heparin drips infusing; BP stable. Afebrile. No complaint of pain. +distended abdomen.  KUB done (-); + small BM

## 2020-03-13 NOTE — PROGRESS NOTES
[facility]  Progress Note    Sandi Mathew Patient Status:  Inpatient    1950 MRN [id number]   [facility] 4SW-A Attending Dread Marin MD   [facility] Day # 12 PCP Donnie Snowden MD     Subjective:  Sandi Mathew is a(n) 77year old NEPRELIM 11.55* 13.04* 11.11*   WBC 16.0* 17.5* 15.9*   .0 243.0 232.0     Recent Labs   Lab 03/11/20  0647 03/12/20  0506 03/13/20  0449   * 87 155*   * 87* 60*   CREATSERUM 3.03* 2.47* 1.95*   GFRAA 18* 22* 30*   GFRNAA 15* 19* 26* ngtd  · Continue scheduled nebulized bronchodilators and airway clearance therapies  · Continue cardizem gtt for rate control; cardiology following  · Monitor MS closely  · Aspiration precautions; will need swallow eval prior to reinstituting meals/feeds

## 2020-03-13 NOTE — PROGRESS NOTES
[facility]                INFECTIOUS DISEASE PROGRESS NOTE    Tyson Singh Patient Status:  Inpatient    1950 MRN [id number]   [facility] 2NE-A Attending Kyle Márquez,  [facility] Day # 12 PCP Rere House MD       Antibi > 3.7 3.8 3.7    105   < > 115* 124* 122*   CO2 27.0 25.0   < > 24.0 23.0 21.0   ALKPHO 94 85  --   --  71  --    AST 88* 42*  --   --  19  --    * 118*  --   --  43  --    BILT 1.0 0.6  --   --  0.5  --    TP 8.1 7.8  --   --  6.5  --     < > Infiltrates noted on CXR; new since CXR on admission  - Low grade temps, increasing leukocytosis, PCT elevated at 2.40  - Blood cx repeated 3/11, negative so far  - Follow CXRs  - Continue zosyn pending micro updates; s/p vancomycin x 1 (3/11)    2.  ELEAZAR on

## 2020-03-13 NOTE — PLAN OF CARE
Problem: CARDIOVASCULAR - ADULT  Goal: Maintains optimal cardiac output and hemodynamic stability  Description  INTERVENTIONS:  - Monitor vital signs, rhythm, and trends  - Monitor for bleeding, hypotension and signs of decreased cardiac output  - Evalua adequate hydration with IV or PO as ordered and tolerated  - Evaluate effectiveness of GI medications  - Encourage mobilization and activity  - Obtain nutritional consult as needed  - Establish a toileting routine/schedule  - Consider collaborating with ph

## 2020-03-13 NOTE — PROGRESS NOTES
Pharmacy Note:  Renal Adjustment for piperacillin/tazobactam (Gary Aristides)         Venkat Loo is a 77year old female who has been prescribed piperacillin/tazobactam 3.375g q12hr (freq originally adjusted d/t ELEAZAR)    Est CrCl: ~24 mL/min (ELEAZAR - SCr trendi

## 2020-03-13 NOTE — PROGRESS NOTES
[facility]  Progress Note    Deandre Hides Patient Status:  Inpatient    1950 MRN [id number]   [facility] 4SW-A Attending Dang Major MD   [facility] Day # 12 PCP Jonathan Ramirez MD     Assessment:  1.   Persistent afib - rates currentl Date    TROP <0.045 02/27/2020    TROP <0.045 02/27/2020    TROP <0.045 02/26/2020        Medications:    • piperacillin-tazobactam  3.375 g Intravenous Q8H   • ipratropium-albuterol  3 mL Nebulization 6 times per day   • ipratropium-albuterol  3 mL Nebuli

## 2020-03-13 NOTE — PROGRESS NOTES
[facility]  Progress Note     Luellen Light Patient Status:  Inpatient    1950 MRN [id number]   [facility] 4SW-A Attending aTbby Silva MD   [address] Day # 12 PCP Pema Wilkins MD     Chief Complaint: sob    S: Patient awake, den --   --  2.0*  --     138   < > 146* 150* 147*   K 3.5 4.0   < > 3.7 3.8 3.7    105   < > 115* 124* 122*   CO2 27.0 25.0   < > 24.0 23.0 21.0   ALKPHO 94 85  --   --  71  --    AST 88* 42*  --   --  19  --    * 118*  --   --  43  --    B yesterday  17. Melena, resolved   1. EGD unremarkable, Watch hgb/stools on AC  18. EITAN   1. EITAN protocol  19. Dysphagia  1. Speech eval once more alert, remains npo. May need dobhoff for TF  20. L1 fracture d/t fall  1.  Pain control     Quality:  · DVT Pr

## 2020-03-13 NOTE — PHYSICAL THERAPY NOTE
PHYSICAL THERAPY EVALUATION - INPATIENT     Room Number: 471/471-A  Evaluation Date: 3/13/2020  Type of Evaluation: Re-evaluation  Physician Order: PT Eval and Treat    Presenting Problem: pneumonia  Reason for Therapy: Mobility Dysfunction and Disch BLEED N/A 10/24/2017    Performed by Toi Wall MD at [address]     • TOTAL ABDOM HYSTERECTOMY         HOME SITUATION  Type of Home: House   Home Layout: Two level  Stairs to Enter : 2  Railing: No  Stairs to Bed NEUROLOGICAL FINDINGS  Neurological Findings: Sensation           Sensation: intact to light touch throughout       ACTIVITY TOLERANCE                         O2 WALK                  AM-PAC '6-Clicks' INPATIENT SHORT FORM chair using sit-stand lift. Pt given VC for hip extension and posture. Pt returned to sitting up in bedside chair. Pt left with call light in reach. RN aware.      Exercise/Education Provided:  Bed mobility  Energy conservation  Functional activity tolerate training;Balance training  Rehab Potential : Good  Frequency (Obs): 5x/week  Number of Visits to Meet Established Goals: 5    CURRENT GOALS    Goal #1 Patient is able to demonstrate supine - sit EOB @ level: minimum assistance     Goal #2 Patient is able t

## 2020-03-13 NOTE — PROGRESS NOTES
[facility]  Nephrology Progress Note    Irasema Rosales Patient Status:  Inpatient    1950 MRN [id number]   [facility] 2NE-A Attending Connor Gutierrez MD   [facility] Day # 12 PCP Mynor Martines MD       SUBJECTIVE:  Looks much better tod 03/08/20  0307 03/09/20  0559 03/12/20  0506 03/13/20  0449   WBC 10.8 12.3* 16.0* 17.5* 15.9*   HGB 11.1* 11.2* 10.2* 8.7* 8.6*   MCV 92.9 92.8 94.0 97.2 96.8   .0 290.0 271.0 243.0 232.0   BAND  --   --  14 3 11       Recent Labs   Lab 03/08/20  0 Oral, Daily PRN  Loperamide HCl (IMODIUM) cap 2 mg, 2 mg, Oral, QID PRN  dilTIAZem HCl (CARDIZEM) injection 10 mg, 10 mg, Intravenous, Q1H PRN  Metoclopramide HCl (REGLAN) injection 5 mg, 5 mg, Intravenous, Q8H PRN  metoprolol Tartrate (LOPRESSOR) 5 MG/5ML

## 2020-03-14 ENCOUNTER — APPOINTMENT (OUTPATIENT)
Dept: GENERAL RADIOLOGY | Facility: HOSPITAL | Age: 70
DRG: 299 | End: 2020-03-14
Attending: INTERNAL MEDICINE
Payer: MEDICARE

## 2020-03-14 ENCOUNTER — APPOINTMENT (OUTPATIENT)
Dept: GENERAL RADIOLOGY | Facility: HOSPITAL | Age: 70
DRG: 299 | End: 2020-03-14
Attending: NURSE PRACTITIONER
Payer: MEDICARE

## 2020-03-14 PROCEDURE — 74018 RADEX ABDOMEN 1 VIEW: CPT | Performed by: NURSE PRACTITIONER

## 2020-03-14 PROCEDURE — 71045 X-RAY EXAM CHEST 1 VIEW: CPT | Performed by: INTERNAL MEDICINE

## 2020-03-14 PROCEDURE — 99233 SBSQ HOSP IP/OBS HIGH 50: CPT | Performed by: INTERNAL MEDICINE

## 2020-03-14 PROCEDURE — 99232 SBSQ HOSP IP/OBS MODERATE 35: CPT | Performed by: HOSPITALIST

## 2020-03-14 RX ORDER — DEXTROSE MONOHYDRATE 50 MG/ML
INJECTION, SOLUTION INTRAVENOUS CONTINUOUS
Status: DISCONTINUED | OUTPATIENT
Start: 2020-03-14 | End: 2020-03-16

## 2020-03-14 RX ORDER — IPRATROPIUM BROMIDE AND ALBUTEROL SULFATE 2.5; .5 MG/3ML; MG/3ML
3 SOLUTION RESPIRATORY (INHALATION)
Status: DISCONTINUED | OUTPATIENT
Start: 2020-03-14 | End: 2020-03-19

## 2020-03-14 NOTE — PLAN OF CARE
3/13 1930: Pt received AOx2, sitting up in the chair. 5L high flow NC. At-fib. Cardizem and Heparin drip. SCD.  2100: Pt back to bed, tolerated sitting up for 2 hrs. Denies pain. 3/14 0000: Pt tolerating BIPAP. Turn q2hr.   0600: Pt vomitted 150cc of green RESPIRATORY - ADULT  Goal: Achieves optimal ventilation and oxygenation  Description  INTERVENTIONS:  - Assess for changes in respiratory status  - Assess for changes in mentation and behavior  - Position to facilitate oxygenation and minimize respiratory

## 2020-03-14 NOTE — PROGRESS NOTES
[facility]  Progress Note    Kerry Blake Patient Status:  Inpatient    1950 MRN [id number]   [facility] 4SW-A Attending Alessandra Sims MD   [facility] Day # 16 PCP Sandip Caballero MD     Subjective:  Kerry Blake is a(n) 77year old 97.2 96.8 96.7   MCH 30.7 30.7 30.5 30.3   MCHC 32.7 31.6 31.5 31.3   RDW 15.0 16.2* 16.4* 16.1*   NEPRELIM 11.55* 13.04* 11.11*  --    WBC 16.0* 17.5* 15.9* 16.1*   .0 243.0 232.0 219.0     Recent Labs   Lab 03/12/20  0506 03/13/20  0449 03/14/20 controlled  · CAD s/p CABG     Plan:  ·  continue to wean NIPPV, will change to PRN. Continue to wean o2 for sats >89%; down to 5L now  · Continue zosyn day 4 for HAP/aspiration pna.    · Awaiting finalization of cultures; ngtd thus far  · Continue schedul

## 2020-03-14 NOTE — PLAN OF CARE
RECEIVED PT FOLLOWING AM REPORT. PT  AT BEDSIDE. VSS. REMAINS ON CARDIZEM AND HEPARIN GTT. ON HIGHFLOW NC 5L. DENIES PAIN. SPEECH EVALUATING PT.-DURING EVALUATION PT TOOK SIPS OF WATER AND STARTED TO VOMIT UP LARGE AMOUNT OF BROWN EMESIS.  APN NOTIFI

## 2020-03-14 NOTE — PLAN OF CARE
Received pt this AM. A&Ox4 throughout morning but as shift progressed, pt became more confused. Weaned from vapotherm to HFNC and tolerating well. OOB to chair with standby assist. HR remains in afib on heparin and cardizem gtt's.  Ana Knee in place but at

## 2020-03-14 NOTE — PROGRESS NOTES
Received pt from ICU at 1800  Aox4, resting calmly in bed.  at bedside. AFIB on cardiac monitor. Adequate saturation on 5 liters hi-flow canula. VSS. Heparin gtt, cardizem gtt and D5W infusion running, along with IV zosyn.  Denies SOB, chest discomfo

## 2020-03-14 NOTE — PROGRESS NOTES
[facility]                INFECTIOUS DISEASE PROGRESS NOTE    Bertram Freed Patient Status:  Inpatient    1950 MRN [id number]   [facility] 2NE-A Attending Chantal Clark MD   [facility] Day # 16 PCP Juan Escoto MD       Antibi --  0.5  --   --    TP 8.1 7.8  --  6.5  --   --     < > = values in this interval not displayed. No results found for: [facility] Encounter on 02/26/20   1.  URINE CULTURE, ROUTINE     Status: None    Collection Time: 03/12/20

## 2020-03-14 NOTE — OCCUPATIONAL THERAPY NOTE
OCCUPATIONAL THERAPY EVALUATION - INPATIENT     Room Number: 471/471-A  Evaluation Date: 3/13/2020  Type of Evaluation: {OT Type of Evaluation:4198}       Physician Order: IP Consult to Occupational Therapy  Reason for Therapy: ADL/IADL Dysfunction and Dis GRAFT N/A 10/24/2017    Performed by Yuliana Herman MD at [address] [place] N/A 10/24/2017    Performed by Yuliana Herman MD at [address]     • TOTAL ABDOM HYSTERECTOMY         OCCUPATIONAL PROFILE    HO OT evaluation patient presents with the following performance deficits: ***. These deficits impact the patient’s ability to participate in ***, instrumental activities of daily living, rest and sleep, work, leisure and social participation.      The patient

## 2020-03-14 NOTE — PROGRESS NOTES
[facility]  Nephrology Progress Note    Festus Butt Patient Status:  Inpatient    1950 MRN [id number]   [facility] 2NE-A Attending Brianna Arndt MD   [facility] Day # 16 PCP Yvonne Scott MD       SUBJECTIVE:  Awake and alert this Recent Labs   Lab 03/08/20  0062 03/09/20  0559 03/12/20  0506 03/13/20  0449 03/14/20  0433   WBC 12.3* 16.0* 17.5* 15.9* 16.1*   HGB 11.2* 10.2* 8.7* 8.6* 8.2*   MCV 92.8 94.0 97.2 96.8 96.7   .0 271.0 243.0 232.0 219.0   BAND  --  14 3 11  -- suspension 30 mL, 30 mL, Oral, QID PRN  Calcium Carbonate Antacid (TUMS) chewable tab 500 mg, 500 mg, Oral, Daily PRN  Loperamide HCl (IMODIUM) cap 2 mg, 2 mg, Oral, QID PRN  dilTIAZem HCl (CARDIZEM) injection 10 mg, 10 mg, Intravenous, Q1H PRN  Metoclopra

## 2020-03-14 NOTE — SLP NOTE
SPEECH DAILY NOTE - INPATIENT    ASSESSMENT & PLAN   ASSESSMENT  Pt seen this AM for reassessment of swallow function. RN approved session. Pt seated upright in bed- alert, cooperative, and pleasant. Pt's  present at bedside.  Pt responding appropria

## 2020-03-15 ENCOUNTER — APPOINTMENT (OUTPATIENT)
Dept: GENERAL RADIOLOGY | Facility: HOSPITAL | Age: 70
DRG: 299 | End: 2020-03-15
Attending: SURGERY
Payer: MEDICARE

## 2020-03-15 ENCOUNTER — APPOINTMENT (OUTPATIENT)
Dept: CT IMAGING | Facility: HOSPITAL | Age: 70
DRG: 299 | End: 2020-03-15
Attending: HOSPITALIST
Payer: MEDICARE

## 2020-03-15 PROCEDURE — 99232 SBSQ HOSP IP/OBS MODERATE 35: CPT | Performed by: INTERNAL MEDICINE

## 2020-03-15 PROCEDURE — 71045 X-RAY EXAM CHEST 1 VIEW: CPT | Performed by: SURGERY

## 2020-03-15 PROCEDURE — 99233 SBSQ HOSP IP/OBS HIGH 50: CPT | Performed by: HOSPITALIST

## 2020-03-15 PROCEDURE — 71250 CT THORAX DX C-: CPT | Performed by: HOSPITALIST

## 2020-03-15 PROCEDURE — 99223 1ST HOSP IP/OBS HIGH 75: CPT | Performed by: SURGERY

## 2020-03-15 PROCEDURE — 74176 CT ABD & PELVIS W/O CONTRAST: CPT | Performed by: HOSPITALIST

## 2020-03-15 RX ORDER — DIPHENHYDRAMINE HYDROCHLORIDE 50 MG/ML
50 INJECTION INTRAMUSCULAR; INTRAVENOUS ONCE
Status: COMPLETED | OUTPATIENT
Start: 2020-03-16 | End: 2020-03-16

## 2020-03-15 RX ORDER — DIPHENHYDRAMINE HYDROCHLORIDE 50 MG/ML
50 INJECTION INTRAMUSCULAR; INTRAVENOUS ONCE
Status: DISCONTINUED | OUTPATIENT
Start: 2020-03-15 | End: 2020-03-15

## 2020-03-15 RX ORDER — METHYLPREDNISOLONE SODIUM SUCCINATE 40 MG/ML
40 INJECTION, POWDER, LYOPHILIZED, FOR SOLUTION INTRAMUSCULAR; INTRAVENOUS EVERY 6 HOURS
Status: COMPLETED | OUTPATIENT
Start: 2020-03-15 | End: 2020-03-16

## 2020-03-15 RX ORDER — METHYLPREDNISOLONE SODIUM SUCCINATE 40 MG/ML
40 INJECTION, POWDER, LYOPHILIZED, FOR SOLUTION INTRAMUSCULAR; INTRAVENOUS EVERY 6 HOURS
Status: DISCONTINUED | OUTPATIENT
Start: 2020-03-15 | End: 2020-03-15

## 2020-03-15 NOTE — CONSULTS
Sonia Bryant is a 77year old female  Patient presents with:  Chest Pain Angina      REFERRED BY    Patient presents with vomiting for the past 3 days. Patient states she has vomited every day for the past 3 days.   She states she stopped having bowel m No current facility-administered medications on file prior to encounter. spironolactone 25 MG Oral Tab, Take 1 tablet (25 mg total) by mouth daily. , Disp: , Rfl: 0  hydrALAzine HCl 25 MG Oral Tab, Take 75 mg by mouth 3 (three) times daily. , Disp: , R urine  MUSCULOSKELETAL: no joint complaints upper or lower extremities  HEMATOLOGY: denies hx anemia; denies bruising or excessive bleeding  Endocrine: no weight gain or loss no hot or cold intolerance    EXAM     Blood pressure 109/73, pulse 92, temperatu Prescriptions     Signed Prescriptions Disp Refills   • lidocaine-menthol 4-1 % External Patch 30 patch 0     Sig: Place 1 patch onto the skin daily.    • Metoprolol Tartrate 100 MG Oral Tab 60 tablet 3     Sig: Take 1 tablet (100 mg total) by mouth 2x Carolin OUTPUT  INTAKE AND OUTPUT  INTAKE AND OUTPUT  INTAKE AND OUTPUT  PATIENT EDUCATION  NURSING COMMUNICATION  NURSING COMMUNICATION  NURSING COMMUNICATION  NURSING COMMUNICATION  NURSING COMMUNICATION  INITIATE HEPARIN LOW DOSE IV FOR ACUTE CORONARY/AFIB PROT (CPT=71045)  IP CONSULT TO CARDIOLOGY  IP CONSULT TO HOSPITALIST  IP CONSULT TO GASTROENTEROLOGY  IP CONSULT TO HEART FAILURE COORDINATOR  IP CONSULT TO FOOD AND NUTRITION SERVICES  IP CONSULT TO CARDIAC REHAB  IP CONSULT TO INFECTIOUS DISEASE  IP CONSULT

## 2020-03-15 NOTE — PROGRESS NOTES
[facility]  Progress Note     Festus Daquan Patient Status:  Inpatient    1950 MRN [id number]   [facility] 2NE-A Attending Fernanda Rowley MD   [address] Day # 25 PCP Yvonne Scott MD     Chief Complaint: sob    S: Patient denies abd --  71  --   --   --    AST 42*  --  19  --   --   --    *  --  43  --   --   --    BILT 0.6  --  0.5  --   --   --    TP 7.8  --  6.5  --   --   --     < > = values in this interval not displayed.        Estimated Creatinine Clearance: 33.2 mL/min (

## 2020-03-15 NOTE — PROGRESS NOTES
[facility]  Nephrology Progress Note    Quinton Baron Patient Status:  Inpatient    1950 MRN [id number]   [facility] 2NE-A Attending Cheryl Vivas MD   [facility] Day # 25 PCP Rose Marie Corbett MD       SUBJECTIVE:  No overnight events, extremities  Skin: Warm and dry, no rash        Labs:     Recent Labs   Lab 03/09/20  0559 03/12/20  0506 03/13/20  0449 03/14/20  0433   WBC 16.0* 17.5* 15.9* 16.1*   HGB 10.2* 8.7* 8.6* 8.2*   MCV 94.0 97.2 96.8 96.7   .0 243.0 232.0 219.0   BAND (MAALOX) oral suspension 30 mL, 30 mL, Oral, QID PRN  Calcium Carbonate Antacid (TUMS) chewable tab 500 mg, 500 mg, Oral, Daily PRN  Loperamide HCl (IMODIUM) cap 2 mg, 2 mg, Oral, QID PRN  dilTIAZem HCl (CARDIZEM) injection 10 mg, 10 mg, Intravenous, Q1H P

## 2020-03-15 NOTE — PROGRESS NOTES
[facility]  Progress Note     Linford Clamp Patient Status:  Inpatient    1950 MRN [id number]   [facility] 2NE-A Attending Alessandra Sims MD   [facility] Day # 16 PCP Sandip Caballero MD     Chief Complaint: sob    S: Patient is more aw 3.8 3.7 3.5    105   < > 124* 122* 117*   CO2 27.0 25.0   < > 23.0 21.0 21.0   ALKPHO 94 85  --  71  --   --    AST 88* 42*  --  19  --   --    * 118*  --  43  --   --    BILT 1.0 0.6  --  0.5  --   --    TP 8.1 7.8  --  6.5  --   --     < > =

## 2020-03-15 NOTE — PROGRESS NOTES
[facility]  Progress Note    Abbi Sweet Patient Status:  Inpatient    1950 MRN [id number]   [facility] 4SW-A Attending Marilyn Garber MD   [facility] Day # 25 PCP Dhiraj Vale MD     Subjective:  Abbi Sweet is a(n) 77year old 03/13/20  0449 03/14/20  0433   RBC 3.32* 2.83* 2.82* 2.71*   HGB 10.2* 8.7* 8.6* 8.2*   HCT 31.2* 27.5* 27.3* 26.2*   MCV 94.0 97.2 96.8 96.7   MCH 30.7 30.7 30.5 30.3   MCHC 32.7 31.6 31.5 31.3   RDW 15.0 16.2* 16.4* 16.1*   NEPRELIM 11.55* 13.04* 11.11* resolved  · Multifocal infiltrates not present on admission with increased PCT and WBC concerning for nosocomial pneumonia vs aspiration  · Dysphagia  · Acute on chronic kidney disease  · Altered mental status  · EITAN on CPAP- declining use in hospital  · a

## 2020-03-15 NOTE — PLAN OF CARE
Problem: CARDIOVASCULAR - ADULT  VSS, , Aflutter , rate . Plan: Continue Telemetry monitoring, Diltiazem gtt 15 mg per hour, Metoprolol 7.5 mg IVP Q 6 hours.   VS q 4 hours and PRN symptoms  Goal: Maintains optimal cardiac output and hemodynamic st Goal  Description  Patient's Long Term Goal: home    Interventions:  - Follow up in heart failure clinic  - See additional Care Plan goals for specific interventions    Outcome: Progressing  Goal: Patient/Family Short Term Goal  Description  Patient's Alondra Espinoza for opioid side effects  - Notify MD/LIP if interventions unsuccessful or patient reports new pain  - Anticipate increased pain with activity and pre-medicate as appropriate  Outcome: Progressing     Problem: GASTROINTESTINAL - ADULT  Abdominal distention i.e. lights on, blinds open  - Promote sleep, encourage patient's normal rest cycle i.e. lights off, TV off, minimize noise and interruptions  - Encourage family to assist in orientation and promotion of home routines  Outcome: Progressing

## 2020-03-15 NOTE — PLAN OF CARE
@00:15- no urine output in 7 hours, distended abdomen, pt reports feeling pressure but unable to void, bladder scan 495 ml, baez cath placed, MD called with update      Alert to name and location- delirium has not resolved as per Dr. Eleuterio Banks note  Heparin output and hemodynamic stability  Description  INTERVENTIONS:  - Monitor vital signs, rhythm, and trends  - Monitor for bleeding, hypotension and signs of decreased cardiac output  - Evaluate effectiveness of vasoactive medications to optimize hemodynamic

## 2020-03-16 ENCOUNTER — APPOINTMENT (OUTPATIENT)
Dept: CT IMAGING | Facility: HOSPITAL | Age: 70
DRG: 299 | End: 2020-03-16
Attending: HOSPITALIST
Payer: MEDICARE

## 2020-03-16 ENCOUNTER — APPOINTMENT (OUTPATIENT)
Dept: GENERAL RADIOLOGY | Facility: HOSPITAL | Age: 70
DRG: 299 | End: 2020-03-16
Attending: HOSPITALIST
Payer: MEDICARE

## 2020-03-16 PROCEDURE — 99232 SBSQ HOSP IP/OBS MODERATE 35: CPT | Performed by: SURGERY

## 2020-03-16 PROCEDURE — 71045 X-RAY EXAM CHEST 1 VIEW: CPT | Performed by: HOSPITALIST

## 2020-03-16 PROCEDURE — 02HV33Z INSERTION OF INFUSION DEVICE INTO SUPERIOR VENA CAVA, PERCUTANEOUS APPROACH: ICD-10-PCS | Performed by: INTERNAL MEDICINE

## 2020-03-16 PROCEDURE — B548ZZA ULTRASONOGRAPHY OF SUPERIOR VENA CAVA, GUIDANCE: ICD-10-PCS | Performed by: INTERNAL MEDICINE

## 2020-03-16 PROCEDURE — 71275 CT ANGIOGRAPHY CHEST: CPT | Performed by: HOSPITALIST

## 2020-03-16 PROCEDURE — 74175 CTA ABDOMEN W/CONTRAST: CPT | Performed by: HOSPITALIST

## 2020-03-16 PROCEDURE — 99233 SBSQ HOSP IP/OBS HIGH 50: CPT | Performed by: INTERNAL MEDICINE

## 2020-03-16 PROCEDURE — 99291 CRITICAL CARE FIRST HOUR: CPT | Performed by: INTERNAL MEDICINE

## 2020-03-16 PROCEDURE — 99233 SBSQ HOSP IP/OBS HIGH 50: CPT | Performed by: HOSPITALIST

## 2020-03-16 RX ORDER — HYDRALAZINE HYDROCHLORIDE 20 MG/ML
10 INJECTION INTRAMUSCULAR; INTRAVENOUS ONCE
Status: COMPLETED | OUTPATIENT
Start: 2020-03-16 | End: 2020-03-16

## 2020-03-16 RX ORDER — SODIUM CHLORIDE 0.9 % (FLUSH) 0.9 %
10 SYRINGE (ML) INJECTION AS NEEDED
Status: DISCONTINUED | OUTPATIENT
Start: 2020-03-16 | End: 2020-03-24

## 2020-03-16 RX ORDER — HYDRALAZINE HYDROCHLORIDE 20 MG/ML
INJECTION INTRAMUSCULAR; INTRAVENOUS
Status: COMPLETED
Start: 2020-03-16 | End: 2020-03-16

## 2020-03-16 RX ORDER — NITROGLYCERIN 20 MG/100ML
INJECTION INTRAVENOUS CONTINUOUS
Status: DISCONTINUED | OUTPATIENT
Start: 2020-03-16 | End: 2020-03-23

## 2020-03-16 RX ORDER — SODIUM CHLORIDE 9 MG/ML
INJECTION, SOLUTION INTRAVENOUS CONTINUOUS
Status: DISCONTINUED | OUTPATIENT
Start: 2020-03-16 | End: 2020-03-17

## 2020-03-16 NOTE — PROGRESS NOTES
[facility]  Progress Note    Irasema Rosales Patient Status:  Inpatient    1950 MRN [id number]   [facility] 6NE-A Attending Neha Cyr MD   [facility] Day # 23 PCP Mynor Martines MD     Subjective:  No new complaints.  She denies chest hyperglycemia     Hypernatremia     Acute blood loss anemia     S/P CABG (coronary artery bypass graft)     ELEAZAR (acute kidney injury) (HCC)     Hypokalemia     Obesity (BMI 30-39. 9)     Altered mental status     Fatigue     Benign essential HTN     Dyslipi minutes. Greater than half of our visit was spent in counseling the patient on the above listed diagnoses and treatment options.       Dolly Moreno DO

## 2020-03-16 NOTE — PROGRESS NOTES
Received pt. From CTU 2 at 2030. Pt. Drowsy, but oriented to self, place, and situation. Disoriented to time. VSS - afib per monitor, HR ranging from 's. Denies any pain, SOB, or chest pain.  Absent bowel sounds at start of shift, hypoactive bowel vilma

## 2020-03-16 NOTE — PROGRESS NOTES
[facility]  Progress Note    Jeremias Piña Patient Status:  Inpatient    1950 MRN [id number]   [facility] 6NE-A Attending Zamzam Edmonds MD   [facility] Day # 23 PCP Jona Bellamy MD     Subjective:  Jeremias Piña is a(n) 77year old 30.3   MCHC 31.6 31.5 31.3   RDW 16.2* 16.4* 16.1*   NEPRELIM 13.04* 11.11*  --    WBC 17.5* 15.9* 16.1*   .0 232.0 219.0     Recent Labs   Lab 03/14/20  0433 03/15/20  0614 03/16/20  0629   * 110* 149*   BUN 35* 20* 12   CREATSERUM 1.52* 1.3 swallow eval prior to reinstituting meals/feeds  · Note plans to defer CLAUDIO/cardioversion given worsening respiratory status  · Ppx: SCDs, heparin gtt, h2b         Zain Bentley SR.  Crit care 35  3/16/2020  7:31 AM

## 2020-03-16 NOTE — CM/SW NOTE
SBO vs ileus per MD notes. Pt has been declining [facility]. Residential home healthcare  P:[phone]  F:[phone] has already been arranged. SW will follow. Monitoring for possible home 02.     Roselia Cao LCSW

## 2020-03-16 NOTE — PROGRESS NOTES
Cardiology Progress Note:    She was admitted with transient chest pain, which resolved by the time that I met her, when I was asked to see her for her atrial fibrillation with RVR from an EP perspective.     CT C/A/P today showed type A aortic dissection a

## 2020-03-16 NOTE — PHYSICAL THERAPY NOTE
Pt with new CTA findings of Type A dissection with large intramural hematoma extending to the aortic hiatus.  CV surgery is consulted.   Will hold physical therapy treatment at this time, but will continue to follow peripherally until clinically appropriate

## 2020-03-16 NOTE — DIETARY NOTE
[facility]    NUTRITION ASSESSMENT    Pt does not meet malnutrition criteria.     NUTRITION DIAGNOSIS/PROBLEM:    Predicted suboptimal energy intake related to medical therapy that is predicted to decrease ability to consume sufficient intake as eviden per MD discretion    MONITOR AND EVALUATE/NUTRITION GOALS:    3. No signs of skin breakdown  4. Maintain lean body mass  6.  Alternative means of nutrition at goal to meet 100% patient nutrition prescription    MEDICATIONS:  Pepcid, Solumedrol, Abx, IVF

## 2020-03-16 NOTE — PLAN OF CARE
Received patient at 0730 awake and alert. No complaints of pain. No nausea or vomiting. Bowel sounds present. Passing gas. IV fluids 100 cc per renal/pulmonary. Cardizem at 15 mg/hr. CTA of Chest/ABD/pelvis results called to Dr. Elian Stephenson.   Dr. Treviño Labs

## 2020-03-16 NOTE — PROGRESS NOTES
[facility]  Progress Note     Burton Mohs Patient Status:  Inpatient    1950 MRN [id number]   [facility] 2NE-A Attending Nereida Dudley MD   [facility] Day # 23 PCP Blayne Phipps MD     Chief Complaint: sob    S: Patient awake.  RT values in this interval not displayed. Estimated Creatinine Clearance: 35.3 mL/min (A) (based on SCr of 1.26 mg/dL (H)). No results for input(s): PTP, INR in the last 168 hours. No results for input(s): TROP, CK in the last 168 hours.          I imaging     Quality:  · DVT Prophylaxis: heparin gtt  · CODE status: Full  · Rangel: yes    Estimated date of discharge: tbd    Case d/w RN, MD, pt.  F/u on imaging.       Korin MENDOZA  10:04 AM     Addendum:   Cards APN notified me of + type A dissect

## 2020-03-16 NOTE — PROGRESS NOTES
Pt seen and examined by Dr. Kita Hemphill. Long discussion had with the family that risk of surgery is roughly equal to risk of rupture at this point, that being 50% mortality if we proceed with surgery & 50% mortality/rupture if no intervention at this point.  The p

## 2020-03-16 NOTE — SLP NOTE
Attempted follow up, patient to remain NPO per RN. Will hold at this time and continue follow.     Cong [facility] Gonzales 87 CCC-SLP  Pager [phone]

## 2020-03-16 NOTE — OCCUPATIONAL THERAPY NOTE
Attempted to see pt for session, but pt scheduled to have CT this morning. Per RN pending results pt may be more appropriate in the afternoon. Will try later pending results and as time permits. RN in agreement with this plan.

## 2020-03-16 NOTE — PROGRESS NOTES
[facility]    Electrophysiology Follow Up Note      Festus Butt Patient Status:  Inpatient    1950 MRN [id number]   [facility] 6NE-A Attending Fernanda Rowley MD   [facility] Day # 23 PCP Yvonne Scott MD     Reason for 200-3,000 Units/hr, Intravenous, Continuous  •  benzocaine-menthol (CEPACOL (SUGAR-FREE)) 1 lozenge, 1 lozenge, Oral, PRN  •  Alum & Mag Hydroxide-Simeth (MAALOX) oral suspension 30 mL, 30 mL, Oral, QID PRN  •  Calcium Carbonate Antacid (TUMS) chewable tab 3.5 3.4* 3.8   * 114* 112   CO2 21.0 25.0 22.0       Recent Labs   Lab 03/12/20  0506 03/13/20  0449 03/14/20  0433   RBC 2.83* 2.82* 2.71*   HGB 8.7* 8.6* 8.2*   HCT 27.5* 27.3* 26.2*   MCV 97.2 96.8 96.7   MCH 30.7 30.5 30.3   MCHC 31.6 31.5 31.3 Altered mental status     Fatigue     Benign essential HTN     Dyslipidemia     Altered mental status, unspecified altered mental status type     Leukocytosis     Weakness     RIVERA (dyspnea on exertion)     Chest pain of uncertain etiology     Abnormal EKG

## 2020-03-16 NOTE — PROGRESS NOTES
[facility]  Nephrology Progress Note    Tyson Singh Patient Status:  Inpatient    1950 MRN [id number]   [facility] 2NE-A Attending Dominguez Bourgeois MD   [facility] Day # 23 PCP Rere House MD       SUBJECTIVE  Events from yest and clubbing, cyanosis or edema.   Neurologic:  moving all extremities  Skin: Warm and dry, no rash        Labs:     Recent Labs   Lab 03/12/20  0506 03/13/20  0449 03/14/20  0433   WBC 17.5* 15.9* 16.1*   HGB 8.7* 8.6* 8.2*   MCV 97.2 96.8 96.7   .0 232 Oral, PRN  Alum & Mag Hydroxide-Simeth (MAALOX) oral suspension 30 mL, 30 mL, Oral, QID PRN  Calcium Carbonate Antacid (TUMS) chewable tab 500 mg, 500 mg, Oral, Daily PRN  Loperamide HCl (IMODIUM) cap 2 mg, 2 mg, Oral, QID PRN  dilTIAZem HCl (CARDIZEM) inj

## 2020-03-17 PROCEDURE — 99233 SBSQ HOSP IP/OBS HIGH 50: CPT | Performed by: INTERNAL MEDICINE

## 2020-03-17 PROCEDURE — 99232 SBSQ HOSP IP/OBS MODERATE 35: CPT | Performed by: SURGERY

## 2020-03-17 PROCEDURE — 99232 SBSQ HOSP IP/OBS MODERATE 35: CPT | Performed by: HOSPITALIST

## 2020-03-17 RX ORDER — FUROSEMIDE 10 MG/ML
40 INJECTION INTRAMUSCULAR; INTRAVENOUS ONCE
Status: COMPLETED | OUTPATIENT
Start: 2020-03-17 | End: 2020-03-17

## 2020-03-17 RX ORDER — POTASSIUM CHLORIDE 29.8 MG/ML
40 INJECTION INTRAVENOUS ONCE
Status: COMPLETED | OUTPATIENT
Start: 2020-03-17 | End: 2020-03-17

## 2020-03-17 NOTE — PROGRESS NOTES
[facility]  Progress Note    Viviane Quintero Patient Status:  Inpatient    1950 MRN [id number]   [facility] 6NE-A Attending Brianna Fowler MD   [facility] Day # 21 PCP Bethany Baird MD     Subjective:  Patient has a history of dementia. Stress hyperglycemia     Hypernatremia     Acute blood loss anemia     S/P CABG (coronary artery bypass graft)     ELEAZAR (acute kidney injury) (HCC)     Hypokalemia     Obesity (BMI 30-39. 9)     Altered mental status     Fatigue     Benign essential HTN to reflect my opinion. The treatment plan for today is patient seen and examined her abdomen is soft she denies any abdominal pain. She had 3 bowel movements overnight and she states she is hungry. She denies any further vomiting.   Case discussed

## 2020-03-17 NOTE — PROGRESS NOTES
Assumed care of pt. At [address]. Pt. A & O to self, place and situation. A-fib. Pt. Denies any N/V, + bowel sounds, liquid stool x3. SBP goal <120, nitro gtt. Cardizem gtt. Pt. Updated on plan of care. Will continue to monitor closely.

## 2020-03-17 NOTE — PROGRESS NOTES
[facility]  Nephrology Progress Note    Quinton Baron Patient Status:  Inpatient    1950 MRN [id number]   [facility] 2NE-A Attending Cheryl Vivas MD   Hosp Day # 20 PCP Rose Marie Corbett MD       SUBJECTIVE  + loose stools  Denies MCG/INH inhaler 1 puff, 1 puff, Inhalation, Daily  lidocaine-menthol 4-1 % 1 patch, 1 patch, Transdermal, Daily  acetaminophen (TYLENOL) tab 650 mg, 650 mg, Oral, Q6H PRN  ondansetron HCl (ZOFRAN) injection 4 mg, 4 mg, Intravenous, Q6H PRN          Physica recovered to baseline. No evidence of ANTONIO  - HLIV; IV diuretics prn    2.  Type A dssection w/ large intramural hematoma extending to the aortic hiatus  - CV surgery consult- high risk and plan for medical management at this time  - on nitro gtt for [facility]

## 2020-03-17 NOTE — PROGRESS NOTES
[facility]  Progress Note     Lukasz Coxs Patient Status:  Inpatient    1950 MRN [id number]   [facility] 2NE-A Attending Benjie Mooney MD   Hosp Day # 21 PCP Kiara Campbell MD     Chief Complaint: sob    S: Patient reports sh --   --   --  28   ALT 43  --   --   --  35   BILT 0.5  --   --   --  0.3   TP 6.5  --   --   --  5.7*    < > = values in this interval not displayed. Estimated Creatinine Clearance: 34.3 mL/min (A) (based on SCr of 1.3 mg/dL (H)).     No results for improved  16. Hypernatremia, resolved  17. Dyslipidemia  1. statin  18. Leukocytosis increased due to steroids  19. Anemia, iron deficiency  1. Venofer x 3, hgb stable  20. Melena, resolved   21. EITAN   1. EITAN protocol  22. Dysphagia  1.  SLP eval once clear

## 2020-03-17 NOTE — PLAN OF CARE
Received patient at 0730 awake and alert. No complaints of pain. Small green stool. Patient states hungry. Dr. Rafat Mart at bedside. Okay for clear liquids if vascular surgery is clear for liquids. Speech eval passed. Tolerated clear liquids.   Up to [facility]) Jerilyn baseline bowel function  Description  INTERVENTIONS:  - Assess bowel function  - Maintain adequate hydration with IV or PO as ordered and tolerated  - Evaluate effectiveness of GI medications  - Encourage mobilization and activity  - Obtain nutritional con

## 2020-03-17 NOTE — CM/SW NOTE
Care Progression Note:  Active Acute Medical Issue:   Acute respiratory failure/multifocal PNA  Afib w/ RVR  ELEAZAR on CKD  Aortic dissection    No surgical intervention for dissection. Nitro gtt for BP. ST to evaluate for dysphagia.      Other Contributing Me

## 2020-03-17 NOTE — SLP NOTE
ADULT SWALLOWING EVALUATION    ASSESSMENT    ASSESSMENT/OVERALL IMPRESSION:  Pt seen to reassess swallow function. RN approved trial of solid consistencies. Pt self presented trials of thin liquid with straw and cup, puree, and hard solid.  During trial of disorder     thrombocytopenia   • Brain aneurysm 1999   • Chronic atrial fibrillation [facility])    • Coronary atherosclerosis    • Essential hypertension    • Heart attack ([facility] [address] 10/2017   • High blood pressure    • High cholesterol    • Hyperlipidemia    • Delores Saul liquids;Puree;Hard solid  Method of Presentation: Staff/Clinician assistance;Self presentation;Straw;Cup;Single sips  Patient Positioning: Upright;Midline    Oral Phase of Swallow:  Within Functional Limits  Bolus Retrieval: Intact  Bilabial Seal: Intact  B

## 2020-03-17 NOTE — CONSULTS
[facility]  Vascular Surgery Consultation    Maged Graf Patient Status:  Inpatient    1950 MRN [id number]   [facility] 6NE-A Attending Esdras Lacy MD   [facility] Day # 21 PCP Tori Rowland MD     Reason for Consultation:  ileus smoking about 2 years ago. Her smoking use included cigarettes. She started smoking about 46 years ago. She has a 4.30 pack-year smoking history. She has never used smokeless tobacco. She reports that she does not drink alcohol or use drugs.     Allergies: mg, 5 mg, Intravenous, Q6H PRN  •  Albuterol Sulfate  (90 Base) MCG/ACT inhaler 2 puff, 2 puff, Inhalation, Q6H PRN  •  Fluticasone Furoate-Vilanterol (BREO ELLIPTA) 100-25 MCG/INH inhaler 1 puff, 1 puff, Inhalation, Daily  •  lidocaine-menthol 4-1 23.9 03/17/2020    HGB 7.5 03/17/2020    HCT 23.7 03/17/2020    .0 03/17/2020    CREATSERUM 1.30 03/17/2020    BUN 14 03/17/2020     03/17/2020    K 3.5 03/17/2020     03/17/2020    CO2 19.0 03/17/2020     03/17/2020    CA 7.9 03/

## 2020-03-17 NOTE — PROGRESS NOTES
[facility]  Progress Note    Oletha Nails Patient Status:  Inpatient    1950 MRN [id number]   [facility] 6NE-A Attending Bib Ulloa MD   [facility] Day # 21 PCP Stacey Ross MD     STATUS UPDATE: The patient had a CT angiogram of irregular/atrial fibrillation, rate acceptable, normal S1S2, no murmur.    Abdomen: Soft, non-tender, minimally distended, no masses, no guarding, no   rebound, positive but hypoactive BS   Extremity: No edema, no cyanosis   Neurological: Alert, interactive a. m..  Results read back was performed. Medications reviewed.       Assessment:  · Small bowel obstruction vs ileus-significant improvement in the last 24 hours with reduction in distention and several stools  · Acute hypoxic respiratory failure due to

## 2020-03-17 NOTE — CONSULTS
[facility]    PATIENT'S NAME: Kasie Yonathan   ATTENDING PHYSICIAN: BETTY Farooq Kt: Gladys Ozuna MD   PATIENT ACCOUNT#:   [id number]    LOCATION:  [address] [place] [address]  MEDICAL RECORD #:   [id number]       DATE OF BIRTH:  [birth year]

## 2020-03-17 NOTE — PROGRESS NOTES
[facility]                INFECTIOUS DISEASE PROGRESS NOTE    Maged Graf Patient Status:  Inpatient    1950 MRN [id number]   [facility] 2NE-A Attending Sasha Rodríguez MD   [facility] Day # 21 PCP Tori Rowland MD       Antibi TP 6.5  --   --   --  5.7*    < > = values in this interval not displayed. No results found for: [facility] Encounter on 02/26/20   1.  URINE CULTURE, ROUTINE     Status: None    Collection Time: 03/12/20  9:03 AM   Result Value

## 2020-03-17 NOTE — OCCUPATIONAL THERAPY NOTE
OCCUPATIONAL THERAPY EVALUATION - INPATIENT     Room Number: 5072/7932-R  Evaluation Date: 3/17/2020  Type of Evaluation: Re-evaluation       Physician Order: IP Consult to Occupational Therapy  Reason for Therapy: ADL/IADL Dysfunction and Discharge Planni metabolic encephalopathy    Diarrhea    CKD (chronic kidney disease) stage 3, GFR 30-59 ml/min (HCC)    Oliguria    ATN (acute tubular necrosis) (HCC)    Transaminitis    Urinary tract infection without hematuria    Intestinal obstruction (HCC)      Past M ASSESSMENT  Ratin  Location: no pain reported       COGNITION  Overall Cognitive Status:  WFL - within functional limits    VISION  Current Vision: no visual deficits    PERCEPTION  Overall Perception Status:   WFL - within functional limits    SENSATI complete orientation questions with confusion noted, pt oriented to self and location. Patient End of Session: Up in chair;Needs met;Call light within reach;RN aware of session/findings; All patient questions and concerns addressed;SCDs in place; Alarm set Visits to Meet Established Goals: 5    ADL Goals   Patient will perform upper body dressing:  with supervision  Patient will perform lower body dressing:  with supervision  Patient will perform toileting: with supervision    Functional Transfer Goals  Kika

## 2020-03-17 NOTE — RESPIRATORY THERAPY NOTE
Pt on 2L NC, saturations at 98%, BS clear/diminished bilaterally. Pt receiving neb treatment via MCPAP QID; tolerated treatment well. Will continue to monitor pt closely.

## 2020-03-17 NOTE — DIETARY NOTE
Clinical Nutrition    Pt had BM x3 yesterday, feeling hungry. OK per MD to start diet. Cleared by SLP without restriction. Clear liquid started. Defer TPN at this time. Please re-consult if TPN is needed.  RD following per protocol    Jessica Tirado RD,LDN

## 2020-03-17 NOTE — PROGRESS NOTES
[facility]    Electrophysiology Follow Up Note      Marielle Don Patient Status:  Inpatient    1950 MRN [id number]   [facility] 6NE-A Attending Wilver Souza MD   Hosp Day # 21 PCP Rylan Hutton MD     Reason for PRN  •  famoTIDine (PEPCID) injection 20 mg, 20 mg, Intravenous, Daily  •  metoprolol Tartrate (LOPRESSOR) 5 MG/5ML injection 7.5 mg, 7.5 mg, Intravenous, Q6H  •  diltiazem 100mg/100ml in NaCl (CARDIZEM) 1 mg/mL premix/add-vantage, 2.5-20 mg/hr, Intravenou dry.       Labs:     Recent Labs   Lab 03/15/20  0614 03/16/20  0629 03/17/20  0412   * 149* 114*   BUN 20* 12 14   CREATSERUM 1.34* 1.26* 1.30*   GFRAA 47* 51* 49*   GFRNAA 41* 44* 42*   CA 8.4* 8.4* 7.9*    140 144   K 3.4* 3.8 3.5   * dissection.       Diagnosis:  Patient Active Problem List:     Acute chest pain     NSTEMI (non-ST elevated myocardial infarction) [facility])     Coronary artery disease involving native heart without angina pectoris     Atrial fibrillation ([facility] [address]     Stress hype

## 2020-03-17 NOTE — PROGRESS NOTES
[facility]                INFECTIOUS DISEASE PROGRESS NOTE    Marlin Cervantes Patient Status:  Inpatient    1950 MRN [id number]   [facility] 2NE-A Attending Army Fransisca,  [facility] Day # 23 PCP Jada Hightower MD       Antibi 3. 4* 3.8   *   < > 117* 114* 112   CO2 23.0   < > 21.0 25.0 22.0   ALKPHO 71  --   --   --   --    AST 19  --   --   --   --    ALT 43  --   --   --   --    BILT 0.5  --   --   --   --    TP 6.5  --   --   --   --     < > = values in this interval no hydronephrosis. BOWEL/MESENTERY: There is small bowel dilatation. High dense material noted within the colon. Mild amount of anasarca. BONES: Degenerative changes. Median sternotomy approximated by wire sutures.     VASCULATURE: There is a type a disse Contrast enhanced CT aortography is recommended. Critical result findings were called to registered nurse [facility] by Dr. Meg Iglesias 3/15/2020 at 1631 hours.  Dictated by: Jeffery Gonzalez MD on 3/15/2020 at 4:28 PM Finalized by: Jeffery Gonzalez MD on 3/15/2020 at obstruction. There is residual barium within the colon which is collapsed. There is no evidence of free air. ABDOMINAL WALL: No mass or hernia. There is mild edema of the dependent anterior abdominal wall and upper thighs.  Correlate clinically for 3rd spa Acute blood loss anemia     S/P CABG (coronary artery bypass graft)     ELEAZAR (acute kidney injury) (HCC)     Hypokalemia     Obesity (BMI 30-39. 9)     Altered mental status     Fatigue     Benign essential HTN     Dyslipidemia     Altered mental status, uns

## 2020-03-17 NOTE — CM/SW NOTE
MSW spoke with the patient's spouse and daughter Sofie Nathan on a three way call to re-evaluate SALIMA recommendation. The family feels it is too soon to make this decision and they want to see how the patient does after she starts eating more.   MSW attempted to

## 2020-03-17 NOTE — PHYSICAL THERAPY NOTE
PHYSICAL THERAPY EVALUATION - INPATIENT     Room Number: 2834  Evaluation Date: 3/17/2020  Type of Evaluation: Re-evaluation  Physician Order: PT Eval and Treat    Presenting Problem: Pneumonia, Ascending Aortic Dissection  Reason for Therapy: Mobility D Lactic acidosis    Lethargy    Toxic metabolic encephalopathy    Diarrhea    CKD (chronic kidney disease) stage 3, GFR 30-59 ml/min ([facility])    Oliguria    ATN (acute tubular necrosis) ([facility])    Transaminitis    Urinary tract infection without hematuria    Int alarm;Limb alert - right  Fall Risk: High fall risk    WEIGHT BEARING RESTRICTION  Weight Bearing Restriction: None                PAIN ASSESSMENT  Ratin  Location: Denies       COGNITION  · Overall Cognitive Status:  Impaired  · Attention Span:  atten intact to light touch throughout       ACTIVITY TOLERANCE  Room air  No shortness of breath  Heart Rate: at rest 92 bpm and with activity 120 bpm      AM-PAC '6-Clicks' INPATIENT SHORT FORM - BASIC MOBILITY  How much difficulty does the patient currently h Pt completed stand>sit to chair with verbal cues for sequencing and hand placement.     Exercise/Education Provided:  Bed mobility  Energy conservation  Functional activity tolerated  Posture  Strengthening  Transfer training    Patient End of Session: Up Goals: 5    CURRENT GOALS    Goal #1 Patient is able to demonstrate supine - sit EOB @ level: minimum assistance     Goal #2 Patient is able to demonstrate transfers Sit to/from Stand at assistance level: supervision     Goal #3 Patient is able to ambulate

## 2020-03-18 ENCOUNTER — APPOINTMENT (OUTPATIENT)
Dept: GENERAL RADIOLOGY | Facility: HOSPITAL | Age: 70
DRG: 299 | End: 2020-03-18
Attending: INTERNAL MEDICINE
Payer: MEDICARE

## 2020-03-18 PROCEDURE — 99232 SBSQ HOSP IP/OBS MODERATE 35: CPT | Performed by: INTERNAL MEDICINE

## 2020-03-18 PROCEDURE — 99233 SBSQ HOSP IP/OBS HIGH 50: CPT | Performed by: HOSPITALIST

## 2020-03-18 PROCEDURE — 71045 X-RAY EXAM CHEST 1 VIEW: CPT | Performed by: INTERNAL MEDICINE

## 2020-03-18 PROCEDURE — 99232 SBSQ HOSP IP/OBS MODERATE 35: CPT | Performed by: SURGERY

## 2020-03-18 PROCEDURE — 99233 SBSQ HOSP IP/OBS HIGH 50: CPT | Performed by: INTERNAL MEDICINE

## 2020-03-18 RX ORDER — MAGNESIUM SULFATE HEPTAHYDRATE 40 MG/ML
2 INJECTION, SOLUTION INTRAVENOUS ONCE
Status: COMPLETED | OUTPATIENT
Start: 2020-03-18 | End: 2020-03-18

## 2020-03-18 RX ORDER — POTASSIUM CHLORIDE 29.8 MG/ML
40 INJECTION INTRAVENOUS ONCE
Status: COMPLETED | OUTPATIENT
Start: 2020-03-18 | End: 2020-03-18

## 2020-03-18 RX ORDER — FUROSEMIDE 10 MG/ML
40 INJECTION INTRAMUSCULAR; INTRAVENOUS ONCE
Status: COMPLETED | OUTPATIENT
Start: 2020-03-18 | End: 2020-03-18

## 2020-03-18 NOTE — PROGRESS NOTES
03/18/20 0720   Incentive Spirometry Tx   Frequency q1hr W/A   Patient Technique Fair   Treatment Tolerance Tolerated well   Incentive Spirometry Goal (mL) 1000 mL   Incentive Spirometry Achieved (mL) 500 mL   Number of breaths 10   Breath hold Poor   P

## 2020-03-18 NOTE — CM/SW NOTE
[email or web address] re[facility] Pt. [facility] rec [facility] now. SW spoke to pt's daughter. Daughter and  agree pt will need rehab. Discussed Ree reddy - daughter agrees. [place] referral sent.       Await TEJAS reddy and will need insurance auth

## 2020-03-18 NOTE — OCCUPATIONAL THERAPY NOTE
OCCUPATIONAL THERAPY TREATMENT NOTE - INPATIENT     Room Number: 2762/5975-D  Session: 1   Number of Visits to Meet Established Goals: 5         History related to current admission: Pt is a 77 y.o. female admitted with chest pain and per  has signi Oliguria    ATN (acute tubular necrosis) (HCC)    Transaminitis    Urinary tract infection without hematuria    Intestinal obstruction (HCC)    Stage 3 chronic kidney disease (HCC)    Dissection of thoracic aorta (HCC)    Respiratory insufficiency    Ileus washing, rinsing, drying)?: A Lot  -   Toileting, which includes using toilet, bedpan or urinal? : A Lot  -   Putting on and taking off regular upper body clothing?: A Lot  -   Taking care of personal grooming such as brushing teeth?: A Lot  -   Eating paula 3-5x/week      OT Goals:   ADL Goals   Patient will perform upper body dressing:  with supervision  Patient will perform lower body dressing:  with supervision  Patient will perform toileting: with supervision     Functional Transfer Goals  Patient will tr

## 2020-03-18 NOTE — PHYSICAL THERAPY NOTE
PHYSICAL THERAPY TREATMENT NOTE - INPATIENT    Room Number: 1973/3183-O     Session: 1   Number of Visits to Meet Established Goals: 5    Presenting Problem: Pneumonia, Ascending Aortic Dissection     History related to current admission:  Pt is a 77 y. o. • Hyperlipidemia    • Osteoarthritis    • Shortness of breath    • Sleep apnea        Past Surgical History  Past Surgical History:   Procedure Laterality Date   • BRAIN SURGERY  1998    FOR ANEURYSM   • BYPASS SURGERY  10/24/2017    cabg x 3   • C-SECTI Climbing 3-5 steps with a railing?: Total       AM-PAC Score:  Raw Score: 12   Approx Degree of Impairment: 68.66%   Standardized Score (AM-PAC Scale): 35.33   CMS Modifier (G-Code): CL    FUNCTIONAL ABILITY STATUS  Gait Assessment   Gait Assistance: Valentina Haynes Goal #1 Patient is able to demonstrate supine - sit EOB @ level: minimum assistance      Goal #2 Patient is able to demonstrate transfers Sit to/from Stand at assistance level: supervision      Goal #3 Patient is able to ambulate 50 feet with assist eliana

## 2020-03-18 NOTE — CONSULTS
.1004 JULIANNA Cuevas Patient Status:  Inpatient    1950 MRN [id number]   [facility] 6NE-A Attending Rey Gutierrez MD   [address] Day # 21 PCP Kiara Campbell MD     Patient Identification  Lukasz Garcia is a 77year old fema recommended. Critical result findings were called to registered nurse Venancio Conner by Dr. Manuel Bryant 3/15/2020 at 1631 hours. CTA 3/16/20    CONCLUSION:    1. 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     2. Small amount of cabg x 3   •      • CABG  10/2017   • CHOLECYSTECTOMY     • ESOPHAGOGASTRODUODENOSCOPY (EGD) N/A 2020    Performed by Thad Huang MD at [address] [facility] N/A 10/24/2017    Performed by Eladio Dumont Intravenous, Once  metoprolol Tartrate (LOPRESSOR) 5 MG/5ML injection 7.5 mg, 7.5 mg, Intravenous, Q6H  [COMPLETED] furosemide (LASIX) injection 20 mg, 20 mg, Intravenous, Once  [COMPLETED] ipratropium-albuterol (DUONEB) 0.5-2.5 (3) MG/3ML nebulizer soluti Intravenous, Daily  [COMPLETED] Potassium Chloride ER (K-DUR M20) CR tab 40 mEq, 40 mEq, Oral, Once  [COMPLETED] iron sucrose (VENOFER) IV Push 200 mg, 200 mg, Intravenous, Once  Metoclopramide HCl (REGLAN) injection 5 mg, 5 mg, Intravenous, Q8H PRN  metop ITCHING, SWELLING    Comment:Patient has received & tolerated diltiazem in past  Radiology Contrast *    NAUSEA AND VOMITING    Comment:PT HAD VOMITING        Lab Results   Component Value Date    WBC 17.2 03/18/2020    HGB 7.7 03/18/2020    HCT 24.3 03/18 is 4-5. ROM WNL. Left Lower Extremity: Strength  is 4-5. ROM WNL. Neuro: CNII-XII are grossly intact.  Sensation to dull touch intact in all extremities and reflexes are 2+, bilateral and symmetric for biceps, brachioradialis, triceps, patell

## 2020-03-18 NOTE — PROGRESS NOTES
[facility]  Progress Note     Magda Plasencia Patient Status:  Inpatient    1950 MRN [id number]   [facility] 2NE-A Attending Alvina Phelps MD   Hosp Day # 21 PCP Irene Rodríguez MD     Chief Complaint: sob    S: Patient reports s < > 3.8 3.5 3.3*   *   < > 112 119* 118*   CO2 23.0   < > 22.0 19.0* 21.0   ALKPHO 71  --   --  55  --    AST 19  --   --  28  --    ALT 43  --   --  35  --    BILT 0.5  --   --  0.3  --    TP 6.5  --   --  5.7*  --     < > = values in this interval pain, atypical, resolved  13. CAD sp CABG   14. Essential hypertension, stable   15. Leukocytosis, improved  16. Hypernatremia, resolved  17. Dyslipidemia  1. statin  18. Leukocytosis increased due to steroids  19. Anemia, iron deficiency  1.  Venofer x 3,

## 2020-03-18 NOTE — PLAN OF CARE
Assumed care of patient at [address] [place] [address]. Patient is AOX4, unable to state what year it is knows month and date, , FLORES, and follows commands. Patient denies dizziness, nausea, or pain.  Nitro drip infusing and titrated to maintain SBP <120 and Cardizem drip infusing and notify MD (or speech pathologist) if coughing or persistent throat clearing or wet/gurgly vocal quality is noted      Outcome: Progressing     Problem: RESPIRATORY - ADULT  Goal: Achieves optimal ventilation and oxygenation  Description  INTERVENTIONS: Provide nonpharmacologic comfort measures as appropriate  - Advance diet as tolerated, if ordered  - Obtain nutritional consult as needed  - Evaluate fluid balance  3/18/2020 0230 by Curtis Cheatham RN  Outcome: Progressing  3/18/2020 0226 by Deejay Marc

## 2020-03-18 NOTE — PROGRESS NOTES
[facility]                INFECTIOUS DISEASE PROGRESS NOTE    Jeremias Piña Patient Status:  Inpatient    1950 MRN [id number]   [facility] 2NE-A Attending Michelle Snider MD   [address] Day # 21 PCP Jona Bellamy MD       Antibi > 149* 114* 100*   BUN 87*   < > 12 14 17   CREATSERUM 2.47*   < > 1.26* 1.30* 1.46*   GFRAA 22*   < > 51* 49* 42*   GFRNAA 19*   < > 44* 42* 37*   CA 8.6   < > 8.4* 7.9* 7.6*   ALB 2.0*  --   --  1.9*  --    *   < > 140 144 144   K 3.8   < > 3.8 3.5 RVR (HCC)     Stress hyperglycemia     Hypernatremia     Acute blood loss anemia     S/P CABG (coronary artery bypass graft)     ELEAZAR (acute kidney injury) ([facility])     Hypokalemia     Obesity (BMI 30-39. 9)     Altered mental status     Fatigue     Benign esse

## 2020-03-18 NOTE — PROGRESS NOTES
[facility]  Nephrology Progress Note    Mavis Flores Patient Status:  Inpatient    1950 MRN [id number]   [facility] 2NE-A Attending Stefan Hoffmann MD   [facility] Day # 21 PCP Dutch Galvez MD       SUBJECTIVE    Up in chair, kelle present  Extremities: Without clubbing, cyanosis or edema. Neurologic:  moving all extremities  Skin: Warm and dry, no rash        Labs:     Recent Labs   Lab 03/12/20  0506 03/13/20  0449 03/14/20  0433 03/17/20  0412 03/18/20  0407   WBC 17.5* 15.9* 16. (LOPRESSOR) 5 MG/5ML injection 7.5 mg, 7.5 mg, Intravenous, Q6H  diltiazem 100mg/100ml in NaCl (CARDIZEM) 1 mg/mL premix/add-vantage, 2.5-20 mg/hr, Intravenous, Continuous  benzocaine-menthol (CEPACOL (SUGAR-FREE)) 1 lozenge, 1 lozenge, Oral, PRN  Alum & M

## 2020-03-18 NOTE — SLP NOTE
SPEECH DAILY NOTE - INPATIENT    ASSESSMENT & PLAN   ASSESSMENT  Pt seen for dysphagia tx to assess tolerance with recommended diet, ensure proper utilization of aspiration precautions and provide pt/family education. Patient alert and upright in bed.   Sh

## 2020-03-18 NOTE — PROGRESS NOTES
[facility]  Progress Note    Mavis Flores Patient Status:  Inpatient    1950 MRN [id number]   [facility] 6NE-A Attending Stefan Hoffmann MD   [facility] Day # 21 PCP Dutch Galvez MD       Assessment and Plan:  Patient Active Problem supportive care. Subjective:  No chest pain or shortness of breath.     Objective:  /77   Pulse 105   Temp 98 °F (36.7 °C) (Temporal)   Resp 25   Ht 5' 3\" (1.6 m)   Wt 186 lb 8.2 oz (84.6 kg)   SpO2 100%   BMI 33.04 kg/m²     Temp (24hrs), Av phosphates (NEUTRA-PHOS) 280-160-250 MG powder packet 1 packet, 1 packet, Oral, BID (Diuretic)  furosemide (LASIX) injection 40 mg, 40 mg, Intravenous, Once  nitroGLYCERIN infusion 50mg in D5W 250ml, 5-100 mcg/min, Intravenous, Continuous  Normal Saline Fl

## 2020-03-18 NOTE — PROGRESS NOTES
[facility]  Progress Note    Linford Clamp Patient Status:  Inpatient    1950 MRN [id number]   [facility] 6NE-A Attending Norma Parekh MD   [facility] Day # 21 PCP Sandip Caballero MD     Subjective:  No new complaints.  She denies abdo bypass graft)     ELEAZAR (acute kidney injury) ([facility] [address]     Hypokalemia     Obesity (BMI 30-39. 9)     Altered mental status     Fatigue     Benign essential HTN     Dyslipidemia     Altered mental status, unspecified altered mental status type     Leukocytosis spent in counseling the patient on the above listed diagnoses and treatment options.       Hector Shipley, DO

## 2020-03-18 NOTE — PROGRESS NOTES
[facility]  Progress Note    Steve Rueda Patient Status:  Inpatient    1950 MRN [id number]   [facility] 6NE-A Attending Daphne Muro MD   [facility] Day # 21 PCP Michelle Ashley MD     Subjective:  Steve Rueda is a(n) 77 year ol 2.46*   HGB 8.6* 8.2* 7.5* 7.7*   HCT 27.3* 26.2* 23.7* 24.3*   MCV 96.8 96.7 97.1 98.8   MCH 30.5 30.3 30.7 31.3   MCHC 31.5 31.3 31.6 31.7   RDW 16.4* 16.1* 15.8* 16.3*   NEPRELIM 11.11*  --  20.31* 13.23*   WBC 15.9* 16.1* 23.9* 17.2*   .0 219.0 pneumonia  · Continue scheduled nebulized bronchodilators and airway clearance therapies  · Rate control and hypertensive treatment per cardiology  · Monitor MS closely  · Note plans to defer CLAUDIO/cardioversion given worsening respiratory status and new gurvinder

## 2020-03-18 NOTE — PAYOR COMM NOTE
--------------  CONTINUED STAY REVIEW    Cynthia Vargas MA [facility]  Subscriber #:  [id number]  Authorization Number: [id number]    Admit date: 2/26/20  Admit time: 26    Admitting Physician: Evan De Luna MD  Attending Physician:  Denia Bolton MD  Primary Ca diagnosis of type a dissection  · Ppx: SCDs, h2b-no medical anticoagulation in light of her dissection  Bridgett Bentley .  3/18/2020  6:58 AM     03/18/20 0720   Incentive Spirometry Tx   Frequency q1hr W/A   Patient Technique Fair   Treatment Trent Salter patient is tolerating clear liquid diet will advance her to a full liquid diet she is continuing to pass flatus, 2 bowel movements yesterday she denies abdominal pain and her abdomen is soft. Bud Other, DO  3/18/2020  1:30 PM    SPEECH DAILY NOTE - I mg/hr Intravenous Thais Alvarado RN    3/17/2020 1730 New Bag 15 mg/hr Intravenous Elise Nolasco RN    3/17/2020 1630 Rate/Dose Verify 15 mg/hr Intravenous Elise Nolasco RN    3/17/2020 1415 Rate/Dose Verify 15 mg/hr Intravenous Elise Nolasco RN Bag 80 mcg/min Intravenous Cornelius Rowe RN    3/18/2020 0200 Rate/Dose Change 50 mcg/min Intravenous Cornelius Rowe RN    3/18/2020 0000 Rate/Dose Change 40 mcg/min Intravenous Cornelius Rowe RN    3/17/2020 2315 Rate/Dose Change 30

## 2020-03-19 ENCOUNTER — TELEPHONE (OUTPATIENT)
Dept: CARDIOLOGY | Age: 70
End: 2020-03-19

## 2020-03-19 PROCEDURE — 99232 SBSQ HOSP IP/OBS MODERATE 35: CPT | Performed by: SURGERY

## 2020-03-19 PROCEDURE — 99233 SBSQ HOSP IP/OBS HIGH 50: CPT | Performed by: HOSPITALIST

## 2020-03-19 PROCEDURE — 99233 SBSQ HOSP IP/OBS HIGH 50: CPT | Performed by: INTERNAL MEDICINE

## 2020-03-19 PROCEDURE — 99232 SBSQ HOSP IP/OBS MODERATE 35: CPT | Performed by: INTERNAL MEDICINE

## 2020-03-19 RX ORDER — POTASSIUM CHLORIDE 29.8 MG/ML
40 INJECTION INTRAVENOUS ONCE
Status: COMPLETED | OUTPATIENT
Start: 2020-03-19 | End: 2020-03-19

## 2020-03-19 RX ORDER — HYDRALAZINE HYDROCHLORIDE 25 MG/1
25 TABLET, FILM COATED ORAL 2 TIMES DAILY
Status: DISCONTINUED | OUTPATIENT
Start: 2020-03-19 | End: 2020-03-22

## 2020-03-19 RX ORDER — METOPROLOL SUCCINATE 25 MG/1
25 TABLET, EXTENDED RELEASE ORAL
Status: DISCONTINUED | OUTPATIENT
Start: 2020-03-19 | End: 2020-03-20

## 2020-03-19 RX ORDER — FAMOTIDINE 20 MG/1
20 TABLET ORAL DAILY
Status: DISCONTINUED | OUTPATIENT
Start: 2020-03-20 | End: 2020-03-24

## 2020-03-19 RX ORDER — DILTIAZEM HYDROCHLORIDE 240 MG/1
240 CAPSULE, COATED, EXTENDED RELEASE ORAL DAILY
Status: DISCONTINUED | OUTPATIENT
Start: 2020-03-19 | End: 2020-03-20

## 2020-03-19 RX ORDER — FUROSEMIDE 10 MG/ML
40 INJECTION INTRAMUSCULAR; INTRAVENOUS
Status: COMPLETED | OUTPATIENT
Start: 2020-03-19 | End: 2020-03-20

## 2020-03-19 RX ORDER — ALBUTEROL SULFATE 2.5 MG/3ML
2.5 SOLUTION RESPIRATORY (INHALATION)
Status: DISCONTINUED | OUTPATIENT
Start: 2020-03-19 | End: 2020-03-21

## 2020-03-19 RX ORDER — HYDRALAZINE HYDROCHLORIDE 20 MG/ML
10 INJECTION INTRAMUSCULAR; INTRAVENOUS EVERY 6 HOURS PRN
Status: DISCONTINUED | OUTPATIENT
Start: 2020-03-19 | End: 2020-03-24

## 2020-03-19 NOTE — CM/SW NOTE
Care Progression Note:  Active Acute Medical Issue:   Acute respiratory failure/multifocal PNA  Afib w/ RVR  ELEAZAR on CKD  SBO vs ileus  Aortic dissection    Patient on cardizem gtt, IV beta blocker, and nitro gtt.  Plan to add po meds in attempt to wean from

## 2020-03-19 NOTE — CM/SW NOTE
SW updated pt's daughter Peterson Castillo that [facility] is able to accept at [place]. Also updated her that  is no longer allowing visitors. Await medical clearance for dc.     Brianna Rogers, [facility]  /Discharge Planner  [phone]

## 2020-03-19 NOTE — PHYSICAL THERAPY NOTE
PHYSICAL THERAPY TREATMENT NOTE - INPATIENT    Room Number: 6597/1303-S     Session: 2   Number of Visits to Meet Established Goals: 5    Presenting Problem: Pneumonia, Ascending Aortic Dissection     History related to current admission:  Pt is a 77 y. o. cholesterol    • Hyperlipidemia    • Osteoarthritis    • Shortness of breath    • Sleep apnea        Past Surgical History  Past Surgical History:   Procedure Laterality Date   • BRAIN SURGERY  1998    FOR ANEURYSM   • BYPASS SURGERY  10/24/2017    cabg x room?: A Lot   -   Climbing 3-5 steps with a railing?: Total       AM-PAC Score:  Raw Score: 12   Approx Degree of Impairment: 68.66%   Standardized Score (AM-PAC Scale): 35.33   CMS Modifier (G-Code): CL    FUNCTIONAL ABILITY STATUS  Gait Assessment   eWrner Acosta RECOMMENDATIONS  PT Discharge Recommendations: Acute rehabilitation     PLAN  PT Treatment Plan: Bed mobility; Endurance; Patient education; Family education;Gait training;Stoop training;Stair training;Transfer training;Balance training  Rehab Potential : Goo

## 2020-03-19 NOTE — PROGRESS NOTES
[facility]  Progress Note    Anne Vasquez Patient Status:  Inpatient    1950 MRN [id number]   [facility] 6NE-A Attending Willie Weber MD   [address] Day # 25 PCP Chad Amin MD     Subjective:  Patient sitting up in bed she yadira [facility])     Coronary artery disease involving native heart without angina pectoris     Atrial fibrillation with RVR ([facility])     Stress hyperglycemia     Hypernatremia     Acute blood loss anemia     S/P CABG (coronary artery bypass graft)     ELEAZAR (acute kidney

## 2020-03-19 NOTE — PLAN OF CARE
Pt. Remains in afib 80-110s, with activity HR reaches 120-130's for short periods. Cardizem gt 20mg/hr, nitro gtt titrated to maintain SBP <120. Cardiology contacted for additional medication as nitro gtt as high as 140mcg/min.   Hydralazine ordered PRN,

## 2020-03-19 NOTE — PROGRESS NOTES
[facility]                INFECTIOUS DISEASE PROGRESS NOTE    Brie Kern Patient Status:  Inpatient    1950 MRN [id number]   [facility] 2NE-A Attending Job Dial, MD   [address] Day # 25 PCP Mart Bhakta MD       Antibi results found for: [address] Encounter on 02/26/20   1. URINE CULTURE, ROUTINE     Status: None    Collection Time: 03/12/20  9:03 AM   Result Value Ref Range    Urine Culture No Growth at 18-24 hrs. N/A   2.  BLOOD CULTURE     Status:

## 2020-03-19 NOTE — PROGRESS NOTES
[facility]  Nephrology Progress Note    Magda Plasencia Patient Status:  Inpatient    1950 MRN [id number]   [facility] 2NE-A Attending Jarrod Garg MD   [facility] Day # 25 PCP Irene Rodríguez MD       SUBJECTIVE  Up in chair, kelle sp Soft, + distended, . + BS present  Extremities: Without clubbing, cyanosis or edema. Neurologic:  moving all extremities  Skin: Warm and dry, no rash        Labs:     Recent Labs   Lab 03/13/20  0449 03/14/20  0433 03/17/20  0412 03/18/20  0407   WBC 15. 9 MG/5ML injection 7.5 mg, 7.5 mg, Intravenous, Q6H  diltiazem 100mg/100ml in NaCl (CARDIZEM) 1 mg/mL premix/add-vantage, 2.5-20 mg/hr, Intravenous, Continuous  benzocaine-menthol (CEPACOL (SUGAR-FREE)) 1 lozenge, 1 lozenge, Oral, PRN  Alum & Mag Hydroxide-S

## 2020-03-19 NOTE — PLAN OF CARE
Pt able to brush own teeth, get up to chair with assist x 1, walk in epstein x 2. Qualifies for acute rehab. Still on cardizem and nitroglycerin gtt's. Ok to advance diet per general surgery.  Cards informed that oral agents can be added to regimen in order to

## 2020-03-19 NOTE — OCCUPATIONAL THERAPY NOTE
OCCUPATIONAL THERAPY TREATMENT NOTE - INPATIENT     Room Number: 1238/3852-B  Session: 2   Number of Visits to Meet Established Goals: 5         History related to current admission: Pt is a 77 y.o. female admitted with chest pain and per  has signi Oliguria    ATN (acute tubular necrosis) (HCC)    Transaminitis    Urinary tract infection without hematuria    Intestinal obstruction (HCC)    Stage 3 chronic kidney disease (HCC)    Dissection of thoracic aorta (HCC)    Respiratory insufficiency    Ileus (including washing, rinsing, drying)?: A Lot  -   Toileting, which includes using toilet, bedpan or urinal? : A Lot  -   Putting on and taking off regular upper body clothing?: A Lot  -   Taking care of personal grooming such as brushing teeth?: A Lot  - eval/education;Patient/Family training;Patient/Family education; Endurance training;UE strengthening/ROM; Functional transfer training  Rehab Potential : Good  Frequency (Obs): 3-5x/week      OT Goals:   ADL Goals   Patient will perform upper body dressing:

## 2020-03-19 NOTE — PROGRESS NOTES
[facility]  Progress Note     Burton Mohs Patient Status:  Inpatient    1950 MRN [id number]   [facility] 2NE-A Attending Calvin Nelson MD   Hosp Day # 25 PCP Blayne Phipps MD     Chief Complaint: sob    S: Patient reports s Estimated Creatinine Clearance: 35.3 mL/min (A) (based on SCr of 1.26 mg/dL (H)). No results for input(s): PTP, INR in the last 168 hours. No results for input(s): TROP, CK in the last 168 hours. Imaging: Imaging data reviewed in Epic. protocol  23. Dysphagia  1. Passed SLP eval  24. L1 fracture d/t fall, not noted on repeat CT imaging  25. Deconditioning  1.  Ok to continue PT per surgery, will need rehab.      Quality:  · DVT Prophylaxis: scds  · CODE status: Full  · Rangel: yes - pull t

## 2020-03-19 NOTE — PROGRESS NOTES
[facility]  Progress Note    Anne Vasquez Patient Status:  Inpatient    1950 MRN [id number]   [facility] 6NE-A Attending Willie Weber MD   [facility] Day # 25 PCP Chad Amin MD     Subjective:  Anne Vasquez is a(n) 77 year ol focal deficits    Lab Data Review:  Recent Labs   Lab 03/13/20  0449 03/14/20  0433 03/17/20  0412 03/18/20  0407   RBC 2.82* 2.71* 2.44* 2.46*   HGB 8.6* 8.2* 7.5* 7.7*   HCT 27.3* 26.2* 23.7* 24.3*   MCV 96.8 96.7 97.1 98.8   MCH 30.5 30.3 30.7 31.3   MC for aspiration pneumonia  · Continue scheduled nebulized bronchodilators and airway clearance therapies  · Rate control and hypertensive treatment per cardiology  · Monitor MS closely  · Note plans to defer CLAUDIO/cardioversion given worsening respiratory sta

## 2020-03-19 NOTE — PLAN OF CARE
Assumed care of pt @ 0730. Pt up to chair for most of the day. Weaned off of nitro gtt this am, Cardizem weaned as able, down to 5 from 20 after the addition of all the PO medications.  Hospitalist PA notified that pt was extremely forgetful (did not rememb

## 2020-03-19 NOTE — SLP NOTE
SPEECH DAILY NOTE - INPATIENT    ASSESSMENT & PLAN   ASSESSMENT  Pt seen for dysphagia tx to assess tolerance with recommended diet, ensure proper utilization of aspiration precautions and provide pt/family education.  Pt remains in ICU and RN approved for

## 2020-03-20 PROCEDURE — 99233 SBSQ HOSP IP/OBS HIGH 50: CPT | Performed by: INTERNAL MEDICINE

## 2020-03-20 PROCEDURE — 99232 SBSQ HOSP IP/OBS MODERATE 35: CPT | Performed by: HOSPITALIST

## 2020-03-20 PROCEDURE — 99233 SBSQ HOSP IP/OBS HIGH 50: CPT | Performed by: NURSE PRACTITIONER

## 2020-03-20 RX ORDER — DILTIAZEM HYDROCHLORIDE 120 MG/1
120 CAPSULE, EXTENDED RELEASE ORAL DAILY
Status: COMPLETED | OUTPATIENT
Start: 2020-03-20 | End: 2020-03-20

## 2020-03-20 RX ORDER — METOPROLOL SUCCINATE 50 MG/1
50 TABLET, EXTENDED RELEASE ORAL
Status: DISCONTINUED | OUTPATIENT
Start: 2020-03-21 | End: 2020-03-21

## 2020-03-20 RX ORDER — FOLIC ACID 1 MG/1
1 TABLET ORAL 2 TIMES DAILY
Status: DISCONTINUED | OUTPATIENT
Start: 2020-03-20 | End: 2020-03-24

## 2020-03-20 RX ORDER — MAGNESIUM OXIDE 400 MG (241.3 MG MAGNESIUM) TABLET
400 TABLET ONCE
Status: COMPLETED | OUTPATIENT
Start: 2020-03-20 | End: 2020-03-20

## 2020-03-20 RX ORDER — DOCUSATE SODIUM 100 MG/1
100 CAPSULE, LIQUID FILLED ORAL 2 TIMES DAILY
Status: DISCONTINUED | OUTPATIENT
Start: 2020-03-20 | End: 2020-03-24

## 2020-03-20 RX ORDER — METOPROLOL SUCCINATE 25 MG/1
25 TABLET, EXTENDED RELEASE ORAL ONCE
Status: COMPLETED | OUTPATIENT
Start: 2020-03-20 | End: 2020-03-20

## 2020-03-20 RX ORDER — MULTIPLE VITAMINS W/ MINERALS TAB 9MG-400MCG
1 TAB ORAL DAILY
Status: DISCONTINUED | OUTPATIENT
Start: 2020-03-20 | End: 2020-03-24

## 2020-03-20 RX ORDER — DIGOXIN 0.25 MG/ML
250 INJECTION INTRAMUSCULAR; INTRAVENOUS ONCE
Status: COMPLETED | OUTPATIENT
Start: 2020-03-20 | End: 2020-03-20

## 2020-03-20 RX ORDER — FOLIC ACID/VIT B COMPLEX AND C 400 MCG
1 TABLET ORAL DAILY
Status: DISCONTINUED | OUTPATIENT
Start: 2020-03-20 | End: 2020-03-24

## 2020-03-20 RX ORDER — DILTIAZEM HYDROCHLORIDE 300 MG/1
300 CAPSULE, COATED, EXTENDED RELEASE ORAL DAILY
Status: DISCONTINUED | OUTPATIENT
Start: 2020-03-21 | End: 2020-03-20

## 2020-03-20 RX ORDER — LISINOPRIL 10 MG/1
10 TABLET ORAL DAILY
Status: DISCONTINUED | OUTPATIENT
Start: 2020-03-20 | End: 2020-03-21

## 2020-03-20 RX ORDER — POTASSIUM CHLORIDE 14.9 MG/ML
20 INJECTION INTRAVENOUS ONCE
Status: COMPLETED | OUTPATIENT
Start: 2020-03-20 | End: 2020-03-20

## 2020-03-20 RX ORDER — DILTIAZEM HYDROCHLORIDE 180 MG/1
360 CAPSULE, EXTENDED RELEASE ORAL DAILY
Status: DISCONTINUED | OUTPATIENT
Start: 2020-03-21 | End: 2020-03-24

## 2020-03-20 NOTE — PLAN OF CARE
Assumed care of yahir @ approximately 1915: drowsy but arousable, oriented to self, cooperative with care early in the shift but obstinate in the morning refusing bath and turns, requiring much encouragement to take morning PO medications; on room air; A-f Problem: Impaired Functional Mobility  Goal: Achieve highest/safest level of mobility/gait  Description  Interventions:  - Assess patient's functional ability and stability  - Promote increasing activity/tolerance for mobility and gait  - Educate and eng and severity of pain and evaluate response  - Implement non-pharmacological measures as appropriate and evaluate response  - Consider cultural and social influences on pain and pain management  - Manage/alleviate anxiety  - Utilize distraction and/or relax this shift.       Please see flow-sheets for full assessments and/or additional information

## 2020-03-20 NOTE — OCCUPATIONAL THERAPY NOTE
OCCUPATIONAL THERAPY TREATMENT NOTE - INPATIENT     Room Number: 0258/2449-U  Session: 3   Number of Visits to Meet Established Goals: 5         History related to current admission: Pt is a 77 y.o. female admitted with chest pain and per  has signi 30-59 ml/min (HCC)    Oliguria    ATN (acute tubular necrosis) (HCC)    Transaminitis    Urinary tract infection without hematuria    Intestinal obstruction (HCC)    Stage 3 chronic kidney disease (HCC)    Dissection of thoracic aorta (HCC)    Respiratory need…  -   Putting on and taking off regular lower body clothing?: Total  -   Bathing (including washing, rinsing, drying)?: Total  -   Toileting, which includes using toilet, bedpan or urinal? : A Lot  -   Putting on and taking off regular upper body clot from continued intensive inpatient rehab in an acute rehab setting. This would aim to maximize independence/safety with self-care, t/f's, community mobility, and home management activities. Especially, since pt was completely independent PTA.

## 2020-03-20 NOTE — CM/SW NOTE
MSW left a message for Collette from [facility] to discuss acceptance and insurance auth. Awaiting return call.      Jorge Johnson [facility]

## 2020-03-20 NOTE — PROGRESS NOTES
[facility]  Cardiology Critical Care Progress Note    Steve Rueda Patient Status:  Inpatient    1950 MRN [id number]   [facility] 6NE-A Attending Daphne Muro MD   [address] Day # 23 PCP Michelle Ashley MD       Subjective:  No ches tachy, irregularly irreg normal S1,S2  Lungs: bs somewhat diminished but overall clear  Abd: soft, NT/ND +bs  Ext: no edema  Skin: Warm, dry  Neuro: No focal deficits      Labs:  Lab Results   Component Value Date    WBC 10.5 03/20/2020    HGB 7.8 03/20/20 mg daily  · Ace resumed by nephrology  · Dc  Planning    I will follow bp and hr daily and make med adjustment as needed. Will follow peripherally moving forward otherwise      Discussed plan of care with patient and nursing.        ALYCIA Nelson  3

## 2020-03-20 NOTE — PHYSICAL THERAPY NOTE
PHYSICAL THERAPY TREATMENT NOTE - INPATIENT    Room Number: 0975/8984-G     Session: 3   Number of Visits to Meet Established Goals: 5    Presenting Problem: Pneumonia, Ascending Aortic Dissection     History related to current admission:  Pt is a 77 y. o. • Heart attack ([facility] [address] 10/2017   • High blood pressure    • High cholesterol    • Hyperlipidemia    • Osteoarthritis    • Shortness of breath    • Sleep apnea        Past Surgical History  Past Surgical History:   Procedure Laterality Date   • BRAIN SURGER to a chair (including a wheelchair)?: A Lot   -   Need to walk in hospital room?: A Lot   -   Climbing 3-5 steps with a railing?: Total       AM-PAC Score:  Raw Score: 13   Approx Degree of Impairment: 64.91%   Standardized Score (AM-PAC Scale): 36.74   CM balance in effort to regain highest level of function prior to d/c.    DISCHARGE RECOMMENDATIONS  PT Discharge Recommendations: Acute rehabilitation     PLAN  PT Treatment Plan: Bed mobility; Endurance; Patient education; Family education;Gait training;Stoop

## 2020-03-20 NOTE — PLAN OF CARE
Assumed care at [address] [place] [address]. Patient is alert and oriented x4. PT/OT ambulated patient in the halls. Sitting in chair and consumed about 15% of breakfast. Rangel removed and awaiting first void. Weaned off nitro and cardizem. PO hydralazine given once.   Problem: C mobility/gait  Description  Interventions:  - Assess patient's functional ability and stability  - Promote increasing activity/tolerance for mobility and gait  - Educate and engage patient/family in tolerated activity level and precautions  - Recommend use measures as appropriate and evaluate response  - Consider cultural and social influences on pain and pain management  - Manage/alleviate anxiety  - Utilize distraction and/or relaxation techniques  - Monitor for opioid side effects  - Notify MD/LIP if inte care  Description  Interventions:  - Assess ability and encourage patient to participate in ADLs to maximize function  - Promote sitting position while performing ADLs such as feeding, grooming, and bathing  - Educate and encourage patient/family in [place]

## 2020-03-20 NOTE — PROGRESS NOTES
[facility]  Progress Note     Venkat Eze Patient Status:  Inpatient    1950 MRN [id number]   [facility] 2NE-A Attending Katlyn Morse MD   [facility] Day # 21 PCP Nancy Miller MD     Chief Complaint: sob    S: Patient reports n K 3.5   < > 3.4* 3.6 3.7   *   < > 117* 115* 115*   CO2 19.0*   < > 22.0 21.0 22.0   ALKPHO 55  --  53*  --   --    AST 28  --  31  --   --    ALT 35  --  38  --   --    BILT 0.3  --  0.4  --   --    TP 5.7*  --  5.9*  --   --     < > = values in t transitioning to oral mgmt  6. SBO/ileus, resolved  7. Transaminitis, resolved  8. ELEAZAR on CKD, likely ATN, improved/stable  9. Mild volume overload  1. Lasix IV given - dyspnea resolved  10. Hypotension with lactic acidosis, resolved  11. COPD, stable  12. HR improved     /86 (BP Location: Left arm)   Pulse 108   Temp 97.8 °F (36.6 °C) (Temporal)   Resp (!) 6   Ht 160 cm (5' 3\")   Wt 179 lb 7.3 oz (81.4 kg)   SpO2 97%   BMI 31.79 kg/m²   Gen: NAD  CV: irreg irreg  CTAB: CTAB  GI: soft, NTND  Jai Sriar

## 2020-03-20 NOTE — PROGRESS NOTES
[facility]  Nephrology Progress Note    Anne Vasquez Patient Status:  Inpatient    1950 MRN [id number]   [facility] 2NE-A Attending Willie Weber MD   [facility] Day # 21 PCP Chad Amin MD       SUBJECTIVE  Very sluggish today, B normal, no murmur or rub  Lungs: Clear without wheezes, rales, rhonchi. Abdomen: Soft . + BS present  Extremities: Without clubbing, cyanosis or edema.   Neurologic:  moving all extremities  Skin: Warm and dry, no rash        Labs:     Recent Labs   Lab in D5W 250ml, 5-100 mcg/min, Intravenous, Continuous  Normal Saline Flush 0.9 % injection 10 mL, 10 mL, Intravenous, PRN  bisacodyl (DULCOLAX) rectal suppository 10 mg, 10 mg, Rectal, Daily PRN  acetaminophen (TYLENOL) 650 MG rectal suppository 650 mg, 650 diet    #6. Hypokalemia- replace per protocol    #7. HTN- adjusting rate controlling meds in hope BP would improve but again on nitro. Resume ACE        Questions/concerns were discussed with patient and/or family by bedside.         Curtis Knight MD

## 2020-03-20 NOTE — PROGRESS NOTES
[facility]  Progress Note    Genaro Sesay Patient Status:  Inpatient    1950 MRN [id number]   [facility] 6NE-A Attending Rosetta Wise MD   [facility] Day # 23 PCP Melissa Garcia MD     Subjective:  Genaro Sesay is a(n) 77 year ol 03/20/20  0425   RBC 2.44* 2.46* 2.51*   HGB 7.5* 7.7* 7.8*   HCT 23.7* 24.3* 24.3*   MCV 97.1 98.8 96.8   MCH 30.7 31.3 31.1   MCHC 31.6 31.7 32.1   RDW 15.8* 16.3* 16.6*   NEPRELIM 20.31* 13.23*  --    WBC 23.9* 17.2* 10.5   .0 175.0 146.0*     Re medical therapy for this and the above referenced type a dissection  · CAD s/p CABG  · Anemia  · New thrombocytopenia     Plan:  · continue to monitor respiratory status closely; follow serial chest x-rays  · Has completed a course of Zosyn therapy for asp

## 2020-03-21 ENCOUNTER — APPOINTMENT (OUTPATIENT)
Dept: GENERAL RADIOLOGY | Facility: HOSPITAL | Age: 70
DRG: 299 | End: 2020-03-21
Attending: INTERNAL MEDICINE
Payer: MEDICARE

## 2020-03-21 PROCEDURE — 99232 SBSQ HOSP IP/OBS MODERATE 35: CPT | Performed by: HOSPITALIST

## 2020-03-21 PROCEDURE — 71045 X-RAY EXAM CHEST 1 VIEW: CPT | Performed by: INTERNAL MEDICINE

## 2020-03-21 PROCEDURE — 99232 SBSQ HOSP IP/OBS MODERATE 35: CPT | Performed by: INTERNAL MEDICINE

## 2020-03-21 RX ORDER — LISINOPRIL 20 MG/1
20 TABLET ORAL DAILY
Status: DISCONTINUED | OUTPATIENT
Start: 2020-03-21 | End: 2020-03-22

## 2020-03-21 RX ORDER — ALBUTEROL SULFATE 2.5 MG/3ML
2.5 SOLUTION RESPIRATORY (INHALATION)
Status: DISCONTINUED | OUTPATIENT
Start: 2020-03-21 | End: 2020-03-23

## 2020-03-21 RX ORDER — METOPROLOL SUCCINATE 100 MG/1
100 TABLET, EXTENDED RELEASE ORAL
Status: DISCONTINUED | OUTPATIENT
Start: 2020-03-22 | End: 2020-03-23

## 2020-03-21 RX ORDER — METOPROLOL SUCCINATE 50 MG/1
50 TABLET, EXTENDED RELEASE ORAL ONCE
Status: COMPLETED | OUTPATIENT
Start: 2020-03-21 | End: 2020-03-21

## 2020-03-21 RX ORDER — POTASSIUM CHLORIDE 20 MEQ/1
40 TABLET, EXTENDED RELEASE ORAL ONCE
Status: COMPLETED | OUTPATIENT
Start: 2020-03-21 | End: 2020-03-21

## 2020-03-21 RX ORDER — FUROSEMIDE 20 MG/1
20 TABLET ORAL DAILY
Status: DISCONTINUED | OUTPATIENT
Start: 2020-03-21 | End: 2020-03-24

## 2020-03-21 RX ORDER — HYDRALAZINE HYDROCHLORIDE 20 MG/ML
INJECTION INTRAMUSCULAR; INTRAVENOUS
Status: DISPENSED
Start: 2020-03-21 | End: 2020-03-21

## 2020-03-21 RX ORDER — DIGOXIN 125 MCG
125 TABLET ORAL DAILY
Status: DISCONTINUED | OUTPATIENT
Start: 2020-03-21 | End: 2020-03-24

## 2020-03-21 NOTE — PLAN OF CARE
0800 Pt alert, oriented to person and place, flat affect, delayed responses, denies pain although appears uncomfortable.  Irregular respiratory pattern, Dr. Jordin Crystal aware, flutter valve ordered, CXR in am. 0900 SBP>120, am meds given, hydralazine IVP x1 given

## 2020-03-21 NOTE — PROGRESS NOTES
[facility]  Progress Note     Jeremias Piña Patient Status:  Inpatient    1950 MRN [id number]   [facility] 2NE-A Attending Avni Daley MD   Hosp Day # 25 PCP Jona Bellamy MD     Chief Complaint: sob    S: Patient reports n > 117* 115* 115* 114*   CO2 19.0*   < > 22.0 21.0 22.0 22.0   ALKPHO 55  --  53*  --   --   --    AST 28  --  31  --   --   --    ALT 35  --  38  --   --   --    BILT 0.3  --  0.4  --   --   --    TP 5.7*  --  5.9*  --   --   --     < > = values in this in mgmt  6. SBO/ileus, resolved  7. Transaminitis, resolved  8. ELEAZAR on CKD, likely ATN, improved/stable  9. Mild volume overload  1. Lasix IV given - dyspnea resolved  10. Hypotension with lactic acidosis, resolved  11. COPD, stable  12.  Chronic diastolic CHF

## 2020-03-21 NOTE — PROGRESS NOTES
[facility]  Progress Note    Lukasz Garcia Patient Status:  Inpatient    1950 MRN [id number]   [facility] 6NE-A Attending Rey Gutierrez MD   [facility] Day # 25 PCP Kiara Campbell MD     Subjective:  Lukasz Garcia is a(n) 77 year ol negative    Radiology:      Chest x-ray with gradual clearing of the left retrocardiac densities  Left midfield with plate of atelectasis slightly increased right base seems improved as well    Medications reviewed     Assessment and Plan:   Patient Active to improve  · EITAN on CPAP- declining use in hospital  · afib w RVR-rate  -occasionally with RVR  · hypertensive emergency with associated chest and abdominal pain at the time of admission (probable time of dissection)- now controlled on medical therapy for

## 2020-03-21 NOTE — PROGRESS NOTES
[facility]  Nephrology Progress Note    Anne Vasquez Patient Status:  Inpatient    1950 MRN [id number]   [facility] 2NE-A Attending Willie Weber MD   [facility] Day # 25 PCP Chad Amin MD       SUBJECTIVE      corrina Pride) PRN  Metoclopramide HCl (REGLAN) injection 5 mg, 5 mg, Intravenous, Q8H PRN  metoprolol Tartrate (LOPRESSOR) 5 MG/5ML injection 5 mg, 5 mg, Intravenous, Q6H PRN  Albuterol Sulfate  (90 Base) MCG/ACT inhaler 2 puff, 2 puff, Inhalation, Q6H PRN  Fluti 2.9  --        Recent Labs     03/19/20  0453   ALT 38   AST 31   ALB 2.1*         Impression/Plan:    1.  ELEAZAR/CKD III- has had longstanding CKD likely due to HTN in setting of CHF but renal fxn typically well preserved with creat close to 1.5 mg/dl or so.

## 2020-03-21 NOTE — CM/SW NOTE
Call from [facility] acute rehab, they have received insurance auth and can admit patient. If medically clear, please page sw/cm [phone].     Felix Ryan RN,   Phone [phone]

## 2020-03-21 NOTE — PLAN OF CARE
Received pt at 1930. A/O x2 not able to state correct year. FLORES. Denies any pain. Hydralazine IVP PRN dose given once. Unable to void. Bladder scanner showed 420cc/straight cath 700cc. Plan of care explained. Continue to monitor.

## 2020-03-22 ENCOUNTER — APPOINTMENT (OUTPATIENT)
Dept: GENERAL RADIOLOGY | Facility: HOSPITAL | Age: 70
DRG: 299 | End: 2020-03-22
Attending: INTERNAL MEDICINE
Payer: MEDICARE

## 2020-03-22 PROCEDURE — 99232 SBSQ HOSP IP/OBS MODERATE 35: CPT | Performed by: INTERNAL MEDICINE

## 2020-03-22 PROCEDURE — 71045 X-RAY EXAM CHEST 1 VIEW: CPT | Performed by: INTERNAL MEDICINE

## 2020-03-22 PROCEDURE — 99232 SBSQ HOSP IP/OBS MODERATE 35: CPT | Performed by: HOSPITALIST

## 2020-03-22 RX ORDER — DIGOXIN 0.25 MG/ML
125 INJECTION INTRAMUSCULAR; INTRAVENOUS ONCE
Status: COMPLETED | OUTPATIENT
Start: 2020-03-22 | End: 2020-03-22

## 2020-03-22 RX ORDER — HYDRALAZINE HYDROCHLORIDE 50 MG/1
50 TABLET, FILM COATED ORAL EVERY 8 HOURS SCHEDULED
Status: DISCONTINUED | OUTPATIENT
Start: 2020-03-22 | End: 2020-03-23

## 2020-03-22 RX ORDER — LISINOPRIL 10 MG/1
10 TABLET ORAL ONCE
Status: COMPLETED | OUTPATIENT
Start: 2020-03-22 | End: 2020-03-22

## 2020-03-22 RX ORDER — LISINOPRIL 40 MG/1
40 TABLET ORAL DAILY
Status: DISCONTINUED | OUTPATIENT
Start: 2020-03-23 | End: 2020-03-24

## 2020-03-22 NOTE — RESPIRATORY THERAPY NOTE
Patient received on room air sating 98%. Breath sounds- diminished/crackles LLL. Mcpap nebs given as ordered. Pep given to patient and instructed on use. Will continue to monitor patient.

## 2020-03-22 NOTE — PROGRESS NOTES
[facility]  Progress Note    Marielle Don Patient Status:  Inpatient    1950 MRN [id number]   [facility] 6NE-A Attending Kash Perez MD   [facility] Day # 25 PCP Rylan Hutton MD     Subjective:  Marielle Don is a(n) 77 year ol 22.0 22.0 21.0   BUN 11 8 9 7   CREATSERUM 1.19* 1.14* 1.10* 0.98     Recent Labs   Lab 03/16/20  0629   PTT >300.0*       Cultures: Reviewed    Radiology:      Chest x-rays reviewed seems without change      Medications reviewed     Assessment and Plan: status-continues to improve questionable baseline  · EITAN on CPAP- declining use in hospital  · afib w RVR-rate  -occasionally with RVR  · hypertensive emergency with associated chest and abdominal pain at the time of admission (probable time of dissection)

## 2020-03-22 NOTE — PLAN OF CARE
Received pt at 1930. Alert to self, place and month. Unable to state correct year. Denies any pain. Incontinent of stool. Straight cath clear, yellow urine. Plan of care explained. Continue to monitor.

## 2020-03-22 NOTE — PROGRESS NOTES
[facility]  Progress Note     Steve Rueda Patient Status:  Inpatient    1950 MRN [id number]   [facility] 2NE-A Attending Zakc Dobbs MD   Hosp Day # 22 PCP Michelle Ashley MD     Chief Complaint: sob    S: Patient reports n 8. 7   ALB 1.9*  --  2.1*  --   --   --   --       < > 143   < > 143 142 141   K 3.5   < > 3.4*   < > 3.7 3.6 3.8   *   < > 117*   < > 115* 114* 112   CO2 19.0*   < > 22.0   < > 22.0 22.0 21.0   ALKPHO 55  --  53*  --   --   --   --    AST 28  - issues w/ afib? 2. Is asymptomatic  6. Type A Aortic dissection  1. Surgery following and spoke with family - no plans for surgical intervention and family aware of risk  2.  nitro drip - weaned, has been transitioning to oral mgmt  7.  SBO/ileus, resolved

## 2020-03-22 NOTE — PLAN OF CARE
1030 BP and HR above parameters despite morning meds, Missy TONG notified, orders received. Pt tolerated walking in epstein.  updated via phone.

## 2020-03-22 NOTE — PROGRESS NOTES
[facility]  Nephrology Progress Note    Maged Graf Patient Status:  Inpatient    1950 MRN [id number]   [facility] 2NE-A Attending Aguila Willis MD   [facility] Day # 25 PCP Tori Rowland MD       SUBJECTIVE  No acute events overni Oral, QID PRN  dilTIAZem HCl (CARDIZEM) injection 10 mg, 10 mg, Intravenous, Q1H PRN  Metoclopramide HCl (REGLAN) injection 5 mg, 5 mg, Intravenous, Q8H PRN  metoprolol Tartrate (LOPRESSOR) 5 MG/5ML injection 5 mg, 5 mg, Intravenous, Q6H PRN  Albuterol [facility] CREATSERUM 1.19* 1.14* 1.10* 0.98   CA 8.1* 7.6* 8.2* 8.7   MG 2.0 1.7 1.8 1.9   PHOS  --  2.9  --   --        No results for input(s): ALT, AST, ALB, AMYLASE, LIPASE, LDH in the last 72 hours.     Invalid input(s): ALPHOS, TBIL, DBIL, TPROT    Impression

## 2020-03-23 ENCOUNTER — TELEPHONE (OUTPATIENT)
Dept: CARDIOLOGY | Age: 70
End: 2020-03-23

## 2020-03-23 PROCEDURE — 99232 SBSQ HOSP IP/OBS MODERATE 35: CPT | Performed by: INTERNAL MEDICINE

## 2020-03-23 RX ORDER — METOPROLOL SUCCINATE 100 MG/1
100 TABLET, EXTENDED RELEASE ORAL
Status: DISCONTINUED | OUTPATIENT
Start: 2020-03-24 | End: 2020-03-24

## 2020-03-23 RX ORDER — POTASSIUM CHLORIDE 14.9 MG/ML
20 INJECTION INTRAVENOUS ONCE
Status: COMPLETED | OUTPATIENT
Start: 2020-03-23 | End: 2020-03-23

## 2020-03-23 RX ORDER — FOLIC ACID 1 MG/1
1 TABLET ORAL DAILY
Qty: 90 TABLET | Refills: 0 | Status: SHIPPED | OUTPATIENT
Start: 2020-03-23 | End: 2020-03-24

## 2020-03-23 RX ORDER — IPRATROPIUM BROMIDE AND ALBUTEROL SULFATE 2.5; .5 MG/3ML; MG/3ML
3 SOLUTION RESPIRATORY (INHALATION)
Status: DISCONTINUED | OUTPATIENT
Start: 2020-03-23 | End: 2020-03-24

## 2020-03-23 RX ORDER — POTASSIUM CHLORIDE 20 MEQ/1
40 TABLET, EXTENDED RELEASE ORAL ONCE
Status: DISCONTINUED | OUTPATIENT
Start: 2020-03-23 | End: 2020-03-24

## 2020-03-23 RX ORDER — METOPROLOL SUCCINATE 50 MG/1
50 TABLET, EXTENDED RELEASE ORAL NIGHTLY
Status: DISCONTINUED | OUTPATIENT
Start: 2020-03-23 | End: 2020-03-24

## 2020-03-23 RX ORDER — MULTIPLE VITAMINS W/ MINERALS TAB 9MG-400MCG
1 TAB ORAL DAILY
Qty: 30 TABLET | Refills: 0 | Status: SHIPPED | OUTPATIENT
Start: 2020-03-23 | End: 2020-03-24

## 2020-03-23 NOTE — PLAN OF CARE
Pt received alert/oriented to name and place, forgetful of year and reason for hospitalization. Pt reoriented to time and situation. Pt FLORES, following all commands. Pt withdrawn. Pt denies any pain or SOB. Lungs clear bilaterally.  Using PRN medications to

## 2020-03-23 NOTE — OCCUPATIONAL THERAPY NOTE
OCCUPATIONAL THERAPY TREATMENT NOTE - INPATIENT     Room Number: 5715/9054-B  Session: 4   Number of Visits to Meet Established Goals: 5         History related to current admission: Pt is a 77 y.o. female admitted with chest pain and per  has signi 30-59 ml/min (HCC)    Oliguria    ATN (acute tubular necrosis) (HCC)    Transaminitis    Urinary tract infection without hematuria    Intestinal obstruction (HCC)    Stage 3 chronic kidney disease (HCC)    Dissection of thoracic aorta (HCC)    Respiratory off regular lower body clothing?: A Lot  -   Bathing (including washing, rinsing, drying)?: A Lot  -   Toileting, which includes using toilet, bedpan or urinal? : A Lot  -   Putting on and taking off regular upper body clothing?: A Lot  -   Taking care of mobility and ADLs. Pt is highly motivated and would would benefit from continued intensive inpatient rehab in an acute rehab setting.   This would aim to maximize independence/safety with self-care, t/f's, community mobility, and home management activities

## 2020-03-23 NOTE — PROGRESS NOTES
[facility]  Progress Note    Dayanara Parikh Patient Status:  Inpatient    1950 MRN [id number]   [facility] 6NE-A Attending Imani Daily, DO   Hosp Day # 32 PCP Brent Junior MD     Subjective:  Dayanara Parikh is a(n) 77 year ol 24.3*  --   --  26.4*   MCV 97.1 98.8 96.8  --   --  100.4*   MCH 30.7 31.3 31.1  --   --  31.6   MCHC 31.6 31.7 32.1  --   --  31.4   RDW 15.8* 16.3* 16.6*  --   --  17.2*   NEPRELIM 20.31* 13.23*  --   --   --  7.31   WBC 23.9* 17.2* 10.5  --   --  9.9 x-rays    Zain Bentley .  3/23/2020  7:29 AM

## 2020-03-23 NOTE — PAYOR COMM NOTE
--------------  CONTINUED STAY REVIEW    PayorSocorro Pallas [place] [facility]  Subscriber #:  [id number]  Authorization Number: [id number]    Admit date: 2/26/20  Admit time: 26    Admitting Physician: Renay Hardy MD  Attending Physician:  David Diehl DO  Primary Ca furosemide (LASIX) tab 20 mg     Date Action Dose Route User    3/23/2020 0820 Given 20 mg Oral Deepti Allen RN    3/22/2020 8673 Given 20 mg Oral Manuel Zuniga, RN      hydrALAzine HCl (APRESOLINE) injection 10 mg     Date Action Dose Route Use multivitamin with minerals (ADULT) tab 1 tablet     Date Action Dose Route User    3/22/2020 2399 Given 1 tablet Oral Carlin Zuniga, RN      Kay-Bee/C (ALBEE/C) tab TABS 1 tablet     Date Action Dose Route User    3/23/2020 0957 Given 1 tablet Ragini Lawrence Throat: Lips, mucosa, and tongue normal.  No thrush noted. Neck: Soft, supple neck; trachea midline, no adenopathy, no thyromegaly.                 Lungs: Clear to auscultation and percussion without wheezes, crackles or tubular · Type a aortic dissection-receiving intravenous beta-blockers and nitroglycerin  · Multifocal infiltrates not present on admission with increased PCT and WBC concerning for nosocomial pneumonia vs aspiration-improved  · Dysphagia  · Acute on chronic gio Patient sitting up in bed she denies complaints she denies chest pain shortness of breath or abdominal pain patient had one bowel movement overnight she has been tolerating a diet without difficulty     Objective/Physical Exam:        Intake/Output Summary Acute blood loss anemia     S/P CABG (coronary artery bypass graft)     ELEAZAR (acute kidney injury) (HCC)     Hypokalemia          Altered mental status     Fatigue     Benign essential HTN     Dyslipidemia     Altered mental status, unspecified altered m /82   Pulse 118   Temp 98.3 °F (36.8 °C) (Temporal)   Resp (!) 34   Ht 63\"   Wt 187 lb 9.8 oz (85.1 kg)   SpO2 98%   BMI 33.23 kg/m²   Temp (24hrs), Av.3 °F (36.8 °C), Min:97.2 °F (36.2 °C), Max:99.5 °F (37.5 °C)        Intake/Output Summary (La WBC 15.9* 16.1* 23.9* 17.2*   HGB 8.6* 8.2* 7.5* 7.7*   MCV 96.8 96.7 97.1 98.8   .0 219.0 170.0 175.0   BAND 11  --   --   --                  Recent Labs   Lab 03/15/20  0614 03/16/20  0629 03/17/20  0412 03/18/20  0407 03/19/20  0453    140 benzocaine-menthol (CEPACOL (SUGAR-FREE)) 1 lozenge, 1 lozenge, Oral, PRN  Alum & Mag Hydroxide-Simeth (MAALOX) oral suspension 30 mL, 30 mL, Oral, QID PRN  Calcium Carbonate Antacid (TUMS) chewable tab 500 mg, 500 mg, Oral, Daily PRN  Loperamide HCl (IMOD Note shared with patient   Chart Review: Note Routing History     No routing history on file.    Karen Ornelas MD   Physician   Cardiology   Progress Notes   Signed   Date of Service:  3/19/2020  9:30 AM               [address] [place] [address]  Pro We can finally add oral agents as ileus has resolved. Cardizem, hydralazine, metoprolol  Will monitor on telemetry and check follow up blood work.     No anticoagulation with Afib due to aortic dissection. Medical management with aortic dissection.  No s   ALB 2.1 03/19/2020     ALKPHO 53 03/19/2020     BILT 0.4 03/19/2020     TP 5.9 03/19/2020     AST 31 03/19/2020     ALT 38 03/19/2020     MG 2.0 03/19/2020     PHOS 2.0 03/19/2020         Medications:  hydrALAzine HCl (APRESOLINE) injection 10 mg, 10 mg, Albuterol Sulfate  (90 Base) MCG/ACT inhaler 2 puff, 2 puff, Inhalation, Q6H PRN  Fluticasone Furoate-Vilanterol (BREO ELLIPTA) 100-25 MCG/INH inhaler 1 puff, 1 puff, Inhalation, Daily  lidocaine-menthol 4-1 % 1 patch, 1 patch, Transdermal, Daily  a MCV 96.8   < > 97.1 98.8   MCH 30.5   < > 30.7 31.3   MCHC 31.5   < > 31.6 31.7   RDW 16.4*   < > 15.8* 16.3*   NEPRELIM 11.11*  --  20.31* 13.23*   WBC 15.9*   < > 23.9* 17.2*   .0   < > 170.0 175.0   NEUT 65  --  89  --    LYMPH 16  --  10  -- Abnormal EKG     Fever     EITAN on CPAP     Hypotension     Lactic acidosis     Lethargy     Toxic metabolic encephalopathy     Diarrhea     CKD (chronic kidney disease) stage 3, GFR 30-59 ml/min (HCC)     Oliguria     ATN (acute tubular necrosis) ([facility]) 03/16/20 0400 : 186 lb 11.7 oz (84.7 kg)  03/15/20 0414 : 189 lb 6 oz (85.9 kg)  03/14/20 0400 : 190 lb 11.2 oz (86.5 kg)  03/13/20 0430 : 185 lb 10 oz (84.2 kg)  03/12/20 2300 : 181 lb 10.5 oz (82.4 kg)  03/12/20 0407 : 182 lb 1.6 oz (82.6 kg)  03/11/20 0 0453   ALT 35 38   AST 28 31   ALB 1.9* 2.1*         No results for input(s): PGLU in the last 168 hours.     Meds:   potassium chloride IVPB premix 20 mEq, 20 mEq, Intravenous, Once  iron sucrose (VENOFER) IV Push 200 mg, 200 mg, Intravenous, Daily  folic Metoclopramide HCl (REGLAN) injection 5 mg, 5 mg, Intravenous, Q8H PRN  metoprolol Tartrate (LOPRESSOR) 5 MG/5ML injection 5 mg, 5 mg, Intravenous, Q6H PRN  Albuterol Sulfate  (90 Base) MCG/ACT inhaler 2 puff, 2 puff, Inhalation, Q6H PRN  Fluticason  1950 MRN [id number]   [facility] 6NE-A Attending Iraida Deluna MD   [facility] Day # 23 PCP Gio Horner MD         Subjective:  No chest pain or shortness of breath  Back on iv ntg and cardizem gtt for htn, rapid afib rates     Objective Lungs: bs somewhat diminished but overall clear  Abd: soft, NT/ND +bs  Ext: no edema  Skin: Warm, dry  Neuro: No focal deficits        Labs:        Lab Results   Component Value Date     WBC 10.5 03/20/2020     HGB 7.8 03/20/2020     HCT 24.3 03/20/2020 · Increase toprol to 50 mg daily  · Ace resumed by nephrology  · Dc  Planning     I will follow bp and hr daily and make med adjustment as needed.  Will follow peripherally moving forward otherwise        Discussed plan of care with patient and nursing.     nitroGLYCERIN infusion 50mg in D5W 250ml, 5-100 mcg/min, Intravenous, Continuous  Normal Saline Flush 0.9 % injection 10 mL, 10 mL, Intravenous, PRN  bisacodyl (DULCOLAX) rectal suppository 10 mg, 10 mg, Rectal, Daily PRN  acetaminophen (TYLENOL) 650 MG re General: sleepy, arouses but falls asleep quickly, no distress  HEENT: No scleral icterus, MMM  Neck: Supple, no ALAN or thyromegaly  Cardiac: tachy, irreg/irregular, S1, S2 normal, no murmur or rub  Lungs: Clear without wheezes, rales, rhonchi.     Abdomen: Pulmonology   Progress Notes   Signed   Date of Service:  3/20/2020  6:33 AM               Signed             [facility]  Progress Note  [facility]:  [facility]    1950 MRN [id number]   [facility] 6NE- Abdomen: Soft, non-tender, non-distended, no masses, no guarding, no                rebound, positive BS                Extremity: No edema, no cyanosis                Neurological: Alert, interactive, no focal deficits     Lab Data Review: · Acute on chronic kidney disease with improvement/near normalization of chemistries  · Altered mental status-appears improved at present  · EITAN on CPAP- declining use in hospital  · afib w RVR-rate  generally acceptable/controlled on a diltiazem  · hypert /90   Pulse 98   Temp 97.1 °F (36.2 °C) (Temporal)   Resp 18   Ht 5' 3\" (1.6 m)   Wt 173 lb 1 oz (78.5 kg)   SpO2 98%   BMI 30.66 kg/m² Room air     Temp (24hrs), Av.8 °F (36.6 °C), Min:97 °F (36.1 °C), Max:99.5 °F (37.5 °C)        Intake/Output Patient Active Problem List:     Acute chest pain     NSTEMI (non-ST elevated myocardial infarction) [facility])     Coronary artery disease involving native heart without angina pectoris     Atrial fibrillation with RVR ([facility])     Stress hyperglycemia     Hypern · hypertensive emergency with associated chest and abdominal pain at the time of admission (probable time of dissection)- now controlled on medical therapy for this and the above referenced type a dissection  · CAD s/p CABG  · History of asthma-on Breo and

## 2020-03-23 NOTE — PHYSICAL THERAPY NOTE
PHYSICAL THERAPY TREATMENT NOTE - INPATIENT    Room Number: 5395/5133-O     Session: 4  Number of Visits to Meet Established Goals: 5    Presenting Problem: Pneumonia, Ascending Aortic Dissection     History related to current admission:  Pt is a 77 y.o. • Heart attack ([facility] [address] 10/2017   • High blood pressure    • High cholesterol    • Hyperlipidemia    • Osteoarthritis    • Shortness of breath    • Sleep apnea        Past Surgical History  Past Surgical History:   Procedure Laterality Date   • BRAIN SURGER Total       AM-PAC Score:  Raw Score: 15   Approx Degree of Impairment: 57.7%   Standardized Score (AM-PAC Scale): 39.45   CMS Modifier (G-Code): CK    FUNCTIONAL ABILITY STATUS  Gait Assessment   Gait Assistance: Contact guard assist  Distance (ft): 30 - PT, so that patient may achieve highest functional independence/return to baseline. DISCHARGE RECOMMENDATIONS  PT Discharge Recommendations: Acute rehabilitation     PLAN  PT Treatment Plan: Bed mobility; Endurance; Patient education; Family education;G

## 2020-03-23 NOTE — PROGRESS NOTES
[facility]  Progress Note     Vignesh Im Patient Status:  Inpatient    1950 MRN [id number]   [facility] 2NE-A Attending Crissy Blanco MD   Hosp Day # 32 PCP Gail Hester MD     Chief Complaint: sob    S: Patient reports n < > 60 69 60   GFRNAA 42*   < > 44*   < > 52* 59* 52*   CA 7.9*   < > 8.1*   < > 8.2* 8.7 8.1*   ALB 1.9*  --  2.1*  --   --   --   --       < > 143   < > 142 141 140   K 3.5   < > 3.4*   < > 3.6 3.8 3.7   *   < > 117*   < > 114* 112 112   CO2 lopressor  2. Telemetry, cards/EP following  5. Cheyne claytno respirations  1. 2/2 TME vs underlying cardiac issues w/ afib?, asymptomatic  6. Type A Aortic dissection  1.  Surgery following and spoke with family - no plans for surgical intervention and fam

## 2020-03-23 NOTE — PLAN OF CARE
Assumed care at 0730. Patient alert and oriented x4. Blood pressure needs to be kept under 130. Morning medications given to help control blood pressure. Patient walked in the hallway with PT/OT. Seated in chair before transport to 8605.

## 2020-03-24 VITALS
HEART RATE: 82 BPM | BODY MASS INDEX: 30.23 KG/M2 | TEMPERATURE: 98 F | HEIGHT: 63 IN | OXYGEN SATURATION: 99 % | DIASTOLIC BLOOD PRESSURE: 81 MMHG | SYSTOLIC BLOOD PRESSURE: 99 MMHG | WEIGHT: 170.63 LBS | RESPIRATION RATE: 16 BRPM

## 2020-03-24 PROCEDURE — 99239 HOSP IP/OBS DSCHRG MGMT >30: CPT | Performed by: INTERNAL MEDICINE

## 2020-03-24 RX ORDER — FUROSEMIDE 20 MG/1
20 TABLET ORAL DAILY
Qty: 30 TABLET | Refills: 3 | Status: SHIPPED | OUTPATIENT
Start: 2020-03-25 | End: 2020-08-24 | Stop reason: CLARIF

## 2020-03-24 RX ORDER — METOPROLOL SUCCINATE 100 MG/1
100 TABLET, EXTENDED RELEASE ORAL
Qty: 30 TABLET | Refills: 3 | Status: SHIPPED | OUTPATIENT
Start: 2020-03-25 | End: 2020-04-09

## 2020-03-24 RX ORDER — METOPROLOL SUCCINATE 50 MG/1
50 TABLET, EXTENDED RELEASE ORAL NIGHTLY
Qty: 30 TABLET | Refills: 3 | Status: SHIPPED | OUTPATIENT
Start: 2020-03-24 | End: 2020-04-09

## 2020-03-24 RX ORDER — DIGOXIN 125 MCG
125 TABLET ORAL DAILY
Qty: 30 TABLET | Refills: 3 | Status: ON HOLD | OUTPATIENT
Start: 2020-03-25 | End: 2020-06-26

## 2020-03-24 RX ORDER — FOLIC ACID 1 MG/1
1 TABLET ORAL DAILY
Qty: 90 TABLET | Refills: 0 | Status: SHIPPED | OUTPATIENT
Start: 2020-03-24 | End: 2020-08-24 | Stop reason: CLARIF

## 2020-03-24 RX ORDER — METOPROLOL TARTRATE 100 MG/1
100 TABLET ORAL EVERY MORNING
Qty: 30 TABLET | Refills: 3 | Status: SHIPPED | OUTPATIENT
Start: 2020-03-24 | End: 2020-03-24

## 2020-03-24 RX ORDER — HYDRALAZINE HYDROCHLORIDE 25 MG/1
75 TABLET, FILM COATED ORAL EVERY 8 HOURS SCHEDULED
Qty: 90 TABLET | Refills: 3 | Status: ON HOLD | OUTPATIENT
Start: 2020-03-24 | End: 2020-05-24

## 2020-03-24 RX ORDER — LISINOPRIL 40 MG/1
40 TABLET ORAL DAILY
Qty: 30 TABLET | Refills: 3 | Status: ON HOLD | OUTPATIENT
Start: 2020-03-25 | End: 2020-07-29

## 2020-03-24 RX ORDER — MULTIPLE VITAMINS W/ MINERALS TAB 9MG-400MCG
1 TAB ORAL DAILY
Qty: 30 TABLET | Refills: 0 | Status: SHIPPED | OUTPATIENT
Start: 2020-03-24 | End: 2020-04-09

## 2020-03-24 RX ORDER — POTASSIUM CHLORIDE 20 MEQ/1
20 TABLET, EXTENDED RELEASE ORAL DAILY
Qty: 30 TABLET | Refills: 3 | Status: ON HOLD | OUTPATIENT
Start: 2020-03-24 | End: 2020-05-24

## 2020-03-24 RX ORDER — DILTIAZEM HYDROCHLORIDE 360 MG/1
360 CAPSULE, EXTENDED RELEASE ORAL DAILY
Qty: 30 CAPSULE | Refills: 3 | Status: SHIPPED | OUTPATIENT
Start: 2020-03-25 | End: 2020-04-09

## 2020-03-24 NOTE — PROGRESS NOTES
[facility]  Progress Note    Steve Ruead Patient Status:  Inpatient    1950 MRN [id number]   [facility] 8NE-A Attending Bety Larios,    Hosp Day # 32 PCP Michelle Ashley MD     Subjective:  Steve Ruead is a(n) 77 year ol --  17.2*   NEPRELIM 13.23*  --   --   --  7.31   WBC 17.2* 10.5  --   --  9.9   .0 146.0* 175.0 196.0 185.0     Recent Labs   Lab 03/21/20  0340 03/22/20  0314 03/23/20  0426 03/24/20  0524   GLU 79 89 89  --    BUN 9 7 8  --    CREATSERUM 1.10* 0.

## 2020-03-24 NOTE — PLAN OF CARE
Patient is alert and oriented x2-3, withdrawn and flat affect. Patient asking about plan of care. Patient updated on plan of care. Denies questions or concerns at this time. Patient verbalized understanding of education.  Plan is discharge when all consults highest/safest level of mobility/gait  Description  Interventions:  - Assess patient's functional ability and stability  - Promote increasing activity/tolerance for mobility and gait  - Educate and engage patient/family in tolerated activity level and prec Implement non-pharmacological measures as appropriate and evaluate response  - Consider cultural and social influences on pain and pain management  - Manage/alleviate anxiety  - Utilize distraction and/or relaxation techniques  - Monitor for opioid side ef in self care  Description  Interventions:  - Assess ability and encourage patient to participate in ADLs to maximize function  - Promote sitting position while performing ADLs such as feeding, grooming, and bathing  - Educate and encourage patient/family i

## 2020-03-24 NOTE — CM/SW NOTE
MSW spoke to bedside RN who states pt can dc today. MSW called [facility] and they will work on getting a bed and call MSW back. Rn states pt can use [facility]. MSW called pt's spouse @ 11:56am and updated him on dc today. He is agreeable to w/c severiano.     [address]

## 2020-03-24 NOTE — PLAN OF CARE
1456: Report called to Vince Valdez RN @ [facility]. Vince Valdez verbalized understanding of education. Denies questions or concerns at this time. Currently waiting for EMS to transport patient.

## 2020-03-24 NOTE — PLAN OF CARE
Discharge Note    Pt. Discharged to [facility]. IV removed, tele dc'd and returned to monitor tech. F/U instructions provided and discussed. Pt. And family verbalized understanding. Rx is given.  Discussed adverse reactions and side effects of all new medica

## 2020-03-24 NOTE — PROGRESS NOTES
[facility]  Progress Note     Festusjohn Butt Patient Status:  Inpatient    1950 MRN [id number]   [facility] 2NE-A Attending Ozzie Rivers MD   Hosp Day # 32 PCP Yvonne Scott MD     Chief Complaint: sob    S: Patient reports n 3.7 3.9   *   < > 114* 112 112  --    CO2 22.0   < > 22.0 21.0 22.0  --    ALKPHO 53*  --   --   --   --   --    AST 31  --   --   --   --   --    ALT 38  --   --   --   --   --    BILT 0.4  --   --   --   --   --    TP 5.9*  --   --   --   --   -- aware of risk  2. Off nitro drip since Friday, stable on oral BP mgmt  7. SBO/ileus, resolved  8. Transaminitis, resolved  9. ELEAZAR on CKD, likely ATN, improved/stable  10. Mild volume overload  1. Improved w/ IV, on lasix po now  11.  Hypotension with lactic

## 2020-03-24 NOTE — DISCHARGE SUMMARY
[facility]  DISCHARGE SUMMARY     Abbi Sweet Patient Status:  Inpatient    1950 MRN [id number]   [facility] 8NE-A Attending Brendan Kruger, DO   Hosp Day # 32 PCP Dhiraj Vale MD     Date of Admission: 2020  Date of D lightheaded and dizzy as though she was going to pass out, but did not. Patient seen in the HF Clinic and sent to the ER. Pain persists despite Nitro > Nitro drip. No recent falls or injuries.   No melena or hematochezia    Brief Synopsis: Patient is a 77- off nitro/Cardizem drip as she transition to oral meds. We continued to titrate cardizem/toprol XL/lisinopril/hydralazine based on heart rate/BP. SBP goal <130. She continued to increase activity with physical therapy and was accepted to acute rehab.   Ary Reece nephrology, pulmonology, general surgery, vascular surgery, CV surgery         Discharge Medications      START taking these medications      Instructions Prescription details   digoxin 0.125 MG Tabs  Commonly known as:  LANOXIN  Start taking on:  March 25 total) by mouth daily.    Quantity:  30 tablet  Refills:  3        CONTINUE taking these medications      Instructions Prescription details   Albuterol Sulfate  (90 Base) MCG/ACT Aers      Inhale 2 puffs into the lungs every 6 (six) hours as needed f 91 11 64      See Dr. Rodriguez Castillo in office ~2 weeks, call for appointment    Phillip Vega, [address] [place] [address] [phone]      follow up with neurology for cognition on 4/13 at 11:40 AM       Anuja Jarquin

## 2020-03-24 NOTE — PLAN OF CARE
Pt denies c/o pain. A/Ox2. Drowsy. Flat affect. Follows commands. Withdrawn. Tx with pt involvement with daily cares and re-orientation to room and situation. Breath sounds clear with diminished bilateral bases. . Room air while awake, SpO2 in 90s. failure clinic  - See additional Care Plan goals for specific interventions     Outcome: Progressing  Goal: Patient/Family Short Term Goal  Description  Patient's Short Term Goal: HR controlled, SBP<130    Interventions:   - frequent monitoring  - med adju indicated  - Manage/alleviate anxiety  - Monitor for signs/symptoms of CO2 retention  Outcome: Progressing     Problem: PAIN - ADULT  Goal: Verbalizes/displays adequate comfort level or patient's stated pain goal  Description  INTERVENTIONS:  - Encourage p highest level of mobility daily  - Provide frequent reorientation  - Promote wakefulness i.e. lights on, blinds open  - Promote sleep, encourage patient's normal rest cycle i.e. lights off, TV off, minimize noise and interruptions  - Encourage family to as

## 2020-03-25 NOTE — PAYOR COMM NOTE
--------------  DISCHARGE REVIEW    Megan Cyr MA [facility]  Subscriber #:  [id number]  Authorization Number: [id number]    Admit date: 2/26/20  Admit time:  1224  Discharge Date: 3/24/2020  3:45 PM     Admitting Physician: Jolynn Pardo MD  Primary Care Physi

## 2020-03-27 ENCOUNTER — TELEPHONE (OUTPATIENT)
Dept: NEUROLOGY | Facility: CLINIC | Age: 70
End: 2020-03-27

## 2020-03-27 NOTE — TELEPHONE ENCOUNTER
Pt has appt scheduled on 4/13/20 at 11:40am.  Dr. Stephanie Camacho would like to convert this to a phone consultation.     Spoke with patient's , who reports that patient has been hospitalized for quite a while, and remains hospitalized at [facility] no

## 2020-04-09 ENCOUNTER — HOSPITAL ENCOUNTER (OUTPATIENT)
Facility: HOSPITAL | Age: 70
Setting detail: OBSERVATION
Discharge: NURSING, REHAB OR LONG-TERM CARE | End: 2020-04-11
Attending: EMERGENCY MEDICINE | Admitting: INTERNAL MEDICINE
Payer: MEDICARE

## 2020-04-09 ENCOUNTER — TELEPHONE (OUTPATIENT)
Dept: CARDIOLOGY | Age: 70
End: 2020-04-09

## 2020-04-09 DIAGNOSIS — I48.91 ATRIAL FIBRILLATION WITH CONTROLLED VENTRICULAR RESPONSE (HCC): ICD-10-CM

## 2020-04-09 DIAGNOSIS — D64.9 ANEMIA, UNSPECIFIED TYPE: Primary | ICD-10-CM

## 2020-04-09 PROBLEM — R79.89 AZOTEMIA: Status: ACTIVE | Noted: 2020-04-09

## 2020-04-09 PROCEDURE — 99220 INITIAL OBSERVATION CARE,LEVL III: CPT | Performed by: INTERNAL MEDICINE

## 2020-04-09 PROCEDURE — 30233N1 TRANSFUSION OF NONAUTOLOGOUS RED BLOOD CELLS INTO PERIPHERAL VEIN, PERCUTANEOUS APPROACH: ICD-10-PCS | Performed by: INTERNAL MEDICINE

## 2020-04-09 RX ORDER — POTASSIUM CHLORIDE 20 MEQ/1
20 TABLET, EXTENDED RELEASE ORAL DAILY
Status: DISCONTINUED | OUTPATIENT
Start: 2020-04-10 | End: 2020-04-11

## 2020-04-09 RX ORDER — FOLIC ACID 1 MG/1
1 TABLET ORAL DAILY
Status: DISCONTINUED | OUTPATIENT
Start: 2020-04-10 | End: 2020-04-11

## 2020-04-09 RX ORDER — BISACODYL 10 MG
10 SUPPOSITORY, RECTAL RECTAL DAILY PRN
COMMUNITY
End: 2020-05-18

## 2020-04-09 RX ORDER — SODIUM CHLORIDE 9 MG/ML
INJECTION, SOLUTION INTRAVENOUS CONTINUOUS
Status: DISCONTINUED | OUTPATIENT
Start: 2020-04-09 | End: 2020-04-10

## 2020-04-09 RX ORDER — DIGOXIN 125 MCG
125 TABLET ORAL DAILY
Status: DISCONTINUED | OUTPATIENT
Start: 2020-04-10 | End: 2020-04-11

## 2020-04-09 RX ORDER — METOCLOPRAMIDE HYDROCHLORIDE 5 MG/ML
10 INJECTION INTRAMUSCULAR; INTRAVENOUS EVERY 8 HOURS PRN
Status: DISCONTINUED | OUTPATIENT
Start: 2020-04-09 | End: 2020-04-11

## 2020-04-09 RX ORDER — ATORVASTATIN CALCIUM 40 MG/1
40 TABLET, FILM COATED ORAL NIGHTLY
Status: DISCONTINUED | OUTPATIENT
Start: 2020-04-09 | End: 2020-04-11

## 2020-04-09 RX ORDER — DILTIAZEM HYDROCHLORIDE 180 MG/1
360 CAPSULE, EXTENDED RELEASE ORAL DAILY
Status: DISCONTINUED | OUTPATIENT
Start: 2020-04-09 | End: 2020-04-09

## 2020-04-09 RX ORDER — METOPROLOL SUCCINATE 100 MG/1
100 TABLET, EXTENDED RELEASE ORAL
Status: DISCONTINUED | OUTPATIENT
Start: 2020-04-10 | End: 2020-04-11

## 2020-04-09 RX ORDER — ALBUTEROL SULFATE 2.5 MG/3ML
2.5 SOLUTION RESPIRATORY (INHALATION) EVERY 6 HOURS PRN
COMMUNITY
End: 2020-08-24 | Stop reason: CLARIF

## 2020-04-09 RX ORDER — ACETAMINOPHEN 325 MG/1
650 TABLET ORAL EVERY 4 HOURS PRN
COMMUNITY
End: 2020-08-24 | Stop reason: CLARIF

## 2020-04-09 RX ORDER — METOPROLOL SUCCINATE 50 MG/1
50 TABLET, EXTENDED RELEASE ORAL NIGHTLY
Status: DISCONTINUED | OUTPATIENT
Start: 2020-04-09 | End: 2020-04-11

## 2020-04-09 RX ORDER — DILTIAZEM HYDROCHLORIDE 60 MG/1
90 TABLET, FILM COATED ORAL 4 TIMES DAILY
Status: ON HOLD | COMMUNITY
End: 2020-04-11

## 2020-04-09 RX ORDER — ONDANSETRON 2 MG/ML
4 INJECTION INTRAMUSCULAR; INTRAVENOUS EVERY 6 HOURS PRN
Status: DISCONTINUED | OUTPATIENT
Start: 2020-04-09 | End: 2020-04-11

## 2020-04-09 RX ORDER — LISINOPRIL 40 MG/1
40 TABLET ORAL DAILY
Status: DISCONTINUED | OUTPATIENT
Start: 2020-04-10 | End: 2020-04-11

## 2020-04-09 RX ORDER — POTASSIUM CHLORIDE 1.5 G/1.77G
20 POWDER, FOR SOLUTION ORAL DAILY
COMMUNITY
End: 2020-04-09

## 2020-04-09 RX ORDER — FUROSEMIDE 20 MG/1
20 TABLET ORAL DAILY
Status: DISCONTINUED | OUTPATIENT
Start: 2020-04-10 | End: 2020-04-11

## 2020-04-09 RX ORDER — MULTIVIT WITH IRON,MINERALS
15 LIQUID (ML) ORAL DAILY
COMMUNITY
End: 2020-05-18

## 2020-04-09 NOTE — ED PROVIDER NOTES
Patient Seen in: [facility] Emergency Department      History   Patient presents with:  Abnormal Result    Stated Complaint: low hgb     HPI    Olvin Banegas is a 70-year-old female who presents today for abnormal hemoglobin at a nursing home, where she is [facility]   • HEART POST OP BLEED N/A 10/24/2017    Performed by Master Andersen MD at [facility]   • HYSTERECTOMY     • REMOVAL GALLBLADDER     • TOTAL ABDOM HYSTERECTOMY                      Social History    Tobacco Use      Smoking status: Former Smoker Neurological: CN III - XII grossly intact, normal strength and sensation. Skin: Skin is warm and dry. No rash noted. No erythema.     ED Course     Labs Reviewed   COMP METABOLIC PANEL (14) - Abnormal; Notable for the following components:       Result DRAW BLUE   RAINBOW DRAW LAVENDER   RAINBOW DRAW LIGHT GREEN   RAINBOW DRAW GOLD          This case is reviewed with Dr. Joao Marshall, who evaluates the patient and agrees with the plan of care.     2/28/2020, EGD performed, distal esophagus biopsy performed, n

## 2020-04-09 NOTE — ED INITIAL ASSESSMENT (HPI)
PT TO ED FROM [facility] FOR LOW HGB. INITIAL HGB 6.0, SECOND HGB DRAWN TODAY REPORTED 6.9. PT DENIES SOB, DENIES ANY OTHER COMPLAINTS. AT [facility] AFTER NON TRAUMATIC BRAIN INJURY. KNOWN [facility].

## 2020-04-09 NOTE — H&P
JOSELYN HOSPITALIST  History and Physical     [facility] Patient Status:  Emergency    1950 MRN [id number]   Location [address] [facility] Attending No att. providers found   Hosp Day # 0 PCP Lois Cordova MD     Chief Compl ago. She has a 4.30 pack-year smoking history. She has never used smokeless tobacco. She reports that she does not drink alcohol or use drugs.     Family History:   Family History   Problem Relation Age of Onset   • Cancer Father         back   • Cancer Mat Aerosol Powder, Breath Activated, Inhale 1 puff into the lungs 2 (two) times daily. , Disp: 3 each, Rfl: 3  atorvastatin 40 MG Oral Tab, Take 1 tablet (40 mg total) by mouth nightly., Disp: 30 tablet, Rfl: 5        Review of Systems:   A comprehensive 14 po admission  2. Afib  1. Rate controlled  3. Type A aortic dissection  4. CKD  5. COPD  1. Continue inhalers  6. Chronic Diastolic CHF  1. Continue lasix  7. Ess HTN  1. Continue homemeds  8.  Dyslipidemia  1. statin  Quality:  · DVT Prophylaxis: SCD  · CODE

## 2020-04-10 ENCOUNTER — ANESTHESIA (OUTPATIENT)
Dept: ENDOSCOPY | Facility: HOSPITAL | Age: 70
End: 2020-04-10
Payer: MEDICARE

## 2020-04-10 ENCOUNTER — APPOINTMENT (OUTPATIENT)
Dept: CT IMAGING | Facility: HOSPITAL | Age: 70
End: 2020-04-10
Attending: HOSPITALIST
Payer: MEDICARE

## 2020-04-10 ENCOUNTER — ANESTHESIA EVENT (OUTPATIENT)
Dept: ENDOSCOPY | Facility: HOSPITAL | Age: 70
End: 2020-04-10
Payer: MEDICARE

## 2020-04-10 PROCEDURE — 0DB98ZX EXCISION OF DUODENUM, VIA NATURAL OR ARTIFICIAL OPENING ENDOSCOPIC, DIAGNOSTIC: ICD-10-PCS | Performed by: INTERNAL MEDICINE

## 2020-04-10 PROCEDURE — 0DB78ZX EXCISION OF STOMACH, PYLORUS, VIA NATURAL OR ARTIFICIAL OPENING ENDOSCOPIC, DIAGNOSTIC: ICD-10-PCS | Performed by: INTERNAL MEDICINE

## 2020-04-10 PROCEDURE — 99226 SUBSEQUENT OBSERVATION CARE: CPT | Performed by: INTERNAL MEDICINE

## 2020-04-10 PROCEDURE — 74176 CT ABD & PELVIS W/O CONTRAST: CPT | Performed by: HOSPITALIST

## 2020-04-10 RX ORDER — SODIUM CHLORIDE 9 MG/ML
INJECTION, SOLUTION INTRAVENOUS ONCE
Status: DISCONTINUED | OUTPATIENT
Start: 2020-04-10 | End: 2020-04-11

## 2020-04-10 RX ORDER — SODIUM CHLORIDE, SODIUM LACTATE, POTASSIUM CHLORIDE, CALCIUM CHLORIDE 600; 310; 30; 20 MG/100ML; MG/100ML; MG/100ML; MG/100ML
INJECTION, SOLUTION INTRAVENOUS CONTINUOUS
Status: DISCONTINUED | OUTPATIENT
Start: 2020-04-10 | End: 2020-04-10

## 2020-04-10 RX ORDER — METOCLOPRAMIDE HYDROCHLORIDE 5 MG/ML
10 INJECTION INTRAMUSCULAR; INTRAVENOUS AS NEEDED
Status: DISCONTINUED | OUTPATIENT
Start: 2020-04-10 | End: 2020-04-10 | Stop reason: HOSPADM

## 2020-04-10 RX ORDER — DEXAMETHASONE SODIUM PHOSPHATE 4 MG/ML
4 VIAL (ML) INJECTION AS NEEDED
Status: DISCONTINUED | OUTPATIENT
Start: 2020-04-10 | End: 2020-04-10 | Stop reason: HOSPADM

## 2020-04-10 RX ORDER — HYDROCODONE BITARTRATE AND ACETAMINOPHEN 5; 325 MG/1; MG/1
2 TABLET ORAL AS NEEDED
Status: DISCONTINUED | OUTPATIENT
Start: 2020-04-10 | End: 2020-04-10 | Stop reason: HOSPADM

## 2020-04-10 RX ORDER — ONDANSETRON 2 MG/ML
4 INJECTION INTRAMUSCULAR; INTRAVENOUS AS NEEDED
Status: DISCONTINUED | OUTPATIENT
Start: 2020-04-10 | End: 2020-04-10 | Stop reason: HOSPADM

## 2020-04-10 RX ORDER — HYDROCODONE BITARTRATE AND ACETAMINOPHEN 5; 325 MG/1; MG/1
1 TABLET ORAL AS NEEDED
Status: DISCONTINUED | OUTPATIENT
Start: 2020-04-10 | End: 2020-04-10 | Stop reason: HOSPADM

## 2020-04-10 RX ORDER — LIDOCAINE HYDROCHLORIDE 10 MG/ML
INJECTION, SOLUTION EPIDURAL; INFILTRATION; INTRACAUDAL; PERINEURAL AS NEEDED
Status: DISCONTINUED | OUTPATIENT
Start: 2020-04-10 | End: 2020-04-10 | Stop reason: SURG

## 2020-04-10 RX ORDER — NALOXONE HYDROCHLORIDE 0.4 MG/ML
80 INJECTION, SOLUTION INTRAMUSCULAR; INTRAVENOUS; SUBCUTANEOUS AS NEEDED
Status: DISCONTINUED | OUTPATIENT
Start: 2020-04-10 | End: 2020-04-10 | Stop reason: HOSPADM

## 2020-04-10 RX ORDER — PANTOPRAZOLE SODIUM 40 MG/1
40 TABLET, DELAYED RELEASE ORAL EVERY 12 HOURS
Status: DISCONTINUED | OUTPATIENT
Start: 2020-04-10 | End: 2020-04-11

## 2020-04-10 RX ORDER — FUROSEMIDE 10 MG/ML
20 INJECTION INTRAMUSCULAR; INTRAVENOUS ONCE
Status: COMPLETED | OUTPATIENT
Start: 2020-04-10 | End: 2020-04-10

## 2020-04-10 RX ORDER — HYDROMORPHONE HYDROCHLORIDE 1 MG/ML
0.4 INJECTION, SOLUTION INTRAMUSCULAR; INTRAVENOUS; SUBCUTANEOUS EVERY 5 MIN PRN
Status: DISCONTINUED | OUTPATIENT
Start: 2020-04-10 | End: 2020-04-10 | Stop reason: HOSPADM

## 2020-04-10 RX ADMIN — LIDOCAINE HYDROCHLORIDE 50 MG: 10 INJECTION, SOLUTION EPIDURAL; INFILTRATION; INTRACAUDAL; PERINEURAL at 11:34:00

## 2020-04-10 RX ADMIN — SODIUM CHLORIDE: 9 INJECTION, SOLUTION INTRAVENOUS at 11:54:00

## 2020-04-10 NOTE — PLAN OF CARE
Problem: CARDIOVASCULAR - ADULT  Goal: Maintains optimal cardiac output and hemodynamic stability  Description  INTERVENTIONS:  - Monitor vital signs, rhythm, and trends  - Monitor for bleeding, hypotension and signs of decreased cardiac output  - Evalua signs and symptoms of internal bleeding  - Monitor lab trends  - Patient is to report abnormal signs of bleeding to staff  - Avoid use of toothpicks and dental floss  - Use electric shaver for shaving  - Use soft bristle tooth brush  - Limit straining and

## 2020-04-10 NOTE — DIETARY NOTE
[address] ASSESSMENT    Pt does not meet malnutrition criteria at this time.      NUTRITION DIAGNOSIS/PROBLEM:    Unintentional weight loss related to physiologic causes as evidenced by severe wt loss per EMR hx (~15 lb, 8.8% in a MD discretion    MONITOR AND EVALUATE/NUTRITION GOALS:  1. Diet to advance to at least Full Liquid within 72 hours    MEDICATIONS:  IVF:NS at 100 ml/hr, Lasix, Folic Acid, KCl (40 meq)    LABS:  Glu:92, K+:4.1, BUN:21, Cr:0.99, GFR:59, Hgb:8.1 (previously 6

## 2020-04-10 NOTE — PROGRESS NOTES
notified w/ hgb of 6.5 @ [id number] after receiving 1u PRBC earlier during the day. hgb was redrawn and still 6.6. Dr Ada Flood notified again. stat CT abd/pelvis & 1u prbc to be transfused was ordered. Pt made aware poc.

## 2020-04-10 NOTE — ANESTHESIA PREPROCEDURE EVALUATION
PRE-OP EVALUATION    Patient Name: Vero Huang    Pre-op Diagnosis: GI BLEED,MELENA    Procedure(s):  ESOPHAGOGASTRODUODENOSCOPY    Surgeon(s) and Role:     Johnny Funez MD - Primary    Pre-op vitals reviewed.   Temp: 97.5 °F (36.4 °C)  Pulse: 8 Tab, Take 75 mg by mouth 3 (three) times daily. , Disp: , Rfl:   Multiple Vitamins-Minerals (CENTRUM) Oral Liquid, Take 15 mL by mouth daily. , Disp: , Rfl:   acetaminophen 325 MG Oral Tab, Take 650 mg by mouth every 4 (four) hours as needed for Pain., Disp: Endo/Other               (+) anemia                   Pulmonary      (+) asthma  (+) COPD       (+) shortness of breath     (+) sleep apnea       Neuro/Psych    Negative neuro/psych ROS.                                 Past Surgical History:   Procedure Lat decreased breath sounds         Other findings            ASA: 4   Plan: MAC       Post-procedure pain management plan discussed with surgeon and patient.       Plan/risks discussed with: patient                Present on Admission:  • Azotemia

## 2020-04-10 NOTE — CM/SW NOTE
Reviewed pt's past admission, pt is a current rehab pt at [facility]. Spoke to Yelitza Dominguez this morning, plan is for her to return. Yelitza Dominguez will have to be notified to [facility] [address] [facility] with [facility].    Spoke to pt's RN- pt is going down for an EGD, and should have

## 2020-04-10 NOTE — PHYSICAL THERAPY NOTE
PHYSICAL THERAPY EVALUATION - INPATIENT     Room Number: [facility] ENDO POOL ROOMS/ ENDO Pool  Evaluation Date: 4/10/2020  Type of Evaluation: Initial  Physician Order: PT Eval and Treat    Presenting Problem: anemia, work up in progress  Reason for Marybeth Hayes by Fanny Clay MD at [address] N/A 10/24/2017    Performed by Fanny Clay MD at [address]     • TOTAL ABDOM HYSTERECTOMY         HOME SITUATION  Type [facility] [place] [address]   Home 3-/5  Left Hip flexion  3-/5  Right Knee extension  3/5  Left Knee extension  3/5  Right Dorsiflexion  3+/5  Left Dorsiflexion  3+/5    BALANCE  Static Sitting: Good  Dynamic Sitting: Fair -  Static Standing: Poor +  Dynamic Standing: Poor +    ADDITIONAL training  Posture  Transfer training    Patient End of Session: Up in chair;Needs met;Call light within reach;RN aware of session/findings; All patient questions and concerns addressed; Alarm set    ASSESSMENT   Patient is a 77year old female admitted on 4/ supervision     Goal #3 Patient is able to ambulate 150 feet with assist device: walker - rolling at assistance level: supervision     Goal #4    Goal #5    Goal #6    Goal Comments: Goals established on 4/10/2020

## 2020-04-10 NOTE — PLAN OF CARE
Pt  A/0 x3 flat affect follows commands Npo with sips of water with meds but needed freq ques to swallow. Pt went  for EGD and then retutn to the room post with freq VS monitored diet advanced to Clear liq diet tolerated well.  IVF to be dc'd when eating Maintains hematologic stability  Description  INTERVENTIONS  - Assess for signs and symptoms of bleeding or hemorrhage  - Monitor labs and vital signs for trends  - Administer supportive blood products/factors, fluids and medications as ordered and appropr

## 2020-04-10 NOTE — CM/SW NOTE
TRISTAN spoke to [facility]. Planning for potential discharge tomorrow 4/11. Informed Rani at [facility] to start authorization.

## 2020-04-10 NOTE — PROGRESS NOTES
JOSELYN HOSPITALIST  Progress Note     [facility] Patient Status:  Observation    1950 MRN [id number]   [facility] 2NE-A Attending Susy Marin MD   Hosp Day # 0 PCP Stacy Mckeon MD     Chief Complaint: GIB    S: Patient h PTP 15.5*   INR 1.19*       No results for input(s): TROP, CK in the last 168 hours. Imaging: Imaging data reviewed in Epic.     Medications:   • sodium chloride   Intravenous Once   • pantoprazole (PROTONIX) IV push  40 mg Intravenous Q12H    Or CTAB

## 2020-04-10 NOTE — OPERATIVE REPORT
Berniece Merlin Patient Status:  Observation    1950 MRN [id number]   [facility] 2NE-A Attending Tee Arauz MD   Date 4/10/2020 PCP Tianna Caruso MD     PREOPERATIVE DIAGNOSIS/INDICATION: GIB, post hemorrhagic anemia  POSTO

## 2020-04-10 NOTE — ANESTHESIA POSTPROCEDURE EVALUATION
[address] Patient Status:  Observation   Age/Gender 77year old female MRN [id number]   Location [facility]. Attending Brenda Cortez MD   Hosp Day # 0 PCP Ortiz Velez MD       Anesthesia Post-op Note    Procedu

## 2020-04-10 NOTE — CONSULTS
[facility]  Report of Consultation    Keegan Huntley Patient Status:  Observation    1950 MRN [id number]   [facility] 2NE-A Attending Adelfo Jauregui MD   Date of Consult 4/10/2020 PCP Lian Johnson MD     Reason for Consult ABDOM HYSTERECTOMY       Family History   Problem Relation Age of Onset   • Cancer Father         back   • Cancer Maternal Grandmother         breast   • Other (stroke) Brother 61      reports that she quit smoking about 2 years ago.  Her smoking use includ systems is negative. Physical Exam:    Blood pressure 120/80, pulse 74, temperature 98.6 °F (37 °C), temperature source Oral, resp. rate 20, height 62\", weight 155 lb 13.8 oz (70.7 kg), SpO2 100 %.     Constitutional: Appearance: ill appearing  Psychiat uncertain etiology     Abnormal EKG     Fever     EITAN on CPAP     Hypotension     Lactic acidosis     Lethargy     Toxic metabolic encephalopathy     Diarrhea     CKD (chronic kidney disease) stage 3, GFR 30-59 ml/min ([facility])     Oliguria     ATN (acute tubu

## 2020-04-10 NOTE — PROGRESS NOTES
04/10/20 0825   Clinical Encounter Type   Visited With Patient   Routine Visit Introduction   Continue Visiting No   Patient Spiritual Encounters   Feelings of Loneliness/Hopelessness Sadness  (Pt. was very quiet.)    responded to request for sp

## 2020-04-10 NOTE — RESPIRATORY THERAPY NOTE
EITAN Equipment Usage Summary :            Set Mode :AUTO CPAP W FLEX          Usage in Hours:8;21          90% Pressure (EPAP) : 13.8           90% Insp Pressure (IPAP);           AHI : 9.5          Supplemental Oxygen LPM :          Auto cpap ( MAX PRESSURE

## 2020-04-10 NOTE — PLAN OF CARE
Alert. Oriented x3-4. Able to say her name, birthdate, year and what brought her to the hospital. Denies any bleeding issue, sob, or cp. Scds in place. Ivf infusing. Poc discussed with pt. Need reinforcement. Cont. to monitor pt.

## 2020-04-11 VITALS
BODY MASS INDEX: 28.28 KG/M2 | HEIGHT: 62 IN | SYSTOLIC BLOOD PRESSURE: 101 MMHG | WEIGHT: 153.69 LBS | HEART RATE: 72 BPM | RESPIRATION RATE: 17 BRPM | OXYGEN SATURATION: 95 % | DIASTOLIC BLOOD PRESSURE: 64 MMHG | TEMPERATURE: 98 F

## 2020-04-11 PROCEDURE — 99217 OBSERVATION CARE DISCHARGE: CPT | Performed by: INTERNAL MEDICINE

## 2020-04-11 RX ORDER — PANTOPRAZOLE SODIUM 40 MG/1
40 TABLET, DELAYED RELEASE ORAL
Qty: 120 TABLET | Refills: 0 | Status: SHIPPED | OUTPATIENT
Start: 2020-04-11 | End: 2020-05-18

## 2020-04-11 NOTE — PLAN OF CARE
Vital signs stable. HGB 8.7 this afternoon, platelets 995. No active bleeding noted. Room air. Eating well, self feed. Up with assist and walker. Patient cleared for discharge to return to [facility].   Confirmed with  that patient has aut emergency measures for life threatening arrhythmias  - Monitor electrolytes and administer replacement therapy as ordered  Outcome: Adequate for Discharge     Problem: HEMATOLOGIC - ADULT  Goal: Maintains hematologic stability  Description  INTERVENTIONS

## 2020-04-11 NOTE — PROGRESS NOTES
JOSELYN HOSPITALIST  Progress Note     Savanah Kareen Patient Status:  Observation    1950 MRN [id number]   [facility] 2NE-A Attending Maged Ramirez MD   Hosp Day # 0 PCP Arielle Corrigan MD     Chief Complaint: GIB    S: Patient r Estimated Creatinine Clearance: 43 mL/min (based on SCr of 0.99 mg/dL). Recent Labs   Lab 04/09/20  1407   PTP 15.5*   INR 1.19*       No results for input(s): TROP, CK in the last 168 hours. Imaging: Imaging data reviewed in Epic.     Medi Pulse 79   Temp 98.2 °F (36.8 °C) (Oral)   Resp 18   Ht 157.5 cm (5' 2\")   Wt 153 lb 10.6 oz (69.7 kg)   SpO2 100%   BMI 28.10 kg/m²   CV: irreg irreg  PULM: CTAB

## 2020-04-11 NOTE — PLAN OF CARE
Assumed care of pt @ 2300. AOx3, flat affect, slow to respond at times. Bed alarm in place for safety. On CPAP. Afib on tele, rates controlled, eliquis on hold. Briefed, incontinent, Faythe Herminia in place. Hgb 7.5 tonight, no s/s bleeding.  Plan for labs in am, rectal medication administration  - Ensure safe mobilization of patient  - Hold pressure on venipuncture sites to achieve adequate hemostasis  - Assess for signs and symptoms of internal bleeding  - Monitor lab trends  - Patient is to report abnormal signs

## 2020-04-11 NOTE — RESPIRATORY THERAPY NOTE
EITAN : EQUIPMENT USE: DAILY SUMMARY                                            SET MODE: AUTO CPAP WITH CFLEX                                          USAGE IN HOURS:7:47                                          90%

## 2020-04-11 NOTE — CM/SW NOTE
Pt is clear to dc to [facility]. Per [facility], they have received insurance auth. Pt will require [employer] for transportation. Pt to transport via [address] patient/family notified transport is not covered by insurance. Pt/family are agreeable to the charges.  PCS form

## 2020-04-11 NOTE — DISCHARGE SUMMARY
[facility] HOSPITALIST  DISCHARGE SUMMARY     Marielle Don Patient Status:  Observation    1950 MRN [id number]   [facility] 2NE-A Attending Kiesha Del Real MD   Hosp Day # 0 PCP Sandrita Andre MD     Date of Admission: 2020  Anselmo Risk  29-58 Medium Risk  0-28   Low Risk         TCM Follow-Up Recommendation:  LACE > 58:  High Risk of readmission after discharge from the hospital.    Procedures during hospitalization:   PREOPERATIVE DIAGNOSIS/INDICATION: GIB, post hemorrhagic anemia Tabs  Commonly known as:  LIPITOR      Take 1 tablet (40 mg total) by mouth nightly. Quantity:  30 tablet  Refills:  5     Centrum Liqd      Take 15 mL by mouth daily.    Refills:  0     digoxin 0.125 MG Tabs  Commonly known as:  LANOXIN      Take 1 table 22 [id number]    Call  in a few weeks to determine timing       Future Appointments   Date Time Provider Alida Dunaway   4/20/2020 10:30 AM HEART FAILURE APN 1 [facility]  [address]       ---------------------------------------------------------

## 2020-04-11 NOTE — PROGRESS NOTES
[facility] Gastroenterology Progress Note    S:  Pt with further signs of GI bleeding today or overnight     O: BP 95/72 (BP Location: Right arm)   Pulse 79   Temp 98.2 °F (36.8 °C) (Oral)   Resp 18   Ht 62\"   Wt 153 lb 10.6 oz (69.7 kg)   SpO2 100%

## 2020-04-11 NOTE — PROGRESS NOTES
Patient was seen by  Cherelle gama  And ok to discharge to [facility] mona  1720-discharge to [facility] per [facility]

## 2020-04-20 ENCOUNTER — TELEPHONE (OUTPATIENT)
Dept: CARDIOLOGY CLINIC | Facility: HOSPITAL | Age: 70
End: 2020-04-20

## 2020-04-20 ENCOUNTER — HOSPITAL ENCOUNTER (OUTPATIENT)
Dept: CARDIOLOGY CLINIC | Facility: HOSPITAL | Age: 70
Discharge: HOME | End: 2020-04-20
Attending: NURSE PRACTITIONER
Payer: MEDICARE

## 2020-04-20 DIAGNOSIS — I50.32 CHRONIC HEART FAILURE WITH PRESERVED EJECTION FRACTION (HCC): Primary | ICD-10-CM

## 2020-04-20 DIAGNOSIS — I10 BENIGN ESSENTIAL HTN: ICD-10-CM

## 2020-04-20 PROCEDURE — 99441 PHONE E/M BY PHYS 5-10 MIN: CPT | Performed by: NURSE PRACTITIONER

## 2020-04-20 NOTE — TELEPHONE ENCOUNTER
RN called [facility] ([phone]) and spoke to Kisha Graf - clinical manager. Patient is new to their service. Requested weekly weights and VS to be faxed to [facility] on Thursdays. Kisha Graf verbalized understanding and agreed.

## 2020-04-20 NOTE — PROGRESS NOTES
Virtual Telephone Check-In    Magda Frees verbally consents to a Virtual/Telephone Check-In visit on 04/20/20. Patient understands and accepts financial responsibility for any deductible, co-insurance and/or co-pays associated with this service. mental status. She was transferred to ICU and continued on BiPAP, broad-spectrum IV antibiotics. Nephrology evaluated due to ELEAZAR.   Resp status improved and she was able to wean off BiPAP and we attempted swallow eval.  Unfortunately patient developed inc scan with no perfusion defects.   8. CKD Stage III, baseline creatinine ~1.3-1.5, stable. 9. Anemia, iron deficiency - last HGB 13.6. Last Iron sat 10% and Ferritin 45.  Given one dose of IV Venofer and felt poorly after in past. She stopped oral iron due

## 2020-04-23 ENCOUNTER — TELEPHONE (OUTPATIENT)
Dept: CARDIOLOGY CLINIC | Facility: HOSPITAL | Age: 70
End: 2020-04-23

## 2020-04-23 NOTE — TELEPHONE ENCOUNTER
Faxed request for patient's weekly weights, vital signs and labs. Results received via fax and reviewed with APN. Pt's weight is stable and ranges from 145-150lbs(currently 145). BP stable 110/68;120/70. HR 70 and afebrile 97. 6. CPM.

## 2020-04-27 ENCOUNTER — HOSPITAL ENCOUNTER (OUTPATIENT)
Dept: CARDIOLOGY CLINIC | Facility: HOSPITAL | Age: 70
Discharge: HOME | End: 2020-04-27
Attending: NURSE PRACTITIONER
Payer: MEDICARE

## 2020-04-27 ENCOUNTER — TELEPHONE (OUTPATIENT)
Dept: CARDIOLOGY CLINIC | Facility: HOSPITAL | Age: 70
End: 2020-04-27

## 2020-04-27 DIAGNOSIS — I50.32 CHRONIC HEART FAILURE WITH PRESERVED EJECTION FRACTION (HCC): Primary | ICD-10-CM

## 2020-04-27 DIAGNOSIS — I27.20 PULMONARY HYPERTENSION (HCC): ICD-10-CM

## 2020-04-27 DIAGNOSIS — R11.2 NAUSEA AND VOMITING, INTRACTABILITY OF VOMITING NOT SPECIFIED, UNSPECIFIED VOMITING TYPE: ICD-10-CM

## 2020-04-27 DIAGNOSIS — I10 HYPERTENSION, UNSPECIFIED TYPE: ICD-10-CM

## 2020-04-27 DIAGNOSIS — R53.1 WEAKNESS: ICD-10-CM

## 2020-04-27 PROCEDURE — 99443 PHONE E/M BY PHYS 21-30 MIN: CPT | Performed by: NURSE PRACTITIONER

## 2020-04-27 NOTE — PROGRESS NOTES
Virtual Telephone Check-In    Burton Mohs verbally consents to a Virtual/Telephone Check-In visit on 04/27/20. Patient understands and accepts financial responsibility for any deductible, co-insurance and/or co-pays associated with this service. current medications. · Order BMP, CBC. Doris Bruner to draw tomorrow. ·  to find BP cuff and check daily and with symptoms of nausea/vomiting. · Daughter, Elke Salcedo to update PCP.     Flora Saint, APRN

## 2020-04-27 NOTE — PROGRESS NOTES
HPI:    Patient ID: Vignesh Kay is a 77year old female.     HPI    Review of Systems         Current Outpatient Medications   Medication Sig Dispense Refill   • Pantoprazole Sodium 40 MG Oral Tab EC Take 1 tablet (40 mg total) by mouth 2 (two) times da Comment:PT HAD VOMITING   PHYSICAL EXAM:   Physical Exam           ASSESSMENT/PLAN:   No diagnosis found. No orders of the defined types were placed in this encounter.       Meds This Visit:  Requested Prescriptions      No prescriptions requested or ord

## 2020-04-27 NOTE — TELEPHONE ENCOUNTER
Rec'd orders from Cheyenne TONG to request CBC and BMP to be drawn by [facility]. Request was faxed to [facility] at [phone] for CBC and BMP to be drawn tomorrow 04/28/2020 with confirmation.

## 2020-04-30 ENCOUNTER — TELEPHONE (OUTPATIENT)
Dept: CARDIOLOGY CLINIC | Facility: HOSPITAL | Age: 70
End: 2020-04-30

## 2020-04-30 NOTE — TELEPHONE ENCOUNTER
Spoke with Jacquelyn Ball and informed her the labs were reviewed by Justina TONG and we will Continue with present medical management. She verbalized understanding. Instructed to call us with any questions or concerns.

## 2020-04-30 NOTE — TELEPHONE ENCOUNTER
Rec'd a fax From Jazmin Bland 5952 regarding BMP results:  Glucose =96  Sodium=140  Potassium= 3.5  BUN= 11  Creatinine= 1.1  GFR= 49  Total CO2= 25  Calcium= 9.4   Result was forwarded to Bernard TONG for review.

## 2020-04-30 NOTE — TELEPHONE ENCOUNTER
Marjan Huynh [facility] @ [phone] regarding lab results for CBC and BMP that was suppose to be drawn on 4/28/2020. Spoke with Liberty and she only had the result of CBC and she will look into BMP (not sure if it was drawn).   WBC=9.4  RBC=3.37  HGB=10.4  HC

## 2020-05-08 ENCOUNTER — TELEPHONE (OUTPATIENT)
Dept: CARDIOLOGY CLINIC | Facility: HOSPITAL | Age: 70
End: 2020-05-08

## 2020-05-08 NOTE — TELEPHONE ENCOUNTER
Vital sign report received from [address] [place] [address] [place]    SBP's 110's-130's with isolated reading in the 150's. DBP 70-80's. O2 saturation 98-99%. HR's averaging 60-70's  No recorded weights since 4/28, 145 lbs. No new recommendations.

## 2020-05-11 ENCOUNTER — LAB REQUISITION (OUTPATIENT)
Dept: LAB | Facility: HOSPITAL | Age: 70
End: 2020-05-11
Payer: MEDICARE

## 2020-05-11 DIAGNOSIS — R35.0 FREQUENCY OF MICTURITION: ICD-10-CM

## 2020-05-11 PROCEDURE — 87186 SC STD MICRODIL/AGAR DIL: CPT | Performed by: FAMILY MEDICINE

## 2020-05-11 PROCEDURE — 87086 URINE CULTURE/COLONY COUNT: CPT | Performed by: FAMILY MEDICINE

## 2020-05-11 PROCEDURE — 87088 URINE BACTERIA CULTURE: CPT | Performed by: FAMILY MEDICINE

## 2020-05-11 PROCEDURE — 81001 URINALYSIS AUTO W/SCOPE: CPT | Performed by: FAMILY MEDICINE

## 2020-05-15 ENCOUNTER — HOSPITAL ENCOUNTER (OUTPATIENT)
Dept: CARDIOLOGY CLINIC | Facility: HOSPITAL | Age: 70
Discharge: HOME | End: 2020-05-15
Attending: NURSE PRACTITIONER
Payer: MEDICARE

## 2020-05-15 DIAGNOSIS — I48.0 PAF (PAROXYSMAL ATRIAL FIBRILLATION) (HCC): ICD-10-CM

## 2020-05-15 DIAGNOSIS — G47.33 OSA (OBSTRUCTIVE SLEEP APNEA): ICD-10-CM

## 2020-05-15 DIAGNOSIS — I50.32 CHRONIC HEART FAILURE WITH PRESERVED EJECTION FRACTION (HCC): Primary | ICD-10-CM

## 2020-05-15 DIAGNOSIS — R00.2 HEART PALPITATIONS: ICD-10-CM

## 2020-05-15 DIAGNOSIS — I10 HYPERTENSION, UNSPECIFIED TYPE: ICD-10-CM

## 2020-05-15 DIAGNOSIS — I10 BENIGN ESSENTIAL HTN: ICD-10-CM

## 2020-05-15 DIAGNOSIS — N18.30 STAGE 3 CHRONIC KIDNEY DISEASE (HCC): ICD-10-CM

## 2020-05-15 DIAGNOSIS — D64.89 ANEMIA DUE TO OTHER CAUSE, NOT CLASSIFIED: ICD-10-CM

## 2020-05-15 DIAGNOSIS — E66.9 OBESITY (BMI 30-39.9): ICD-10-CM

## 2020-05-15 DIAGNOSIS — I27.20 PULMONARY HYPERTENSION (HCC): ICD-10-CM

## 2020-05-15 DIAGNOSIS — Z95.1 S/P CABG (CORONARY ARTERY BYPASS GRAFT): ICD-10-CM

## 2020-05-15 NOTE — PROGRESS NOTES
Virtual Telephone Check-In    Berniece Merlin verbally consents to a Virtual/Telephone Check-In visit on 05/15/20. Patient understands and accepts financial responsibility for any deductible, co-insurance and/or co-pays associated with this service. tablet, Rfl: 3  digoxin 0.125 MG Oral Tab, Take 1 tablet (125 mcg total) by mouth daily. , Disp: 30 tablet, Rfl: 3  furosemide 20 MG Oral Tab, Take 1 tablet (20 mg total) by mouth daily. , Disp: 30 tablet, Rfl: 3  Potassium Chloride ER 20 MEQ Oral Tab CR, Ta N/A 2/28/2020    Performed by Lucie Bejarano MD at [address] N/A 10/24/2017    Performed by Aleta Zavala MD at [address] N/A 10/24/2017    Performed by Aleta Zavala MD at [facility] · Virtual visit June 3rd with labs per [facility] on the 1st.   · CHF discharge instructions given    Please note that the following visit was completed using two-way, real-time interactive audio and/or video communication.   This has been done

## 2020-05-18 NOTE — ADDENDUM NOTE
Encounter addended by: CRUZITO Castellano on: 5/18/2020 7:41 AM   Actions taken: Clinical Note Signed

## 2020-05-21 ENCOUNTER — HOSPITAL ENCOUNTER (INPATIENT)
Facility: HOSPITAL | Age: 70
LOS: 4 days | Discharge: HOME HEALTH | DRG: 308 | End: 2020-05-25
Attending: EMERGENCY MEDICINE | Admitting: HOSPITALIST
Payer: MEDICARE

## 2020-05-21 ENCOUNTER — APPOINTMENT (OUTPATIENT)
Dept: GENERAL RADIOLOGY | Facility: HOSPITAL | Age: 70
DRG: 308 | End: 2020-05-21
Attending: NURSE PRACTITIONER
Payer: MEDICARE

## 2020-05-21 DIAGNOSIS — I48.91 ATRIAL FIBRILLATION, UNSPECIFIED TYPE (HCC): Primary | ICD-10-CM

## 2020-05-21 PROBLEM — D69.6 THROMBOCYTOPENIA (HCC): Status: ACTIVE | Noted: 2020-05-21

## 2020-05-21 PROBLEM — R73.9 HYPERGLYCEMIA: Status: ACTIVE | Noted: 2020-05-21

## 2020-05-21 PROCEDURE — 99223 1ST HOSP IP/OBS HIGH 75: CPT | Performed by: HOSPITALIST

## 2020-05-21 PROCEDURE — 99223 1ST HOSP IP/OBS HIGH 75: CPT | Performed by: INTERNAL MEDICINE

## 2020-05-21 PROCEDURE — 71045 X-RAY EXAM CHEST 1 VIEW: CPT | Performed by: NURSE PRACTITIONER

## 2020-05-21 RX ORDER — ATORVASTATIN CALCIUM 40 MG/1
40 TABLET, FILM COATED ORAL DAILY
Status: DISCONTINUED | OUTPATIENT
Start: 2020-05-22 | End: 2020-05-25

## 2020-05-21 RX ORDER — DOCUSATE SODIUM 100 MG/1
100 CAPSULE, LIQUID FILLED ORAL 2 TIMES DAILY
Status: DISCONTINUED | OUTPATIENT
Start: 2020-05-21 | End: 2020-05-25

## 2020-05-21 RX ORDER — BISACODYL 10 MG
10 SUPPOSITORY, RECTAL RECTAL
Status: DISCONTINUED | OUTPATIENT
Start: 2020-05-21 | End: 2020-05-25

## 2020-05-21 RX ORDER — PANTOPRAZOLE SODIUM 40 MG/1
40 TABLET, DELAYED RELEASE ORAL
Status: DISCONTINUED | OUTPATIENT
Start: 2020-05-21 | End: 2020-05-25

## 2020-05-21 RX ORDER — FUROSEMIDE 20 MG/1
20 TABLET ORAL DAILY
Status: DISCONTINUED | OUTPATIENT
Start: 2020-05-22 | End: 2020-05-25

## 2020-05-21 RX ORDER — METOPROLOL TARTRATE 5 MG/5ML
5 INJECTION INTRAVENOUS EVERY 6 HOURS SCHEDULED
Status: DISCONTINUED | OUTPATIENT
Start: 2020-05-21 | End: 2020-05-22

## 2020-05-21 RX ORDER — SODIUM PHOSPHATE, DIBASIC AND SODIUM PHOSPHATE, MONOBASIC 7; 19 G/133ML; G/133ML
1 ENEMA RECTAL ONCE AS NEEDED
Status: DISCONTINUED | OUTPATIENT
Start: 2020-05-21 | End: 2020-05-25

## 2020-05-21 RX ORDER — ACETAMINOPHEN 325 MG/1
650 TABLET ORAL EVERY 6 HOURS PRN
Status: DISCONTINUED | OUTPATIENT
Start: 2020-05-21 | End: 2020-05-25

## 2020-05-21 RX ORDER — LISINOPRIL 40 MG/1
40 TABLET ORAL DAILY
Status: DISCONTINUED | OUTPATIENT
Start: 2020-05-22 | End: 2020-05-25

## 2020-05-21 RX ORDER — METOCLOPRAMIDE HYDROCHLORIDE 5 MG/ML
5 INJECTION INTRAMUSCULAR; INTRAVENOUS EVERY 8 HOURS PRN
Status: DISCONTINUED | OUTPATIENT
Start: 2020-05-21 | End: 2020-05-25

## 2020-05-21 RX ORDER — POTASSIUM CHLORIDE 20 MEQ/1
40 TABLET, EXTENDED RELEASE ORAL ONCE
Status: COMPLETED | OUTPATIENT
Start: 2020-05-21 | End: 2020-05-21

## 2020-05-21 RX ORDER — ONDANSETRON 2 MG/ML
4 INJECTION INTRAMUSCULAR; INTRAVENOUS EVERY 6 HOURS PRN
Status: DISCONTINUED | OUTPATIENT
Start: 2020-05-21 | End: 2020-05-25

## 2020-05-21 RX ORDER — DIGOXIN 125 MCG
125 TABLET ORAL DAILY
Status: DISCONTINUED | OUTPATIENT
Start: 2020-05-22 | End: 2020-05-25

## 2020-05-21 RX ORDER — SODIUM CHLORIDE 9 MG/ML
INJECTION, SOLUTION INTRAVENOUS CONTINUOUS
Status: CANCELLED | OUTPATIENT
Start: 2020-05-21 | End: 2020-05-21

## 2020-05-21 RX ORDER — POLYETHYLENE GLYCOL 3350 17 G/17G
17 POWDER, FOR SOLUTION ORAL DAILY PRN
Status: DISCONTINUED | OUTPATIENT
Start: 2020-05-21 | End: 2020-05-25

## 2020-05-21 RX ORDER — HEPARIN SODIUM 5000 [USP'U]/ML
30 INJECTION INTRAVENOUS; SUBCUTANEOUS ONCE
Status: DISCONTINUED | OUTPATIENT
Start: 2020-05-21 | End: 2020-05-21

## 2020-05-21 RX ORDER — DILTIAZEM HYDROCHLORIDE 5 MG/ML
20 INJECTION INTRAVENOUS ONCE
Status: COMPLETED | OUTPATIENT
Start: 2020-05-21 | End: 2020-05-21

## 2020-05-21 NOTE — CONSULTS
[facility]  Report of Consultation    [facility] Patient Status:  Inpatient    1950 MRN [id number]   [facility] 2NE-A Attending [facility] Day # 0 PCP Bethany Baird MD     Reason for Consultation:   At as anticoagulation recommendations.           History:  Past Medical History:   Diagnosis Date   • A-fib ([facility] [address]     WITH RAPID VENTRICULAR RESPONSE   • Arrhythmia 2016    hx of A-Fib   • Asthma    • Blood disorder     thrombocytopenia, anemia   • Brai She is retired.     Allergies:    Amlodipine              RASH, ITCHING, SWELLING    Comment:Patient has received & tolerated diltiazem in past  Lisinopril              SWELLING  Radiology Contrast *    NAUSEA AND VOMITING    Comment:PT HAD VOMITING    Medi Well-healed median sternotomy incision. Parasternal lift. Irregular rhythm, S1, S2 normal, 2/6 holosystolic murmur, left lower sternal border. Lungs: Clear anteriorly, no wheezes or rales. Abdomen: Soft, non-tender.    Extremities: Without clubbing, sara Acute blood loss anemia     S/P CABG (coronary artery bypass graft)     ELEAZAR (acute kidney injury) (HCC)     Hypokalemia     Obesity (BMI 30-39. 9)     Altered mental status     Fatigue     Benign essential HTN     Dyslipidemia     Altered mental status, uns and mortality with attempted redo surgery. Continue medical therapy. History of recent upper GI bleed with EGD demonstrated duodenal ulcer, April 2020. Remains on high-dose pantoprazole therapy.     DeBakey type I aortic dissection and recent bleeding

## 2020-05-21 NOTE — ED PROVIDER NOTES
Patient Seen in: [facility] Emergency Department      History   Patient presents with:  Arrythmia/Palpitations    Stated Complaint: palpitations / history of a fib    HPI  Patient is a 81-year-old female past medical history of aortic dissection -me • HEART CORONARY ARTERY BYPASS GRAFT N/A 10/24/2017    Performed by Sury Crawley MD at [address] N/A 10/24/2017    Performed by Sury Crawley MD at [address] respiratory distress. Breath sounds: Normal breath sounds. No wheezing or rales. Chest:      Chest wall: No tenderness. Abdominal:      General: Bowel sounds are normal. There is no distension or abdominal bruit. Palpations: Abdomen is soft. -----------         ------                     CBC W/ DIFFERENTIAL[[id number]]          Abnormal            Final result                 Please view results for these tests on the individual orders.    [facility] 5/21/2020  3:51 PM         Chest x-ray without acute disease  CMP-potassium 3.4. Creatinine 1.23, GFR 52.   CBC-hemoglobin 10.7, hematocrit 33.7, RBCs 3.60, platelets 262      Patient was taken directly to an exam room and placed on a cardiac monitor and p

## 2020-05-21 NOTE — H&P
JOSELYN HOSPITALIST  History and Physical     [facility] Patient Status:  Emergency    1950 MRN [id number]   Location [address] [facility] Attending Hermila Cramer MD   Hosp Day # 0 PCP Sherman Ramirez MD     Chief Complaint: Performed by Henna Alexander MD at [address] N/A 10/24/2017    Performed by Henna Alexander MD at [address]     • TOTAL ABDOM HYSTERECTOMY         Social History:  reports that she quit smoking abo by mouth daily. , Disp: 30 tablet, Rfl: 3  Potassium Chloride ER 20 MEQ Oral Tab CR, Take 1 tablet (20 mEq total) by mouth daily. , Disp: 30 tablet, Rfl: 3  folic acid 1 MG Oral Tab, Take 1 tablet (1 mg total) by mouth daily. , Disp: 90 tablet, Rfl: 0  flutic (A) (based on SCr of 1.23 mg/dL (H)). Recent Labs   Lab 05/21/20  1455   PTP 15.3*   INR 1.17*       Recent Labs   Lab 05/21/20  1455   TROP 0.132*       Imaging: Imaging data reviewed in Epic. ASSESSMENT / PLAN:     1.  Atrial fibrillation with RVR

## 2020-05-21 NOTE — PROGRESS NOTES
Patient alert and oriented, x4. On room air, lungs are clear and maintaining normal sats. Patient reports chest palpitations that \"comes and goes. \" A fib per tele, HR remains in the 70's. Cardizem gtt infusing.  Bowel sounds present, last bowel movement re

## 2020-05-22 PROCEDURE — 99232 SBSQ HOSP IP/OBS MODERATE 35: CPT | Performed by: HOSPITALIST

## 2020-05-22 PROCEDURE — 99233 SBSQ HOSP IP/OBS HIGH 50: CPT | Performed by: INTERNAL MEDICINE

## 2020-05-22 RX ORDER — LABETALOL HYDROCHLORIDE 5 MG/ML
10 INJECTION, SOLUTION INTRAVENOUS ONCE
Status: COMPLETED | OUTPATIENT
Start: 2020-05-22 | End: 2020-05-22

## 2020-05-22 RX ORDER — SPIRONOLACTONE 25 MG/1
25 TABLET ORAL DAILY
Status: DISCONTINUED | OUTPATIENT
Start: 2020-05-22 | End: 2020-05-25

## 2020-05-22 RX ORDER — METOPROLOL SUCCINATE 100 MG/1
200 TABLET, EXTENDED RELEASE ORAL
Status: DISCONTINUED | OUTPATIENT
Start: 2020-05-23 | End: 2020-05-25

## 2020-05-22 RX ORDER — LISINOPRIL 40 MG/1
40 TABLET ORAL DAILY
Status: DISCONTINUED | OUTPATIENT
Start: 2020-05-22 | End: 2020-05-22

## 2020-05-22 RX ORDER — METOPROLOL TARTRATE 100 MG/1
100 TABLET ORAL
Status: COMPLETED | OUTPATIENT
Start: 2020-05-22 | End: 2020-05-22

## 2020-05-22 RX ORDER — ALBUTEROL SULFATE 2.5 MG/3ML
2.5 SOLUTION RESPIRATORY (INHALATION) EVERY 6 HOURS PRN
Status: DISCONTINUED | OUTPATIENT
Start: 2020-05-22 | End: 2020-05-25

## 2020-05-22 RX ORDER — DILTIAZEM HYDROCHLORIDE 180 MG/1
360 CAPSULE, EXTENDED RELEASE ORAL DAILY
Status: DISCONTINUED | OUTPATIENT
Start: 2020-05-23 | End: 2020-05-25

## 2020-05-22 NOTE — PROGRESS NOTES
JOSELYN HOSPITALIST  Progress Note     Linford Clamp Patient Status:  Inpatient    1950 MRN [id number]   [facility] 2NE-A Attending [place] [facility] Day # 1 PCP Sandip Caballero MD     Chief Complaint: Palpitations    S: potassium chloride 40mEq IVPB (peripheral line)  40 mEq Intravenous Once   • Fluticasone Furoate-Vilanterol  1 puff Inhalation Daily   • metoprolol tartrate  100 mg Oral 2x Daily(Beta Blocker)   • hydrALAzine HCl  75 mg Oral Q8H [facility]   • dilTIAZem HCl  90 m

## 2020-05-22 NOTE — RESPIRATORY THERAPY NOTE
EITAN Equipment Usage Summary :            Set Mode :AUTO CPAP W FLEX          Usage in Hours; 6;03          90% Pressure (EPAP) : 10.1           90% Insp Pressure (IPAP);           AHI : 39          Supplemental Oxygen LPM :

## 2020-05-22 NOTE — PLAN OF CARE
Received patient at 0730. Alert and orientated x4. Tele Rhythm Afib. O2 saturation 95% on RA. Breath sounds diminished. No C/O chest pain, dizziness, or SOB. Pt briefed for stress incontinence. Pt tolerating ambulation. Skin is dry and intact.  D/C Cardizem Initiate emergency measures for life threatening arrhythmias  - Monitor electrolytes and administer replacement therapy as ordered  Outcome: Progressing     Problem: SAFETY ADULT - FALL  Goal: Free from fall injury  Description  INTERVENTIONS:  - Assess pt

## 2020-05-22 NOTE — PLAN OF CARE
Assumed care of patient around [address]. Patient sitting up in bed, ox4, flat affect. Denies any chest pain or sob. Room air, . Afib on tele controlled rate. Brief on for stress incontinence. Cardizem drip infusing per order. High fall precautions in place. frequently for physical needs  - Identify cognitive and physical deficits and behaviors that affect risk of falls.   - [place] fall precautions as indicated by assessment.  - Educate pt/family on patient safety including physical limitations  - Instruct p

## 2020-05-23 PROCEDURE — 99232 SBSQ HOSP IP/OBS MODERATE 35: CPT | Performed by: INTERNAL MEDICINE

## 2020-05-23 PROCEDURE — 99232 SBSQ HOSP IP/OBS MODERATE 35: CPT | Performed by: HOSPITALIST

## 2020-05-23 RX ORDER — POTASSIUM CHLORIDE 20 MEQ/1
40 TABLET, EXTENDED RELEASE ORAL EVERY 4 HOURS
Status: COMPLETED | OUTPATIENT
Start: 2020-05-23 | End: 2020-05-23

## 2020-05-23 RX ORDER — POTASSIUM CHLORIDE 20 MEQ/1
40 TABLET, EXTENDED RELEASE ORAL ONCE
Status: COMPLETED | OUTPATIENT
Start: 2020-05-23 | End: 2020-05-23

## 2020-05-23 RX ORDER — HYDRALAZINE HYDROCHLORIDE 25 MG/1
25 TABLET, FILM COATED ORAL EVERY 4 HOURS PRN
Status: DISCONTINUED | OUTPATIENT
Start: 2020-05-23 | End: 2020-05-25

## 2020-05-23 NOTE — CM/SW NOTE
TRISTAN spoke to pt's RN- pt to possibly dc tomorrow. Pt is current with [facility]. TRISTAN spoke to daughter - she states pt had also been evaluated for services through [facility] but is waiting to here from them.   TRISTAN sent resume of care orders to [place] -

## 2020-05-23 NOTE — PLAN OF CARE
Assumed care of patient around [address]. Patient sitting in bed, ox4, very flat affect. Denies any chest pain or sob. Room air, . Afib controlled on tele. Blood pressure noted to be higher in left arm. Brief for stress incontinence.  High fall precautions in Progressing     Problem: SAFETY ADULT - FALL  Goal: Free from fall injury  Description  INTERVENTIONS:  - Assess pt frequently for physical needs  - Identify cognitive and physical deficits and behaviors that affect risk of falls.   - [place] fall precaut

## 2020-05-23 NOTE — RESPIRATORY THERAPY NOTE
EITAN : EQUIPMENT USE: DAILY SUMMARY                                            SET MODE:                                           USAGE IN HOURS:                                          90% EXP. PRESSURE(EPAP):

## 2020-05-23 NOTE — PROGRESS NOTES
[facility] Card[facility] Progress Note    Subjective:  Felt fine overnight despite bp on high side.       Objective:  BP (!) 189/99 (BP Location: Right arm)   Pulse 85   Temp 98.2 °F (36.8 °C) (Oral)   Resp 20   Ht 160 cm (5' 3\")   Wt 126 lb 1.7 oz (57.2 kg)   S flat affect. Lungs are clear. She is got an irregular rhythm. Abdomen is soft. There is no significant edema. With her blood pressures being elevated, we will titrate hydralazine as required.   If her pressures are reasonable, I think she can be disc

## 2020-05-23 NOTE — PROGRESS NOTES
JOSELYN HOSPITALIST  Progress Note     Tyson Singh Patient Status:  Inpatient    1950 MRN [id number]   [facility] 2NE-A Attending [facility] Day # 2 PCP Rere House MD     Chief Complaint: Palpitations    S: Recent Labs   Lab 05/21/20  1455   TROP 0.132*            Imaging: Imaging data reviewed in Epic.     Medications:   • Potassium Chloride ER  40 mEq Oral Q4H   • Fluticasone Furoate-Vilanterol  1 puff Inhalation Daily   • dilTIAZem HCl ER Coated Beads after discharge, patient will require TBD.

## 2020-05-23 NOTE — PLAN OF CARE
Received patient at 0730. Alert and Oriented x4, flat affect and soft spoken. Tele Rhythm Afib, rate controled today. O2 saturation 96%  On room air. Breath sounds diminished but clear. Bed is locked and in low position.  Call light and personal items with for life threatening arrhythmias  - Monitor electrolytes and administer replacement therapy as ordered  Outcome: Progressing     Problem: SAFETY ADULT - FALL  Goal: Free from fall injury  Description  INTERVENTIONS:  - Assess pt frequently for physical nee

## 2020-05-24 PROCEDURE — 99232 SBSQ HOSP IP/OBS MODERATE 35: CPT | Performed by: HOSPITALIST

## 2020-05-24 PROCEDURE — 99233 SBSQ HOSP IP/OBS HIGH 50: CPT | Performed by: INTERNAL MEDICINE

## 2020-05-24 RX ORDER — DILTIAZEM HYDROCHLORIDE 360 MG/1
360 CAPSULE, EXTENDED RELEASE ORAL DAILY
Qty: 30 CAPSULE | Refills: 3 | Status: ON HOLD | OUTPATIENT
Start: 2020-05-25 | End: 2020-07-18

## 2020-05-24 RX ORDER — POTASSIUM CHLORIDE 20 MEQ/1
40 TABLET, EXTENDED RELEASE ORAL EVERY 4 HOURS
Status: COMPLETED | OUTPATIENT
Start: 2020-05-24 | End: 2020-05-24

## 2020-05-24 RX ORDER — METOPROLOL SUCCINATE 200 MG/1
200 TABLET, EXTENDED RELEASE ORAL
Qty: 30 TABLET | Refills: 3 | Status: ON HOLD | OUTPATIENT
Start: 2020-05-25 | End: 2020-09-03

## 2020-05-24 RX ORDER — HYDRALAZINE HYDROCHLORIDE 50 MG/1
100 TABLET, FILM COATED ORAL 2 TIMES DAILY
Status: DISCONTINUED | OUTPATIENT
Start: 2020-05-24 | End: 2020-05-25

## 2020-05-24 RX ORDER — SPIRONOLACTONE 25 MG/1
25 TABLET ORAL DAILY
Qty: 30 TABLET | Refills: 3 | Status: ON HOLD | OUTPATIENT
Start: 2020-05-25 | End: 2020-07-29

## 2020-05-24 NOTE — PROGRESS NOTES
[facility] Card[facility] Progress Note    Subjective:  Sleeping comfortably.      Objective:  BP (!) 130/98 (BP Location: Right arm)   Pulse (!) 126   Temp 98 °F (36.7 °C) (Oral)   Resp 18   Ht 160 cm (5' 3\")   Wt 143 lb 12.8 oz (65.2 kg)   SpO2 99%   BMI 25.47 and plan. Danilo Yanez.  Jayla Chase MD

## 2020-05-24 NOTE — PLAN OF CARE
Assumed care of pt at 0730. Denies any pain or SOB. Afib, HR 70 to low 100s. BP controlled. Denies light-headedness/dizziness. Sats > 95% on RA.   K 3.5, replaced per protocol. Will continue to monitor.     Problem: CARDIOVASCULAR - ADULT  Goal: Maintains

## 2020-05-24 NOTE — PLAN OF CARE
Assumed care of pt at 1930 a/o x4 in no apparent distress sitting up in the chair. Monitor shows continued AFib with variable HR at beginning of shift was in 90's -100 before scheduled hydralazine PO.   Dose given and HR continue to be erratic from 90 - 13 ordered  - Initiate emergency measures for life threatening arrhythmias  - Monitor electrolytes and administer replacement therapy as ordered  Outcome: Progressing     Problem: SAFETY ADULT - FALL  Goal: Free from fall injury  Description  INTERVENTIONS:

## 2020-05-24 NOTE — PROGRESS NOTES
JOSELYN HOSPITALIST  Progress Note     Lukasz Garcia Patient Status:  Inpatient    1950 MRN [id number]   [facility] 2NE-A [facility] Kiara Campbell MD     Chief Complaint: Palpitations    S: --   --    TP 7.6  --   --   --   --        Estimated Creatinine Clearance: 44.1 mL/min (based on SCr of 1.01 mg/dL).     Recent Labs   Lab 05/21/20  1455   PTP 15.3*   INR 1.17*       Recent Labs   Lab 05/21/20  1455   TROP 0.132*            Imaging: Imagi

## 2020-05-24 NOTE — RESPIRATORY THERAPY NOTE
EITAN : EQUIPMENT USE: DAILY SUMMARY                                            SET MODE:AUTO CPAP WITH CFLEX                                          USAGE IN HOURS:8:51                                          90%

## 2020-05-25 VITALS
BODY MASS INDEX: 26.22 KG/M2 | SYSTOLIC BLOOD PRESSURE: 179 MMHG | WEIGHT: 148 LBS | DIASTOLIC BLOOD PRESSURE: 106 MMHG | HEART RATE: 83 BPM | OXYGEN SATURATION: 100 % | HEIGHT: 63 IN | TEMPERATURE: 98 F | RESPIRATION RATE: 18 BRPM

## 2020-05-25 PROCEDURE — 99239 HOSP IP/OBS DSCHRG MGMT >30: CPT | Performed by: HOSPITALIST

## 2020-05-25 PROCEDURE — 99232 SBSQ HOSP IP/OBS MODERATE 35: CPT | Performed by: INTERNAL MEDICINE

## 2020-05-25 RX ORDER — HYDRALAZINE HYDROCHLORIDE 100 MG/1
100 TABLET, FILM COATED ORAL 2 TIMES DAILY
Qty: 60 TABLET | Refills: 2 | Status: ON HOLD | OUTPATIENT
Start: 2020-05-25 | End: 2020-09-03

## 2020-05-25 NOTE — PROGRESS NOTES
[facility]  Progress Note    Linford Clamp Patient Status:  Inpatient    1950 MRN [id number]   [facility] 2NE-A Attending [address]  [place] Day # 4 PCP Sandip Caballero MD     Subjective:  Frustrated, wants to go CV surgery evaluation. Continue medical therapy. HTN, on extensive medical regimen with improving control. CPM.  I have tried to adjust her medications to daily or at most BID dosing to try and improve home compliance.   From my perspective she can be d

## 2020-05-25 NOTE — PLAN OF CARE
Patient alert and oriented with period of forgetfulness ,on room air , denies any cp/ palpitation , patient was adjusting with CPAP 8hrs during this night , encouraged to wear and adjusted, incontinent care /skin care provided and made comfortable,afib wit ordered  Outcome: Progressing     Problem: SAFETY ADULT - FALL  Goal: Free from fall injury  Description  INTERVENTIONS:  - Assess pt frequently for physical needs  - Identify cognitive and physical deficits and behaviors that affect risk of falls.   - Inst

## 2020-05-25 NOTE — RESPIRATORY THERAPY NOTE
EITAN - Equipment Use Daily Summary:  · Set Mode   · Usage in hours: 6.5  · 90% Pressure (EPAP) level:   · 90% Insp Pressure (IPAP): 13  · AHI: 19  · Supplemental Oxygen:  · Comments:

## 2020-05-25 NOTE — PLAN OF CARE
Pt is alert x 3 forgetful,  on Ra, a-fib on the monitor. PT denies any cardiovascular symptoms at this time. Pt is up with SBA and walker, incontinent of B/B. All needs met and will continue to monitor. PLAN; monitor heart rate, encourage ambulation. Assess pt frequently for physical needs  - Identify cognitive and physical deficits and behaviors that affect risk of falls.   - [place] fall precautions as indicated by assessment.  - Educate pt/family on patient safety including physical limitations  -

## 2020-05-26 ENCOUNTER — TELEPHONE (OUTPATIENT)
Dept: CARDIOLOGY CLINIC | Facility: HOSPITAL | Age: 70
End: 2020-05-26

## 2020-05-26 ENCOUNTER — TELEPHONE (OUTPATIENT)
Dept: CARDIOLOGY | Age: 70
End: 2020-05-26

## 2020-05-26 RX ORDER — HYDRALAZINE HYDROCHLORIDE 100 MG/1
100 TABLET, FILM COATED ORAL 2 TIMES DAILY
Status: SHIPPED | COMMUNITY
Start: 2020-05-26 | End: 2020-11-25

## 2020-05-26 RX ORDER — ATORVASTATIN CALCIUM 40 MG/1
40 TABLET, FILM COATED ORAL AT BEDTIME
Qty: 90 TABLET | Refills: 3 | Status: SHIPPED | OUTPATIENT
Start: 2020-05-26 | End: 2020-11-25

## 2020-05-26 RX ORDER — DILTIAZEM HYDROCHLORIDE 360 MG/1
360 CAPSULE, EXTENDED RELEASE ORAL DAILY
COMMUNITY
End: 2020-07-31 | Stop reason: ALTCHOICE

## 2020-05-26 RX ORDER — METOPROLOL SUCCINATE 200 MG/1
100 TABLET, EXTENDED RELEASE ORAL 2 TIMES DAILY
COMMUNITY

## 2020-05-26 RX ORDER — METOPROLOL TARTRATE 100 MG/1
200 TABLET ORAL DAILY
Status: SHIPPED | COMMUNITY
Start: 2020-05-26 | End: 2020-05-26 | Stop reason: DRUGHIGH

## 2020-05-26 RX ORDER — FUROSEMIDE 20 MG/1
20 TABLET ORAL DAILY
COMMUNITY
Start: 2019-11-25 | End: 2020-11-08

## 2020-05-26 RX ORDER — LISINOPRIL 40 MG/1
40 TABLET ORAL DAILY
COMMUNITY
End: 2020-07-31 | Stop reason: ALTCHOICE

## 2020-05-26 RX ORDER — PANTOPRAZOLE SODIUM 40 MG/1
40 TABLET, DELAYED RELEASE ORAL 2 TIMES DAILY
COMMUNITY

## 2020-05-26 RX ORDER — DIGOXIN 125 MCG
125 TABLET ORAL DAILY
COMMUNITY
End: 2020-07-31 | Stop reason: ALTCHOICE

## 2020-05-26 NOTE — DISCHARGE SUMMARY
[facility] HOSPITALIST  DISCHARGE SUMMARY     Marielle Don Patient Status:  Inpatient    1950 MRN [id number]   [facility] 2NE-A Attending No att. providers found   Hosp Day # 4 PCP Rylan Hutton MD     Date of Admission: 2020  Date Prescription details   dilTIAZem HCl ER Coated Beads 360 MG Cp24  Commonly known as:  CARDIZEM CD      Take 1 capsule (360 mg total) by mouth daily.    Quantity:  30 capsule  Refills:  3     Metoprolol Succinate  MG Tb24  Commonly known as:  TOPROL-XL lisinopril 40 MG Tabs      Take 1 tablet (40 mg total) by mouth daily.    Quantity:  30 tablet  Refills:  3     Pantoprazole Sodium 40 MG Tbec  Commonly known as:  PROTONIX      Take 1 tablet (40 mg total) by mouth 2 (two) times daily before meals for 120 is our priority. Location Instructions: Your appointment is scheduled at [facility]. The address is&nbsp; [address] [facility]. [address], [place]. To reach Registration, park in the [address] [place] [address] [place].  Enter through the revolving doors locate

## 2020-05-26 NOTE — TELEPHONE ENCOUNTER
This patient's daughter called the [facility] today asking for clarification of discharge medication changes made by the cardiologist while recently admitted in the hospital.  This patient was instructed to call S Cardiology office to determine what medication

## 2020-05-26 NOTE — PAYOR COMM NOTE
--------------  DISCHARGE REVIEW    Payor: [employer] MEDICARE ADV PPO  Subscriber #:  [id number]  Authorization Number: [id number]    Admit date: 5/21/20  Admit time:  2648  Discharge Date: 5/25/2020  6:21 PM     Admitting Physician: Lisa Evans MD  Attendin

## 2020-06-02 ENCOUNTER — TELEPHONE (OUTPATIENT)
Dept: CARDIOLOGY CLINIC | Facility: HOSPITAL | Age: 70
End: 2020-06-02

## 2020-06-02 NOTE — TELEPHONE ENCOUNTER
The [facility] called to remind this patient of her scheduled virtual telephone visit tomorrow with the APN in the [facility]. This patient's  reports that she has not had recent labs drawn by the [facility].   The APN in the [facility] ordered for labs to be drawn on 6/1/2020

## 2020-06-03 ENCOUNTER — HOSPITAL ENCOUNTER (OUTPATIENT)
Dept: CARDIOLOGY CLINIC | Facility: HOSPITAL | Age: 70
Discharge: HOME | End: 2020-06-03
Attending: NURSE PRACTITIONER
Payer: MEDICARE

## 2020-06-03 DIAGNOSIS — I10 BENIGN ESSENTIAL HTN: ICD-10-CM

## 2020-06-03 DIAGNOSIS — I50.32 CHRONIC HEART FAILURE WITH PRESERVED EJECTION FRACTION (HCC): Primary | ICD-10-CM

## 2020-06-03 DIAGNOSIS — G47.33 OSA (OBSTRUCTIVE SLEEP APNEA): ICD-10-CM

## 2020-06-03 DIAGNOSIS — I48.0 PAF (PAROXYSMAL ATRIAL FIBRILLATION) (HCC): ICD-10-CM

## 2020-06-03 PROCEDURE — 99443 PHONE E/M BY PHYS 21-30 MIN: CPT | Performed by: NURSE PRACTITIONER

## 2020-06-03 NOTE — PROGRESS NOTES
Virtual Telephone Check-In    Lisa Luz verbally consents to a Virtual/Telephone Check-In visit on 06/03/20. Patient has been referred to the [facility] website at [email or web address] to review the yearly Consent to Treat document.     Patient underst Tab, Take 650 mg by mouth every 4 (four) hours as needed for Pain., Disp: , Rfl:   albuterol sulfate (2.5 MG/3ML) 0.083% Inhalation Nebu Soln, Take 2.5 mg by nebulization every 6 (six) hours as needed for Wheezing., Disp: , Rfl:   lisinopril 40 MG Oral Tab Rebecca Condon MD at [address] GRAFT N/A 10/24/2017    Performed by Maribel Jarvis MD at [address] [place] N/A 10/24/2017    Performed by Maribel Jarvis MD at [address]     • HYSTEROSCOPY     • R complex cardiac regimen  - Encouraged ongoing monitoring of her b/p at home  - Ongoing PT/OT twice weekly  - CHF discharge instructions reviewed    Please note that the following visit was completed using two-way, real-time interactive audio and/or video c

## 2020-06-04 ENCOUNTER — TELEPHONE (OUTPATIENT)
Dept: CARDIOLOGY CLINIC | Facility: HOSPITAL | Age: 70
End: 2020-06-04

## 2020-06-04 NOTE — TELEPHONE ENCOUNTER
Faxed request for patient's weekly weights, vital signs and labs. Results received via fax and reviewed with APN. Pt's weight has gone down to 138 lbs. Virtual Visit yesterday shows she is stable. /60 on 6/2. HR 64. . CPM.

## 2020-06-08 ENCOUNTER — TELEPHONE (OUTPATIENT)
Dept: CARDIOLOGY CLINIC | Facility: HOSPITAL | Age: 70
End: 2020-06-08

## 2020-06-08 RX ORDER — POTASSIUM CHLORIDE 1.5 G/1.77G
20 POWDER, FOR SOLUTION ORAL DAILY
Qty: 30 PACKET | Refills: 3 | Status: ON HOLD | OUTPATIENT
Start: 2020-06-08 | End: 2020-06-26

## 2020-06-08 NOTE — TELEPHONE ENCOUNTER
Outpatient labs reviewed from 6/4 and received today. Hypokalemia noted and will start [address] or [place] 20meq daily with repeat BMP on Thursday.     Glucose 120  BUN 14  Cr 1.3  Na 142  K 3.0  Chloride 106  CO2 23  Calcium 9.9      Blayne Gandhi, APRN

## 2020-06-08 NOTE — TELEPHONE ENCOUNTER
Received BMP results (collected on 6/4/20) via fax from [facility]:    Glucose 120  BUN 14  Creatinine 1.3  GFR 41  Sodium 142  Potassium 3.0  Chloride 106  CO2 23  Calcium 9.9    Per APN, pt notifed that prescription for Klor (pt prefers powder) 20 mEq

## 2020-06-12 ENCOUNTER — TELEPHONE (OUTPATIENT)
Dept: CARDIOLOGY CLINIC | Facility: HOSPITAL | Age: 70
End: 2020-06-12

## 2020-06-12 NOTE — TELEPHONE ENCOUNTER
The [facility] called this patient this afternoon and informed her that the APN in the [facility] reviewed her labs, and informed her that her potassium has improved since starting the potassium supplement.   Per Luiz Mckeon, she was instructed to take an extra one t

## 2020-06-12 NOTE — TELEPHONE ENCOUNTER
Outpatient labs reviewed from 6/11    Glucose 100  BUN 13  Creatinine 1.1  Na 142  Potassium 3.5    Potassium improved since starting supplement. Would give an extra one time dose 40 meq and continue 20 meq daily.     Will repeat labs 7/1 at appointment

## 2020-06-15 ENCOUNTER — TELEPHONE (OUTPATIENT)
Dept: CARDIOLOGY CLINIC | Facility: HOSPITAL | Age: 70
End: 2020-06-15

## 2020-06-15 NOTE — TELEPHONE ENCOUNTER
Pt's daughter called asking if we received the patient's blood work results from last week.  Upon reviewing pt's EMR, the HF APN had reviewed them and adjusted her kdur to 40meq po x 1 dose, then 20meq po daily until next blood draw per home health on 7/1/2

## 2020-06-17 ENCOUNTER — TELEPHONE (OUTPATIENT)
Dept: CARDIOLOGY | Age: 70
End: 2020-06-17

## 2020-06-23 ENCOUNTER — HOSPITAL ENCOUNTER (INPATIENT)
Facility: HOSPITAL | Age: 70
LOS: 3 days | Discharge: NURSING, REHAB OR LONG-TERM CARE | DRG: 388 | End: 2020-06-26
Attending: EMERGENCY MEDICINE | Admitting: HOSPITALIST
Payer: MEDICARE

## 2020-06-23 ENCOUNTER — APPOINTMENT (OUTPATIENT)
Dept: CT IMAGING | Facility: HOSPITAL | Age: 70
DRG: 388 | End: 2020-06-23
Attending: EMERGENCY MEDICINE
Payer: MEDICARE

## 2020-06-23 ENCOUNTER — APPOINTMENT (OUTPATIENT)
Dept: GENERAL RADIOLOGY | Facility: HOSPITAL | Age: 70
DRG: 388 | End: 2020-06-23
Attending: EMERGENCY MEDICINE
Payer: MEDICARE

## 2020-06-23 DIAGNOSIS — N39.0 URINARY TRACT INFECTION WITHOUT HEMATURIA, SITE UNSPECIFIED: Primary | ICD-10-CM

## 2020-06-23 DIAGNOSIS — J18.9 PNEUMONIA DUE TO INFECTIOUS ORGANISM, UNSPECIFIED LATERALITY, UNSPECIFIED PART OF LUNG: ICD-10-CM

## 2020-06-23 DIAGNOSIS — R11.2 NAUSEA AND VOMITING IN ADULT: ICD-10-CM

## 2020-06-23 LAB
ALBUMIN SERPL-MCNC: 3.5 G/DL (ref 3.4–5)
ALBUMIN/GLOB SERPL: 0.8 {RATIO} (ref 1–2)
ALP LIVER SERPL-CCNC: 74 U/L (ref 55–142)
ALT SERPL-CCNC: 20 U/L (ref 13–56)
ANION GAP SERPL CALC-SCNC: 9 MMOL/L (ref 0–18)
AST SERPL-CCNC: 14 U/L (ref 15–37)
ATRIAL RATE: 122 BPM
BASOPHILS # BLD AUTO: 0.01 X10(3) UL (ref 0–0.2)
BASOPHILS NFR BLD AUTO: 0.1 %
BILIRUB SERPL-MCNC: 1.4 MG/DL (ref 0.1–2)
BILIRUB UR QL STRIP.AUTO: NEGATIVE
BUN BLD-MCNC: 13 MG/DL (ref 7–18)
BUN/CREAT SERPL: 10.5 (ref 10–20)
CALCIUM BLD-MCNC: 9.9 MG/DL (ref 8.5–10.1)
CHLORIDE SERPL-SCNC: 111 MMOL/L (ref 98–112)
CO2 SERPL-SCNC: 23 MMOL/L (ref 21–32)
COLOR UR AUTO: YELLOW
CREAT BLD-MCNC: 1.24 MG/DL (ref 0.55–1.02)
DEPRECATED RDW RBC AUTO: 47.4 FL (ref 35.1–46.3)
EOSINOPHIL # BLD AUTO: 0.01 X10(3) UL (ref 0–0.7)
EOSINOPHIL NFR BLD AUTO: 0.1 %
ERYTHROCYTE [DISTWIDTH] IN BLOOD BY AUTOMATED COUNT: 14.1 % (ref 11–15)
GLOBULIN PLAS-MCNC: 4.4 G/DL (ref 2.8–4.4)
GLUCOSE BLD-MCNC: 89 MG/DL (ref 70–99)
GLUCOSE UR STRIP.AUTO-MCNC: NEGATIVE MG/DL
HCT VFR BLD AUTO: 35.4 % (ref 35–48)
HGB BLD-MCNC: 11.2 G/DL (ref 12–16)
HYALINE CASTS #/AREA URNS AUTO: PRESENT /LPF
IMM GRANULOCYTES # BLD AUTO: 0.01 X10(3) UL (ref 0–1)
IMM GRANULOCYTES NFR BLD: 0.1 %
LIPASE SERPL-CCNC: 47 U/L (ref 73–393)
LYMPHOCYTES # BLD AUTO: 1.54 X10(3) UL (ref 1–4)
LYMPHOCYTES NFR BLD AUTO: 22.8 %
M PROTEIN MFR SERPL ELPH: 7.9 G/DL (ref 6.4–8.2)
MCH RBC QN AUTO: 28.8 PG (ref 26–34)
MCHC RBC AUTO-ENTMCNC: 31.6 G/DL (ref 31–37)
MCV RBC AUTO: 91 FL (ref 80–100)
MONOCYTES # BLD AUTO: 0.53 X10(3) UL (ref 0.1–1)
MONOCYTES NFR BLD AUTO: 7.9 %
NEUTROPHILS # BLD AUTO: 4.64 X10 (3) UL (ref 1.5–7.7)
NEUTROPHILS # BLD AUTO: 4.64 X10(3) UL (ref 1.5–7.7)
NEUTROPHILS NFR BLD AUTO: 69 %
NITRITE UR QL STRIP.AUTO: NEGATIVE
OSMOLALITY SERPL CALC.SUM OF ELEC: 296 MOSM/KG (ref 275–295)
PH UR STRIP.AUTO: 5 [PH] (ref 4.5–8)
PLATELET # BLD AUTO: 136 10(3)UL (ref 150–450)
POTASSIUM SERPL-SCNC: 3.6 MMOL/L (ref 3.5–5.1)
PROT UR STRIP.AUTO-MCNC: 30 MG/DL
Q-T INTERVAL: 382 MS
QRS DURATION: 120 MS
QTC CALCULATION (BEZET): 480 MS
R AXIS: 0 DEGREES
RBC # BLD AUTO: 3.89 X10(6)UL (ref 3.8–5.3)
RBC UR QL AUTO: NEGATIVE
SARS-COV-2 RNA RESP QL NAA+PROBE: NOT DETECTED
SODIUM SERPL-SCNC: 143 MMOL/L (ref 136–145)
SP GR UR STRIP.AUTO: 1.01 (ref 1–1.03)
T AXIS: 230 DEGREES
UROBILINOGEN UR STRIP.AUTO-MCNC: <2 MG/DL
VENTRICULAR RATE: 95 BPM
WBC # BLD AUTO: 6.7 X10(3) UL (ref 4–11)

## 2020-06-23 PROCEDURE — 74019 RADEX ABDOMEN 2 VIEWS: CPT | Performed by: EMERGENCY MEDICINE

## 2020-06-23 PROCEDURE — 74176 CT ABD & PELVIS W/O CONTRAST: CPT | Performed by: EMERGENCY MEDICINE

## 2020-06-23 PROCEDURE — 99223 1ST HOSP IP/OBS HIGH 75: CPT | Performed by: HOSPITALIST

## 2020-06-23 RX ORDER — METOPROLOL SUCCINATE 100 MG/1
200 TABLET, EXTENDED RELEASE ORAL
Status: DISCONTINUED | OUTPATIENT
Start: 2020-06-24 | End: 2020-06-26

## 2020-06-23 RX ORDER — ONDANSETRON 2 MG/ML
4 INJECTION INTRAMUSCULAR; INTRAVENOUS ONCE
Status: DISCONTINUED | OUTPATIENT
Start: 2020-06-23 | End: 2020-06-26

## 2020-06-23 RX ORDER — DILTIAZEM HYDROCHLORIDE 180 MG/1
360 CAPSULE, EXTENDED RELEASE ORAL DAILY
Status: DISCONTINUED | OUTPATIENT
Start: 2020-06-24 | End: 2020-06-26

## 2020-06-23 RX ORDER — SPIRONOLACTONE 25 MG/1
25 TABLET ORAL DAILY
Status: DISCONTINUED | OUTPATIENT
Start: 2020-06-24 | End: 2020-06-26

## 2020-06-23 RX ORDER — ONDANSETRON 2 MG/ML
4 INJECTION INTRAMUSCULAR; INTRAVENOUS EVERY 6 HOURS PRN
Status: DISCONTINUED | OUTPATIENT
Start: 2020-06-23 | End: 2020-06-26

## 2020-06-23 RX ORDER — POTASSIUM CHLORIDE 20 MEQ/1
40 TABLET, EXTENDED RELEASE ORAL EVERY 4 HOURS
Status: DISPENSED | OUTPATIENT
Start: 2020-06-23 | End: 2020-06-24

## 2020-06-23 RX ORDER — FUROSEMIDE 20 MG/1
20 TABLET ORAL DAILY
Status: DISCONTINUED | OUTPATIENT
Start: 2020-06-24 | End: 2020-06-26

## 2020-06-23 RX ORDER — ACETAMINOPHEN 325 MG/1
650 TABLET ORAL EVERY 6 HOURS PRN
Status: DISCONTINUED | OUTPATIENT
Start: 2020-06-23 | End: 2020-06-26

## 2020-06-23 RX ORDER — ATORVASTATIN CALCIUM 40 MG/1
40 TABLET, FILM COATED ORAL NIGHTLY
Status: DISCONTINUED | OUTPATIENT
Start: 2020-06-23 | End: 2020-06-26

## 2020-06-23 RX ORDER — HYDRALAZINE HYDROCHLORIDE 20 MG/ML
10 INJECTION INTRAMUSCULAR; INTRAVENOUS EVERY 4 HOURS PRN
Status: DISCONTINUED | OUTPATIENT
Start: 2020-06-23 | End: 2020-06-26

## 2020-06-23 RX ORDER — SODIUM CHLORIDE 9 MG/ML
1000 INJECTION, SOLUTION INTRAVENOUS ONCE
Status: COMPLETED | OUTPATIENT
Start: 2020-06-23 | End: 2020-06-23

## 2020-06-23 RX ORDER — ONDANSETRON 2 MG/ML
4 INJECTION INTRAMUSCULAR; INTRAVENOUS ONCE
Status: COMPLETED | OUTPATIENT
Start: 2020-06-23 | End: 2020-06-23

## 2020-06-23 RX ORDER — POTASSIUM CHLORIDE 1500 MG/1
20 TABLET, FILM COATED, EXTENDED RELEASE ORAL DAILY
COMMUNITY
End: 2020-08-24 | Stop reason: CLARIF

## 2020-06-23 RX ORDER — HYDRALAZINE HYDROCHLORIDE 50 MG/1
100 TABLET, FILM COATED ORAL 2 TIMES DAILY
Status: DISCONTINUED | OUTPATIENT
Start: 2020-06-23 | End: 2020-06-25

## 2020-06-23 RX ORDER — ALBUTEROL SULFATE 2.5 MG/3ML
2.5 SOLUTION RESPIRATORY (INHALATION) EVERY 6 HOURS PRN
Status: DISCONTINUED | OUTPATIENT
Start: 2020-06-23 | End: 2020-06-26

## 2020-06-23 RX ORDER — FOLIC ACID 1 MG/1
1 TABLET ORAL DAILY
Status: DISCONTINUED | OUTPATIENT
Start: 2020-06-24 | End: 2020-06-26

## 2020-06-23 RX ORDER — METOCLOPRAMIDE HYDROCHLORIDE 5 MG/ML
10 INJECTION INTRAMUSCULAR; INTRAVENOUS EVERY 8 HOURS PRN
Status: DISCONTINUED | OUTPATIENT
Start: 2020-06-23 | End: 2020-06-26

## 2020-06-23 RX ORDER — SODIUM CHLORIDE 9 MG/ML
INJECTION, SOLUTION INTRAVENOUS CONTINUOUS
Status: ACTIVE | OUTPATIENT
Start: 2020-06-23 | End: 2020-06-23

## 2020-06-23 RX ORDER — PANTOPRAZOLE SODIUM 40 MG/1
40 TABLET, DELAYED RELEASE ORAL
Status: DISCONTINUED | OUTPATIENT
Start: 2020-06-24 | End: 2020-06-24

## 2020-06-23 RX ORDER — POTASSIUM CHLORIDE 750 MG/1
10 TABLET, EXTENDED RELEASE ORAL 2 TIMES DAILY
Status: DISCONTINUED | OUTPATIENT
Start: 2020-06-24 | End: 2020-06-26

## 2020-06-23 RX ORDER — LISINOPRIL 40 MG/1
40 TABLET ORAL DAILY
Status: DISCONTINUED | OUTPATIENT
Start: 2020-06-24 | End: 2020-06-26

## 2020-06-23 RX ORDER — DIGOXIN 125 MCG
125 TABLET ORAL DAILY
Status: DISCONTINUED | OUTPATIENT
Start: 2020-06-24 | End: 2020-06-24

## 2020-06-23 RX ORDER — SODIUM CHLORIDE 9 MG/ML
INJECTION, SOLUTION INTRAVENOUS CONTINUOUS
Status: DISCONTINUED | OUTPATIENT
Start: 2020-06-23 | End: 2020-06-23

## 2020-06-23 NOTE — ED PROVIDER NOTES
Patient Seen in: [facility] Emergency Department      History   Patient presents with:  Nausea/Vomiting/Diarrhea    Stated Complaint: Vomiting    HPI    71-year-old female with history of atrial fibrillation not on anticoagulation as deemed not a ca Billie Falcon MD at [address] GRAFT N/A 10/24/2017    Performed by Abeba De MD at [address] [place] N/A 10/24/2017    Performed by Abeba De MD at [address]     • HYSTEROSCOPY Notable for the following components:       Result Value    Creatinine 1.24 (*)     Calculated Osmolality 296 (*)     GFR, Non- 45 (*)     GFR, -American 52 (*)     AST 14 (*)     A/G Ratio 0.8 (*)     All other components within nor given IV fluid hydration, obstructive x-ray performed revealing mild distention of proximal small bowel which may reflect partial small bowel obstruction, CT the abdomen/pelvis performed which did not reveal any acute abdominopelvic pathology.   There was m

## 2020-06-23 NOTE — H&P
JOSELYN HOSPITALIST  History and Physical     Brie Kern Patient Status:  Emergency    1950 MRN [id number]   Location [address] [facility] Attending Joshua Vance, [facility] Day # 0 PCP Mart Bhakta MD     Chief Complaint: Anahi Ruvalcaba Mercedes Butt MD at [address] [facility] ENDOSCOPY   • ESOPHAGOGASTRODUODENOSCOPY (EGD) N/A 2/28/2020    Performed by Linnea Davis MD at [address] [facility] N/A 10/24/2017    Performed by Master Andersen MD at [address] [facility] 3  Pantoprazole Sodium 40 MG Oral Tab EC, Take 1 tablet (40 mg total) by mouth 2 (two) times daily before meals for 120 doses. , Disp: 120 tablet, Rfl: 0  acetaminophen 325 MG Oral Tab, Take 650 mg by mouth every 4 (four) hours as needed for Pain., Disp: , Data:      Labs:  Recent Labs   Lab 06/23/20  1418   WBC 6.7   HGB 11.2*   MCV 91.0   .0*       Recent Labs   Lab 06/23/20  1419   GLU 89   BUN 13   CREATSERUM 1.24*   GFRAA 52*   GFRNAA 45*   CA 9.9   ALB 3.5      K 3.6      CO2 23.0

## 2020-06-24 ENCOUNTER — APPOINTMENT (OUTPATIENT)
Dept: GENERAL RADIOLOGY | Facility: HOSPITAL | Age: 70
DRG: 388 | End: 2020-06-24
Attending: HOSPITALIST
Payer: MEDICARE

## 2020-06-24 LAB
ANION GAP SERPL CALC-SCNC: 7 MMOL/L (ref 0–18)
BASOPHILS # BLD AUTO: 0.01 X10(3) UL (ref 0–0.2)
BASOPHILS NFR BLD AUTO: 0.2 %
BUN BLD-MCNC: 11 MG/DL (ref 7–18)
BUN/CREAT SERPL: 10.8 (ref 10–20)
CALCIUM BLD-MCNC: 9.4 MG/DL (ref 8.5–10.1)
CHLORIDE SERPL-SCNC: 115 MMOL/L (ref 98–112)
CO2 SERPL-SCNC: 20 MMOL/L (ref 21–32)
CREAT BLD-MCNC: 1.02 MG/DL (ref 0.55–1.02)
DEPRECATED RDW RBC AUTO: 47.8 FL (ref 35.1–46.3)
DIGOXIN SERPL-MCNC: 1.73 NG/ML (ref 0.8–2)
EOSINOPHIL # BLD AUTO: 0.06 X10(3) UL (ref 0–0.7)
EOSINOPHIL NFR BLD AUTO: 1 %
ERYTHROCYTE [DISTWIDTH] IN BLOOD BY AUTOMATED COUNT: 14.2 % (ref 11–15)
GLUCOSE BLD-MCNC: 93 MG/DL (ref 70–99)
HAV IGM SER QL: 1.6 MG/DL (ref 1.6–2.6)
HCT VFR BLD AUTO: 30.9 % (ref 35–48)
HGB BLD-MCNC: 9.7 G/DL (ref 12–16)
IMM GRANULOCYTES # BLD AUTO: 0.01 X10(3) UL (ref 0–1)
IMM GRANULOCYTES NFR BLD: 0.2 %
L PNEUMO AG UR QL: NEGATIVE
LYMPHOCYTES # BLD AUTO: 1.76 X10(3) UL (ref 1–4)
LYMPHOCYTES NFR BLD AUTO: 30 %
MCH RBC QN AUTO: 28.8 PG (ref 26–34)
MCHC RBC AUTO-ENTMCNC: 31.4 G/DL (ref 31–37)
MCV RBC AUTO: 91.7 FL (ref 80–100)
MONOCYTES # BLD AUTO: 0.79 X10(3) UL (ref 0.1–1)
MONOCYTES NFR BLD AUTO: 13.5 %
NEUTROPHILS # BLD AUTO: 3.23 X10 (3) UL (ref 1.5–7.7)
NEUTROPHILS # BLD AUTO: 3.23 X10(3) UL (ref 1.5–7.7)
NEUTROPHILS NFR BLD AUTO: 55.1 %
OSMOLALITY SERPL CALC.SUM OF ELEC: 293 MOSM/KG (ref 275–295)
PLATELET # BLD AUTO: 114 10(3)UL (ref 150–450)
POTASSIUM SERPL-SCNC: 3.3 MMOL/L (ref 3.5–5.1)
POTASSIUM SERPL-SCNC: 3.3 MMOL/L (ref 3.5–5.1)
PROCALCITONIN SERPL-MCNC: <0.05 NG/ML (ref ?–0.16)
RBC # BLD AUTO: 3.37 X10(6)UL (ref 3.8–5.3)
SODIUM SERPL-SCNC: 142 MMOL/L (ref 136–145)
STREP PNEUMO ANTIGEN, URINE: NEGATIVE
WBC # BLD AUTO: 5.9 X10(3) UL (ref 4–11)

## 2020-06-24 PROCEDURE — 71046 X-RAY EXAM CHEST 2 VIEWS: CPT | Performed by: HOSPITALIST

## 2020-06-24 PROCEDURE — 99233 SBSQ HOSP IP/OBS HIGH 50: CPT | Performed by: STUDENT IN AN ORGANIZED HEALTH CARE EDUCATION/TRAINING PROGRAM

## 2020-06-24 PROCEDURE — 99222 1ST HOSP IP/OBS MODERATE 55: CPT | Performed by: INTERNAL MEDICINE

## 2020-06-24 RX ORDER — DILTIAZEM HYDROCHLORIDE 5 MG/ML
INJECTION INTRAVENOUS
Status: DISPENSED
Start: 2020-06-24 | End: 2020-06-24

## 2020-06-24 RX ORDER — DILTIAZEM HYDROCHLORIDE 5 MG/ML
5 INJECTION INTRAVENOUS EVERY 6 HOURS PRN
Status: DISCONTINUED | OUTPATIENT
Start: 2020-06-24 | End: 2020-06-26

## 2020-06-24 RX ORDER — MAGNESIUM SULFATE HEPTAHYDRATE 40 MG/ML
2 INJECTION, SOLUTION INTRAVENOUS ONCE
Status: COMPLETED | OUTPATIENT
Start: 2020-06-24 | End: 2020-06-24

## 2020-06-24 NOTE — PAYOR COMM NOTE
--------------  ADMISSION REVIEW     Manoj Hernadez MEDICARE ADV PPO  Subscriber #:  [id number]  Authorization Number: [id number]    Admit date: 6/23/20  Admit time: [address]       Admitting Physician: Brittani Murrieta MD  Attending Physician:  MD RICKY Joyner Hyperlipidemia    • Muscle weakness    • Osteoarthritis    • Pulmonary emphysema (HCC)    • Renal disorder     ckd   • Shortness of breath    • Sleep apnea     cpap   • Visual impairment     glasses     Past Surgical History:   Procedure Laterality Date guarding. no pulsatile masses.  No CVA tenderness  EXTREMITIES: No peripheral edema, no calf tenderness    ED Course     Labs Reviewed   COMP METABOLIC PANEL (14) - Abnormal; Notable for the following components:       Result Value    Creatinine 1.24 (*) states she has had slight cough but denies chest pain or shortness of breath. Blood cultures were performed and patient was given IV antibiotics for pneumonia and urinary tract infection.   [facility] and [facility] sob, cough, sick contacts or recent travel.     Family History:   Family History   Problem Relation Age of Onset   • Cancer Father         back   • Cancer Maternal Grandmother         breast   • Other (stroke) Brother 61      Allergies:   Amlodipine Imaging data reviewed in Epic. ASSESSMENT / PLAN:     1. Nausea, vomiting, likely related to UTI  1. Obstructive series and CT a/p reviewed  2. UTI, on IVF, f/u UCx  3. PNA, obtain CXR, IV abx, f/u cultures  4. HTN, controlled  5.  CAD with remote CABG, Cx  Adjust Abx to cover for suspected  GNR/aspiration PNA  Labs reviewed  Quality:  · DVT Prophylaxis: SCD   · CODE status: full  · Rangel: no  · Central line: no  Will the patient be referred to TCC on discharge?: no  Estimated date of discharge: tbd  Disc solid trials given pt only on CLD at this time.      Given pt's esophageal history, current medical status, and presentation at bedside, recommend proceed with video swallow study.  Suspect prominent cricopharyngeus noted on UGI contributing to pt's current 6/24/2020 0945 Given 1 mg Oral Keenan Ordaz, KRISTIE      furosemide (LASIX) tab 20 mg     Date Action Dose Route User    6/24/2020 0945 Given 20 mg Oral Grecia Ordaz RN      hydrALAzine HCl (APRESOLINE) injection 10 mg     Date Action Dose Rou New Bag 40 mEq Intravenous Teresa Paz, RN      spironolactone (ALDACTONE) tab 25 mg     Date Action Dose Route User    6/24/2020 0945 Given 25 mg Oral Maciej Ordaz, RN          REVIEWER COMMENTS:     FOR REVIEW/APPROVAL OF INPT ADMISSION

## 2020-06-24 NOTE — PROGRESS NOTES
Patient is EITAN protocol , wearing CPAP. Started on Tele , patient noted to be AFIB with RVR. ( has history of AFib) Heart rate noted to be 120's to 140's. Dr Shahid Golden notified. Cardizem 5mg iv ordered and given with effect. Heart rate returned to 80-90's.  Harman Hernandez

## 2020-06-24 NOTE — PROGRESS NOTES
JOSELYN HOSPITALIST  Progress Note     Mavis Him Patient Status:  Observation    1950 MRN [id number]   [facility] 4NW-A Attending Manda Jeffery MD   Hosp Day # 0 PCP Dutch Galvez MD     Chief Complaint: N,V    S: Patient fatigue • potassium chloride 40mEq IVPB (peripheral line)  40 mEq Intravenous Once   • ondansetron HCl  4 mg Intravenous Once   • cefTRIAXone  1 g Intravenous Q24H   • azithromycin  500 mg Intravenous Q24H   • atorvastatin  40 mg Oral Nightly   • digoxin  125 mc documentation in H+P. Based on patients current state of illness, I anticipate that, after discharge, patient will require TBD.

## 2020-06-24 NOTE — PROGRESS NOTES
Has remained in controlled Afib-80's. Cardiology on consult. ST saw pt and recommended crushing meds. Tolerating small amts clear liquids. Poor appetite. Denies nausea or abd pain. Bowel sounds active.

## 2020-06-24 NOTE — CONSULTS
[facility]    Inpatient Cardiology Consultation Note    Main Kaye Patient Status:  Inpatient    1950 MRN [id number]   [facility] 4NW-A Attending Kimberly Ng MD   Hosp Day # 1 PCP Duran Molina MD       HPI: Calculus of kidney    • Cardiomegaly    • Chronic atrial fibrillation (HCC)    • Congestive heart disease (HCC)    • COPD (chronic obstructive pulmonary disease) ([facility]    • Coronary atherosclerosis    • Essential hypertension    • Heart attack ([facility] 10/201 RASH, ITCHING, SWELLING    Comment:Patient has received & tolerated diltiazem in past  Lisinopril              SWELLING    Comment:Pt takes medication at home 5/2020  Radiology Contrast *    NAUSEA AND VOMITING    Comment:PT HAD VOMITING  Kris excursions and effort. Abdomen: Soft, non-tender. BS-present. Extremities: Without clubbing, cyanosis or edema. Peripheral pulses are 2+. Neurologic: Non-focal, normal affect, cranial nerves intact  Skin: Warm and dry. Navjot Metz MD  6

## 2020-06-24 NOTE — HISTORICAL OFFICE NOTE
Progress Notes  - documented in this encounter  Gaby Encarnacion MD - [id number]/[id number] 2:30 PM CDT  Formatting of this note might be different from the original.  [address]  Advanced Heart Failure Clinic Note    Pulmonary Hypertension Consult    D exertion, irregular heartbeat, leg swelling, orthopnea, palpitations and paroxysmal nocturnal dyspnea. Negative for claudication, cyanosis, near-syncope and syncope.    Respiratory: Positive for shortness of breath, sleep disturbances due to breathing and s CLASS     PAST HISTORY:  I have reviewed the past medical history, family history, social history, medications and allergies listed in the medical record as obtained by my nursing staff and support staff and agree with their documentation.     OBJECTIVE: evaluation. Image quality was adequate. Intravenous contrast     (Definity) was administered. 2. Left ventricle:  The cavity size was normal. Wall thickness was at the     upper limits of normal. Systolic function was at the lower limits of     normal. The mg)/2 liter (64 ounces) fluid restricted diet. Avoid the use of NSAID (nonsteroidal anti-inflammatory drugs) including but not limited to Ibuprofen, Advil and Aleve.      Pulmonary Hypertension  -Likely secondary  -Increase diuretics as above  -Will compl

## 2020-06-24 NOTE — SLP NOTE
ADULT SWALLOWING EVALUATION    ASSESSMENT    ASSESSMENT/OVERALL IMPRESSION:  Pt seen this AM for bedside swallow evaluation. RN approved session. Pt admitted to hospital due to c/o vomiting. Pt diagnosed with UTI.  Imaging 6/23/20 revealed possible small mehrdad sips  Aspiration Precautions: Upright position; Slow rate;Small bites and sips  Medication Administration Recommendations: One pill at a time;Crushed in puree  Treatment Plan/Recommendations: Videofluoroscopic swallow study  Discharge Recommendations/Plan: Results: UGI 2/29/20:    FINDINGS:  Air-contrast barium swallow esophagram and upper GI was performed. Deglutition appears within normal limits. There is no tracheal aspiration or laryngeal penetration identified.   There is mild posterior impression u noted)  Range of Motion: Within Functional Limits  Rate of Motion: Within Functional Limits    Voice Quality: Clear  Respiratory Status: Unlabored  Consistencies Trialed:  Thin liquids  Method of Presentation: Staff/Clinician assistance;Cup;Straw;Single sip

## 2020-06-24 NOTE — RESPIRATORY THERAPY NOTE
EITAN - Equipment Use Daily Summary:  · Set Mode {  · Usage in hours:   · 90% Pressure (EPAP) level:   · 90% Insp Pressure (IPAP):   · AHI:   · Supplemental Oxygen:   · Comments: PT DID NOT WEAR

## 2020-06-25 ENCOUNTER — TELEPHONE (OUTPATIENT)
Dept: CARDIOLOGY | Age: 70
End: 2020-06-25

## 2020-06-25 LAB
ANION GAP SERPL CALC-SCNC: 6 MMOL/L (ref 0–18)
BASOPHILS # BLD AUTO: 0.01 X10(3) UL (ref 0–0.2)
BASOPHILS NFR BLD AUTO: 0.2 %
BUN BLD-MCNC: 7 MG/DL (ref 7–18)
BUN/CREAT SERPL: 6.5 (ref 10–20)
CALCIUM BLD-MCNC: 9.6 MG/DL (ref 8.5–10.1)
CHLORIDE SERPL-SCNC: 116 MMOL/L (ref 98–112)
CO2 SERPL-SCNC: 23 MMOL/L (ref 21–32)
CREAT BLD-MCNC: 1.08 MG/DL (ref 0.55–1.02)
DEPRECATED RDW RBC AUTO: 47.6 FL (ref 35.1–46.3)
EOSINOPHIL # BLD AUTO: 0.09 X10(3) UL (ref 0–0.7)
EOSINOPHIL NFR BLD AUTO: 1.6 %
ERYTHROCYTE [DISTWIDTH] IN BLOOD BY AUTOMATED COUNT: 13.9 % (ref 11–15)
GLUCOSE BLD-MCNC: 107 MG/DL (ref 70–99)
HAV IGM SER QL: 1.9 MG/DL (ref 1.6–2.6)
HCT VFR BLD AUTO: 30.4 % (ref 35–48)
HGB BLD-MCNC: 9.7 G/DL (ref 12–16)
IMM GRANULOCYTES # BLD AUTO: 0.03 X10(3) UL (ref 0–1)
IMM GRANULOCYTES NFR BLD: 0.5 %
LYMPHOCYTES # BLD AUTO: 1.47 X10(3) UL (ref 1–4)
LYMPHOCYTES NFR BLD AUTO: 25.6 %
MCH RBC QN AUTO: 29.5 PG (ref 26–34)
MCHC RBC AUTO-ENTMCNC: 31.9 G/DL (ref 31–37)
MCV RBC AUTO: 92.4 FL (ref 80–100)
MONOCYTES # BLD AUTO: 0.67 X10(3) UL (ref 0.1–1)
MONOCYTES NFR BLD AUTO: 11.7 %
NEUTROPHILS # BLD AUTO: 3.48 X10 (3) UL (ref 1.5–7.7)
NEUTROPHILS # BLD AUTO: 3.48 X10(3) UL (ref 1.5–7.7)
NEUTROPHILS NFR BLD AUTO: 60.4 %
OSMOLALITY SERPL CALC.SUM OF ELEC: 298 MOSM/KG (ref 275–295)
PLATELET # BLD AUTO: 110 10(3)UL (ref 150–450)
POTASSIUM SERPL-SCNC: 3.4 MMOL/L (ref 3.5–5.1)
RBC # BLD AUTO: 3.29 X10(6)UL (ref 3.8–5.3)
SODIUM SERPL-SCNC: 145 MMOL/L (ref 136–145)
WBC # BLD AUTO: 5.8 X10(3) UL (ref 4–11)

## 2020-06-25 PROCEDURE — 99232 SBSQ HOSP IP/OBS MODERATE 35: CPT | Performed by: INTERNAL MEDICINE

## 2020-06-25 PROCEDURE — 99233 SBSQ HOSP IP/OBS HIGH 50: CPT | Performed by: STUDENT IN AN ORGANIZED HEALTH CARE EDUCATION/TRAINING PROGRAM

## 2020-06-25 RX ORDER — HYDRALAZINE HYDROCHLORIDE 50 MG/1
100 TABLET, FILM COATED ORAL EVERY 8 HOURS SCHEDULED
Status: DISCONTINUED | OUTPATIENT
Start: 2020-06-25 | End: 2020-06-26

## 2020-06-25 NOTE — PROGRESS NOTES
[facility] HOSPITALIST  Progress Note     Brie Kern Patient Status:  Observation    1950 MRN [id number]   [facility] 4NW-A Attending Vinny Rojas MD   Hosp Day # 2 PCP Mart Bhakta MD     Chief Complaint: N,V    S: Patient sitting TROP, CK in the last 168 hours. Imaging: Imaging data reviewed in Epic.     Medications:   • pantoprazole (PROTONIX) IV push  40 mg Intravenous Q24H   • ondansetron HCl  4 mg Intravenous Once   • azithromycin  500 mg Intravenous Q24H   • atorvastati

## 2020-06-25 NOTE — PHYSICAL THERAPY NOTE
PHYSICAL THERAPY EVALUATION - INPATIENT     Room Number: 430/430-A  Evaluation Date: 6/25/2020  Type of Evaluation: Initial  Physician Order: PT Eval and Treat    Presenting Problem: UTI, pneumonia  Reason for Therapy: Mobility Dysfunction and Discha      • CABG  10/2017   • CHOLECYSTECTOMY     • ESOPHAGOGASTRODUODENOSCOPY (EGD) N/A 4/10/2020    Performed by Monico Mcdonnell MD at [facility] ENDOSCOPY   • ESOPHAGOGASTRODUODENOSCOPY (EGD) N/A 2020    Performed by Diann Falcon MD at [facility] NEUROLOGICAL FINDINGS                      ACTIVITY TOLERANCE                         O2 WALK                  AM-PAC '6-Clicks' INPATIENT SHORT FORM - BASIC MOBILITY  How much difficulty does the patient currently have. ..  - session/findings; All patient questions and concerns addressed    ASSESSMENT   Patient is a 77year old female admitted on 6/23/2020 with nausea and vomiting. Pt presenting with UTI and possible pneumonia.  Pertinent comorbidities and personal factors impact

## 2020-06-25 NOTE — PROGRESS NOTES
Dr James Rueda is aware of elevated bp, new med started, also hydralazine IV prn given, will monitor    1800 : pts bp now 131/82

## 2020-06-25 NOTE — CM/SW NOTE
PT recommending [facility]. Referral sent via [employer]. Once a provider list is available will follow up with patient to present options. DON requested.     Elva Ward MSN RN, [facility]  P: [phone]

## 2020-06-25 NOTE — PROGRESS NOTES
[facility]  Cardiology Progress Note    Subjective:  No chest pain or shortness of breath.     Objective:  BP (!) 156/100 (BP Location: Left arm)   Pulse 77   Temp 97.8 °F (36.6 °C) (Oral)   Resp 15   Ht 5' 3\" (1.6 m)   Wt 141 lb (64 kg)   SpO2 100% woman with CAD s/p CABG, known type A aortic dissection that has been treated non operatively and persistent atrial fibrillation. She is admitted with nausea, vomiting and UTI. In this setting she had atrial fibrillation with RVR.       Exam:   CV: irregula

## 2020-06-25 NOTE — PAYOR COMM NOTE
--------------  CONTINUED STAY REVIEW    [facility]  Subscriber #:  [id number]  Authorization Number: [id number]    Admit date: 6/23/20  Admit time: [address]    Admitting Physician: Dacia Marques MD  Attending Physician:  Mallory Prescott MD (Obs): 3-5x/week  Number of Visits to Meet Established Goals: 5        MEDICATIONS ADMINISTERED IN LAST 1 DAY:  atorvastatin (LIPITOR) tab 40 mg     Date Action Dose Route User    6/24/2020 2059 Given 40 mg Oral Diya Page RN      azithromycin (Roland Almanza 40 mEq in sodium chloride 0.9% 250 mL IVPB     Date Action Dose Route User    6/25/2020 0828 New Bag 40 mEq Intravenous Garrick Pierce, RN      spironolactone (ALDACTONE) tab 25 mg     Date Action Dose Route User    6/25/2020 5435 Given 25 mg Oral MARITO Martínez

## 2020-06-25 NOTE — PLAN OF CARE
Patient received alert and oriented, able to answer questions appropriately. Tolerated medications po, crushed. Repositioned as per facility policy. Vitals remained within normal limits throughout shift. Antibiotics infused with no adverse effect noted.

## 2020-06-25 NOTE — SLP NOTE
SPEECH DAILY NOTE - INPATIENT    ASSESSMENT & PLAN   ASSESSMENT  Pt seen at bedside this PM for speech therapy services. Pt cooperative, pleasant, and alert. Per RN, pt tolerating diet well with no concerns of aspiration.  Pt advanced to full liquids per MD

## 2020-06-25 NOTE — PAYOR COMM NOTE
--------------  CONTINUED STAY REVIEW    Megan Cyr MEDICARE ADV PPO  Subscriber #:  [id number]  Authorization Number: [id number]    Admit date: 6/23/20  Admit time: [address]    Admitting Physician: Danae Barajas MD  Attending Physician:  Kyle Cruz MD Freq: Daily Route: OR  Start: 06/24/20 0930    0959-Given              0823-Given                dilTIAZem HCl (CARDIZEM) 25 MG/5ML injection   Start: 06/24/20 0130 End: 06/24/20 1344    (0148)-Not Given   7344-W/C'C             folic acid (FOLVITE) tab 1          0101-New Bag   0823-New Bag   1031-D/C'd          potassium chloride 40 mEq in sodium chloride 0.9% 250 mL IVPB   Dose: 40 mEq  Freq:  Once Route: IV  Last Dose: 40 mEq (06/25/20 0828)  Start: 06/25/20 0745 0828-New Bag                caridad

## 2020-06-25 NOTE — RESPIRATORY THERAPY NOTE
EITAN : EQUIPMENT USE: DAILY SUMMARY                                            SET MODE:AUTO CPAP WITH CFLEX                                          USAGE IN HOURS:10:16                                          90%

## 2020-06-26 VITALS
HEIGHT: 63 IN | SYSTOLIC BLOOD PRESSURE: 139 MMHG | HEART RATE: 81 BPM | OXYGEN SATURATION: 100 % | WEIGHT: 138.81 LBS | RESPIRATION RATE: 17 BRPM | DIASTOLIC BLOOD PRESSURE: 88 MMHG | BODY MASS INDEX: 24.59 KG/M2 | TEMPERATURE: 98 F

## 2020-06-26 LAB
POTASSIUM SERPL-SCNC: 4.1 MMOL/L (ref 3.5–5.1)
T4 FREE SERPL-MCNC: >8 NG/DL (ref 0.8–1.7)
TSI SER-ACNC: <0.005 MIU/ML (ref 0.36–3.74)

## 2020-06-26 PROCEDURE — 99239 HOSP IP/OBS DSCHRG MGMT >30: CPT | Performed by: STUDENT IN AN ORGANIZED HEALTH CARE EDUCATION/TRAINING PROGRAM

## 2020-06-26 PROCEDURE — 99232 SBSQ HOSP IP/OBS MODERATE 35: CPT | Performed by: INTERNAL MEDICINE

## 2020-06-26 RX ORDER — METHIMAZOLE 5 MG/1
5 TABLET ORAL
Status: DISCONTINUED | OUTPATIENT
Start: 2020-06-26 | End: 2020-06-26

## 2020-06-26 RX ORDER — METHIMAZOLE 5 MG/1
TABLET ORAL
Qty: 60 TABLET | Refills: 0 | Status: SHIPPED | OUTPATIENT
Start: 2020-06-26 | End: 2020-08-24 | Stop reason: CLARIF

## 2020-06-26 NOTE — PLAN OF CARE
Patient received alert and oriented this shift. She is slow to respond to questions. Able to make needs known. Ambulatory to the bathroom with standby assistance and rolling walker. No complaints of pain. No nausea or emesis noted.  Took medication crush

## 2020-06-26 NOTE — CM/SW NOTE
CM met with patient to discuss discharge planning. Pt asking CM to call spouse to discuss [facility]. Spoke with spouse who is agreeable to [employer]. CM also received a call from daughter who also agreeable to [employer].   [employer]

## 2020-06-26 NOTE — PROGRESS NOTES
[facility]  Progress Note    Marielle Don Patient Status:  Inpatient    1950 MRN [id number]   [facility] 4NW-A Attending Gustavo Toussaint MD   Hosp Day # 3 PCP Rylan Hutton MD     Assessment:  · AF RVR: hx of PAF:   · UTI: abx p methimazole  5 mg Oral BID AC   • hydrALAZINE HCl  100 mg Oral Q8H [facility] & assisted   • pantoprazole (PROTONIX) IV push  40 mg Intravenous Q24H   • ondansetron HCl  4 mg Intravenous Once   • atorvastatin  40 mg Oral Nightly   • dilTIAZem HCl ER Coated Beads  360 mg Erika Owens

## 2020-06-26 NOTE — CONSULTS
ENDOCRINOLOGY CONSULTATION    Attending physician:  Filemon Snow MD  Consulting physican:  Merlin Cheshire, MD    Admission Date:  6/23/2020  Consultation Date:  6/26/2020      Reason for consultation: Hyperthyroidism    Chief Complaint:  Admitted f 2/28/2020    Performed by Lucie Bejarano MD at [address] [facility] N/A 10/24/2017    Performed by Aleta Zavala MD at [address] N/A 10/24/2017    Performed by Aleta Zavala MD at [address] [facility] hours as needed for Pain., Disp: , Rfl:         CURRENT MEDICATIONS    · [COMPLETED] potassium chloride 40 mEq in sodium chloride 0.9% 250 mL IVPB, 40 mEq, Intravenous, Once  · hydrALAzine HCl (APRESOLINE) tab 100 mg, 100 mg, Oral, Q8H SANG  · dilTIAZem HCl Beta Blocker  · lisinopril tab 40 mg, 40 mg, Oral, Daily        Allergies:    Amlodipine              RASH, ITCHING, SWELLING    Comment:Patient has received & tolerated diltiazem in past  Lisinopril              SWELLING    Comment:Pt takes medication at ng/dL >8.0 (H) 1.6 1.4    TSH      0.358 - 3.740 mIU/mL <0.005 (L) 1.370 0.645 0.733     Component      Latest Ref Rng & Units 11/14/2017 11/13/2017 1/23/2016   T4,Free (Direct)      0.8 - 1.7 ng/dL 1.5     TSH      0.358 - 3.740 mIU/mL  0.807 1.250 today   · She will need thyroid labs in 2-4 weeks. · Follow up with [facility] in 4 weeks with Paul Bowels   · I already completed my part of the order reconciliation for discharge. I spoke with pt's nurse.         Michael Mathur MD  Endocrinology, Diabetes

## 2020-06-26 NOTE — OCCUPATIONAL THERAPY NOTE
OCCUPATIONAL THERAPY EVALUATION - INPATIENT     Room Number: 430/430-A  Evaluation Date: 6/26/2020  Type of Evaluation: Initial  Presenting Problem: UTI    Physician Order: IP Consult to Occupational Therapy  Reason for Therapy: ADL/IADL Dysfunction and Cathern Lisa      • CABG  10/2017   • CHOLECYSTECTOMY     • ESOPHAGOGASTRODUODENOSCOPY (EGD) N/A 4/10/2020    Performed by Monico Mcdonnell MD at [facility] ENDOSCOPY   • ESOPHAGOGASTRODUODENOSCOPY (EGD) N/A 2020    Performed by Diann Falcon MD at [facility] ADDITIONAL TESTS                                    NEUROLOGICAL FINDINGS                   ACTIVITY TOLERANCE                         O2 SATURATIONS                ACTIVITIES OF DAILY LIVING ASSESSMENT  AM-PAC ‘6-Clicks’ Inpatient Daily Activity Short completed include [facility]. The AM-ZEINAB ' '6-Clicks' Inpatient Daily Activity Short Form was completed and  this patient  is demonstrating a  60% degree impairment in activities of daily living.  Research supports that patients with this level of impairment oft supervision    Functional Transfer Goals  Patient will transfer from sit to supine:  with supervision  Patient will transfer from supine to sit:  with supervision  Patient will transfer from sit to stand:  with supervision  Patient will transfer to toilet:

## 2020-06-26 NOTE — CM/SW NOTE
Received a call from Rony Ziegler and they have insurance auth. Rony Ziegler is aware that last COVID test was done on 6-23 & is negative. CM spoke with spouse and daughter Yaakov Wong to informed them of discharge today.  The are agreeable to discharge to [facility]

## 2020-06-26 NOTE — PLAN OF CARE
Somewhat drowsy this morning, more awake as the days goes, out of bed to the bathroom with one assists, tolerated well. Advanced diet to soft diet for lunch, tolerated diet well, abdomen is soft and non distended with bowel sounds noted.  Had  soft BM, smal

## 2020-06-28 NOTE — DISCHARGE SUMMARY
[facility]  DISCHARGE SUMMARY     Magda Plasencia Patient Status:  Inpatient    1950 MRN [id number]   [facility] 4NW-A Attending No att. providers found   Hosp Day # 3 PCP Irene Rodríguez MD     Date of Admission: 2020  Date (brief descriptions):  • no    Lab/Test results pending at Discharge:   · no    Consultants:  • Cards    Discharge Medication List:     Discharge Medications      START taking these medications      Instructions Prescription details   methimazole 5 MG Tabs taking on:  July 17, 2020  Quantity:  120 tablet  Refills:  0     Potassium Chloride ER 20 MEQ Tbcr      Take 20 mEq by mouth daily. Refills:  0     spironolactone 25 MG Tabs  Commonly known as:  ALDACTONE      Take 1 tablet (25 mg total) by mouth daily. to the [facility] desk. Vital signs:       Physical Exam:    General: No acute distress. Respiratory: Clear to auscultation bilaterally. No wheezes. No rhonchi. Cardiovascular: S1, S2. Regular rate and rhythm.  No murm

## 2020-07-09 ENCOUNTER — TELEPHONE (OUTPATIENT)
Dept: CARDIOLOGY CLINIC | Facility: HOSPITAL | Age: 70
End: 2020-07-09

## 2020-07-09 NOTE — TELEPHONE ENCOUNTER
-150's but only 2 readings received. Weight 138 lbs    HR's 70-80's. Continue to trend and if BP is consistently elevated will adjust medications.

## 2020-07-10 ENCOUNTER — APPOINTMENT (OUTPATIENT)
Dept: CARDIOLOGY | Age: 70
End: 2020-07-10

## 2020-07-15 ENCOUNTER — APPOINTMENT (OUTPATIENT)
Dept: CARDIOLOGY | Age: 70
End: 2020-07-15

## 2020-07-15 PROBLEM — Z09 HOSPITAL DISCHARGE FOLLOW-UP: Status: ACTIVE | Noted: 2020-07-15

## 2020-07-17 ENCOUNTER — APPOINTMENT (OUTPATIENT)
Dept: CT IMAGING | Facility: HOSPITAL | Age: 70
DRG: 981 | End: 2020-07-17
Attending: EMERGENCY MEDICINE
Payer: MEDICARE

## 2020-07-17 ENCOUNTER — APPOINTMENT (OUTPATIENT)
Dept: GENERAL RADIOLOGY | Facility: HOSPITAL | Age: 70
DRG: 981 | End: 2020-07-17
Attending: EMERGENCY MEDICINE
Payer: MEDICARE

## 2020-07-17 ENCOUNTER — HOSPITAL ENCOUNTER (INPATIENT)
Facility: HOSPITAL | Age: 70
LOS: 12 days | Discharge: HOME HEALTH | DRG: 981 | End: 2020-07-29
Attending: EMERGENCY MEDICINE | Admitting: HOSPITALIST
Payer: MEDICARE

## 2020-07-17 DIAGNOSIS — R63.0 POOR APPETITE: ICD-10-CM

## 2020-07-17 DIAGNOSIS — R53.1 WEAKNESS GENERALIZED: Primary | ICD-10-CM

## 2020-07-17 DIAGNOSIS — K22.9 ABNORMALITY OF ESOPHAGUS: ICD-10-CM

## 2020-07-17 DIAGNOSIS — Z71.89 GOALS OF CARE, COUNSELING/DISCUSSION: ICD-10-CM

## 2020-07-17 DIAGNOSIS — D69.6 THROMBOCYTOPENIA (HCC): ICD-10-CM

## 2020-07-17 DIAGNOSIS — R77.8 ELEVATED TROPONIN: ICD-10-CM

## 2020-07-17 DIAGNOSIS — N28.9 RENAL INSUFFICIENCY: ICD-10-CM

## 2020-07-17 DIAGNOSIS — E86.0 DEHYDRATION: ICD-10-CM

## 2020-07-17 DIAGNOSIS — Z51.5 PALLIATIVE CARE ENCOUNTER: ICD-10-CM

## 2020-07-17 PROBLEM — E05.90 HYPERTHYROIDISM: Status: ACTIVE | Noted: 2020-07-17

## 2020-07-17 PROBLEM — R79.89 ELEVATED TROPONIN: Status: ACTIVE | Noted: 2020-07-17

## 2020-07-17 LAB
ALBUMIN SERPL-MCNC: 3.4 G/DL (ref 3.4–5)
ALBUMIN/GLOB SERPL: 0.7 {RATIO} (ref 1–2)
ALP LIVER SERPL-CCNC: 104 U/L (ref 55–142)
ALT SERPL-CCNC: 42 U/L (ref 13–56)
ANION GAP SERPL CALC-SCNC: 9 MMOL/L (ref 0–18)
APTT PPP: 29.5 SECONDS (ref 25.4–36.1)
AST SERPL-CCNC: 41 U/L (ref 15–37)
ATRIAL RATE: 107 BPM
BASOPHILS # BLD AUTO: 0.03 X10(3) UL (ref 0–0.2)
BASOPHILS NFR BLD AUTO: 0.4 %
BILIRUB SERPL-MCNC: 1 MG/DL (ref 0.1–2)
BILIRUB UR QL STRIP.AUTO: NEGATIVE
BUN BLD-MCNC: 48 MG/DL (ref 7–18)
BUN/CREAT SERPL: 20 (ref 10–20)
CALCIUM BLD-MCNC: 10.6 MG/DL (ref 8.5–10.1)
CHLORIDE SERPL-SCNC: 112 MMOL/L (ref 98–112)
CLARITY UR REFRACT.AUTO: CLEAR
CO2 SERPL-SCNC: 21 MMOL/L (ref 21–32)
COLOR UR AUTO: YELLOW
CREAT BLD-MCNC: 2.4 MG/DL (ref 0.55–1.02)
DEPRECATED RDW RBC AUTO: 50.4 FL (ref 35.1–46.3)
EOSINOPHIL # BLD AUTO: 0.06 X10(3) UL (ref 0–0.7)
EOSINOPHIL NFR BLD AUTO: 0.8 %
ERYTHROCYTE [DISTWIDTH] IN BLOOD BY AUTOMATED COUNT: 14.8 % (ref 11–15)
GLOBULIN PLAS-MCNC: 5 G/DL (ref 2.8–4.4)
GLUCOSE BLD-MCNC: 124 MG/DL (ref 70–99)
GLUCOSE UR STRIP.AUTO-MCNC: NEGATIVE MG/DL
HCT VFR BLD AUTO: 41.9 % (ref 35–48)
HGB BLD-MCNC: 13 G/DL (ref 12–16)
IMM GRANULOCYTES # BLD AUTO: 0.05 X10(3) UL (ref 0–1)
IMM GRANULOCYTES NFR BLD: 0.7 %
INR BLD: 1.13 (ref 0.89–1.11)
KETONES UR STRIP.AUTO-MCNC: NEGATIVE MG/DL
LEUKOCYTE ESTERASE UR QL STRIP.AUTO: NEGATIVE
LIPASE SERPL-CCNC: 144 U/L (ref 73–393)
LYMPHOCYTES # BLD AUTO: 1.79 X10(3) UL (ref 1–4)
LYMPHOCYTES NFR BLD AUTO: 24.4 %
M PROTEIN MFR SERPL ELPH: 8.4 G/DL (ref 6.4–8.2)
MCH RBC QN AUTO: 28.6 PG (ref 26–34)
MCHC RBC AUTO-ENTMCNC: 31 G/DL (ref 31–37)
MCV RBC AUTO: 92.1 FL (ref 80–100)
MONOCYTES # BLD AUTO: 0.64 X10(3) UL (ref 0.1–1)
MONOCYTES NFR BLD AUTO: 8.7 %
NEUTROPHILS # BLD AUTO: 4.77 X10 (3) UL (ref 1.5–7.7)
NEUTROPHILS # BLD AUTO: 4.77 X10(3) UL (ref 1.5–7.7)
NEUTROPHILS NFR BLD AUTO: 65 %
NITRITE UR QL STRIP.AUTO: NEGATIVE
OSMOLALITY SERPL CALC.SUM OF ELEC: 308 MOSM/KG (ref 275–295)
PH UR STRIP.AUTO: 5 [PH] (ref 4.5–8)
PLATELET # BLD AUTO: 130 10(3)UL (ref 150–450)
POTASSIUM SERPL-SCNC: 3.7 MMOL/L (ref 3.5–5.1)
PROT UR STRIP.AUTO-MCNC: NEGATIVE MG/DL
PSA SERPL DL<=0.01 NG/ML-MCNC: 14.9 SECONDS (ref 12.4–14.6)
Q-T INTERVAL: 372 MS
QRS DURATION: 118 MS
QTC CALCULATION (BEZET): 457 MS
R AXIS: 10 DEGREES
RBC # BLD AUTO: 4.55 X10(6)UL (ref 3.8–5.3)
RBC UR QL AUTO: NEGATIVE
SARS-COV-2 RNA RESP QL NAA+PROBE: NOT DETECTED
SODIUM SERPL-SCNC: 142 MMOL/L (ref 136–145)
SP GR UR STRIP.AUTO: 1.02 (ref 1–1.03)
T AXIS: 232 DEGREES
TROPONIN I SERPL-MCNC: 0.34 NG/ML (ref ?–0.04)
UROBILINOGEN UR STRIP.AUTO-MCNC: <2 MG/DL
VENTRICULAR RATE: 91 BPM
WBC # BLD AUTO: 7.3 X10(3) UL (ref 4–11)

## 2020-07-17 PROCEDURE — 71045 X-RAY EXAM CHEST 1 VIEW: CPT | Performed by: EMERGENCY MEDICINE

## 2020-07-17 PROCEDURE — 99232 SBSQ HOSP IP/OBS MODERATE 35: CPT | Performed by: HOSPITALIST

## 2020-07-17 PROCEDURE — 74176 CT ABD & PELVIS W/O CONTRAST: CPT | Performed by: EMERGENCY MEDICINE

## 2020-07-17 RX ORDER — ONDANSETRON 2 MG/ML
4 INJECTION INTRAMUSCULAR; INTRAVENOUS EVERY 4 HOURS PRN
Status: DISCONTINUED | OUTPATIENT
Start: 2020-07-17 | End: 2020-07-17 | Stop reason: SDUPTHER

## 2020-07-17 RX ORDER — SODIUM CHLORIDE 9 MG/ML
INJECTION, SOLUTION INTRAVENOUS CONTINUOUS
Status: DISCONTINUED | OUTPATIENT
Start: 2020-07-17 | End: 2020-07-22

## 2020-07-17 RX ORDER — METOCLOPRAMIDE HYDROCHLORIDE 5 MG/ML
5 INJECTION INTRAMUSCULAR; INTRAVENOUS EVERY 8 HOURS PRN
Status: DISCONTINUED | OUTPATIENT
Start: 2020-07-17 | End: 2020-07-29

## 2020-07-17 RX ORDER — ONDANSETRON 2 MG/ML
4 INJECTION INTRAMUSCULAR; INTRAVENOUS
Status: DISCONTINUED | OUTPATIENT
Start: 2020-07-17 | End: 2020-07-17 | Stop reason: SDUPTHER

## 2020-07-17 RX ORDER — SODIUM CHLORIDE 9 MG/ML
INJECTION, SOLUTION INTRAVENOUS CONTINUOUS
Status: ACTIVE | OUTPATIENT
Start: 2020-07-17 | End: 2020-07-17

## 2020-07-17 RX ORDER — SODIUM CHLORIDE 9 MG/ML
INJECTION, SOLUTION INTRAVENOUS CONTINUOUS
Status: DISCONTINUED | OUTPATIENT
Start: 2020-07-17 | End: 2020-07-19

## 2020-07-17 RX ORDER — ONDANSETRON 2 MG/ML
4 INJECTION INTRAMUSCULAR; INTRAVENOUS EVERY 6 HOURS PRN
Status: DISCONTINUED | OUTPATIENT
Start: 2020-07-17 | End: 2020-07-29

## 2020-07-17 RX ORDER — HEPARIN SODIUM 5000 [USP'U]/ML
5000 INJECTION, SOLUTION INTRAVENOUS; SUBCUTANEOUS EVERY 8 HOURS SCHEDULED
Status: DISCONTINUED | OUTPATIENT
Start: 2020-07-17 | End: 2020-07-24

## 2020-07-17 NOTE — ED INITIAL ASSESSMENT (HPI)
Pt c/o nausea, vomiting and not eating and drinking well for \"couple of weeks. \" Pt saw Dr. Harvinder Garcia on July 10.

## 2020-07-17 NOTE — ED PROVIDER NOTES
Patient Seen in: [facility] Emergency Department      History   Patient presents with:  Fatigue    Stated Complaint: fatigue, not able to eat     HPI    Patient was just discharged from the hospital last month.   She has a significant history of paro • Coronary atherosclerosis    • Essential hypertension    • [facility]   • High blood pressure    • High cholesterol    • History of blood transfusion    • Hyperlipidemia    • Muscle weakness    • Osteoarthritis    • Pulmonary emphysema speech is very soft and a little mumbling but she will answer some simple questions appropriately. There is facial symmetry. Eyes: sclera white, conjunctiva pink and moist, constricted pupils, equal round reactive to light.   Lids and lashes are normal. CBC WITH DIFFERENTIAL WITH PLATELET    Narrative: The following orders were created for panel order CBC WITH DIFFERENTIAL WITH PLATELET.   Procedure                               Abnormality         Status                     --------- [facility])    Disposition:  There is no disposition on file for this visit. There is no disposition time on file for this visit. Follow-up:  No follow-up provider specified.         Medications Prescribed:  Current Discharge Medication List

## 2020-07-17 NOTE — H&P
[facility]ITAL[facility]  History and Physical     Quinton Baron Patient Status:  Emergency    1950 MRN [id number]   Location [address] [facility] Attending No att. providers found   [address] Day # 0 PCP Rose Marie Corbett MD     Chief Complain N/A 10/24/2017    Performed by Davon Sheridan MD at [address] N/A 10/24/2017    Performed by Davon Sheridan MD at [address]     • HYSTEROSCOPY     • REMOVAL GALLBLADDER     • TOTAL ABDOM HYSTERECTOMY         Social Histor 4 (four) hours as needed for Pain., Disp: , Rfl:   albuterol sulfate (2.5 MG/3ML) 0.083% Inhalation Nebu Soln, Take 2.5 mg by nebulization every 6 (six) hours as needed for Wheezing., Disp: , Rfl:   lisinopril 40 MG Oral Tab, Take 1 tablet (40 mg total) by ALKPHO 104   AST 41*   ALT 42   BILT 1.0   TP 8.4*       Estimated Creatinine Clearance: 18.6 mL/min (A) (based on SCr of 2.4 mg/dL (H)).     Recent Labs   Lab 07/17/20  1202   PTP 14.9*   INR 1.13*       Recent Labs   Lab 07/17/20  1243   TROP 0.343*

## 2020-07-18 PROBLEM — I48.91 ATRIAL FIBRILLATION WITH RVR (HCC): Status: ACTIVE | Noted: 2020-07-18

## 2020-07-18 LAB
ANION GAP SERPL CALC-SCNC: 7 MMOL/L (ref 0–18)
ATRIAL RATE: 147 BPM
BASOPHILS # BLD AUTO: 0.02 X10(3) UL (ref 0–0.2)
BASOPHILS NFR BLD AUTO: 0.2 %
BUN BLD-MCNC: 39 MG/DL (ref 7–18)
BUN/CREAT SERPL: 25.7 (ref 10–20)
CALCIUM BLD-MCNC: 9.9 MG/DL (ref 8.5–10.1)
CHLORIDE SERPL-SCNC: 119 MMOL/L (ref 98–112)
CO2 SERPL-SCNC: 18 MMOL/L (ref 21–32)
CREAT BLD-MCNC: 1.52 MG/DL (ref 0.55–1.02)
DEPRECATED RDW RBC AUTO: 49.2 FL (ref 35.1–46.3)
EOSINOPHIL # BLD AUTO: 0.07 X10(3) UL (ref 0–0.7)
EOSINOPHIL NFR BLD AUTO: 0.8 %
ERYTHROCYTE [DISTWIDTH] IN BLOOD BY AUTOMATED COUNT: 14.7 % (ref 11–15)
GLUCOSE BLD-MCNC: 82 MG/DL (ref 70–99)
HCT VFR BLD AUTO: 36.3 % (ref 35–48)
HGB BLD-MCNC: 11.2 G/DL (ref 12–16)
IMM GRANULOCYTES # BLD AUTO: 0.02 X10(3) UL (ref 0–1)
IMM GRANULOCYTES NFR BLD: 0.2 %
LYMPHOCYTES # BLD AUTO: 3.16 X10(3) UL (ref 1–4)
LYMPHOCYTES NFR BLD AUTO: 38 %
MCH RBC QN AUTO: 28.1 PG (ref 26–34)
MCHC RBC AUTO-ENTMCNC: 30.9 G/DL (ref 31–37)
MCV RBC AUTO: 91 FL (ref 80–100)
MONOCYTES # BLD AUTO: 0.86 X10(3) UL (ref 0.1–1)
MONOCYTES NFR BLD AUTO: 10.3 %
NEUTROPHILS # BLD AUTO: 4.19 X10 (3) UL (ref 1.5–7.7)
NEUTROPHILS # BLD AUTO: 4.19 X10(3) UL (ref 1.5–7.7)
NEUTROPHILS NFR BLD AUTO: 50.5 %
OSMOLALITY SERPL CALC.SUM OF ELEC: 306 MOSM/KG (ref 275–295)
PLATELET # BLD AUTO: 105 10(3)UL (ref 150–450)
POTASSIUM SERPL-SCNC: 3.9 MMOL/L (ref 3.5–5.1)
Q-T INTERVAL: 286 MS
QRS DURATION: 106 MS
QTC CALCULATION (BEZET): 483 MS
R AXIS: 17 DEGREES
RBC # BLD AUTO: 3.99 X10(6)UL (ref 3.8–5.3)
SODIUM SERPL-SCNC: 144 MMOL/L (ref 136–145)
T AXIS: 247 DEGREES
T4 FREE SERPL-MCNC: 5.2 NG/DL (ref 0.8–1.7)
TROPONIN I SERPL-MCNC: 0.15 NG/ML (ref ?–0.04)
TSI SER-ACNC: <0.005 MIU/ML (ref 0.36–3.74)
VENTRICULAR RATE: 172 BPM
WBC # BLD AUTO: 8.3 X10(3) UL (ref 4–11)

## 2020-07-18 PROCEDURE — 99221 1ST HOSP IP/OBS SF/LOW 40: CPT | Performed by: INTERNAL MEDICINE

## 2020-07-18 PROCEDURE — 99291 CRITICAL CARE FIRST HOUR: CPT | Performed by: HOSPITALIST

## 2020-07-18 RX ORDER — HYDRALAZINE HYDROCHLORIDE 50 MG/1
100 TABLET, FILM COATED ORAL 2 TIMES DAILY
Status: DISCONTINUED | OUTPATIENT
Start: 2020-07-18 | End: 2020-07-19

## 2020-07-18 RX ORDER — METOPROLOL TARTRATE 5 MG/5ML
5 INJECTION INTRAVENOUS EVERY 4 HOURS PRN
Status: DISCONTINUED | OUTPATIENT
Start: 2020-07-18 | End: 2020-07-29

## 2020-07-18 RX ORDER — DILTIAZEM HYDROCHLORIDE 5 MG/ML
INJECTION INTRAVENOUS
Status: DISPENSED
Start: 2020-07-18 | End: 2020-07-19

## 2020-07-18 RX ORDER — METOPROLOL TARTRATE 5 MG/5ML
5 INJECTION INTRAVENOUS EVERY 6 HOURS PRN
Status: DISCONTINUED | OUTPATIENT
Start: 2020-07-18 | End: 2020-07-18

## 2020-07-18 RX ORDER — BISACODYL 10 MG
10 SUPPOSITORY, RECTAL RECTAL
Status: DISCONTINUED | OUTPATIENT
Start: 2020-07-18 | End: 2020-07-29

## 2020-07-18 RX ORDER — SODIUM PHOSPHATE, DIBASIC AND SODIUM PHOSPHATE, MONOBASIC 7; 19 G/133ML; G/133ML
1 ENEMA RECTAL ONCE
Status: COMPLETED | OUTPATIENT
Start: 2020-07-18 | End: 2020-07-18

## 2020-07-18 RX ORDER — METHIMAZOLE 5 MG/1
5 TABLET ORAL 2 TIMES DAILY
Status: DISCONTINUED | OUTPATIENT
Start: 2020-07-18 | End: 2020-07-29

## 2020-07-18 RX ORDER — DILTIAZEM HYDROCHLORIDE 5 MG/ML
INJECTION INTRAVENOUS
Status: COMPLETED
Start: 2020-07-18 | End: 2020-07-18

## 2020-07-18 RX ORDER — DILTIAZEM HYDROCHLORIDE 5 MG/ML
10 INJECTION INTRAVENOUS ONCE
Status: DISCONTINUED | OUTPATIENT
Start: 2020-07-18 | End: 2020-07-29

## 2020-07-18 RX ORDER — METOPROLOL SUCCINATE 100 MG/1
200 TABLET, EXTENDED RELEASE ORAL
Status: DISCONTINUED | OUTPATIENT
Start: 2020-07-18 | End: 2020-07-19

## 2020-07-18 RX ORDER — DILTIAZEM HYDROCHLORIDE 5 MG/ML
5 INJECTION INTRAVENOUS ONCE
Status: DISCONTINUED | OUTPATIENT
Start: 2020-07-18 | End: 2020-07-18 | Stop reason: ALTCHOICE

## 2020-07-18 RX ORDER — DILTIAZEM HYDROCHLORIDE 5 MG/ML
10 INJECTION INTRAVENOUS ONCE
Status: COMPLETED | OUTPATIENT
Start: 2020-07-18 | End: 2020-07-18

## 2020-07-18 RX ORDER — LISINOPRIL 40 MG/1
40 TABLET ORAL DAILY
Status: DISCONTINUED | OUTPATIENT
Start: 2020-07-18 | End: 2020-07-19

## 2020-07-18 NOTE — PLAN OF CARE
Received patient at 0730. Alert and orientated x4. Tele Rhythm Afib. O2 saturation 100% on RA. Breath sounds clear/diminished. No C/O chest pain, dizziness, or SOB. Pt incontinence. Skin is dry and intact. Bed is locked and in low position.  Call light and status  - Assess for changes in mentation and behavior  - Position to facilitate oxygenation and minimize respiratory effort  - Oxygen supplementation based on oxygen saturation or ABGs  - Provide Smoking Cessation handout, if applicable  - Encourage bron

## 2020-07-18 NOTE — PROGRESS NOTES
07/18/20 1115 07/18/20 1117 07/18/20 1119   Vital Signs   BP (!) 141/96 132/66 133/81   BP Location Right arm Right arm Right arm   BP Method Automatic Automatic Automatic   Patient Position Lying Sitting Standing

## 2020-07-18 NOTE — CODE DOCUMENTATION
[facility]  RAPID RESPONSE NOTE     Abbi Sweet Patient Status:  Inpatient    1950 MRN [id number]   [facility] 2NE-A Attending Shea MainPREETI [facility] Day # 1 PCP Dhiraj Vale MD     Reason for RRT: HR  Of 201

## 2020-07-18 NOTE — CONSULTS
[facility]  Report of Consultation    [facility] Patient Status:  Inpatient    1950 MRN [id number]   [facility] 2NE-A Attending Devi [facility] Day # 1 PCP Sherman Ramirez MD transfusion    • Hyperlipidemia    • Muscle weakness    • Osteoarthritis    • Pulmonary emphysema (HCC)    • Renal disorder     ckd   • Shortness of breath    • Sleep apnea     cpap   • Visual impairment     glasses     Past Surgical History:   Procedure L (LOPRESSOR) 5 MG/5ML injection 5 mg, 5 mg, Intravenous, Q4H PRN  •  diltiazem 100mg/100ml in NaCl (CARDIZEM) 1 mg/mL premix/add-vantage, 5 mg/hr, Intravenous, Continuous  •  diltiazem 100mg/100ml in NaCl (CARDIZEM) 1 mg/mL premix/add-vantage, 10 mg/hr, Int Laboratories and Data:  Diagnostics:    Labs:   Lab Results   Component Value Date    WBC 8.3 07/18/2020    RBC 3.99 07/18/2020    HGB 11.2 07/18/2020    HCT 36.3 07/18/2020    MCV 91.0 07/18/2020    MCH 28.1 07/18/2020    MCHC 30.9 07/18/2020    RDW 1

## 2020-07-18 NOTE — PHYSICAL THERAPY NOTE
Orders received, chart reviewed. RR was called this AM during speech evaluation due to increase in HR to 205-209. RN reported that cardiology has not been in yet & request PT to not see patient today. Will check again tomorrow if pt is appropriate.

## 2020-07-18 NOTE — PROGRESS NOTES
[facility]  Progress Note     Pina Hu Patient Status:  Inpatient    1950 MRN [id number]   [facility] 2NE-A Attending [facility] Day # 1 PCP Pema Wilkins MD     Chief Complaint: Vomiting and dehydr Imaging data reviewed in Epic.     Medications:   • hydrALAZINE HCl  100 mg Oral BID   • lisinopril  40 mg Oral Daily   • methimazole  5 mg Oral BID   • Metoprolol Succinate ER  200 mg Oral Daily Beta Blocker   • Heparin Sodium (Porcine)  5,000 Units Subcut after discharge, patient will require TBD.

## 2020-07-18 NOTE — CONSULTS
Gastroenterology Initial Consultation  I have personally seen and examined the patient.     Patient Name: Main Kaye  Referring physician: Dr. Donal Jennings  Reason for consultation: Vomiting, dehydration  CC: Vomiting  HPI: This is a 76 yo AA woman with      • CABG  10/2017   • CHOLECYSTECTOMY     • ESOPHAGOGASTRODUODENOSCOPY (EGD) N/A 4/10/2020    Performed by Virginia Cao MD at [facility] ENDOSCOPY   • ESOPHAGOGASTRODUODENOSCOPY (EGD) N/A 2020    Performed by Reina Falcon MD at [facility] Contrast *    NAUSEA AND VOMITING    Comment:PT HAD VOMITING  SocHx:  (+) hx of smoking; The patient does not drink alcohol; The patient has no history of IV drug use or other illicit substances.   FamHx: The patient has no family history of colon cancer o S2; No S3; No murmurs  Resp: Clear to auscultation bilaterally without wheezes; rubs, rhonchi, or rales  Abdomen: Soft, non-tender, non-distended with the presence of bowel sounds; No hepatosplenomegaly; no rebound or guarding;  No ascites is clinically devin Registry) which includes the Dose Index Registry.     PATIENT STATED HISTORY: (As transcribed by Technologist) Kanika Juares presents with nausea and vomiting. .  Not eating and drinking for couple of weeks.         FINDINGS:  Sensitivity decreased without IV co Finalized by: Paco Monique MD on 7/17/2020 at 3:35 PM            Impression: This is a patient who presented with regard to the acute onset of vomiting over the past few days. She has not tolerated even sips of water without vomiting.   Her CT does not rosas

## 2020-07-18 NOTE — PLAN OF CARE
Alert. Oriented. Afib/rvr. Hr 100-140s. Lopressor ivp given as ordered. Denies any pain, sob, palpitations. Npo for swallow eval today. Ivf infusing. Scds in place. Pt requested external cath/purewick tonight. Poc discussed with pt. Cont. to monitor pt.

## 2020-07-18 NOTE — SLP NOTE
ADULT SWALLOWING EVALUATION    ASSESSMENT    ASSESSMENT/OVERALL IMPRESSION:  Pt seen this morning for BSSE. Nurse approved this evaluation. Pt admitted to [facility] on 7/17/2020 with N/V and diarrhea.  Per chart review, pt has not been eating/drinking Plan/Recommendations: SLP to reassess     Discharge Recommendations/Plan: Undetermined    HISTORY   MEDICAL HISTORY  Reason for Referral: R/O aspiration    Problem List  Principal Problem:    Weakness generalized  Active Problems:    S/P CABG (coronary art office visit completed 5/18/20.  Pt completed UGI 2/28/2020 revealed no tracheal aspiration or penetration, prominent cricopharyngeus, mass effect upon the posterior and right posterior lateral aspect of the distal esophagus, and moderate to marked gastroes lingual;Reduced left lingual  Rate of Motion: Reduced    Voice Quality: Clear  Respiratory Status: Unlabored  Consistencies Trialed:  Thin liquids  Method of Presentation: Staff/Clinician assistance;Spoon  Patient Positioning: Upright;Midline    Oral Phase

## 2020-07-18 NOTE — PROGRESS NOTES
Patient oriented to room. Call light in reach. Tele monitor on, VS taken. Admission Navigators complete including PTA medications. Patient needs met by staff. Per Dr. Rosaura Schlatter, ok to d/c isolation of contact and droplet.

## 2020-07-18 NOTE — PLAN OF CARE
Problem: Impaired Swallowing  Goal: Minimize aspiration risk  Description  Interventions:  - NPO  - Oral Care   7/18/2020 0939 by Alva Nye SLP  Outcome: Not Progressing

## 2020-07-19 LAB
ANION GAP SERPL CALC-SCNC: 10 MMOL/L (ref 0–18)
BASOPHILS # BLD AUTO: 0.02 X10(3) UL (ref 0–0.2)
BASOPHILS NFR BLD AUTO: 0.3 %
BUN BLD-MCNC: 29 MG/DL (ref 7–18)
BUN/CREAT SERPL: 22.5 (ref 10–20)
CALCIUM BLD-MCNC: 9.9 MG/DL (ref 8.5–10.1)
CHLORIDE SERPL-SCNC: 122 MMOL/L (ref 98–112)
CO2 SERPL-SCNC: 17 MMOL/L (ref 21–32)
CREAT BLD-MCNC: 1.29 MG/DL (ref 0.55–1.02)
DEPRECATED RDW RBC AUTO: 50.6 FL (ref 35.1–46.3)
EOSINOPHIL # BLD AUTO: 0.06 X10(3) UL (ref 0–0.7)
EOSINOPHIL NFR BLD AUTO: 0.8 %
ERYTHROCYTE [DISTWIDTH] IN BLOOD BY AUTOMATED COUNT: 14.9 % (ref 11–15)
GLUCOSE BLD-MCNC: 80 MG/DL (ref 70–99)
HCT VFR BLD AUTO: 35.1 % (ref 35–48)
HGB BLD-MCNC: 10.8 G/DL (ref 12–16)
IMM GRANULOCYTES # BLD AUTO: 0.02 X10(3) UL (ref 0–1)
IMM GRANULOCYTES NFR BLD: 0.3 %
LYMPHOCYTES # BLD AUTO: 2.08 X10(3) UL (ref 1–4)
LYMPHOCYTES NFR BLD AUTO: 28.5 %
MCH RBC QN AUTO: 28.4 PG (ref 26–34)
MCHC RBC AUTO-ENTMCNC: 30.8 G/DL (ref 31–37)
MCV RBC AUTO: 92.4 FL (ref 80–100)
MONOCYTES # BLD AUTO: 0.85 X10(3) UL (ref 0.1–1)
MONOCYTES NFR BLD AUTO: 11.6 %
NEUTROPHILS # BLD AUTO: 4.27 X10 (3) UL (ref 1.5–7.7)
NEUTROPHILS # BLD AUTO: 4.27 X10(3) UL (ref 1.5–7.7)
NEUTROPHILS NFR BLD AUTO: 58.5 %
OSMOLALITY SERPL CALC.SUM OF ELEC: 313 MOSM/KG (ref 275–295)
PLATELET # BLD AUTO: 98 10(3)UL (ref 150–450)
POTASSIUM SERPL-SCNC: 3.7 MMOL/L (ref 3.5–5.1)
RBC # BLD AUTO: 3.8 X10(6)UL (ref 3.8–5.3)
SODIUM SERPL-SCNC: 149 MMOL/L (ref 136–145)
WBC # BLD AUTO: 7.3 X10(3) UL (ref 4–11)

## 2020-07-19 PROCEDURE — 99232 SBSQ HOSP IP/OBS MODERATE 35: CPT | Performed by: HOSPITALIST

## 2020-07-19 PROCEDURE — 99232 SBSQ HOSP IP/OBS MODERATE 35: CPT | Performed by: INTERNAL MEDICINE

## 2020-07-19 RX ORDER — METOPROLOL TARTRATE 5 MG/5ML
5 INJECTION INTRAVENOUS EVERY 6 HOURS
Status: DISCONTINUED | OUTPATIENT
Start: 2020-07-19 | End: 2020-07-24

## 2020-07-19 RX ORDER — HYDRALAZINE HYDROCHLORIDE 20 MG/ML
10 INJECTION INTRAMUSCULAR; INTRAVENOUS EVERY 4 HOURS
Status: DISCONTINUED | OUTPATIENT
Start: 2020-07-19 | End: 2020-07-25

## 2020-07-19 RX ORDER — POTASSIUM CHLORIDE 14.9 MG/ML
20 INJECTION INTRAVENOUS ONCE
Status: COMPLETED | OUTPATIENT
Start: 2020-07-19 | End: 2020-07-19

## 2020-07-19 NOTE — PROGRESS NOTES
[facility]  Progress Note     Festus Butt Patient Status:  Inpatient    1950 MRN [id number]   [facility] 2NE-A Attending Z[facility] Day # 2 PCP Yvonne Scott MD     Chief Complaint: Vomiting and dehydr Estimated Creatinine Clearance: 34.5 mL/min (A) (based on SCr of 1.29 mg/dL (H)).     Recent Labs   Lab 07/17/20  1202   PTP 14.9*   INR 1.13*       Recent Labs   Lab 07/17/20  1243 07/18/20  1017   TROP 0.343* 0.154*            Imaging: Imaging data DO          **Certification      PHYSICIAN Certification of Need for Inpatient Hospitalization - Initial Certification    Patient will require inpatient services that will reasonably be expected to span two midnight's based on the clinical documentation in

## 2020-07-19 NOTE — PLAN OF CARE
Alert. Oriented. Afib per tele. Hr 90s. Denies pain, sob. Npo maintained d/t failed swallow eval. Ivf & cardizem gtt infusing. Poc updated and discussed with pt. Voiced understanding. Cont. to monitor pt.

## 2020-07-19 NOTE — PROGRESS NOTES
· [facility]      Subjective:  No more N/V has been NPO.       Objective:  AFib 80-90s 129/73   Afebrile  I/O incomplete  BUN/cr 39/1.52  Troponin 0.154 Hgb 10.8    Neuro: awake/alert  HEENT: no JVD  Cardiac: S1 S2 irregular 2/6 systolic murmur

## 2020-07-19 NOTE — PROGRESS NOTES
Gastroenterology Progress Note  Patient Name: Deandre Collins  Chief Complaint: vomiting  S: The patient reports that she feels better today. No n/v overnight. No abdominal pain. No clear dysphagia.    O: /78 (BP Location: Right arm)   Pulse 111

## 2020-07-19 NOTE — SLP NOTE
ADULT SWALLOWING EVALUATION    ASSESSMENT    ASSESSMENT/OVERALL IMPRESSION:  Orders were received for a bedside swallowing evaluation.  SLP had seen patient on 7/18 for Bedside Swallowing Evaluation(BSSE) however patient started to vomit following 3 tsp of RECOMMENDATIONS   Diet Recommendations - Solids: Regular(CLD until advanced by GI). Diet Recommendations - Liquid: Thin      Compensatory Strategies Recommended: Small bites and sips; Slow rate  Aspiration Precautions: Upright position; Slow rate; Sm water\". SWALLOWING HISTORY  Current Diet Consistency: NPO  Dysphagia History: No past  Imaging Results:   UGI/Esophagus Double Contrast on 2/28/20:  FINDINGS:  Air-contrast barium swallow esophagram and upper GI was performed.   Deglutition appears with aspiration.)    Esophageal Phase of Swallow: No complaints consistent with possible esophageal involvement  Comments:   Diet recommendations discussed with RN. RN to discuss with GI regarding advancing diet.                GOALS  Goal #1 The patient will to

## 2020-07-19 NOTE — PHYSICAL THERAPY NOTE
PHYSICAL THERAPY EVALUATION - INPATIENT     Room Number: 1293/9529-H  Evaluation Date: 7/19/2020  Type of Evaluation: Initial  Physician Order: PT Eval and Treat    Presenting Problem: nausea, vomiting, poor appetite  Reason for Therapy: Mobility Dys SURGERY  10/24/2017    cabg x 3   •      • CABG  10/2017   • CHOLECYSTECTOMY     • ESOPHAGOGASTRODUODENOSCOPY (EGD) N/A 4/10/2020    Performed by Haider Roland MD at [facility] ENDOSCOPY   • ESOPHAGOGASTRODUODENOSCOPY (EGD) N/A 2020    Perfo Fair  Static Standing: Fair -  Dynamic Standing: Poor +      ACTIVITY TOLERANCE  Pulse: 111  Heart Rate Source: Monitor  Patient Position: Sitting      AM-PAC '6-Clicks' INPATIENT SHORT FORM - BASIC MOBILITY  How much difficulty does the patient currently tolerated  Gait training  Posture  ROM  Strengthening  Lower therapeutic exercise: Alternating marching  Ankle pumps  LAQ  Transfer training    Patient End of Session: Up in chair;Needs met;Call light within reach;RN aware of session/findings; All patient independent     Goal #4 Patient will negotiate a flight of stairs with supervision.    Goal #5    Goal #6    Goal Comments: Goals established on 7/19/2020

## 2020-07-19 NOTE — PLAN OF CARE
Problem: Impaired Swallowing  Goal: Minimize aspiration risk  Description  Interventions:  Supervision with meals  Position as upright as possible. Up to chair preferred. Keep upright for 20-30 minutes following po  Liquids by cup only. Single sips.

## 2020-07-19 NOTE — PHYSICAL THERAPY NOTE
Order received, chart reviewed and attempted evaluation however, pt sound asleep, will open eyes briefly then will fall back asleep. Will follow up at a later time as schedule permits.

## 2020-07-19 NOTE — PLAN OF CARE
Pt is alert x 4, on Ra, NPO,  Afib on the monitor, Cardizem gtt . PT denies any cardiovascular symptoms at this time. Pt is up with SBA, incontinent of B/B. All needs met and will continue to monitor.      Pt seen by speech therapy today, pt passed bedside based on oxygen saturation or ABGs  - Provide Smoking Cessation handout, if applicable  - Encourage broncho-pulmonary hygiene including cough, deep breathe, Incentive Spirometry  - Assess the need for suctioning and perform as needed  - Assess and instruct Progressing

## 2020-07-20 ENCOUNTER — ANESTHESIA EVENT (OUTPATIENT)
Dept: ENDOSCOPY | Facility: HOSPITAL | Age: 70
DRG: 981 | End: 2020-07-20
Payer: MEDICARE

## 2020-07-20 ENCOUNTER — APPOINTMENT (OUTPATIENT)
Dept: GENERAL RADIOLOGY | Facility: HOSPITAL | Age: 70
DRG: 981 | End: 2020-07-20
Attending: HOSPITALIST
Payer: MEDICARE

## 2020-07-20 ENCOUNTER — APPOINTMENT (OUTPATIENT)
Dept: GENERAL RADIOLOGY | Facility: HOSPITAL | Age: 70
DRG: 981 | End: 2020-07-20
Attending: INTERNAL MEDICINE
Payer: MEDICARE

## 2020-07-20 LAB — POTASSIUM SERPL-SCNC: 4.3 MMOL/L (ref 3.5–5.1)

## 2020-07-20 PROCEDURE — BD11YZZ FLUOROSCOPY OF ESOPHAGUS USING OTHER CONTRAST: ICD-10-PCS | Performed by: RADIOLOGY

## 2020-07-20 PROCEDURE — 02HV33Z INSERTION OF INFUSION DEVICE INTO SUPERIOR VENA CAVA, PERCUTANEOUS APPROACH: ICD-10-PCS | Performed by: HOSPITALIST

## 2020-07-20 PROCEDURE — 99232 SBSQ HOSP IP/OBS MODERATE 35: CPT | Performed by: HOSPITALIST

## 2020-07-20 PROCEDURE — 74246 X-RAY XM UPR GI TRC 2CNTRST: CPT | Performed by: INTERNAL MEDICINE

## 2020-07-20 PROCEDURE — 99232 SBSQ HOSP IP/OBS MODERATE 35: CPT | Performed by: INTERNAL MEDICINE

## 2020-07-20 PROCEDURE — 71045 X-RAY EXAM CHEST 1 VIEW: CPT | Performed by: HOSPITALIST

## 2020-07-20 RX ORDER — DIGOXIN 125 MCG
0.25 TABLET ORAL ONCE
Status: COMPLETED | OUTPATIENT
Start: 2020-07-20 | End: 2020-07-20

## 2020-07-20 NOTE — SLP NOTE
Bedside swallow evaluation completed 7/19/20 and pt recommended for reg/thin liquid diet however pt with vomiting episode following pill taken with small amount of water shortly after so returned to NPO. GI following and esophagram with UGI study planned.

## 2020-07-20 NOTE — ANESTHESIA PREPROCEDURE EVALUATION
PRE-OP EVALUATION    Patient Name: Dayanara Parikh    Pre-op Diagnosis: GI BLEED,MELENA    Procedure(s):  ESOPHAGOGASTRODUODENOSCOPY    Surgeon(s) and Role:     Shante Mae MD - Primary    Pre-op vitals reviewed.   Temp: 97.4 °F (36.3 °C)  Pulse: 1 Intravenous, Q8H PRN        Outpatient Medications:  methimazole 5 MG Oral Tab, Take 1 tablet with breakfast and dinner, Disp: 60 tablet, Rfl: 0  Potassium Chloride ER 20 MEQ Oral Tab CR, Take 20 mEq by mouth daily.   , Disp: , Rfl:   hydrALAzine HCl 100 MG anemia                   Pulmonary      (+) asthma  (+) COPD and mild  COPD not requiring home oxygen.    (+) shortness of breath     (+) sleep apnea       Neuro/Psych    Negative neuro/psych ROS.                           A fib rvr now on diltiazem infusio Dehydration     Renal insufficiency     Elevated troponin     Atrial fibrillation with RVR ([address]        Past Surgical History:   Procedure Laterality Date   • BRAIN SURGERY      FOR ANEURYSM   • BYPASS SURGERY  10/24/2017    cabg x 3   •  history. Pulmonary            (+) decreased breath sounds         Other findings            ASA: 4   Plan: MAC  NPO status verified and patient meets guidelines. Post-procedure pain management plan discussed with surgeon and patient.     Comment:

## 2020-07-20 NOTE — PROGRESS NOTES
[facility] Gastroenterology Progress Note    S: Pt with improved nausea and vomiting at this time but has not eaten at this time.  Pt with tachycardia to the 130-140s at this time on dilt gtt      O: BP (!) 133/92 (BP Location: Left arm)   Pulse 111

## 2020-07-20 NOTE — PLAN OF CARE
Pt denies c/o pain, malaise, or cardiac symptoms. A&Ox4. Lungs diminished bilaterally with equal expansion, room air. Pt afib on monitor. Abdomen soft and non-tender with active bowel sounds in all four quadrants, NPO. Pt incontinent of B&B.  IVF 0.9%NS at as ordered  - Monitor response to electrolyte replacements, including rhythm and repeat lab results as appropriate  - Fluid restriction as ordered  - Instruct patient on fluid and nutrition restrictions as appropriate  Outcome: Progressing  Goal: Hemodynam Scribe Attestation (For Scribes USE Only)... Scribe Attestation (For Scribes USE Only).../Attending Attestation (For Attendings USE Only)...

## 2020-07-20 NOTE — PROGRESS NOTES
[facility]  Progress Note     Mavis Flores Patient Status:  Inpatient    1950 MRN [id number]   [facility] 2NE-A Attending [address] [address] [address] Day # 3 PCP Dutch Galvez MD     Chief Complaint: Vomiting and dehydr 18.0* 17.0*  --    ALKPHO 104  --   --   --    AST 41*  --   --   --    ALT 42  --   --   --    BILT 1.0  --   --   --    TP 8.4*  --   --   --        Estimated Creatinine Clearance: 34.5 mL/min (A) (based on SCr of 1.29 mg/dL (H)).     Recent Labs   Lab 07 120s.  Abdomen: Soft, nondistended, nontender, positive bowel sounds. Extremities: No C/C/E. Assessment/plan:  Agree with above. Esophagram today if patient not able to keep down the contrast she might need to have a EGD.

## 2020-07-20 NOTE — PLAN OF CARE
I called dtr with update. EGD tomorrow per GI recs. We reviewed prior EGDs. dtr reports over time getting more difficult to get her to take pills and she would hold it in her mouth. Pt po intake has been up and down the past few months also.    We revie

## 2020-07-20 NOTE — PROGRESS NOTES
Pt is alert x 4, fatigued, on Ra, Afib, rates from 115s- 150's on Cardizem gtt. . PT denies any cardiovascular symptoms at this time. Pt is up with SBA and a walker, incontinent of B/B. All needs met and will continue to monitor.        PLAN; clear liquid d

## 2020-07-20 NOTE — DIETARY MALNUTRITION NOTE
[facility]    NUTRITION INITIAL ASSESSMENT    Pt meets severe malnutrition criteria.     CRITERIA FOR MALNUTRITION DIAGNOSIS:  Criteria for severe malnutrition diagnosis: chronic illness related to wt loss greater than 7.5% in 3 months, energy intake l down.     ANTHROPOMETRICS:  Ht: 160 cm (5' 3\")  Wt: 54.9 kg (121 lb 0.5 oz). This is 104% of IBW  BMI: Body mass index is 21.44 kg/m².   IBW: 52.3 kg  Usual Body Wt:72.7 kg     WEIGHT HISTORY:   Patient Weight for the past 72 hrs:   Weight   07/17/20 1152

## 2020-07-21 ENCOUNTER — APPOINTMENT (OUTPATIENT)
Dept: CT IMAGING | Facility: HOSPITAL | Age: 70
DRG: 981 | End: 2020-07-21
Attending: INTERNAL MEDICINE
Payer: MEDICARE

## 2020-07-21 ENCOUNTER — ANESTHESIA (OUTPATIENT)
Dept: ENDOSCOPY | Facility: HOSPITAL | Age: 70
DRG: 981 | End: 2020-07-21
Payer: MEDICARE

## 2020-07-21 LAB
ANION GAP SERPL CALC-SCNC: 5 MMOL/L (ref 0–18)
BASOPHILS # BLD AUTO: 0.01 X10(3) UL (ref 0–0.2)
BASOPHILS NFR BLD AUTO: 0.1 %
BUN BLD-MCNC: 18 MG/DL (ref 7–18)
BUN/CREAT SERPL: 17 (ref 10–20)
CALCIUM BLD-MCNC: 9.9 MG/DL (ref 8.5–10.1)
CHLORIDE SERPL-SCNC: 125 MMOL/L (ref 98–112)
CO2 SERPL-SCNC: 20 MMOL/L (ref 21–32)
CREAT BLD-MCNC: 1.06 MG/DL (ref 0.55–1.02)
DEPRECATED RDW RBC AUTO: 52.7 FL (ref 35.1–46.3)
EOSINOPHIL # BLD AUTO: 0.11 X10(3) UL (ref 0–0.7)
EOSINOPHIL NFR BLD AUTO: 1.5 %
ERYTHROCYTE [DISTWIDTH] IN BLOOD BY AUTOMATED COUNT: 15.4 % (ref 11–15)
GLUCOSE BLD-MCNC: 112 MG/DL (ref 70–99)
HAV IGM SER QL: 1.4 MG/DL (ref 1.6–2.6)
HCT VFR BLD AUTO: 33.2 % (ref 35–48)
HGB BLD-MCNC: 10 G/DL (ref 12–16)
IMM GRANULOCYTES # BLD AUTO: 0.04 X10(3) UL (ref 0–1)
IMM GRANULOCYTES NFR BLD: 0.5 %
LYMPHOCYTES # BLD AUTO: 1.55 X10(3) UL (ref 1–4)
LYMPHOCYTES NFR BLD AUTO: 21.3 %
MCH RBC QN AUTO: 27.9 PG (ref 26–34)
MCHC RBC AUTO-ENTMCNC: 30.1 G/DL (ref 31–37)
MCV RBC AUTO: 92.5 FL (ref 80–100)
MONOCYTES # BLD AUTO: 0.67 X10(3) UL (ref 0.1–1)
MONOCYTES NFR BLD AUTO: 9.2 %
NEUTROPHILS # BLD AUTO: 4.9 X10 (3) UL (ref 1.5–7.7)
NEUTROPHILS # BLD AUTO: 4.9 X10(3) UL (ref 1.5–7.7)
NEUTROPHILS NFR BLD AUTO: 67.4 %
OSMOLALITY SERPL CALC.SUM OF ELEC: 313 MOSM/KG (ref 275–295)
PLATELET # BLD AUTO: 83 10(3)UL (ref 150–450)
POTASSIUM SERPL-SCNC: 3.2 MMOL/L (ref 3.5–5.1)
POTASSIUM SERPL-SCNC: 3.7 MMOL/L (ref 3.5–5.1)
RBC # BLD AUTO: 3.59 X10(6)UL (ref 3.8–5.3)
SODIUM SERPL-SCNC: 150 MMOL/L (ref 136–145)
WBC # BLD AUTO: 7.3 X10(3) UL (ref 4–11)

## 2020-07-21 PROCEDURE — 99232 SBSQ HOSP IP/OBS MODERATE 35: CPT | Performed by: HOSPITALIST

## 2020-07-21 PROCEDURE — 71250 CT THORAX DX C-: CPT | Performed by: INTERNAL MEDICINE

## 2020-07-21 PROCEDURE — 0DJ08ZZ INSPECTION OF UPPER INTESTINAL TRACT, VIA NATURAL OR ARTIFICIAL OPENING ENDOSCOPIC: ICD-10-PCS | Performed by: INTERNAL MEDICINE

## 2020-07-21 PROCEDURE — 99233 SBSQ HOSP IP/OBS HIGH 50: CPT | Performed by: INTERNAL MEDICINE

## 2020-07-21 RX ORDER — NALOXONE HYDROCHLORIDE 0.4 MG/ML
80 INJECTION, SOLUTION INTRAMUSCULAR; INTRAVENOUS; SUBCUTANEOUS AS NEEDED
Status: DISCONTINUED | OUTPATIENT
Start: 2020-07-21 | End: 2020-07-21 | Stop reason: ALTCHOICE

## 2020-07-21 RX ORDER — METOCLOPRAMIDE HYDROCHLORIDE 5 MG/ML
10 INJECTION INTRAMUSCULAR; INTRAVENOUS AS NEEDED
Status: DISCONTINUED | OUTPATIENT
Start: 2020-07-21 | End: 2020-07-21 | Stop reason: ALTCHOICE

## 2020-07-21 RX ORDER — ONDANSETRON 2 MG/ML
4 INJECTION INTRAMUSCULAR; INTRAVENOUS AS NEEDED
Status: DISCONTINUED | OUTPATIENT
Start: 2020-07-21 | End: 2020-07-21 | Stop reason: ALTCHOICE

## 2020-07-21 RX ORDER — LIDOCAINE HYDROCHLORIDE 10 MG/ML
INJECTION, SOLUTION EPIDURAL; INFILTRATION; INTRACAUDAL; PERINEURAL AS NEEDED
Status: DISCONTINUED | OUTPATIENT
Start: 2020-07-21 | End: 2020-07-21 | Stop reason: SURG

## 2020-07-21 RX ORDER — SODIUM CHLORIDE, SODIUM LACTATE, POTASSIUM CHLORIDE, CALCIUM CHLORIDE 600; 310; 30; 20 MG/100ML; MG/100ML; MG/100ML; MG/100ML
INJECTION, SOLUTION INTRAVENOUS CONTINUOUS
Status: DISCONTINUED | OUTPATIENT
Start: 2020-07-21 | End: 2020-07-22

## 2020-07-21 RX ORDER — POTASSIUM CHLORIDE 29.8 MG/ML
40 INJECTION INTRAVENOUS ONCE
Status: COMPLETED | OUTPATIENT
Start: 2020-07-21 | End: 2020-07-21

## 2020-07-21 RX ORDER — POTASSIUM CHLORIDE 14.9 MG/ML
20 INJECTION INTRAVENOUS ONCE
Status: COMPLETED | OUTPATIENT
Start: 2020-07-21 | End: 2020-07-21

## 2020-07-21 RX ORDER — HYDROMORPHONE HYDROCHLORIDE 1 MG/ML
0.4 INJECTION, SOLUTION INTRAMUSCULAR; INTRAVENOUS; SUBCUTANEOUS EVERY 5 MIN PRN
Status: DISCONTINUED | OUTPATIENT
Start: 2020-07-21 | End: 2020-07-21 | Stop reason: ALTCHOICE

## 2020-07-21 RX ADMIN — LIDOCAINE HYDROCHLORIDE 50 MG: 10 INJECTION, SOLUTION EPIDURAL; INFILTRATION; INTRACAUDAL; PERINEURAL at 11:35:00

## 2020-07-21 RX ADMIN — SODIUM CHLORIDE: 9 INJECTION, SOLUTION INTRAVENOUS at 11:47:00

## 2020-07-21 RX ADMIN — LIDOCAINE HYDROCHLORIDE 50 MG: 10 INJECTION, SOLUTION EPIDURAL; INFILTRATION; INTRACAUDAL; PERINEURAL at 11:34:00

## 2020-07-21 NOTE — PLAN OF CARE
Assumed care at 0730. A&O x 4, drowsy. On RA, wears CPAP at night, tolerating well. A fib on tele. Cardizem gtt at 20 mg/hr. Potassium replaced IV, patient NPO for EGD. Consent signed on chart. IV BP medications given.  SBP to be kept below 140, monitor azul for suctioning and perform as needed  - Assess and instruct to report SOB or any respiratory difficulty  - Respiratory Therapy support as indicated  - Manage/alleviate anxiety  - Monitor for signs/symptoms of CO2 retention  Outcome: Progressing     Problem mobility/gait  Description  Interventions:  - Assess patient's functional ability and stability  - Promote increasing activity/tolerance for mobility and gait  - Educate and engage patient/family in tolerated activity level and precautions  - Ambulate with

## 2020-07-21 NOTE — PLAN OF CARE
PT A/O, 97% ON RA, CPAP AT NIGHT, HR , CARDIZEM GTT INFUSING AT 20MG/HR, AFIB, DENIES CP OR SOB, SCDS ON, PUREWICK DRAINING YELLOW URINE, TOLERATED CLEAR LIQUIDS, NPO AFTER MIDNIGHT, PLAN FOR EGD, LABS IN AM, INSTRUCTED PT ON POC    Problem: Humaira Maldonado

## 2020-07-21 NOTE — ANESTHESIA POSTPROCEDURE EVALUATION
[address] Patient Status:  Inpatient   Age/Gender 77year old female MRN [id number]   [facility] ENDOSCOPY Attending Crissy Houston [facility] Day # 4 PCP Melissa Garcia MD       Anesthesia Post-op Note    Procedu

## 2020-07-21 NOTE — PROGRESS NOTES
[facility]  Progress Note     Jeremias Piña Patient Status:  Inpatient    1950 MRN [id number]   [facility] 2NE-A Attending [facility] Day # 4 PCP Jona Bellamy MD     Chief Complaint: Vomiting and dehydr 3.7 3.9 3.7 4.3 3.2*    119* 122*  --  125*   CO2 21.0 18.0* 17.0*  --  20.0*   ALKPHO 104  --   --   --   --    AST 41*  --   --   --   --    ALT 42  --   --   --   --    BILT 1.0  --   --   --   --    TP 8.4*  --   --   --   --        Estimated Cre heparin sq  · CODE status: Full    Will the patient be referred to [facility] on discharge?: yes    Estimated date of discharge: tbd    Plan of care discussed with pt, RN. Called dtr yesterday with update.       Jennifer MENDOZA  9:17 AM     Internal medicine h

## 2020-07-21 NOTE — PAYOR COMM NOTE
--------------  ADMISSION REVIEW     Payor[employer]orri Najjar MEDICARE ADV PPO  Subscriber #:  [id number]  Authorization Number: [id number]    Admit date: 7/17/20  Admit time: 18       Admitting Physician: Emilio Flores MD  Attending Physician:  Larisa Ann movements have been good. He does note that when he changed her diaper, he did notice a spot of blood. There is no fever. No cough. No reports of headache. No reports of chest pain or shortness of breath.     Past Medical History:   Diagnosis Date   • Current:/76   Pulse 90   Temp 98.3 °F (36.8 °C) (Oral)   Resp 18   Ht 160 cm (5' 3\")   Wt 56.7 kg   SpO2 98%   BMI 22.14 kg/m²      Physical Exam  General: The patient is awake and alert.   Her speech is very soft and a little mumbling but she wi components:    .0 (*)     RDW-SD 50.4 (*)     All other components within normal limits   PTT, ACTIVATED - Normal   LIPASE - Normal   RAPID SARS-COV-2 BY PCR - Normal   URINALYSIS WITH CULTURE REFLEX   CBC WITH DIFFERENTIAL WITH PLATELET     EKG  Service:  — Author Type:  Physician    Filed:  7/17/2020  5:36 PM Date of Service:  7/17/2020  5:30 PM Status:  Signed    :  Bandar Piña DO (Physician)     [facility]  [facility] and [facility]     Deandre Hides Patient Status Chest and Back: No tenderness or deformity. Abdomen: Soft, nontender, nondistended. Positive bowel sounds. No rebound, guarding or organomegaly. Neurologic: No focal neurological deficits. CNII-XII grossly intact.   Musculoskeletal: Moves all extremiti status: Full  · Rangel: N/A  Plan of care discussed with pt at bedside. Jerry Hansen DO  7/17/2020    RAPID RESPONSE NOTE  Reason for RRT: HR  Of 201     Physical Exam:    /76 (BP Location: Right arm)   Pulse (!) 138   Temp 97.9 °F (36. infiltrate/pneumonia in the right middle lobe.  Residual opacities in the right and left lower lobes appear unchanged and consistent with areas of scarring. 3. Details as above.  Continued clinical correlation recommended.      Impression:  This is a patie down.   She demonstrates tolerance of regular solids and thin liquids. However given GI issues, GI service will need to recommend diet.    Discussed with RN.      Will follow up to ensure safety with diet and educate pt on compensatory strategies/swallow pr 7/19/2020 10:57 AM     PHYSICAL THERAPY EVALUATION - INPATIENT    patient presents with the following impairments generalized weakness, decreased activity tolerance, decreased balance.   Functional outcome measures completed include AM PAC with pt keke NPO  · Change Hydralazine/Metoprolol to IV, Lisinopril to IV Vasotec while NPO  · Does she need Tapazole adjusted - per IM  · Rate control for atrial fibrillation  · Not on anticoagulation as outlined above (GI ulcer, type 1 Ao dissection, not a surgical c pending EGD per GI recs  3. Esophagram was limited- 2 sips, prior esophagram in February showed extrinsic compression ?aorta  4. GI following  4. Hyperthyroidism  1. Methimazole on hold while NPO.   5. Hypertension, stable  1.  Hydralazine/lopressor IV and 10 mg  Freq: Every 4 hours Route: IV  Start: 07/19/20 0800      1031-Given   1441-Given     1849-Given   2121-Given      0245-Given   0624-Given     1050-Given   1430-Given     1847-Given   2231-Given      0308-Given   0606-Given     1010-Given   1430 Route:  IV  Start: 07/17/20 1304 End: 07/19/20 0754    1410-New Bag   1737-Handoff       0754-D/C'd                Medications 07/17/20 07/18/20 07/19/20 07/20/20 07/21/20   0.9% NaCl infusion   Rate: 75 mL/hr Freq: Continuous Route: IV  Start: 07/17/20 190 Elevated Heart Rate  PRN Comment: for HR > 120  Start: 07/18/20 1756       1433-Bolus from Bag           Metoclopramide HCl (REGLAN) injection 5 mg   Dose: 5 mg  Freq: Every 8 hours PRN Route: IV  PRN Reasons: Nausea,vomiting  Start: 07/17/20 1849      104

## 2020-07-21 NOTE — PLAN OF CARE
EGD results reviewed w/ dtr. Prior imaging in February showed extrinsic compression on esophagus. F/u on CT chest.  Dtr wants to make sure she gets CPAP at night - EITAN protocol in place.  We reviewed current medical problem list.     Sigifredo MENDOZA  3

## 2020-07-21 NOTE — SLP NOTE
Attempted to see pt for dysphagia follow up- pt NPO for pending procedure. Will follow up as scheduling permits. Thank you.     Santana Ramos M.S. [address] [facility]  Pager [phone]

## 2020-07-21 NOTE — OPERATIVE REPORT
Operative Report-Esophagogastroduodenoscopy      PREOPERATIVE DIAGNOSIS/INDICATION: Dysphagia, nausea    POSTOPERTATIVE DIAGNOSIS: Duodenitis, gastritis, esophagitis     PROCEDURE PERFORMED: EGD    INFORMED CONSENT: Once a brief hi

## 2020-07-22 PROBLEM — Z71.89 GOALS OF CARE, COUNSELING/DISCUSSION: Status: ACTIVE | Noted: 2020-07-22

## 2020-07-22 PROBLEM — Z51.5 PALLIATIVE CARE ENCOUNTER: Status: ACTIVE | Noted: 2020-07-22

## 2020-07-22 LAB
ALBUMIN SERPL-MCNC: 2.8 G/DL (ref 3.4–5)
ALBUMIN/GLOB SERPL: 0.7 {RATIO} (ref 1–2)
ALP LIVER SERPL-CCNC: 78 U/L (ref 55–142)
ALT SERPL-CCNC: 20 U/L (ref 13–56)
ANION GAP SERPL CALC-SCNC: 5 MMOL/L (ref 0–18)
AST SERPL-CCNC: 14 U/L (ref 15–37)
BASOPHILS # BLD AUTO: 0.01 X10(3) UL (ref 0–0.2)
BASOPHILS NFR BLD AUTO: 0.2 %
BILIRUB SERPL-MCNC: 0.6 MG/DL (ref 0.1–2)
BUN BLD-MCNC: 13 MG/DL (ref 7–18)
BUN/CREAT SERPL: 13.3 (ref 10–20)
CALCIUM BLD-MCNC: 10 MG/DL (ref 8.5–10.1)
CHLORIDE SERPL-SCNC: 124 MMOL/L (ref 98–112)
CO2 SERPL-SCNC: 19 MMOL/L (ref 21–32)
CREAT BLD-MCNC: 0.98 MG/DL (ref 0.55–1.02)
DEPRECATED RDW RBC AUTO: 52.8 FL (ref 35.1–46.3)
EOSINOPHIL # BLD AUTO: 0.12 X10(3) UL (ref 0–0.7)
EOSINOPHIL NFR BLD AUTO: 1.8 %
ERYTHROCYTE [DISTWIDTH] IN BLOOD BY AUTOMATED COUNT: 15.5 % (ref 11–15)
GLOBULIN PLAS-MCNC: 3.8 G/DL (ref 2.8–4.4)
GLUCOSE BLD-MCNC: 103 MG/DL (ref 70–99)
HCT VFR BLD AUTO: 32.5 % (ref 35–48)
HGB BLD-MCNC: 9.7 G/DL (ref 12–16)
IMM GRANULOCYTES # BLD AUTO: 0.04 X10(3) UL (ref 0–1)
IMM GRANULOCYTES NFR BLD: 0.6 %
LYMPHOCYTES # BLD AUTO: 1.72 X10(3) UL (ref 1–4)
LYMPHOCYTES NFR BLD AUTO: 25.8 %
M PROTEIN MFR SERPL ELPH: 6.6 G/DL (ref 6.4–8.2)
MCH RBC QN AUTO: 27.2 PG (ref 26–34)
MCHC RBC AUTO-ENTMCNC: 29.8 G/DL (ref 31–37)
MCV RBC AUTO: 91.3 FL (ref 80–100)
MONOCYTES # BLD AUTO: 0.62 X10(3) UL (ref 0.1–1)
MONOCYTES NFR BLD AUTO: 9.3 %
NEUTROPHILS # BLD AUTO: 4.15 X10 (3) UL (ref 1.5–7.7)
NEUTROPHILS # BLD AUTO: 4.15 X10(3) UL (ref 1.5–7.7)
NEUTROPHILS NFR BLD AUTO: 62.3 %
OSMOLALITY SERPL CALC.SUM OF ELEC: 306 MOSM/KG (ref 275–295)
PLATELET # BLD AUTO: 85 10(3)UL (ref 150–450)
POTASSIUM SERPL-SCNC: 3.7 MMOL/L (ref 3.5–5.1)
RBC # BLD AUTO: 3.56 X10(6)UL (ref 3.8–5.3)
SODIUM SERPL-SCNC: 148 MMOL/L (ref 136–145)
WBC # BLD AUTO: 6.7 X10(3) UL (ref 4–11)

## 2020-07-22 PROCEDURE — 99233 SBSQ HOSP IP/OBS HIGH 50: CPT | Performed by: INTERNAL MEDICINE

## 2020-07-22 PROCEDURE — 99233 SBSQ HOSP IP/OBS HIGH 50: CPT | Performed by: HOSPITALIST

## 2020-07-22 RX ORDER — DILTIAZEM HYDROCHLORIDE 5 MG/ML
INJECTION INTRAVENOUS
Status: DISCONTINUED
Start: 2020-07-22 | End: 2020-07-22 | Stop reason: WASHOUT

## 2020-07-22 NOTE — PALLIATIVE CARE NOTE
Palliative Care Consult request from Jesus MENDOZA noted requesting discussion for goals of care. Discussed consultation request with patient's daughter Paula Lopez ([phone]) via phone today.  Requested time to meet tomorrow when spouse was visiting an

## 2020-07-22 NOTE — PROGRESS NOTES
[facility] Gastroenterology Progress Note    S: Pt with improved nausea and vomiting and dysphagia at this time. Tolerating soft diet with no complaints.        O: BP (!) 132/93   Pulse 114   Temp 98.1 °F (36.7 °C) (Axillary)   Resp 20   Ht 63\"   Wt 1 concerns.        Christel Rogers, 07/22/20, 9:34 AM  [facility]

## 2020-07-22 NOTE — PHYSICAL THERAPY NOTE
PHYSICAL THERAPY TREATMENT NOTE - INPATIENT    Room Number: 2607/2607-A     Session: 1   Number of Visits to Meet Established Goals: 5    Presenting Problem: nausea, vomiting, poor appetite    History related to current admission: Pt is 77year old female ESOPHAGOGASTRODUODENOSCOPY (EGD) N/A 4/10/2020    Performed by aMribel Huggins MD at [facility] ENDOSCOPY   • ESOPHAGOGASTRODUODENOSCOPY (EGD) N/A 2/28/2020    Performed by Thad Huang MD at [facility] N/A 10/24/ Assistance:  Moderate assistance  Distance (ft): 30  Assistive Device: Rolling walker  Pattern: Shuffle(Flexed posture, needs physical assist for walker management)  Stoop/Curb Assistance: Not tested       Skilled Therapy Provided: Therapist treating pt in rehabilitation(ELOS is 11-13 days)     PLAN  PT Treatment Plan: Bed mobility; Body mechanics; Endurance; Energy conservation;Patient education; Family education;Gait training;Strengthening;Stair training;Transfer training;Balance training  Rehab Potential : Go

## 2020-07-22 NOTE — PROGRESS NOTES
[facility]  Progress Note     Festus Daquan Patient Status:  Inpatient    1950 MRN [id number]   [facility] 2NE-A Attending Charissa Simpson MD   Hosp Day # 5 PCP Yvonne Scott MD     Chief Complaint: fatigue    S: Patient awake --   --   --   --  2.8*      < > 149*  --  150*  --  148*   K 3.7   < > 3.7   < > 3.2* 3.7 3.7      < > 122*  --  125*  --  124*   CO2 21.0   < > 17.0*  --  20.0*  --  19.0*   ALKPHO 104  --   --   --   --   --  78   AST 41*  --   --   --   -- adjacent hematoma  1. Previously seen by Dr. Mendoza Leos and no surgical intervention  2. CT chest reviewed, keep Bp <140  14. Demand ischemia   1. BB IV  15. Severe protein malnutrition  1. Dietitian evaluated  16. EITAN  1. EITAN protocol  17. Vascular dementia  1.

## 2020-07-22 NOTE — CM/SW NOTE
SW spoke to pt's daughter, Shaquille Cohen, over the phone to follow up on discharge recommendations. As of today, PT is now recommending [facility]. Shaquille Cohen stated that pt had a poor experience at a [facility] in the past, and they want pt to return home with [facility].  Pt has hx wi

## 2020-07-22 NOTE — SLP NOTE
SPEECH DAILY NOTE - INPATIENT    ASSESSMENT & PLAN   ASSESSMENT  Pt seen for dysphagia tx to assess tolerance with recommended diet, ensure proper utilization of aspiration precautions and provide pt/family education.  GI workup completed and have signed of patient/family/caregiver will demonstrate understanding and implementation of aspiration precautions and swallow strategies independently over 1-2 session(s).      Met         FOLLOW UP  Follow Up Needed: Yes  SLP Follow-up Date: 07/22/20  Number of Visits

## 2020-07-22 NOTE — RESPIRATORY THERAPY NOTE
EITAN - Equipment Use Daily Summary:  · Set Mode CPAP  · Usage in hours: 7:35  · 90% Pressure (EPAP) level: 5.0  · 90% Insp Pressure (IPAP):   · AHI: 27.5  · Supplemental Oxygen:   · Comments:

## 2020-07-22 NOTE — PLAN OF CARE
ALERT AND ORIENTED X4 ON TELE MONITOR 'S AFIB. CARDIZEM GTT AT 20 MG/HR AND IVF OF 0.9NSTL AT 75 CC/HR IN PROGRESS PATENT AND INTACT. CPAP AT HS. DENIES ANY PAIN. ALL NEEDS ATTENDED AND WILL CONTINUE TO MONITOR. CALL LIGHT WITHIN REACH.  CPAP IN USED applicable  - Encourage broncho-pulmonary hygiene including cough, deep breathe, Incentive Spirometry  - Assess the need for suctioning and perform as needed  - Assess and instruct to report SOB or any respiratory difficulty  - Respiratory Therapy support as upright as possible. Up to chair preferred. Keep upright for 20-30 minutes following po  Liquids by cup only. Single sips. No straws.   Slow rate  Medications with liquids  Discontinue feeding and notify MD or speech pathologist if patient begins to

## 2020-07-22 NOTE — PLAN OF CARE
Pt is alert x 4, on Ra, Afib on the monitor, rate uncontrolled pt HR can reach the low 170's, Cardizem  Gtt @ 20ml . PT denies any cardiovascular symptoms at this time. Pt is up with SBA, incontinent of B/B. All needs met and will continue to monitor. perform as needed  - Assess and instruct to report SOB or any respiratory difficulty  - Respiratory Therapy support as indicated  - Manage/alleviate anxiety  - Monitor for signs/symptoms of CO2 retention  Outcome: Progressing

## 2020-07-22 NOTE — CONSULTS
[address] [facility]  [id number]  Hospital Day #6  Date of Consult: 07/23/20  Patient seen at: [facility]    Reason for Consultation:      Consult requested by Merrill MENDOZA for evaluation Surgical History:   Procedure Laterality Date   • BRAIN SURGERY      FOR ANEURYSM   • BYPASS SURGERY  10/24/2017    cabg x 3   •      • CABG  10/2017   • CHOLECYSTECTOMY     • ESOPHAGOGASTRODUODENOSCOPY (EGD) N/A 4/10/2020    Performed by MG/5ML injection 5 mg, 5 mg, Intravenous, Q6H  methimazole (TAPAZOLE) tab 5 mg, 5 mg, Oral, BID  metoprolol Tartrate (LOPRESSOR) 5 MG/5ML injection 5 mg, 5 mg, Intravenous, Q4H PRN  bisacodyl (DULCOLAX) rectal suppository 10 mg, 10 mg, Rectal, Daily PRN  d This does not necessarily mean that this is an acute change and this slight increase in size   may have happened any time in this interval.  Direct comparison with a gated thoracic CT of the chest would be recommended for further evaluation of the thoracic I entered the room Jennifer Kohler was up in the chair. I met with Jennifer Kohler, her  Eric Oakes in person and daughter Norma Salazar by phone. I introduced palliative care and reason for consultation. I discussed the benefits of palliative care to include assistance with mckenna We discussed the risks vs benefits of life sustaining treatments in the setting of advanced age and co-morbities. Brigitte Pedro initially stated that she would not want to be resuscitated but her  and daughter shared that they would \"want to try\".  We disc ml/min (HCC)     Oliguria     ATN (acute tubular necrosis) (HCC)     Transaminitis     Urinary tract infection     Intestinal obstruction (HCC)     Stage 3 chronic kidney disease (HCC)     Dissection of thoracic aorta (HCC)     Respiratory insufficiency for allowing the Palliative Care Team to participate in the care of your patient. Above plan reviewed with the patient's nurse. Epic message sent to Reyna Essex PA and Dr. Latesha Mcdonnell.     As goals of care have been established, Palliative Care will sign

## 2020-07-23 LAB
BASOPHILS # BLD AUTO: 0.01 X10(3) UL (ref 0–0.2)
BASOPHILS NFR BLD AUTO: 0.2 %
DEPRECATED RDW RBC AUTO: 52 FL (ref 35.1–46.3)
EOSINOPHIL # BLD AUTO: 0.13 X10(3) UL (ref 0–0.7)
EOSINOPHIL NFR BLD AUTO: 2.4 %
ERYTHROCYTE [DISTWIDTH] IN BLOOD BY AUTOMATED COUNT: 15.6 % (ref 11–15)
HCT VFR BLD AUTO: 30.3 % (ref 35–48)
HGB BLD-MCNC: 9.3 G/DL (ref 12–16)
IMM GRANULOCYTES # BLD AUTO: 0.02 X10(3) UL (ref 0–1)
IMM GRANULOCYTES NFR BLD: 0.4 %
LYMPHOCYTES # BLD AUTO: 1.33 X10(3) UL (ref 1–4)
LYMPHOCYTES NFR BLD AUTO: 24.6 %
MCH RBC QN AUTO: 27.9 PG (ref 26–34)
MCHC RBC AUTO-ENTMCNC: 30.7 G/DL (ref 31–37)
MCV RBC AUTO: 91 FL (ref 80–100)
MONOCYTES # BLD AUTO: 0.5 X10(3) UL (ref 0.1–1)
MONOCYTES NFR BLD AUTO: 9.3 %
NEUTROPHILS # BLD AUTO: 3.41 X10 (3) UL (ref 1.5–7.7)
NEUTROPHILS # BLD AUTO: 3.41 X10(3) UL (ref 1.5–7.7)
NEUTROPHILS NFR BLD AUTO: 63.1 %
PLATELET # BLD AUTO: 66 10(3)UL (ref 150–450)
RBC # BLD AUTO: 3.33 X10(6)UL (ref 3.8–5.3)
WBC # BLD AUTO: 5.4 X10(3) UL (ref 4–11)

## 2020-07-23 PROCEDURE — 99233 SBSQ HOSP IP/OBS HIGH 50: CPT | Performed by: HOSPITALIST

## 2020-07-23 PROCEDURE — 99222 1ST HOSP IP/OBS MODERATE 55: CPT | Performed by: CLINICAL NURSE SPECIALIST

## 2020-07-23 PROCEDURE — 99233 SBSQ HOSP IP/OBS HIGH 50: CPT | Performed by: INTERNAL MEDICINE

## 2020-07-23 RX ORDER — DIGOXIN 0.25 MG/ML
250 INJECTION INTRAMUSCULAR; INTRAVENOUS ONCE
Status: COMPLETED | OUTPATIENT
Start: 2020-07-23 | End: 2020-07-23

## 2020-07-23 NOTE — RESPIRATORY THERAPY NOTE
EITAN : EQUIPMENT USE: DAILY SUMMARY                                            SET MODE: CPAP                                          USAGE IN HOURS:8:42                                          90% EXP. PRESSURE(EP

## 2020-07-23 NOTE — PROGRESS NOTES
[facility]  Progress Note     Sandi Mathew Patient Status:  Inpatient    1950 MRN [id number]   [facility] 2NE-A Attending Tom Reese MD   Hosp Day # 6 PCP Donnie Snowden MD     Chief Complaint: fatigue    S: Patient awake --  59*   CA 10.6*   < > 9.9  --  9.9  --  10.0   ALB 3.4  --   --   --   --   --  2.8*      < > 149*  --  150*  --  148*   K 3.7   < > 3.7   < > 3.2* 3.7 3.7      < > 122*  --  125*  --  124*   CO2 21.0   < > 17.0*  --  20.0*  --  19.0*   ALK on egd here  11. HfPEF, Chronic, compensated  12. CAD, prior CABG  13. Aortic dissection type 1, increasing w/ adjacent hematoma  1. Previously seen by Dr. Tobias Lemos and no surgical intervention  2. CT Sx evaluation   3. CT chest reviewed  14.  Demand ischemia- B

## 2020-07-23 NOTE — PLAN OF CARE
Pt A&O x 4, SPO2 98% on RA, up to chair with minimal assist with walker. A Fib on tele, HR 90's to 130's, maintained on Diltiazem infusion @ 20 mg/hr, scheduled metoprolol IVP. Right PICC line intact and flushed, R arm precautions.  Palliative care to meet to report SOB or any respiratory difficulty  - Respiratory Therapy support as indicated  - Manage/alleviate anxiety  - Monitor for signs/symptoms of CO2 retention  Outcome: Progressing     Problem: METABOLIC/FLUID AND ELECTROLYTES - ADULT  Goal: Electrolyt Promote increasing activity/tolerance for mobility and gait  - Educate and engage patient/family in tolerated activity level and precautions  - Ambulate with staff and with RW, up in chair for meals, perform LE exercises   Outcome: Progressing

## 2020-07-23 NOTE — PLAN OF CARE
Assumed care at [address]. Pt is A&Ox4, up with SB and a walker. Denies having pain. O2 sats WNL on RA. CPAP at night. Lung sounds clear but diminished bilaterally. Pt is afib on tele, rates uncontrolled. Cardizem gtt running at 20.  PRN and scheduled IV lopress needed  - Assess and instruct to report SOB or any respiratory difficulty  - Respiratory Therapy support as indicated  - Manage/alleviate anxiety  - Monitor for signs/symptoms of CO2 retention  Outcome: Progressing     Problem: METABOLIC/FLUID AND ELECTROL ability and stability  - Promote increasing activity/tolerance for mobility and gait  - Educate and engage patient/family in tolerated activity level and precautions  - Ambulate with staff and with RW, up in chair for meals, perform LE exercises   Outcome:

## 2020-07-23 NOTE — PROGRESS NOTES
[facility]  Cardiology Progress Note    Subjective:  No chest pain or shortness of breath. Getting washed up.      Objective:  /84 (BP Location: Left arm)   Pulse (!) 153   Temp 98.4 °F (36.9 °C) (Oral)   Resp 18   Ht 5' 3\" (1.6 m)   Wt 122 lb 2 Dr. Lane Mcneil evaluation today. EP to consider ablate and pace    I have seen and examined the patient with the APN and I agree with the assessment and plan. Frank Locke.  Kelly Mahan MD

## 2020-07-23 NOTE — CM/SW NOTE
June 11, 2020       Kody Garza MD  [address] [facility] [address]  VIA In Basket      Patient: Akil Rodriguez   YOB: 1942   Date of Visit: 6/11/2020       Dear Dr. Garza:    Thank you for referring Akil Rodriguez to me for evaluation. Below are my notes for this visit with him.    If you have questions, please do not hesitate to call me. I look forward to following your patient along with you.      Sincerely,        Mario Leon MD        CC: No Recipients  Mario Leon MD  6/11/2020 10:59 AM  Sign when Signing Visit  Cardiology Office Note: Mario Leon MD    Due to COVID-19 ACTION PLAN, the patient's office visit was converted to a phone visit. The patient verbalized understanding and agreed to phone visit.       It has been 3 months since the patient's last in-office visit.    The patient is a poor historian and most history is obtained from the medical chart.  He was treated at [facility] from May 31 through Kena 3, 2020 for 3 weeks of chest pain.  Per [facility] records, it was difficult to obtain details of the chest pain.  For the most part, he actually denied having chest pain while inpatient.  He was noted to have symptomatic bradycardia and his beta-blocker was discontinued.  Serial troponins were normal.  He was evaluated by cardiology and the decision was made to continue medical management of his CAD.  He then went to [facility] because of facial and lip swelling.  He was discharged after observation, but then went to see PCP on June 9, 2020 as a walk-in.  From there, was sent to [facility] for further evaluation for possible anaphylactic reaction.  He was discharged the same day.  He currently denies any symptoms of chest pain or any cardiovascular complaints.  Facial swelling is gradually improving.  He does not remember what medication causes allergy.  He does not remember any names of his current medications, but  SW received referral for palliativecare reports that his daughter helps him with his medications.  Of note, on last internal medicine office note, daughter reported a decline on her father's cognition.    The following records were reviewed: Discharge summary from [facility], internal medicine office note, [facility] medical records from June 2020.    Problem   Palpitations   Cad in Native Artery    -November 18, 2019: Cath showed severe three-vessel CAD.  Not suitable for CABG    -November 20, 2019: PCI of proximal to mid RCA with rotational atherectomy and 2 overlapping drug-eluting stents (2.25×32 postdilated with a 2.5 balloon and 3.0 x 32 mm Synergy drug-eluting stents).  Required temporary pacemaker and intra-aortic balloon placement during this procedure.    -December 20, 2019: PCI of proximal to mid LAD with a 3.0 x 32 mm Synergy drug-eluting stent postdilated with a 3.5 mm balloon.  Balloon angioplasty of major diagonal.  Residual severe CAD of entire circumflex, distal RCA, and distal apical LAD.  Circumflex has sequential, severe, 95 to 99% lesions.     Dizziness    -April 15, 2020: 30-day event monitor showed sinus rhythm.  Symptoms of palpitations, dizziness, chest pain, and rapid heart rate correlated with sinus rhythm, sinus rhythm with PACs and an atrial couplet and isolated PVCs.       The following testing was reviewed during this phone visit: Echo report November 2019, CBC from [facility] June 9, 2020/BERYL    Medication and allergy reconciliation completed over the phone.     The following medications were refilled: None required     There were no vitals filed for this visit.    The following problems were addressed during today's call:  Problem List Items Addressed This Visit        Circulatory    Essential hypertension    Palpitations     Event monitor showed that symptoms correlated with PACs, PVCs and sinus rhythm.  Beta-blocker recently discontinued at another hospital because of symptomatic  bradycardia.  Will have office staff call his daughter for update medication reconciliation.  He currently denies any palpitations.            Endocrine    Dyslipidemia       Other    Dizziness    CAD in native artery - Primary     The patient's metoprolol was discontinued at [facility] because of symptomatic bradycardia.  He denies any angina recurrence at this time, but history is limited because of dementia.  He was instructed to have his daughter on next visit.  Office staff will also reach out to his daughter.  His CAD will be best managed medically.               Recommendations:  1.  Continue current cardiac medications  2.  Office staff will call daughter for medication reconciliation and to make sure she is present on next appointment.    The patient was advised to call if they experience any new or worsening symptoms.  The patient was educated on coronavirus exposure prevention.      Time spent talking with patient during today's call: 30 minutes    Return in about 4 weeks (around 7/9/2020).      Mario Leon MD

## 2020-07-23 NOTE — PALLIATIVE CARE NOTE
Meeting with spouse (in person) and daughter (by phone) planned for 1030 this morning.      CRUZITO Morales  Palliative Care   Phone: [phone]     7/23/2020  8:20 AM

## 2020-07-23 NOTE — CONSULTS
[facility]    Report of Consultation    Genaro Sesay Patient Status:  Inpatient    1950 MRN [id number]   [facility] 2NE-A Attending Deejay Acosta MD   [facility] Day # 6 PCP Melissa Garcia MD     Date of Admission:  2020  Date SURGERY  1998    FOR ANEURYSM   • BYPASS SURGERY  10/24/2017    cabg x 3   •      • CABG  10/2017   • CHOLECYSTECTOMY     • ESOPHAGOGASTRODUODENOSCOPY (EGD) N/A 4/10/2020    Performed by Edith Hensley MD at [place] [address] UNIT/ML injection 5,000 Units, 5,000 Units, Subcutaneous, Q8H SANG  ondansetron HCl (ZOFRAN) injection 4 mg, 4 mg, Intravenous, Q6H PRN  Metoclopramide HCl (REGLAN) injection 5 mg, 5 mg, Intravenous, Q8H PRN      methimazole 5 MG Oral Tab, Take 1 tablet wit Hemodynamic parameters (last 24 hours):      Scheduled Meds:   • pantoprazole (PROTONIX) IV push  40 mg Intravenous Q12H   • hydrALAzine HCl  10 mg Intravenous Q4H   • metoprolol Tartrate  5 mg Intravenous Q6H   • methimazole  5 mg Oral BID   • dilTIAZ Impression: This is a pleasant 78 yo female with multiple medical issues and a known type A dissection. She had a non contrast CT scan this admission.   It is difficult to assess the aorta without IV contrast but it appears to be very close to

## 2020-07-23 NOTE — CM/SW NOTE
Acknowledged order for Residential Palliative Care at discharge. TRISTAN paged liaison at [phone] to make them aware of the new referral. Will ej order complete.  &  to remain available and supportive for discharge planning needs.

## 2020-07-24 LAB
ANION GAP SERPL CALC-SCNC: 5 MMOL/L (ref 0–18)
BUN BLD-MCNC: 11 MG/DL (ref 7–18)
BUN/CREAT SERPL: 12.9 (ref 10–20)
CALCIUM BLD-MCNC: 9.6 MG/DL (ref 8.5–10.1)
CHLORIDE SERPL-SCNC: 118 MMOL/L (ref 98–112)
CO2 SERPL-SCNC: 21 MMOL/L (ref 21–32)
CREAT BLD-MCNC: 0.85 MG/DL (ref 0.55–1.02)
DEPRECATED RDW RBC AUTO: 49.6 FL (ref 35.1–46.3)
ERYTHROCYTE [DISTWIDTH] IN BLOOD BY AUTOMATED COUNT: 15.3 % (ref 11–15)
GLUCOSE BLD-MCNC: 101 MG/DL (ref 70–99)
HCT VFR BLD AUTO: 30.6 % (ref 35–48)
HGB BLD-MCNC: 9.6 G/DL (ref 12–16)
MCH RBC QN AUTO: 27.9 PG (ref 26–34)
MCHC RBC AUTO-ENTMCNC: 31.4 G/DL (ref 31–37)
MCV RBC AUTO: 89 FL (ref 80–100)
OSMOLALITY SERPL CALC.SUM OF ELEC: 298 MOSM/KG (ref 275–295)
PLATELET # BLD AUTO: 62 10(3)UL (ref 150–450)
POTASSIUM SERPL-SCNC: 3.4 MMOL/L (ref 3.5–5.1)
RBC # BLD AUTO: 3.44 X10(6)UL (ref 3.8–5.3)
SODIUM SERPL-SCNC: 144 MMOL/L (ref 136–145)
WBC # BLD AUTO: 5.2 X10(3) UL (ref 4–11)

## 2020-07-24 PROCEDURE — 99233 SBSQ HOSP IP/OBS HIGH 50: CPT | Performed by: INTERNAL MEDICINE

## 2020-07-24 PROCEDURE — 99233 SBSQ HOSP IP/OBS HIGH 50: CPT | Performed by: HOSPITALIST

## 2020-07-24 RX ORDER — POTASSIUM CHLORIDE 20 MEQ/1
40 TABLET, EXTENDED RELEASE ORAL EVERY 4 HOURS
Status: COMPLETED | OUTPATIENT
Start: 2020-07-24 | End: 2020-07-24

## 2020-07-24 RX ORDER — METOPROLOL TARTRATE 50 MG/1
50 TABLET, FILM COATED ORAL
Status: DISCONTINUED | OUTPATIENT
Start: 2020-07-24 | End: 2020-07-25

## 2020-07-24 RX ORDER — DILTIAZEM HYDROCHLORIDE 60 MG/1
60 TABLET, FILM COATED ORAL EVERY 6 HOURS SCHEDULED
Status: DISCONTINUED | OUTPATIENT
Start: 2020-07-24 | End: 2020-07-25

## 2020-07-24 NOTE — PLAN OF CARE
Pt A&O x 4, forgetful, SPO2 96% on RA, up with moderate assist with walker. A Fib on tele, 's to 130's at rest, 150's to 170's with ambulation. Maintained on Cardizem infusion @ 20 mg/hr, was getting metoprolol IVP.  Started on PO Metoprolol and Cardi at baseline  Description  INTERVENTIONS:  - Continuous cardiac monitoring, monitor vital signs, obtain 12 lead EKG if indicated  - Evaluate effectiveness of antiarrhythmic and heart rate control medications as ordered  - Initiate emergency measures for lif Hemodynamic stability and optimal renal function maintained  Description  INTERVENTIONS:  - Monitor labs and assess for signs and symptoms of volume excess or deficit  - Monitor intake, output and patient weight  - Monitor urine specific gravity, serum osm

## 2020-07-24 NOTE — RESPIRATORY THERAPY NOTE
EITAN Equipment Usage Summary :            Set Mode : CPAP W FLEX          Usage in Hours:6;12          90% Pressure (EPAP) : 5           90% Insp Pressure (IPAP);           AHI : 6.5          Supplemental Oxygen LPM :

## 2020-07-24 NOTE — PLAN OF CARE
Assumed care at [address]. Pt is A&Ox4, up with 1x and a walker. Denies having pain. O2 sats WNL on RA. CPAP at night. Lung sounds clear but diminished bilaterally. Pt is afib on tele, rates uncontrolled. Cardizem gtt running at 20.  PRN and scheduled IV lopress Spirometry  - Assess the need for suctioning and perform as needed  - Assess and instruct to report SOB or any respiratory difficulty  - Respiratory Therapy support as indicated  - Manage/alleviate anxiety  - Monitor for signs/symptoms of CO2 retention  Tyrel Turner mobility/gait  Description  Interventions:  - Assess patient's functional ability and stability  - Promote increasing activity/tolerance for mobility and gait  - Educate and engage patient/family in tolerated activity level and precautions  - Ambulate with

## 2020-07-24 NOTE — HOME CARE LIAISON
Received referral from [address] [place] [address]. Met with patient at the bedside to discuss home health services and offer choice. Patient is agreeable to [facility] services at discharge. Brochure and contact information provided. Any questions addressed. Will follow.

## 2020-07-24 NOTE — PROGRESS NOTES
[facility]  Cardiology Progress Note    Steve Rueda Patient Status:  Inpatient    1950 MRN [id number]   [facility] 2NE-A Attending Stacey Jc MD   Hosp Day # 7 PCP Michelle Ashley MD     Subjective:  Nausea has resolved.  Ab most likely secondary to elevated rates  · Hx of duodenal ulcer    Plan:   1.  Will change IV meds to oral as patient is now eating and able to take pills  2. EP to see today, considering AV node ablation and PPM.       ALYCIA Peace  7/24/2020  7:47

## 2020-07-24 NOTE — PROGRESS NOTES
[facility]  Progress Note     Luciano Morales Patient Status:  Inpatient    1950 MRN [id number]   [facility] 2NE-A Attending Jordan Baker MD   Hosp Day # 7 PCP Bebe Nava MD     Chief Complaint: fatigue     S: Patient awak GFRNAA 20*   < > 43*  --  54*  --  59*   CA 10.6*   < > 9.9  --  9.9  --  10.0   ALB 3.4  --   --   --   --   --  2.8*      < > 149*  --  150*  --  148*   K 3.7   < > 3.7   < > 3.2* 3.7 3.7      < > 122*  --  125*  --  124*   CO2 21.0   < > 1 9. Hypernatremia- recheck level   10. Prior duodenal ulcer in March, not noted on egd here  11. HfPEF, Chronic, compensated  12. CAD, prior CABG  13. Aortic dissection type 1, increasing w/ adjacent hematoma  1.  Previously seen by Dr. Reji Salter and no surgical

## 2020-07-24 NOTE — PHYSICAL THERAPY NOTE
PHYSICAL THERAPY TREATMENT NOTE - INPATIENT    Room Number: 2607/2607-A     Session: 1   Number of Visits to Meet Established Goals: 5    Presenting Problem: nausea, vomiting, poor appetite    Problem List  Principal Problem:    Weakness generalized  Acti • ESOPHAGOGASTRODUODENOSCOPY (EGD) N/A 2/28/2020    [facility] 10/24/2017    [facility] [address] [facility] 10/24/2017    Performed Assistance: Not tested       Skilled Therapy Provided: patient supine in bed at start of session, patient agreeable to therapy, per RN cleared for therapy session. Therapist steve mask and gloves during session. Patient denies pain.  Patient requesting to us Patient is able to ambulate 150 feet with assist device: walker - rolling at assistance level: min a of 1      Goal #4 Patient will negotiate a flight of stairs with supervision.    Goal #5     Goal #6     Goal Comments: Goals modified on 7/22/2020, ongoing

## 2020-07-25 LAB
DEPRECATED RDW RBC AUTO: 50.1 FL (ref 35.1–46.3)
ERYTHROCYTE [DISTWIDTH] IN BLOOD BY AUTOMATED COUNT: 15.5 % (ref 11–15)
HCT VFR BLD AUTO: 30.8 % (ref 35–48)
HEPARIN INDUCED PLT ANTIBODY: NEGATIVE
HGB BLD-MCNC: 9.6 G/DL (ref 12–16)
MCH RBC QN AUTO: 27.9 PG (ref 26–34)
MCHC RBC AUTO-ENTMCNC: 31.2 G/DL (ref 31–37)
MCV RBC AUTO: 89.5 FL (ref 80–100)
PLATELET # BLD AUTO: 86 10(3)UL (ref 150–450)
POTASSIUM SERPL-SCNC: 3.7 MMOL/L (ref 3.5–5.1)
RBC # BLD AUTO: 3.44 X10(6)UL (ref 3.8–5.3)
WBC # BLD AUTO: 6.6 X10(3) UL (ref 4–11)

## 2020-07-25 PROCEDURE — 99232 SBSQ HOSP IP/OBS MODERATE 35: CPT | Performed by: NURSE PRACTITIONER

## 2020-07-25 PROCEDURE — 99233 SBSQ HOSP IP/OBS HIGH 50: CPT | Performed by: HOSPITALIST

## 2020-07-25 RX ORDER — METOPROLOL TARTRATE 50 MG/1
50 TABLET, FILM COATED ORAL
Status: DISCONTINUED | OUTPATIENT
Start: 2020-07-25 | End: 2020-07-29

## 2020-07-25 RX ORDER — POTASSIUM CHLORIDE 20 MEQ/1
40 TABLET, EXTENDED RELEASE ORAL ONCE
Status: COMPLETED | OUTPATIENT
Start: 2020-07-25 | End: 2020-07-25

## 2020-07-25 NOTE — RESPIRATORY THERAPY NOTE
EITAN - Equipment Use Daily Summary:  · Set Mode CPAP  · Usage in hours: 9:35  · 90% Pressure (EPAP) level: 5.0  · 90% Insp Pressure (IPAP):   · AHI: 12.1  · Supplemental Oxygen:   · Comments:

## 2020-07-25 NOTE — PLAN OF CARE
A  fib rate  90s'- 110's w/ cardizem drip infusing 10 mg/ hour  Deny pain ;dyspnea is less per patient    spo2 mid-hi 90's  on room air  Uses cpap when sleeping  Hemog stable  Potassium replacement per lytes protocol  Aspiration prec maintained  tolerat indicated  - Manage/alleviate anxiety  - Monitor for signs/symptoms of CO2 retention  Outcome: Progressing     Problem: CARDIOVASCULAR - ADULT  Goal: Absence of cardiac arrhythmias or at baseline  Description  INTERVENTIONS:  - Continuous cardiac monitorin

## 2020-07-25 NOTE — PLAN OF CARE
Received patient, alert and oriented. Denied any pain. Denied SOB. Assisted to bed. Discussed POC. Due meds given. Aspiration precaution maintained. Safety measures reinforced, call light within reach. Bed alarm on. Needs attended to.  Will continue to [facility] oxygenation  Description  INTERVENTIONS:  - Assess for changes in respiratory status  - Assess for changes in mentation and behavior  - Position to facilitate oxygenation and minimize respiratory effort  - Oxygen supplementation based on oxygen saturation upright for 20-30 minutes following po  Liquids by cup only. Single sips. No straws.   Slow rate  Medications with liquids  Discontinue feeding and notify MD or speech pathologist if patient begins to cough, persistently clear throat, or exhibits a wet or

## 2020-07-25 NOTE — PROGRESS NOTES
[facility]  Progress Note     Zacarias Lion Patient Status:  Inpatient    1950 MRN [id number]   [facility] 2NE-A Attending Mamie Jasso MD   Hosp Day # 8 PCP Otto Butt MD     Chief Complaint: fatigue     S: Patient awak --  78  --   --    AST  --   --  14*  --   --    ALT  --   --  20  --   --    BILT  --   --  0.6  --   --    TP  --   --  6.6  --   --     < > = values in this interval not displayed.        Estimated Creatinine Clearance: 52.4 mL/min (based on SCr of 0.85 protocol  17.  Vascular dementia- OP neuropsych eval in 2-3 weeks     Quality:  · DVT Prophylaxis: SCDs  · CODE status: Full- palliative consulted    Will the patient be referred to TCC on discharge?: yes    Neena Phan MD

## 2020-07-25 NOTE — PROGRESS NOTES
[facility]  Cardiology Progress Note    Jeremias Piña Patient Status:  Inpatient    1950 MRN [id number]   [facility] 2NE-A Attending Conrad Fagan MD   Hosp Day # 8 PCP Jona Bellamy MD     Subjective:  Sleeping.      Objective aortic dissection. On IV cardizem at 15 mg/hr.    · Chronic type A aortic dissection - plans for medical management per surgery  · CAD, s/p CABG, normal LVEF 50%  · Moderate TR, PASP 60 mmHg  · Nausea, diarrhea - resolved  · Hypothyroidism - on methimazole

## 2020-07-26 LAB
DEPRECATED RDW RBC AUTO: 50.2 FL (ref 35.1–46.3)
ERYTHROCYTE [DISTWIDTH] IN BLOOD BY AUTOMATED COUNT: 15.4 % (ref 11–15)
HCT VFR BLD AUTO: 29.5 % (ref 35–48)
HGB BLD-MCNC: 9.1 G/DL (ref 12–16)
MCH RBC QN AUTO: 27.7 PG (ref 26–34)
MCHC RBC AUTO-ENTMCNC: 30.8 G/DL (ref 31–37)
MCV RBC AUTO: 89.9 FL (ref 80–100)
PLATELET # BLD AUTO: 86 10(3)UL (ref 150–450)
POTASSIUM SERPL-SCNC: 4.4 MMOL/L (ref 3.5–5.1)
RBC # BLD AUTO: 3.28 X10(6)UL (ref 3.8–5.3)
WBC # BLD AUTO: 8.6 X10(3) UL (ref 4–11)

## 2020-07-26 PROCEDURE — 99232 SBSQ HOSP IP/OBS MODERATE 35: CPT | Performed by: HOSPITALIST

## 2020-07-26 PROCEDURE — 99233 SBSQ HOSP IP/OBS HIGH 50: CPT | Performed by: INTERNAL MEDICINE

## 2020-07-26 RX ORDER — CEFAZOLIN SODIUM/WATER 2 G/20 ML
2 SYRINGE (ML) INTRAVENOUS
Status: DISCONTINUED | OUTPATIENT
Start: 2020-07-27 | End: 2020-07-27 | Stop reason: HOSPADM

## 2020-07-26 RX ORDER — SODIUM CHLORIDE 9 MG/ML
INJECTION, SOLUTION INTRAVENOUS
Status: DISCONTINUED | OUTPATIENT
Start: 2020-07-27 | End: 2020-07-27 | Stop reason: HOSPADM

## 2020-07-26 RX ORDER — ACETAMINOPHEN 500 MG
500 TABLET ORAL EVERY 6 HOURS PRN
Status: DISCONTINUED | OUTPATIENT
Start: 2020-07-26 | End: 2020-07-29

## 2020-07-26 RX ORDER — CHLORHEXIDINE GLUCONATE 4 G/100ML
30 SOLUTION TOPICAL
Status: COMPLETED | OUTPATIENT
Start: 2020-07-27 | End: 2020-07-27

## 2020-07-26 RX ORDER — ACETAMINOPHEN 500 MG
1000 TABLET ORAL EVERY 6 HOURS PRN
Status: DISCONTINUED | OUTPATIENT
Start: 2020-07-26 | End: 2020-07-29

## 2020-07-26 NOTE — PLAN OF CARE
Patient alert and oriented x4. Blood pressure 150s, HR ranging 80-100s on Cardizem gtt, infusing at 5 cc/hr. Patient reports mild right leg pain improved with cold pack. Lung sounds diminished bilaterally, saturating at 96% on room air.  Patient was able to threatening arrhythmias  - Monitor electrolytes and administer replacement therapy as ordered  Outcome: Progressing     Problem: RESPIRATORY - ADULT  Goal: Achieves optimal ventilation and oxygenation  Description  INTERVENTIONS:  - Assess for changes in r Progressing     Problem: Impaired Swallowing  Goal: Minimize aspiration risk  Description  Interventions:  Supervision with meals  Position as upright as possible. Up to chair preferred. Keep upright for 20-30 minutes following po  Liquids by cup only.

## 2020-07-26 NOTE — RESPIRATORY THERAPY NOTE
EITAN - Equipment Use Daily Summary:  · Set Mode CPAP  · Usage in hours: 7:35  · 90% Pressure (EPAP) level: 5.0  · 90% Insp Pressure (IPAP):   · AHI: 15.3  · Supplemental Oxygen:   · Comments:

## 2020-07-26 NOTE — PLAN OF CARE
Problem: Patient/Family Goals  Goal: Patient/Family Long Term Goal  Description  Patient's Long Term Goal: Able to move around better    Interventions:  - Physical therapy  - medications  - See additional Care Plan goals for specific interventions   Outc if applicable  - Encourage broncho-pulmonary hygiene including cough, deep breathe, Incentive Spirometry  - Assess the need for suctioning and perform as needed  - Assess and instruct to report SOB or any respiratory difficulty  - Respiratory Therapy suppo Progressing     Problem: Impaired Functional Mobility  Goal: Achieve highest/safest level of mobility/gait  Description  Interventions:  - Assess patient's functional ability and stability  - Promote increasing activity/tolerance for mobility and gait  - E

## 2020-07-26 NOTE — PROGRESS NOTES
[facility]  Progress Note     Maged Homar Patient Status:  Inpatient    1950 MRN [id number]   [facility] 2NE-A Attending Nallely Marvin MD   [facility] Day # 9 PCP Tori Rowland MD     Chief Complaint: fatigue     S: no overnight --    ALT  --   --  20  --   --   --    BILT  --   --  0.6  --   --   --    TP  --   --  6.6  --   --   --     < > = values in this interval not displayed. Estimated Creatinine Clearance: 52.4 mL/min (based on SCr of 0.85 mg/dL).     No results for i protocol  17.  Vascular dementia- OP neuropsych eval in 2-3 weeks     Quality:  · DVT Prophylaxis: SCDs  · CODE status: Full- palliative consulted    Will the patient be referred to TCC on discharge?: yes    Markie Vega MD

## 2020-07-26 NOTE — PROGRESS NOTES
[facility]  Cardiology Progress Note    [facility] Patient Status:  Inpatient    1950 MRN [id number]   [facility] 2NE-A Attending Mohamud Weinberg MD   [facility] Day # 9 PCP Sherman Ramirez MD     Subjective:  Complains of right thig Brock Guzman  · Hypothyroidism - on methimazole  · HTN  · Hyperlipidemia  · COPD  · Elevated troponin, most likely secondary to elevated rates  · Hx of duodenal ulcer    Plan:   1.  Dr. Audra Banks talked to the  yesterday and tentative plan for tomorrow is

## 2020-07-27 ENCOUNTER — APPOINTMENT (OUTPATIENT)
Dept: GENERAL RADIOLOGY | Facility: HOSPITAL | Age: 70
DRG: 981 | End: 2020-07-27
Attending: HOSPITALIST
Payer: MEDICARE

## 2020-07-27 ENCOUNTER — APPOINTMENT (OUTPATIENT)
Dept: INTERVENTIONAL RADIOLOGY/VASCULAR | Facility: HOSPITAL | Age: 70
DRG: 981 | End: 2020-07-27
Attending: INTERNAL MEDICINE
Payer: MEDICARE

## 2020-07-27 LAB
ALBUMIN SERPL-MCNC: 2.7 G/DL (ref 3.4–5)
ALBUMIN/GLOB SERPL: 0.7 {RATIO} (ref 1–2)
ALLENS TEST: POSITIVE
ALP LIVER SERPL-CCNC: 76 U/L (ref 55–142)
ALT SERPL-CCNC: 17 U/L (ref 13–56)
ANION GAP SERPL CALC-SCNC: 4 MMOL/L (ref 0–18)
ARTERIAL BLD GAS O2 SATURATION: 92 % (ref 92–100)
ARTERIAL BLOOD GAS BASE EXCESS: -3.7 MMOL/L (ref ?–2)
ARTERIAL BLOOD GAS HCO3: 20.7 MEQ/L (ref 22–26)
ARTERIAL BLOOD GAS PCO2: 35 MM HG (ref 35–45)
ARTERIAL BLOOD GAS PH: 7.39 (ref 7.35–7.45)
ARTERIAL BLOOD GAS PO2: 67 MM HG (ref 80–105)
AST SERPL-CCNC: 24 U/L (ref 15–37)
ATRIAL RATE: 163 BPM
BASOPHILS # BLD AUTO: 0.01 X10(3) UL (ref 0–0.2)
BASOPHILS NFR BLD AUTO: 0.2 %
BILIRUB SERPL-MCNC: 0.5 MG/DL (ref 0.1–2)
BUN BLD-MCNC: 11 MG/DL (ref 7–18)
BUN/CREAT SERPL: 10.8 (ref 10–20)
CALCIUM BLD-MCNC: 9.6 MG/DL (ref 8.5–10.1)
CALCULATED O2 SATURATION: 93 % (ref 92–100)
CARBOXYHEMOGLOBIN: 1.2 % SAT (ref 0–3)
CHLORIDE SERPL-SCNC: 112 MMOL/L (ref 98–112)
CO2 SERPL-SCNC: 25 MMOL/L (ref 21–32)
CPAP: 5 CM H2O
CREAT BLD-MCNC: 1.02 MG/DL (ref 0.55–1.02)
DEPRECATED RDW RBC AUTO: 48.9 FL (ref 35.1–46.3)
DEPRECATED RDW RBC AUTO: 50 FL (ref 35.1–46.3)
EOSINOPHIL # BLD AUTO: 0.13 X10(3) UL (ref 0–0.7)
EOSINOPHIL NFR BLD AUTO: 2.5 %
ERYTHROCYTE [DISTWIDTH] IN BLOOD BY AUTOMATED COUNT: 15.2 % (ref 11–15)
ERYTHROCYTE [DISTWIDTH] IN BLOOD BY AUTOMATED COUNT: 15.5 % (ref 11–15)
GLOBULIN PLAS-MCNC: 4 G/DL (ref 2.8–4.4)
GLUCOSE BLD-MCNC: 121 MG/DL (ref 70–99)
HCT VFR BLD AUTO: 25.1 % (ref 35–48)
HCT VFR BLD AUTO: 30.2 % (ref 35–48)
HGB BLD-MCNC: 7.9 G/DL (ref 12–16)
HGB BLD-MCNC: 9.5 G/DL (ref 12–16)
IMM GRANULOCYTES # BLD AUTO: 0.02 X10(3) UL (ref 0–1)
IMM GRANULOCYTES NFR BLD: 0.4 %
IONIZED CALCIUM: 1.4 MMOL/L (ref 1.12–1.32)
LACTIC ACID ARTERIAL: <1.6 MMOL/L (ref 0.5–2)
LYMPHOCYTES # BLD AUTO: 1.94 X10(3) UL (ref 1–4)
LYMPHOCYTES NFR BLD AUTO: 37.3 %
M PROTEIN MFR SERPL ELPH: 6.7 G/DL (ref 6.4–8.2)
MCH RBC QN AUTO: 27.9 PG (ref 26–34)
MCH RBC QN AUTO: 28.2 PG (ref 26–34)
MCHC RBC AUTO-ENTMCNC: 31.5 G/DL (ref 31–37)
MCHC RBC AUTO-ENTMCNC: 31.5 G/DL (ref 31–37)
MCV RBC AUTO: 88.8 FL (ref 80–100)
MCV RBC AUTO: 89.6 FL (ref 80–100)
METHEMOGLOBIN: 0.6 % SAT (ref 0.4–1.5)
MONOCYTES # BLD AUTO: 0.49 X10(3) UL (ref 0.1–1)
MONOCYTES NFR BLD AUTO: 9.4 %
NEUTROPHILS # BLD AUTO: 2.61 X10 (3) UL (ref 1.5–7.7)
NEUTROPHILS # BLD AUTO: 2.61 X10(3) UL (ref 1.5–7.7)
NEUTROPHILS NFR BLD AUTO: 50.2 %
OSMOLALITY SERPL CALC.SUM OF ELEC: 293 MOSM/KG (ref 275–295)
PATIENT TEMPERATURE: 98.6 F
PLATELET # BLD AUTO: 64 10(3)UL (ref 150–450)
PLATELET # BLD AUTO: 76 10(3)UL (ref 150–450)
POTASSIUM BLOOD GAS: 3.7 MMOL/L (ref 3.6–5.1)
POTASSIUM SERPL-SCNC: 3.7 MMOL/L (ref 3.5–5.1)
Q-T INTERVAL: 438 MS
QRS DURATION: 112 MS
QTC CALCULATION (BEZET): 505 MS
R AXIS: -42 DEGREES
RBC # BLD AUTO: 2.8 X10(6)UL (ref 3.8–5.3)
RBC # BLD AUTO: 3.4 X10(6)UL (ref 3.8–5.3)
SODIUM BLOOD GAS: 141 MMOL/L (ref 136–144)
SODIUM SERPL-SCNC: 141 MMOL/L (ref 136–145)
T AXIS: 268 DEGREES
TOTAL HEMOGLOBIN: 10.2 G/DL (ref 11.7–16)
VENTRICULAR RATE: 80 BPM
WBC # BLD AUTO: 5.2 X10(3) UL (ref 4–11)
WBC # BLD AUTO: 6.4 X10(3) UL (ref 4–11)

## 2020-07-27 PROCEDURE — 71045 X-RAY EXAM CHEST 1 VIEW: CPT | Performed by: HOSPITALIST

## 2020-07-27 PROCEDURE — 02HK3JZ INSERTION OF PACEMAKER LEAD INTO RIGHT VENTRICLE, PERCUTANEOUS APPROACH: ICD-10-PCS | Performed by: INTERNAL MEDICINE

## 2020-07-27 PROCEDURE — 93650 ICAR CATH ABLTJ AV NODE FUNC: CPT | Performed by: INTERNAL MEDICINE

## 2020-07-27 PROCEDURE — 33225 L VENTRIC PACING LEAD ADD-ON: CPT | Performed by: INTERNAL MEDICINE

## 2020-07-27 PROCEDURE — 99232 SBSQ HOSP IP/OBS MODERATE 35: CPT | Performed by: HOSPITALIST

## 2020-07-27 PROCEDURE — 0JH607Z INSERTION OF CARDIAC RESYNCHRONIZATION PACEMAKER PULSE GENERATOR INTO CHEST SUBCUTANEOUS TISSUE AND FASCIA, OPEN APPROACH: ICD-10-PCS | Performed by: INTERNAL MEDICINE

## 2020-07-27 PROCEDURE — 02HL3JZ INSERTION OF PACEMAKER LEAD INTO LEFT VENTRICLE, PERCUTANEOUS APPROACH: ICD-10-PCS | Performed by: INTERNAL MEDICINE

## 2020-07-27 PROCEDURE — 33207 INSERT HEART PM VENTRICULAR: CPT | Performed by: INTERNAL MEDICINE

## 2020-07-27 PROCEDURE — 02583ZZ DESTRUCTION OF CONDUCTION MECHANISM, PERCUTANEOUS APPROACH: ICD-10-PCS | Performed by: INTERNAL MEDICINE

## 2020-07-27 PROCEDURE — B51V1ZZ FLUOROSCOPY OF OTHER VEINS USING LOW OSMOLAR CONTRAST: ICD-10-PCS | Performed by: INTERNAL MEDICINE

## 2020-07-27 PROCEDURE — 99152 MOD SED SAME PHYS/QHP 5/>YRS: CPT | Performed by: INTERNAL MEDICINE

## 2020-07-27 RX ORDER — MIDAZOLAM HYDROCHLORIDE 1 MG/ML
INJECTION INTRAMUSCULAR; INTRAVENOUS
Status: COMPLETED
Start: 2020-07-27 | End: 2020-07-27

## 2020-07-27 RX ORDER — DIPHENHYDRAMINE HYDROCHLORIDE 50 MG/ML
INJECTION INTRAMUSCULAR; INTRAVENOUS
Status: COMPLETED
Start: 2020-07-27 | End: 2020-07-27

## 2020-07-27 RX ORDER — HEPARIN SODIUM 5000 [USP'U]/ML
INJECTION, SOLUTION INTRAVENOUS; SUBCUTANEOUS
Status: COMPLETED
Start: 2020-07-27 | End: 2020-07-27

## 2020-07-27 RX ORDER — FUROSEMIDE 10 MG/ML
20 INJECTION INTRAMUSCULAR; INTRAVENOUS ONCE
Status: COMPLETED | OUTPATIENT
Start: 2020-07-27 | End: 2020-07-27

## 2020-07-27 RX ORDER — POTASSIUM CHLORIDE 20 MEQ/1
40 TABLET, EXTENDED RELEASE ORAL ONCE
Status: COMPLETED | OUTPATIENT
Start: 2020-07-27 | End: 2020-07-27

## 2020-07-27 RX ORDER — METHYLPREDNISOLONE SODIUM SUCCINATE 125 MG/2ML
INJECTION, POWDER, LYOPHILIZED, FOR SOLUTION INTRAMUSCULAR; INTRAVENOUS
Status: COMPLETED
Start: 2020-07-27 | End: 2020-07-27

## 2020-07-27 RX ORDER — BACITRACIN 50000 [USP'U]/1
INJECTION, POWDER, LYOPHILIZED, FOR SOLUTION INTRAMUSCULAR
Status: COMPLETED
Start: 2020-07-27 | End: 2020-07-27

## 2020-07-27 RX ORDER — CEFAZOLIN SODIUM/WATER 2 G/20 ML
SYRINGE (ML) INTRAVENOUS
Status: COMPLETED
Start: 2020-07-27 | End: 2020-07-27

## 2020-07-27 RX ORDER — LIDOCAINE HYDROCHLORIDE 10 MG/ML
INJECTION, SOLUTION EPIDURAL; INFILTRATION; INTRACAUDAL; PERINEURAL
Status: COMPLETED
Start: 2020-07-27 | End: 2020-07-27

## 2020-07-27 NOTE — RESPIRATORY THERAPY NOTE
EITAN : EQUIPMENT USE: DAILY SUMMARY                                            SET MODE: CPAP                                          USAGE IN HOURS:2:07                                          90% EXP. PRESSURE(EP

## 2020-07-27 NOTE — PROGRESS NOTES
[facility]  Progress Note     Mavis Mark Patient Status:  Inpatient    1950 MRN [id number]   [facility] 2NE-A Attending Vivian Herring MD   Hosp Day # 10 PCP Dutch Galvez MD     Chief Complaint: fatigue     S: no overnigh --   --    ALKPHO  --   --  78  --   --   --    AST  --   --  14*  --   --   --    ALT  --   --  20  --   --   --    BILT  --   --  0.6  --   --   --    TP  --   --  6.6  --   --   --     < > = values in this interval not displayed.        Estimated Creatin malnutrition- Dietitian evaluated  16. EITAN- EITAN protocol  17.  Vascular dementia- OP neuropsych eval in 2-3 weeks     ADDENDUM:  Updated by my PA that patient still sleepy hours post procedure  D/W Nurse  ABG just performed  Adding CBC, CMP, and stat CXR s/

## 2020-07-27 NOTE — PLAN OF CARE
Received patient at 0730. Alert and Oriented x2-3, very drowsy. Tele Rhythm Vpaced, underlying afib rates 80-95. O2 saturation 98-99% on room air, CPAP on now while pt is sleeping. Breath sounds diminished. Bed is locked and in low position.  Call light and hemodynamic stability  - Monitor arterial and/or venous puncture sites for bleeding and/or hematoma  - Assess quality of pulses, skin color and temperature  - Assess for signs of decreased coronary artery perfusion - ex.  Angina  - Evaluate fluid balance, a appropriate  Outcome: Progressing  Goal: Hemodynamic stability and optimal renal function maintained  Description  INTERVENTIONS:  - Monitor labs and assess for signs and symptoms of volume excess or deficit  - Monitor intake, output and patient weight  -

## 2020-07-27 NOTE — PROGRESS NOTES
[facility]  Progress Note     Burton Mohs Patient Status:  Inpatient    1950 MRN [id number]   [facility] 2NE-A Attending Danyel Campa MD   Hosp Day # 10 PCP Blayne Phipps MD     Chief Complaint: fatigue     S: patient res --   --  14*  --   --   --    ALT  --   --  20  --   --   --    BILT  --   --  0.6  --   --   --    TP  --   --  6.6  --   --   --     < > = values in this interval not displayed.        Estimated Creatinine Clearance: 52.4 mL/min (based on SCr of 0.85 mg/d Prophylaxis: SCDs  · CODE status: Full- palliative evaluated prior. Will the patient be referred to TCC on discharge?: yes    Case d/w RN, pt family. CBC in am. Follow bp/hr.      Gerardo MENDOZA  1:41 PM

## 2020-07-27 NOTE — PLAN OF CARE
Pt received at 0732. Cath lab nurses in room, notified wrong consent was signed and pt was not prepped correctly. This RN printed correct consent form. Cath lab rns already took pt down. Called cath lab to notify of allergy and pt was not pre medicated.  Pe

## 2020-07-27 NOTE — PLAN OF CARE
Patient alert and oriented. Room air during the day, cpap at night. Maintaining normal sats. A fib on tele. HR 70's-80's. Bowel sounds present, purewick in place. Up x2 and a walker. Cardizem gtt turned off at 2210 per protocol.  Right PICC line dressing C/ threatening arrhythmias  - Monitor electrolytes and administer replacement therapy as ordered  Outcome: Progressing     Problem: RESPIRATORY - ADULT  Goal: Achieves optimal ventilation and oxygenation  Description  INTERVENTIONS:  - Assess for changes in r Progressing     Problem: Impaired Swallowing  Goal: Minimize aspiration risk  Description  Interventions:  Supervision with meals  Position as upright as possible. Up to chair preferred. Keep upright for 20-30 minutes following po  Liquids by cup only.

## 2020-07-27 NOTE — PHYSICAL THERAPY NOTE
Attempted to see pt for physical therapy treatment at this time, but pt remains lethargic and difficult to arouse following cath this morning. ABG ordered. Will plan to re-attempt to see pt again at a later date.

## 2020-07-27 NOTE — DIETARY MALNUTRITION NOTE
[facility]    NUTRITION ASSESSMENT    Pt meets severe malnutrition criteria.     CRITERIA FOR MALNUTRITION DIAGNOSIS:  Criteria for severe malnutrition diagnosis: chronic illness related to wt loss greater than 7.5% in 3 months, energy intake less than unable to be advanced and aggressive care is persued RD available for alternative nutrition recommendations. At risk for refeeding syndrome.      77year old female with hx of Afib, CHF, COPD,hypertension, hyperlipidemia, EITAN presents with increasing genera labs      MEDICATIONS:  Potassium    LABS:  K+ 3.7, Na 141    Pt is at High nutrition risk    FOLLOW-UP DATE: 7/31    Minda Anderson, [facility]  Clinical Dietitian  Pager- [phone]

## 2020-07-28 LAB
ALBUMIN SERPL-MCNC: 2.5 G/DL (ref 3.4–5)
ALBUMIN/GLOB SERPL: 0.7 {RATIO} (ref 1–2)
ALP LIVER SERPL-CCNC: 67 U/L (ref 55–142)
ALT SERPL-CCNC: 14 U/L (ref 13–56)
ANION GAP SERPL CALC-SCNC: 4 MMOL/L (ref 0–18)
AST SERPL-CCNC: 17 U/L (ref 15–37)
BASOPHILS # BLD AUTO: 0 X10(3) UL (ref 0–0.2)
BASOPHILS NFR BLD AUTO: 0 %
BILIRUB SERPL-MCNC: 0.4 MG/DL (ref 0.1–2)
BUN BLD-MCNC: 15 MG/DL (ref 7–18)
BUN/CREAT SERPL: 13 (ref 10–20)
CALCIUM BLD-MCNC: 9 MG/DL (ref 8.5–10.1)
CHLORIDE SERPL-SCNC: 111 MMOL/L (ref 98–112)
CO2 SERPL-SCNC: 24 MMOL/L (ref 21–32)
CREAT BLD-MCNC: 1.15 MG/DL (ref 0.55–1.02)
DEPRECATED RDW RBC AUTO: 48.4 FL (ref 35.1–46.3)
EOSINOPHIL # BLD AUTO: 0 X10(3) UL (ref 0–0.7)
EOSINOPHIL NFR BLD AUTO: 0 %
ERYTHROCYTE [DISTWIDTH] IN BLOOD BY AUTOMATED COUNT: 15.2 % (ref 11–15)
GLOBULIN PLAS-MCNC: 3.6 G/DL (ref 2.8–4.4)
GLUCOSE BLD-MCNC: 137 MG/DL (ref 70–99)
HCT VFR BLD AUTO: 26.1 % (ref 35–48)
HGB BLD-MCNC: 8.1 G/DL (ref 12–16)
IMM GRANULOCYTES # BLD AUTO: 0.04 X10(3) UL (ref 0–1)
IMM GRANULOCYTES NFR BLD: 0.4 %
LYMPHOCYTES # BLD AUTO: 0.94 X10(3) UL (ref 1–4)
LYMPHOCYTES NFR BLD AUTO: 8.9 %
M PROTEIN MFR SERPL ELPH: 6.1 G/DL (ref 6.4–8.2)
MCH RBC QN AUTO: 27.3 PG (ref 26–34)
MCHC RBC AUTO-ENTMCNC: 31 G/DL (ref 31–37)
MCV RBC AUTO: 87.9 FL (ref 80–100)
MONOCYTES # BLD AUTO: 0.2 X10(3) UL (ref 0.1–1)
MONOCYTES NFR BLD AUTO: 1.9 %
NEUTROPHILS # BLD AUTO: 9.44 X10 (3) UL (ref 1.5–7.7)
NEUTROPHILS # BLD AUTO: 9.44 X10(3) UL (ref 1.5–7.7)
NEUTROPHILS NFR BLD AUTO: 88.8 %
OSMOLALITY SERPL CALC.SUM OF ELEC: 291 MOSM/KG (ref 275–295)
PLATELET # BLD AUTO: 69 10(3)UL (ref 150–450)
POTASSIUM SERPL-SCNC: 4.1 MMOL/L (ref 3.5–5.1)
RBC # BLD AUTO: 2.97 X10(6)UL (ref 3.8–5.3)
SODIUM SERPL-SCNC: 139 MMOL/L (ref 136–145)
WBC # BLD AUTO: 10.6 X10(3) UL (ref 4–11)

## 2020-07-28 PROCEDURE — 99024 POSTOP FOLLOW-UP VISIT: CPT | Performed by: INTERNAL MEDICINE

## 2020-07-28 PROCEDURE — 99232 SBSQ HOSP IP/OBS MODERATE 35: CPT | Performed by: HOSPITALIST

## 2020-07-28 NOTE — CM/SW NOTE
Care Progression Note:  Active Acute Medical Issue:   Weakness generalized   Dehydration/Acute Renal Failure- resolved    Other Contributing Medical Factors/Dx. :   A fib with RVR- s/p AV node ablation and pacemaker 7/27  Nausea/vomiting/dysphagia- GI follo

## 2020-07-28 NOTE — PHYSICAL THERAPY NOTE
Attempted to see pt for PT session this AM. Pt had just returned from a walk and was resting. Will attempt again as schedule allows. Thank you.

## 2020-07-28 NOTE — PROCEDURES
OPERATION(S) PERFORMED:   1. BiV ppm implant. 2. Chest fluoroscopy. 3. CS venography. 4. AV node ablation. : Barber Crook MD  INDICATION: AF with RVR.      COMPLICATIONS: None     ESTIMATED BLOOD LOSS: Minimal.  SEDATION: IV was maintaine was irrigated with antibiotic solution. Bleeding was sought for until hemostasis was achieved. The leads were connected to a pulse generator. They were coiled and placed into the pocket along with the pulse generator.  The generator was secured to the under

## 2020-07-28 NOTE — PLAN OF CARE
Received patient at 0730. Alert and Oriented x4, more alert, much better than yesterday. Tele Rhythm V paced. O2 saturation 95-99% on room air. Breath sounds diminished. Bed is locked and in low position. Call light and personal items within reach.  No C/O medications as ordered  - Initiate emergency measures for life threatening arrhythmias  - Monitor electrolytes and administer replacement therapy as ordered  Outcome: Progressing     Problem: RESPIRATORY - ADULT  Goal: Achieves optimal ventilation and oxyg fluid and nutrition restrictions as appropriate  Outcome: Progressing     Problem: Impaired Swallowing  Goal: Minimize aspiration risk  Description  Interventions:  Supervision with meals  Position as upright as possible. Up to chair preferred.   Keep upri

## 2020-07-28 NOTE — PROGRESS NOTES
[facility]  Cardiology Progress Note    Pina Hu Patient Status:  Inpatient    1950 MRN [id number]   [facility] 2NE-A Attending Jojo Juárez MD   [facility] Day # 6 PCP Pema Wilkins MD     Subjective:  Complains of [facility] Assessment:  · Afib with RVR - status post AV node ablation with pacemaker. 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    · Chronic type A aortic dissection - plans for medical management per surgery  · CAD, s/p CABG, normal LVEF 50%  · Moderate TR, P

## 2020-07-28 NOTE — PLAN OF CARE
Assumed care at [address]. Pt is A&Ox4, up with 1-2x walker. Denies chest pain and SOB. O2 sats WNL on RA. CPAP at night. Lung sounds clear but diminished bilaterally. Pt is afib/vpaced on tele. No cardiac symptoms. [facility]emarker site, [facility].  Sling in place on left Progressing  Goal: Absence of cardiac arrhythmias or at baseline  Description  INTERVENTIONS:  - Continuous cardiac monitoring, monitor vital signs, obtain 12 lead EKG if indicated  - Evaluate effectiveness of antiarrhythmic and heart rate control medicati and serum sodium as indicated or ordered  - Monitor response to interventions for patient's volume status, including labs, urine output, blood pressure (other measures as available)  - Encourage oral intake as appropriate  - Instruct patient on fluid and n

## 2020-07-28 NOTE — PROGRESS NOTES
[facility]  Progress Note     Oletha Nails Patient Status:  Inpatient    1950 MRN [id number]   [facility] 2NE-A Attending Poonam Rosas MD   [address] Day # 6 PCP Stacey Ross MD     Chief Complaint: fatigue     S: patient rep 76 67   AST 14*  --   --   --  24 17   ALT 20  --   --   --  17 14   BILT 0.6  --   --   --  0.5 0.4   TP 6.6  --   --   --  6.7 6.1*    < > = values in this interval not displayed.        Estimated Creatinine Clearance: 38.7 mL/min (A) (based on SCr of 1.1 in 2-3 weeks     Quality:  · DVT Prophylaxis: SCDs  · CODE status: Full- palliative evaluated prior. Will the patient be referred to TCC on discharge?: yes    Case d/w RN, pt.  Given lasix yesterday. HR improved post ablation. D/c planning.   Needs PT

## 2020-07-28 NOTE — RESPIRATORY THERAPY NOTE
EITAN : EQUIPMENT USE: DAILY SUMMARY                                            SET MODE: CPAP;                                          USAGE IN HOURS: 8:24                                          90% EXP. PRESSURE(

## 2020-07-28 NOTE — PAYOR COMM NOTE
--------------  CONTINUED STAY REVIEW    Manoj Hernadez MEDICARE ADV PPO  Subscriber #:  [id number]  Authorization Number: [id number]    Admit date: 7/17/20  Admit time: 18    Admitting Physician: Lisa Evans MD  Attending Physician:  Aravind Ledesma MD junctional escape was achieved. The ablation catheter was withdrawn and exchanged for a CS guide catheter. . Using an extended hook guide catheter, along with an EP diagnostic catheter, access to the CS was achieved.  This was confirmed by performing a ball Route User    7/28/2020 0851 Given 50 mg Oral Hiro Patel RN    7/27/2020 2037 [facility] 50 [facility]    7/27/2020 1622 Given 50 mg Oral Hiro KRISTIE Patel      Pantoprazole Sodium (PROTONIX) 40 mg in Sodium Chloride 0.9 % 10 mL IV pus

## 2020-07-29 VITALS
WEIGHT: 143.31 LBS | OXYGEN SATURATION: 100 % | HEART RATE: 80 BPM | SYSTOLIC BLOOD PRESSURE: 103 MMHG | TEMPERATURE: 97 F | BODY MASS INDEX: 25.39 KG/M2 | DIASTOLIC BLOOD PRESSURE: 72 MMHG | HEIGHT: 63 IN | RESPIRATION RATE: 18 BRPM

## 2020-07-29 LAB
BASOPHILS # BLD AUTO: 0.01 X10(3) UL (ref 0–0.2)
BASOPHILS NFR BLD AUTO: 0.1 %
DEPRECATED RDW RBC AUTO: 50.1 FL (ref 35.1–46.3)
EOSINOPHIL # BLD AUTO: 0.02 X10(3) UL (ref 0–0.7)
EOSINOPHIL NFR BLD AUTO: 0.2 %
ERYTHROCYTE [DISTWIDTH] IN BLOOD BY AUTOMATED COUNT: 15.7 % (ref 11–15)
HCT VFR BLD AUTO: 26.8 % (ref 35–48)
HGB BLD-MCNC: 8.6 G/DL (ref 12–16)
IMM GRANULOCYTES # BLD AUTO: 0.06 X10(3) UL (ref 0–1)
IMM GRANULOCYTES NFR BLD: 0.5 %
LYMPHOCYTES # BLD AUTO: 2.1 X10(3) UL (ref 1–4)
LYMPHOCYTES NFR BLD AUTO: 17.1 %
MCH RBC QN AUTO: 28.5 PG (ref 26–34)
MCHC RBC AUTO-ENTMCNC: 32.1 G/DL (ref 31–37)
MCV RBC AUTO: 88.7 FL (ref 80–100)
MONOCYTES # BLD AUTO: 0.76 X10(3) UL (ref 0.1–1)
MONOCYTES NFR BLD AUTO: 6.2 %
NEUTROPHILS # BLD AUTO: 9.32 X10 (3) UL (ref 1.5–7.7)
NEUTROPHILS # BLD AUTO: 9.32 X10(3) UL (ref 1.5–7.7)
NEUTROPHILS NFR BLD AUTO: 75.9 %
PLATELET # BLD AUTO: 80 10(3)UL (ref 150–450)
RBC # BLD AUTO: 3.02 X10(6)UL (ref 3.8–5.3)
WBC # BLD AUTO: 12.3 X10(3) UL (ref 4–11)

## 2020-07-29 PROCEDURE — 99239 HOSP IP/OBS DSCHRG MGMT >30: CPT | Performed by: HOSPITALIST

## 2020-07-29 PROCEDURE — 99232 SBSQ HOSP IP/OBS MODERATE 35: CPT | Performed by: CLINICAL NURSE SPECIALIST

## 2020-07-29 RX ORDER — PANTOPRAZOLE SODIUM 40 MG/1
40 TABLET, DELAYED RELEASE ORAL
Qty: 120 TABLET | Refills: 0 | Status: SHIPPED | OUTPATIENT
Start: 2020-07-29 | End: 2020-09-27

## 2020-07-29 RX ORDER — PANTOPRAZOLE SODIUM 40 MG/1
40 TABLET, DELAYED RELEASE ORAL
Status: DISCONTINUED | OUTPATIENT
Start: 2020-07-29 | End: 2020-07-29

## 2020-07-29 NOTE — PLAN OF CARE
Obtained pt at 1930. Pt is a/o x4 during the day, orientated to person overnight. Lungs are diminished on room air, cpap at night. HR is v-paced on telemetry. Site is clean, dry, and intact. Denies any pain or SOB.  Pt is laying in bed and call light is wit Progressing  Goal: Absence of cardiac arrhythmias or at baseline  Description  INTERVENTIONS:  - Continuous cardiac monitoring, monitor vital signs, obtain 12 lead EKG if indicated  - Evaluate effectiveness of antiarrhythmic and heart rate control medicati and serum sodium as indicated or ordered  - Monitor response to interventions for patient's volume status, including labs, urine output, blood pressure (other measures as available)  - Encourage oral intake as appropriate  - Instruct patient on fluid and n

## 2020-07-29 NOTE — PLAN OF CARE
Patient laying in bed, not wanting to open eyes but responds to verbal stimuli. Right PICC line in place, flushing and with blood return. CHG bath completed. Patient placed in chair with x1 assist and walker. Patient denies CP, SOB and dizziness. VSS.  Am m

## 2020-07-29 NOTE — PLAN OF CARE
Patient tele D/C, IV discontinued with catheter intact. Patient denies CP, SOB, dizziness, or palpitations. Patient denies calf tenderness. Patient discharge instructions reviewed with patient and spouse, verbalize understanding.  Patient medication and the baseline  Description  INTERVENTIONS:  - Continuous cardiac monitoring, monitor vital signs, obtain 12 lead EKG if indicated  - Evaluate effectiveness of antiarrhythmic and heart rate control medications as ordered  - Initiate emergency measures for life t Progressing  Goal: Hemodynamic stability and optimal renal function maintained  Description  INTERVENTIONS:  - Monitor labs and assess for signs and symptoms of volume excess or deficit  - Monitor intake, output and patient weight  - Monitor urine specific

## 2020-07-29 NOTE — PROGRESS NOTES
[facility]  Progress Note     Anne Vasquez Patient Status:  Inpatient    1950 MRN [id number]   [facility] 2NE-A Attending Dr. Dagoberto Garces Day # 12 PCP Chad Amin MD     Chief Complaint: fatigue     S: patient up in --   --  17 14   BILT  --   --   --  0.5 0.4   TP  --   --   --  6.7 6.1*    < > = values in this interval not displayed. Estimated Creatinine Clearance: 38.7 mL/min (A) (based on SCr of 1.15 mg/dL (H)).     No results for input(s): PTP, INR in the la d/w RN, pt.  D/c planning. C/palliative care to follow after d/c. F/u with endo as OP.     Antonina MENDOZA  10:13 AM

## 2020-07-29 NOTE — PLAN OF CARE
I called dtr Paula Lopez with update on medications and adjustments made by cards. Updated on f/u w/ device clinic. She reports they were taking PPI with food - D/w to take on EMPTY stomach, no NSAIDs. Tylenol OK. D/w her to see PCP in 1 week also.   Spent a

## 2020-07-29 NOTE — DISCHARGE SUMMARY
[facility] HOSPITALIST  DISCHARGE SUMMARY     Dayanara Parikh Patient Status:  Inpatient    1950 MRN [id number]   [facility] 2NE-A Attending Reymundo [facility] Day [facility] 15 PCP Brent Junior MD     Date of Admission: 2020  D she wishes for her mother to return home and does not want her to go back to rehab. Patient required Cardizem drip in addition to metoprolol scheduled and digoxin prn. Given IV fluids for dehydration.   She was evaluated by GI and underwent EGD which show 7/27/2020   Robina Soni MD  OPERATION(S) PERFORMED:   1. BiV ppm implant. 2. Chest fluoroscopy. 3. CS venography. 4. AV node ablation. Consultants: GI, cardiology, CV surgery, palliative care.         Discharge Medications      CONTINUE ALDACTONE              Where to Get Your Medications      These medications were sent to [facility], [phone], [phone]  [address] [facility], [place]    Phone:  [phone]

## 2020-07-29 NOTE — PROGRESS NOTES
[facility] Cardiology Progress Note    Subjective:  Feels good. Just walked with PT and tolerated well.      Objective:  BP (!) 144/98 (BP Location: Left arm)   Pulse 83   Temp 98 °F (36.7 °C) (Oral)   Resp 18   Ht 160 cm (5' 3\")   Wt 143 lb 4.8 oz (65 kg)

## 2020-07-29 NOTE — PHYSICAL THERAPY NOTE
PHYSICAL THERAPY TREATMENT NOTE - INPATIENT    Room Number: 2607/2607-A     Session: 3   Number of Visits to Meet Established Goals: 5    Presenting Problem: nausea, vomiting, poor appetite    Problem List  Principal Problem:    Weakness generalized  Acti • ESOPHAGOGASTRODUODENOSCOPY (EGD) N/A 2/28/2020    Performed by Rito Ortega MD at [address] [facility] N/A 10/24/2017    Performed by Myrtle Magallanes MD at [address] N/A 10/24/2017    Performed in chair when entered room. Pt sit to stand with CGA. Pt amb into epstein with very slow george, cues for inc step length and no LOB noted. Pt appears to have difficulty dividing attention while amb in epstein. Pt returned to room and sat in chair.     THERAPEUT

## 2020-07-29 NOTE — RESPIRATORY THERAPY NOTE
EITAN - Equipment Use Daily Summary:  · Set Mode CPAP  · Usage in hours: 2:40  · 90% Pressure (EPAP) level: 5.0  · 90% Insp Pressure (IPAP):   · AHI: 0.7  · Supplemental Oxygen:   · Comments:

## 2020-07-29 NOTE — CM/SW NOTE
07/29/20 1300   Discharge disposition   Expected discharge disposition Home or Self   Name of Facillity/Home Care/Hospice Residential   Additional Home Care/Hospice Provider   (Residential palliative care)   Patient Olivia Yes   [address]

## 2020-07-30 NOTE — PAYOR COMM NOTE
--------------  DISCHARGE REVIEW    Payor: [employer] MEDICARE ADV PPO  Subscriber #:  [id number]  Authorization Number: [id number]    Admit date: 7/17/20  Admit time:  1800  Discharge Date: 7/29/2020  2:03 PM

## 2020-07-31 ENCOUNTER — TELEPHONE (OUTPATIENT)
Dept: CARDIOLOGY | Age: 70
End: 2020-07-31

## 2020-07-31 RX ORDER — METHIMAZOLE 5 MG/1
5 TABLET ORAL 2 TIMES DAILY
COMMUNITY

## 2020-07-31 RX ORDER — POTASSIUM CHLORIDE 1500 MG/1
20 TABLET, EXTENDED RELEASE ORAL DAILY
COMMUNITY
Start: 2019-11-25 | End: 2020-11-16

## 2020-08-10 ENCOUNTER — ANCILLARY PROCEDURE (OUTPATIENT)
Dept: CARDIOLOGY | Age: 70
End: 2020-08-10
Attending: INTERNAL MEDICINE

## 2020-08-10 VITALS — HEART RATE: 98 BPM

## 2020-08-10 DIAGNOSIS — Z45.018 PACEMAKER REPROGRAMMING/CHECK: ICD-10-CM

## 2020-08-10 PROCEDURE — 93290 INTERROG DEV EVAL ICPMS IP: CPT | Performed by: INTERNAL MEDICINE

## 2020-08-10 PROCEDURE — 93281 PM DEVICE PROGR EVAL MULTI: CPT | Performed by: INTERNAL MEDICINE

## 2020-08-10 RX ORDER — SPIRONOLACTONE 25 MG/1
25 TABLET ORAL DAILY
COMMUNITY
Start: 2020-08-08 | End: 2020-11-08

## 2020-08-13 ENCOUNTER — TELEPHONE (OUTPATIENT)
Dept: CARDIOLOGY | Age: 70
End: 2020-08-13

## 2020-08-13 ENCOUNTER — ANCILLARY PROCEDURE (OUTPATIENT)
Dept: CARDIOLOGY | Age: 70
End: 2020-08-13
Attending: INTERNAL MEDICINE

## 2020-08-13 DIAGNOSIS — Z95.0 CARDIAC PACEMAKER: ICD-10-CM

## 2020-08-20 ENCOUNTER — TELEPHONE (OUTPATIENT)
Dept: CARDIOLOGY CLINIC | Facility: HOSPITAL | Age: 70
End: 2020-08-20

## 2020-08-20 NOTE — TELEPHONE ENCOUNTER
[facility] contacted Samantha to request weekly weights, and the staff reported that they have not seen the patient since before her last hospitalization.     [facility] reviewed the note from social work at the time of hospital discharge and not

## 2020-08-24 ENCOUNTER — HOSPITAL ENCOUNTER (INPATIENT)
Facility: HOSPITAL | Age: 70
LOS: 12 days | Discharge: HOME HEALTH | DRG: 682 | End: 2020-09-05
Attending: EMERGENCY MEDICINE | Admitting: HOSPITALIST
Payer: MEDICARE

## 2020-08-24 ENCOUNTER — APPOINTMENT (OUTPATIENT)
Dept: GENERAL RADIOLOGY | Facility: HOSPITAL | Age: 70
DRG: 682 | End: 2020-08-24
Attending: EMERGENCY MEDICINE
Payer: MEDICARE

## 2020-08-24 ENCOUNTER — APPOINTMENT (OUTPATIENT)
Dept: CT IMAGING | Facility: HOSPITAL | Age: 70
DRG: 682 | End: 2020-08-24
Attending: EMERGENCY MEDICINE
Payer: MEDICARE

## 2020-08-24 DIAGNOSIS — D64.9 ANEMIA, UNSPECIFIED TYPE: ICD-10-CM

## 2020-08-24 DIAGNOSIS — E87.6 HYPOKALEMIA: ICD-10-CM

## 2020-08-24 DIAGNOSIS — E05.90 HYPERTHYROIDISM: ICD-10-CM

## 2020-08-24 DIAGNOSIS — E86.0 DEHYDRATION: ICD-10-CM

## 2020-08-24 DIAGNOSIS — R19.7 NAUSEA VOMITING AND DIARRHEA: Primary | ICD-10-CM

## 2020-08-24 DIAGNOSIS — R11.0 NAUSEA: ICD-10-CM

## 2020-08-24 DIAGNOSIS — I48.91 ATRIAL FIBRILLATION WITH RAPID VENTRICULAR RESPONSE (HCC): ICD-10-CM

## 2020-08-24 DIAGNOSIS — R13.10 DYSPHAGIA, UNSPECIFIED TYPE: ICD-10-CM

## 2020-08-24 DIAGNOSIS — D69.6 THROMBOCYTOPENIA (HCC): ICD-10-CM

## 2020-08-24 DIAGNOSIS — R11.2 NAUSEA VOMITING AND DIARRHEA: Primary | ICD-10-CM

## 2020-08-24 DIAGNOSIS — N28.9 RENAL INSUFFICIENCY: ICD-10-CM

## 2020-08-24 LAB
ALBUMIN SERPL-MCNC: 3.3 G/DL (ref 3.4–5)
ALBUMIN/GLOB SERPL: 0.9 {RATIO} (ref 1–2)
ALP LIVER SERPL-CCNC: 90 U/L (ref 55–142)
ALT SERPL-CCNC: 26 U/L (ref 13–56)
ANION GAP SERPL CALC-SCNC: 8 MMOL/L (ref 0–18)
AST SERPL-CCNC: 15 U/L (ref 15–37)
ATRIAL RATE: 125 BPM
ATRIAL RATE: 144 BPM
BASOPHILS # BLD AUTO: 0.02 X10(3) UL (ref 0–0.2)
BASOPHILS NFR BLD AUTO: 0.3 %
BILIRUB SERPL-MCNC: 1.4 MG/DL (ref 0.1–2)
BUN BLD-MCNC: 15 MG/DL (ref 7–18)
BUN/CREAT SERPL: 10.4 (ref 10–20)
CALCIUM BLD-MCNC: 9.6 MG/DL (ref 8.5–10.1)
CHLORIDE SERPL-SCNC: 115 MMOL/L (ref 98–112)
CO2 SERPL-SCNC: 20 MMOL/L (ref 21–32)
CREAT BLD-MCNC: 1.44 MG/DL (ref 0.55–1.02)
DEPRECATED RDW RBC AUTO: 63.9 FL (ref 35.1–46.3)
EOSINOPHIL # BLD AUTO: 0.06 X10(3) UL (ref 0–0.7)
EOSINOPHIL NFR BLD AUTO: 0.9 %
ERYTHROCYTE [DISTWIDTH] IN BLOOD BY AUTOMATED COUNT: 18.2 % (ref 11–15)
GLOBULIN PLAS-MCNC: 3.7 G/DL (ref 2.8–4.4)
GLUCOSE BLD-MCNC: 93 MG/DL (ref 70–99)
HCT VFR BLD AUTO: 33.1 % (ref 35–48)
HGB BLD-MCNC: 10 G/DL (ref 12–16)
IMM GRANULOCYTES # BLD AUTO: 0.01 X10(3) UL (ref 0–1)
IMM GRANULOCYTES NFR BLD: 0.1 %
LIPASE SERPL-CCNC: 43 U/L (ref 73–393)
LYMPHOCYTES # BLD AUTO: 1.9 X10(3) UL (ref 1–4)
LYMPHOCYTES NFR BLD AUTO: 27.8 %
M PROTEIN MFR SERPL ELPH: 7 G/DL (ref 6.4–8.2)
MCH RBC QN AUTO: 29.2 PG (ref 26–34)
MCHC RBC AUTO-ENTMCNC: 30.2 G/DL (ref 31–37)
MCV RBC AUTO: 96.8 FL (ref 80–100)
MONOCYTES # BLD AUTO: 0.73 X10(3) UL (ref 0.1–1)
MONOCYTES NFR BLD AUTO: 10.7 %
NEUTROPHILS # BLD AUTO: 4.12 X10 (3) UL (ref 1.5–7.7)
NEUTROPHILS # BLD AUTO: 4.12 X10(3) UL (ref 1.5–7.7)
NEUTROPHILS NFR BLD AUTO: 60.2 %
OSMOLALITY SERPL CALC.SUM OF ELEC: 297 MOSM/KG (ref 275–295)
PLATELET # BLD AUTO: 118 10(3)UL (ref 150–450)
POTASSIUM SERPL-SCNC: 3.4 MMOL/L (ref 3.5–5.1)
Q-T INTERVAL: 298 MS
Q-T INTERVAL: 314 MS
QRS DURATION: 102 MS
QRS DURATION: 104 MS
QTC CALCULATION (BEZET): 430 MS
QTC CALCULATION (BEZET): 441 MS
R AXIS: -46 DEGREES
R AXIS: 14 DEGREES
RBC # BLD AUTO: 3.42 X10(6)UL (ref 3.8–5.3)
SARS-COV-2 RNA RESP QL NAA+PROBE: NOT DETECTED
SODIUM SERPL-SCNC: 143 MMOL/L (ref 136–145)
T AXIS: 202 DEGREES
T AXIS: 206 DEGREES
TROPONIN I SERPL-MCNC: <0.045 NG/ML (ref ?–0.04)
VENTRICULAR RATE: 119 BPM
VENTRICULAR RATE: 125 BPM
WBC # BLD AUTO: 6.8 X10(3) UL (ref 4–11)

## 2020-08-24 PROCEDURE — 71250 CT THORAX DX C-: CPT | Performed by: EMERGENCY MEDICINE

## 2020-08-24 PROCEDURE — 99223 1ST HOSP IP/OBS HIGH 75: CPT | Performed by: HOSPITALIST

## 2020-08-24 PROCEDURE — 5A09357 ASSISTANCE WITH RESPIRATORY VENTILATION, LESS THAN 24 CONSECUTIVE HOURS, CONTINUOUS POSITIVE AIRWAY PRESSURE: ICD-10-PCS | Performed by: HOSPITALIST

## 2020-08-24 PROCEDURE — 74176 CT ABD & PELVIS W/O CONTRAST: CPT | Performed by: EMERGENCY MEDICINE

## 2020-08-24 PROCEDURE — 70490 CT SOFT TISSUE NECK W/O DYE: CPT | Performed by: EMERGENCY MEDICINE

## 2020-08-24 PROCEDURE — 71045 X-RAY EXAM CHEST 1 VIEW: CPT | Performed by: EMERGENCY MEDICINE

## 2020-08-24 PROCEDURE — 99221 1ST HOSP IP/OBS SF/LOW 40: CPT | Performed by: INTERNAL MEDICINE

## 2020-08-24 RX ORDER — DEXTROSE AND SODIUM CHLORIDE 5; .45 G/100ML; G/100ML
INJECTION, SOLUTION INTRAVENOUS CONTINUOUS
Status: ACTIVE | OUTPATIENT
Start: 2020-08-24 | End: 2020-08-24

## 2020-08-24 RX ORDER — POTASSIUM CHLORIDE 1.5 G/1.77G
20 POWDER, FOR SOLUTION ORAL ONCE
Status: DISCONTINUED | OUTPATIENT
Start: 2020-08-24 | End: 2020-09-05

## 2020-08-24 RX ORDER — SODIUM CHLORIDE 9 MG/ML
125 INJECTION, SOLUTION INTRAVENOUS CONTINUOUS
Status: DISCONTINUED | OUTPATIENT
Start: 2020-08-24 | End: 2020-08-26

## 2020-08-24 RX ORDER — SPIRONOLACTONE 25 MG/1
25 TABLET ORAL DAILY
Status: ON HOLD | COMMUNITY
End: 2020-09-03

## 2020-08-24 RX ORDER — POTASSIUM CHLORIDE 14.9 MG/ML
20 INJECTION INTRAVENOUS ONCE
Status: COMPLETED | OUTPATIENT
Start: 2020-08-24 | End: 2020-08-24

## 2020-08-24 RX ORDER — DILTIAZEM HCL 90 MG
90 TABLET ORAL 4 TIMES DAILY
COMMUNITY
End: 2020-08-24 | Stop reason: CLARIF

## 2020-08-24 RX ORDER — ACETAMINOPHEN 325 MG/1
650 TABLET ORAL EVERY 6 HOURS PRN
Status: DISCONTINUED | OUTPATIENT
Start: 2020-08-24 | End: 2020-09-05

## 2020-08-24 RX ORDER — LISINOPRIL 20 MG/1
20 TABLET ORAL DAILY
COMMUNITY
End: 2020-08-24 | Stop reason: CLARIF

## 2020-08-24 RX ORDER — POTASSIUM CHLORIDE 750 MG/1
20 TABLET, EXTENDED RELEASE ORAL DAILY
Status: ON HOLD | COMMUNITY
End: 2020-09-03

## 2020-08-24 RX ORDER — SPIRONOLACTONE 100 MG/1
100 TABLET, FILM COATED ORAL DAILY
COMMUNITY
End: 2020-08-24 | Stop reason: CLARIF

## 2020-08-24 RX ORDER — FUROSEMIDE 20 MG/1
20 TABLET ORAL DAILY
Status: ON HOLD | COMMUNITY
End: 2020-09-03

## 2020-08-24 RX ORDER — CITALOPRAM 10 MG/1
10 TABLET ORAL DAILY
COMMUNITY

## 2020-08-24 RX ORDER — ONDANSETRON 2 MG/ML
4 INJECTION INTRAMUSCULAR; INTRAVENOUS EVERY 4 HOURS PRN
Status: DISCONTINUED | OUTPATIENT
Start: 2020-08-24 | End: 2020-08-24

## 2020-08-24 RX ORDER — METHIMAZOLE 5 MG/1
5 TABLET ORAL 2 TIMES DAILY
COMMUNITY

## 2020-08-24 RX ORDER — ONDANSETRON 2 MG/ML
4 INJECTION INTRAMUSCULAR; INTRAVENOUS
Status: DISCONTINUED | OUTPATIENT
Start: 2020-08-24 | End: 2020-08-24 | Stop reason: DRUGHIGH

## 2020-08-24 RX ORDER — METOPROLOL SUCCINATE 50 MG/1
50 TABLET, EXTENDED RELEASE ORAL DAILY
COMMUNITY
End: 2020-08-24 | Stop reason: CLARIF

## 2020-08-24 RX ORDER — ONDANSETRON 2 MG/ML
8 INJECTION INTRAMUSCULAR; INTRAVENOUS EVERY 6 HOURS PRN
Status: DISCONTINUED | OUTPATIENT
Start: 2020-08-24 | End: 2020-09-05

## 2020-08-24 RX ORDER — METOPROLOL SUCCINATE 100 MG/1
100 TABLET, EXTENDED RELEASE ORAL DAILY
COMMUNITY
End: 2020-08-24 | Stop reason: CLARIF

## 2020-08-24 RX ORDER — SODIUM CHLORIDE 9 MG/ML
INJECTION, SOLUTION INTRAVENOUS CONTINUOUS
Status: DISCONTINUED | OUTPATIENT
Start: 2020-08-24 | End: 2020-09-04

## 2020-08-24 NOTE — ED NOTES
PT PLACED ON 2L NASAL CANULA FOR A DROP IN O2 SATURATIONS, PT HAS HX OF SLEEP APNEA AND PT USES SUPPLEMENTAL O2 AT NIGHT

## 2020-08-24 NOTE — ED NOTES
PT HELD POTASSIUM PO IN MOUTH AND REFUSES TO SWALLOW, PER FAMILY PT HAS BEEN REFUSING TO SWALLOW HER PILLS OR EAT AND DRINK, STATES PT KEEPS LIQUIDS IN HER MOUTH UNTIL FAMILY LEAVES AND THEN SHE SPITS THEM OUT

## 2020-08-24 NOTE — ED PROVIDER NOTES
Patient Seen in: [facility] Emergency Department      History   Patient presents with:  Nausea/Vomiting/Diarrhea    Stated Complaint: diarrhea, vomiting    HPI    Patient with a history of aortic dissection type I with no surgical intervention per f transfusion    • Hyperlipidemia    • Muscle weakness    • Osteoarthritis    • Pulmonary emphysema (HCC)    • Renal disorder     ckd   • Shortness of breath    • Sleep apnea     cpap   • Visual impairment     glasses              Past Surgical History:   Pr appropriately. Eyes: sclera white, conjunctiva pink and moist.  Lids and lashes are normal.  Neck: Supple with no JVD. Lungs: Diminished to auscultation bilaterally. No rhonchi or rales. Heart: Fast and irregular, without murmur or rub.   Distal pulses tests on the individual orders. URINALYSIS WITH CULTURE REFLEX   RAINBOW DRAW BLUE   RAINBOW DRAW LAVENDER   RAINBOW DRAW LIGHT GREEN   RAINBOW DRAW GOLD   C. DIFFICILE(TOXIGENIC)PCR   OCCULT BLOOD, STOOL   STOOL CULTURE W/SHIGATOXIN    Narrative:      Bigg Mistry likely related to dehydration  Troponin negative  Shows normal white count with no predominance of neutrophils on differential.  Hemoglobin is 10.   Platelets 750, both improved from previous  Rapid COVID test negative    Chest x-ray  CONCLUSION:  Cardiomeg clinical staff.                    Present on Admission  Date Reviewed: 8/20/2020          ICD-10-CM Noted POA    Nausea vomiting and diarrhea R11.2, R19.7 8/24/2020 Unknown    Rapid atrial fibrillation ([facility] [address] I48.91 5/21/2020 Unknown

## 2020-08-24 NOTE — CONSULTS
Cardiology Consult Note     PRIMARY CARDIOLOGIST: Dr. Sunil Carroll      CONSULT FOR: Recurrent A. fib with rapid ventricular response       HISTORY: 52-year-old female history of CABG, history of type I aortic dissection that is being treated medically.   Has

## 2020-08-24 NOTE — H&P
[facility]  History and Physical     [facility] Patient Status:  Emergency    1950 MRN [id number]   Location [address] [facility] Attending Benito Linder MD   Hosp Day # 0 PCP Bethany Baird MD     Chief Complaint: Marilynn Antis BLEED N/A 10/24/2017    Performed by Roslyn Cool MD at [address]     • HYSTEROSCOPY     • REMOVAL GALLBLADDER     • TOTAL ABDOM HYSTERECTOMY         Social History:  reports that she quit smoking about 3 years ago.  Her smoking use included MG Oral Tab, Take 1 tablet (1 mg total) by mouth daily. , Disp: 90 tablet, Rfl: 0  atorvastatin 40 MG Oral Tab, Take 1 tablet (40 mg total) by mouth nightly., Disp: 30 tablet, Rfl: 5        Review of Systems:   A comprehensive 14 point review of systems was stable and then will need permission from cards to resume anticoag  3. Dysphagia-will ask GI to re-consult  4. Chronic diastolic CHF-seems compensated  5.  Hx type 1 dissection-previously deemed medical management last hospitalization; repeat CT pending; ho

## 2020-08-24 NOTE — ED NOTES
REPORT GIVEN TO CAIO FLOWERS, PT TO BE TRANSPORTED TO FLOOR WHEN ED PHYSICIAN NOTIFIES RN THAT CONSULTS ARE FULFILLED

## 2020-08-24 NOTE — ED INITIAL ASSESSMENT (HPI)
Pt presents to ed with diarrhea and vomiting for two days, pt reports not being able to swallow or keep fluids down

## 2020-08-25 ENCOUNTER — ANESTHESIA EVENT (OUTPATIENT)
Dept: ENDOSCOPY | Facility: HOSPITAL | Age: 70
DRG: 682 | End: 2020-08-25
Payer: MEDICARE

## 2020-08-25 LAB
ADENOVIRUS F 40/41 PCR: NEGATIVE
ALBUMIN SERPL-MCNC: 2.8 G/DL (ref 3.4–5)
ALBUMIN/GLOB SERPL: 1 {RATIO} (ref 1–2)
ALP LIVER SERPL-CCNC: 67 U/L (ref 55–142)
ALT SERPL-CCNC: 19 U/L (ref 13–56)
ANION GAP SERPL CALC-SCNC: 8 MMOL/L (ref 0–18)
AST SERPL-CCNC: 11 U/L (ref 15–37)
ASTROVIRUS PCR: NEGATIVE
BILIRUB SERPL-MCNC: 1.1 MG/DL (ref 0.1–2)
BUN BLD-MCNC: 13 MG/DL (ref 7–18)
BUN/CREAT SERPL: 11.6 (ref 10–20)
C CAYETANENSIS DNA SPEC QL NAA+PROBE: NEGATIVE
C DIFF TOX B STL QL: NEGATIVE
CALCIUM BLD-MCNC: 8.8 MG/DL (ref 8.5–10.1)
CAMPY SP DNA.DIARRHEA STL QL NAA+PROBE: NEGATIVE
CHLORIDE SERPL-SCNC: 120 MMOL/L (ref 98–112)
CO2 SERPL-SCNC: 17 MMOL/L (ref 21–32)
CREAT BLD-MCNC: 1.12 MG/DL (ref 0.55–1.02)
CRYPTOSP DNA SPEC QL NAA+PROBE: NEGATIVE
DEPRECATED RDW RBC AUTO: 63.6 FL (ref 35.1–46.3)
EAEC PAA PLAS AGGR+AATA ST NAA+NON-PRB: NEGATIVE
EC STX1+STX2 + H7 FLIC SPEC NAA+PROBE: NEGATIVE
ENTAMOEBA HISTOLYTICA PCR: NEGATIVE
EPEC EAE GENE STL QL NAA+NON-PROBE: NEGATIVE
ERYTHROCYTE [DISTWIDTH] IN BLOOD BY AUTOMATED COUNT: 18.4 % (ref 11–15)
ETEC LTA+ST1A+ST1B TOX ST NAA+NON-PROBE: NEGATIVE
GIARDIA LAMBLIA PCR: NEGATIVE
GLOBULIN PLAS-MCNC: 2.9 G/DL (ref 2.8–4.4)
GLUCOSE BLD-MCNC: 85 MG/DL (ref 70–99)
HAV IGM SER QL: 1.7 MG/DL (ref 1.6–2.6)
HCT VFR BLD AUTO: 28.2 % (ref 35–48)
HGB BLD-MCNC: 8.5 G/DL (ref 12–16)
M PROTEIN MFR SERPL ELPH: 5.7 G/DL (ref 6.4–8.2)
MCH RBC QN AUTO: 28.7 PG (ref 26–34)
MCHC RBC AUTO-ENTMCNC: 30.1 G/DL (ref 31–37)
MCV RBC AUTO: 95.3 FL (ref 80–100)
NOROVIRUS GI/GII PCR: NEGATIVE
OSMOLALITY SERPL CALC.SUM OF ELEC: 299 MOSM/KG (ref 275–295)
P SHIGELLOIDES DNA STL QL NAA+PROBE: NEGATIVE
PLATELET # BLD AUTO: 106 10(3)UL (ref 150–450)
POTASSIUM SERPL-SCNC: 3.6 MMOL/L (ref 3.5–5.1)
POTASSIUM SERPL-SCNC: 5 MMOL/L (ref 3.5–5.1)
RBC # BLD AUTO: 2.96 X10(6)UL (ref 3.8–5.3)
ROTAVIRUS A PCR: NEGATIVE
SALMONELLA DNA SPEC QL NAA+PROBE: NEGATIVE
SAPOVIRUS PCR: NEGATIVE
SHIGELLA SP+EIEC IPAH ST NAA+NON-PROBE: NEGATIVE
SODIUM SERPL-SCNC: 145 MMOL/L (ref 136–145)
V CHOLERAE DNA SPEC QL NAA+PROBE: NEGATIVE
VIBRIO DNA SPEC NAA+PROBE: NEGATIVE
WBC # BLD AUTO: 6.8 X10(3) UL (ref 4–11)
YERSINIA DNA SPEC NAA+PROBE: NEGATIVE

## 2020-08-25 PROCEDURE — 99232 SBSQ HOSP IP/OBS MODERATE 35: CPT | Performed by: HOSPITALIST

## 2020-08-25 PROCEDURE — 99232 SBSQ HOSP IP/OBS MODERATE 35: CPT | Performed by: INTERNAL MEDICINE

## 2020-08-25 RX ORDER — MAGNESIUM OXIDE 400 MG (241.3 MG MAGNESIUM) TABLET
400 TABLET ONCE
Status: COMPLETED | OUTPATIENT
Start: 2020-08-25 | End: 2020-08-25

## 2020-08-25 RX ORDER — METHIMAZOLE 5 MG/1
5 TABLET ORAL 2 TIMES DAILY
Status: DISCONTINUED | OUTPATIENT
Start: 2020-08-25 | End: 2020-09-05

## 2020-08-25 RX ORDER — POTASSIUM CHLORIDE 1.5 G/1.77G
40 POWDER, FOR SOLUTION ORAL EVERY 4 HOURS
Status: COMPLETED | OUTPATIENT
Start: 2020-08-25 | End: 2020-08-25

## 2020-08-25 RX ORDER — HYDRALAZINE HYDROCHLORIDE 50 MG/1
100 TABLET, FILM COATED ORAL 2 TIMES DAILY
Status: DISCONTINUED | OUTPATIENT
Start: 2020-08-25 | End: 2020-09-01

## 2020-08-25 RX ORDER — PANTOPRAZOLE SODIUM 40 MG/1
40 TABLET, DELAYED RELEASE ORAL
Status: DISCONTINUED | OUTPATIENT
Start: 2020-08-25 | End: 2020-08-25

## 2020-08-25 RX ORDER — METOPROLOL TARTRATE 5 MG/5ML
5 INJECTION INTRAVENOUS EVERY 6 HOURS
Status: DISCONTINUED | OUTPATIENT
Start: 2020-08-25 | End: 2020-08-31

## 2020-08-25 RX ORDER — ATORVASTATIN CALCIUM 40 MG/1
40 TABLET, FILM COATED ORAL NIGHTLY
Status: DISCONTINUED | OUTPATIENT
Start: 2020-08-25 | End: 2020-09-05

## 2020-08-25 RX ORDER — SUCRALFATE ORAL 1 G/10ML
1 SUSPENSION ORAL
Status: DISCONTINUED | OUTPATIENT
Start: 2020-08-25 | End: 2020-09-05

## 2020-08-25 NOTE — PLAN OF CARE
Pt received from Er this evening. Pt received with cardizem drip running at 15mg - for Atrial fibrillation w/ RVR. Vpacing noted per monitor as well. Pt received additional bolus of 10 mg Cardizem and drip increased to 20mg/hr for rates sustaining >120.

## 2020-08-25 NOTE — DIETARY MALNUTRITION NOTE
[facility]    NUTRITION INITIAL ASSESSMENT    Pt meets severe malnutrition criteria.     CRITERIA FOR MALNUTRITION DIAGNOSIS:  Criteria for severe malnutrition diagnosis: chronic illness related to energy intake less than 75% for greater than 1 month, questions during interview. She shook her head no when asked if swallowing hurt. Will continue to monitor pt and family's GOC. ANTHROPOMETRICS:  Ht:  157.5 cm (5' 2\")  Wt: 55.9 kg (123 lb 3.8 oz).  This is 106 % of IBW  BMI: Body mass index is 22.54 kg [address] [place] [address]

## 2020-08-25 NOTE — PLAN OF CARE
Problem: Impaired Swallowing  Goal: Minimize aspiration risk  Description  Interventions: If patient asks to eat or drink, allow patient to initiate chopped diet and thin liquids with aspiration precautions.    - Patient should be alert and upright for a

## 2020-08-25 NOTE — CONSULTS
[facility]                       Gastroenterology [address] [facility] Gastroenterology    Anne Pedro Patient Status:  Inpatient    1950 MRN [id number]   [facility] 2NE-A Attending Brandt Bonilla MD   [facility] Day # 1 PCP Marline Garcia use.  Stool is C. difficile negative. Only new medication change is the addition of antidepressant.   Since admission, bedside nurse reports toleration of clear liquids without dysphasia however patient is pooling pills in mouth and she has had 1 watery no (APRESOLINE) tab 100 mg, 100 mg, Oral, BID  methimazole (TAPAZOLE) tab 5 mg, 5 mg, Oral, BID  Pantoprazole Sodium (PROTONIX) EC tab 40 mg, 40 mg, Oral, BID AC  potassium chloride (KLOR-CON) powder packet 40 mEq, 40 mEq, Oral, Q4H  [COMPLETED] magnesium oxi bleeding;  + chronic anemia, thrombocytopenia            Dermatologic: The patient reports no recent rashes or chronic skin disorders            Rheumatologic: The patient reports no history of chronic arthritis, myalgias, arthralgias            Genitourin Lab 08/24/20  1329 08/25/20  0637   GLU 93 85   BUN 15 13   CREATSERUM 1.44* 1.12*   GFRAA 43* 58*   GFRNAA 37* 51*   CA 9.6 8.8    145   K 3.4* 3.6   * 120*   CO2 20.0* 17.0*     Recent Labs   Lab 08/24/20  1329 08/25/20  0637   RBC 3.42* 2. mediastinum. CARDIAC:  There is 4 chamber cardiomegaly. The left atrium and inferior pulmonary vein does have mild mass effect on the esophagus but this is not changed since study from 11/8/2019. PLEURA:  Trace right pleural effusion.     THORACIC AORTA: FLUOROSCOPY IMAGES OBTAINED:  6  FLUOROSCOPY TIME:  1:29  RADIATION DOSE (AIR KERMA PRODUCT):  367. 7uGy/m2     FINDINGS:    Please note that the exam is significantly limited due to the patient's inability to tolerate positioning or more than 2 sips of c dysphagia, known h/o stable thoracic aortic aneurysm admitted with sx of n/v/d for several days followed by worsening dysphagia, regurgitation post meals. In hospital, able to tolerate liquids without difficulty. Having harder time with pills. Recent EGD wi

## 2020-08-25 NOTE — ANESTHESIA PREPROCEDURE EVALUATION
PRE-OP EVALUATION    Patient Name: Dayanara Parikh    Pre-op Diagnosis: Dysphagia, unspecified type [R13.10]  Nausea [R11.0]    Procedure(s):  ESOPHAGOGASTRODUODENOSCOPY (EGD)    Surgeon(s) and Role:     Matthew Marroquin MD - Primary    Pre-op vital mg by mouth 2 (two) times a day., Disp: , Rfl:   spironolactone 25 MG Oral Tab, Take 25 mg by mouth daily. , Disp: , Rfl:   Pantoprazole Sodium 40 MG Oral Tab EC, Take 1 tablet (40 mg total) by mouth 2 (two) times daily before meals for 120 doses. , Disp: 12 Performed by Rito Ortega MD at [address] [facility] N/A 10/24/2017    Performed by Myrtle Magallanes MD at [address] N/A 10/24/2017    Performed by Myrtle Magallanes MD at [address] [facility]

## 2020-08-25 NOTE — SLP NOTE
ADULT SWALLOWING EVALUATION    ASSESSMENT    ASSESSMENT/OVERALL IMPRESSION:  Orders were received for a bedside swallowing evaluation as patient with recent history of not taking food, holding liquids and food in mouth eventually spitting out items.    Kika solids. Dietary and RN were present during assessment to discuss PEG tube for nutrition, hydration and medications. Patient's  agreeable to PEG placement.     No further follow up for skilled swallowing intervention as patient's dementia is likely cpap   • Visual impairment     glasses       Prior Living Situation: Home with spouse  Diet Prior to Admission: Thin liquids; Regular  Precautions: None    Patient/Family Goals: TO eat and drink    SWALLOWING HISTORY  Current Diet Consistency: (Clear Liquid UP  Treatment Plan/Recommendations: No further inpatient SLP service warranted  Number of Visits to Meet Established Goals: 0  Follow Up Needed: No       Thank you for your referral.   If you have any questions, please contact [facility]

## 2020-08-25 NOTE — CONSULTS
[facility]    PATIENT'S NAME: KRISTEN SULTANA   ATTENDING PHYSICIAN: BETTY Walters: Fadi Esparza M.D.    PATIENT ACCOUNT#:   [de-identified]    LOCATION:  [address] [place] [address]  MEDICAL RECORD #:   [id number]       DATE OF BIRTH SIGNS:  Currently through the emergency room, 98.2, atrial fibrillation 110, respirations 19, blood pressure elevated at 148/107, 100% sat. NECK:  No carotid bruits. No increased jugular venous pressure. LUNGS:  Clear without rales or rhonchi.   HEART:

## 2020-08-25 NOTE — RESPIRATORY THERAPY NOTE
EITAN : EQUIPMENT USE: DAILY SUMMARY                                            SET MODE: CPAP                                          USAGE IN HOURS:10.0                                          90% EXP. PRESSURE(EP

## 2020-08-25 NOTE — PROGRESS NOTES
[facility]  Cardiology Progress Note    Subjective:  No chest pain or shortness of breath.     Objective:  /83 (BP Location: Right arm)   Pulse 112   Temp 97.3 °F (36.3 °C) (Tympanic)   Resp 20   Wt 123 lb 3.8 oz (55.9 kg)   SpO2 97%   BMI 22.54 admission, resolved. · HTN    Plan:    - Needs better rate control. Historically on high dose metoprolol XL. Will start lopressor 5mg IV every 6 hours since still unable to swallow. Discussed with Dr. Anita Portillo, may ultimately require Peg tube.   GI fol

## 2020-08-25 NOTE — PROGRESS NOTES
[facility]OSPITAL[facility]  Progress note     Steffikeara Vargas Patient Status:  Emergency    1950 MRN [id number]   Location [address] [facility] [address] Attending Estefani Perez MD   Hosp Day # 1 PCP Duran Molina MD     Chief Complaint: vomting/di hx ablation/ppm; per cards rianna blockers IV; not on warfarin due to dissection? 3. Dysphagia-GI following; unclear if dissection with mass effect on esophagus? 4. Chronic diastolic CHF-seems compensated  5.  Hx type 1 dissection-previously deemed medical

## 2020-08-25 NOTE — PLAN OF CARE
Patient alert and oriented x3, disoriented to time. Vital signs stable, Afib with V-pacing on telemetry, rates 80-110s, Cardizem gtt infusing at 20 cc/hr. Lung sounds diminished bilaterally. Patient denies a cough.  Is refusing to swallow meds, holds them i intermittent suction as ordered  - Evaluate effectiveness of ordered antiemetic medications  - Provide nonpharmacologic comfort measures as appropriate  - Advance diet as tolerated, if ordered  - Obtain nutritional consult as needed  - Evaluate fluid miguel a

## 2020-08-25 NOTE — PLAN OF CARE
Assumed care of pt @2330. Pt axox3, forgetful at times. Disoriented to year . Flat affect. Denies pain at this time. Denies nausea. Tolerated 1/2 Giovanni ice earlier this evening. Pt has had 1 episode of diarrhea. Pt currently on CPAP , O2 sat >932%.  Grady Salazar

## 2020-08-26 ENCOUNTER — ANESTHESIA (OUTPATIENT)
Dept: ENDOSCOPY | Facility: HOSPITAL | Age: 70
DRG: 682 | End: 2020-08-26
Payer: MEDICARE

## 2020-08-26 LAB — HAV IGM SER QL: 1.6 MG/DL (ref 1.6–2.6)

## 2020-08-26 PROCEDURE — 99233 SBSQ HOSP IP/OBS HIGH 50: CPT | Performed by: HOSPITALIST

## 2020-08-26 PROCEDURE — 0DJ08ZZ INSPECTION OF UPPER INTESTINAL TRACT, VIA NATURAL OR ARTIFICIAL OPENING ENDOSCOPIC: ICD-10-PCS | Performed by: INTERNAL MEDICINE

## 2020-08-26 PROCEDURE — 99232 SBSQ HOSP IP/OBS MODERATE 35: CPT | Performed by: INTERNAL MEDICINE

## 2020-08-26 RX ORDER — SODIUM CHLORIDE, SODIUM LACTATE, POTASSIUM CHLORIDE, CALCIUM CHLORIDE 600; 310; 30; 20 MG/100ML; MG/100ML; MG/100ML; MG/100ML
INJECTION, SOLUTION INTRAVENOUS CONTINUOUS
Status: DISCONTINUED | OUTPATIENT
Start: 2020-08-26 | End: 2020-08-26

## 2020-08-26 RX ORDER — MAGNESIUM SULFATE HEPTAHYDRATE 40 MG/ML
2 INJECTION, SOLUTION INTRAVENOUS ONCE
Status: COMPLETED | OUTPATIENT
Start: 2020-08-26 | End: 2020-08-26

## 2020-08-26 RX ORDER — NALOXONE HYDROCHLORIDE 0.4 MG/ML
80 INJECTION, SOLUTION INTRAMUSCULAR; INTRAVENOUS; SUBCUTANEOUS AS NEEDED
Status: DISCONTINUED | OUTPATIENT
Start: 2020-08-26 | End: 2020-08-26 | Stop reason: HOSPADM

## 2020-08-26 NOTE — PAYOR COMM NOTE
--------------  ADMISSION REVIEW     [facility]  Subscriber #:  [id number]  Authorization Number: [id number]    Admit date: 8/24/20  Admit time: 1       Admitting Physician: Stefani Healy MD  Attending Physician:  Ximena Harman MD  Pr history of present illness and review of systems is limited.       Past Medical History:   Diagnosis Date   • A-fib ([facility]     WITH RAPID VENTRICULAR RESPONSE   • Arrhythmia 2016    hx of A-Fib   • Asthma    • Blood disorder     thrombocytopenia, anem off and on smoker    Alcohol use: No    Drug use:  No             Review of Systems  Unable to obtain    Physical Exam     ED Triage Vitals   BP 08/24/20 1256 (!) 147/113   Pulse 08/24/20 1256 (!) 173   Resp 08/24/20 1345 20   Temp 08/24/20 1256 98.2 °F (36 118.0 (*)     MCHC 30.2 (*)     RDW 18.2 (*)     RDW-SD 63.9 (*)     All other components within normal limits   TROPONIN I - Normal   RAPID SARS-COV-2 BY PCR - Normal   CBC WITH DIFFERENTIAL WITH PLATELET    Narrative:      The following orders were create diarrhea. The diarrhea has become quite profuse. Patient does not recall being on antibiotic in the last month. C. difficile colitis of course included in the differential.  Her abdominal examination is benign.   Patient was gently hydrated with normal s encounter diagnosis)  Dehydration  Renal insufficiency  Hypokalemia  Anemia, unspecified type  Thrombocytopenia (HCC)  Atrial fibrillation with rapid ventricular response (HCC)    Disposition:  Admit  8/24/2020  5:42 pm    Follow-up:  No follow-up provider A-Fib   • Asthma    • Blood disorder     thrombocytopenia, anemia   • Brain aneurysm 1999   • Calculus of kidney    • Cardiomegaly    • Chronic atrial fibrillation (HCC)    • Congestive heart disease (HCC)    • COPD (chronic obstructive pulmonary disease) RASH, ITCHING, SWELLING    Comment:Patient has received & tolerated diltiazem in past  Lisinopril              SWELLING    Comment:Pt takes medication at home 5/2020  Radiology Contrast *    NAUSEA AND VOMITING    Comment:PT HAD VOMITING    Med JVD. No carotid bruits. Respiratory: Clear to auscultation bilaterally. No wheezes. No rhonchi. Cardiovascular: S1, S2. irreg irreg, tachy No murmurs, rubs or gallops. Equal pulses. Chest and Back: No tenderness or deformity.   Abdomen: Soft, nontender, pending  Will do home meds once review complete    Quality:  · DVT Prophylaxis: scds  · CODE status: full  · Rangel: no    Plan of care discussed with patient and dr. López Anne MD  8/24/2020                        Electronically signed by Uri Mccray Betty Devlin MD      0.9% NaCl infusion     Date Action Dose Route User    8/26/2020 0135 New Bag (none) Intravenous Paula Cotto RN      sucralfate (CARAFATE) 1 GM/10ML suspension 1 g     Date Action Dose Route User    8/25/2020 2232 Given 1 g O negative x10 systems.       PHYSICAL EXAMINATION:    VITAL SIGNS:  Currently through the emergency room, 98.2, atrial fibrillation 110, respirations 19, blood pressure elevated at 148/107, 100% sat. NECK:  No carotid bruits.   No increased jugular venous diarrhea. Pt is unable to provide hx and one has been obtained via discussion with the patient's . It is reported that the patient experienced watery nonbloody diarrhea over the last 10 days which increased in frequency over the last 2 days.   Stool without nuchal rigidity  CV: Irregularly irregular, tachycardic  Resp: Diminished bilaterally without wheezes; rubs, rhonchi, or rales  Abdomen: Soft, non-tender, non-distended with the presence of bowel sounds;  No hepatosplenomegaly; no rebound or guardin answer to commands with yes or no, mostly non-verbal. Denies pain with swallowing, denies nausea. Abdomen soft non-distended non-tender. Acute worsening of dysphgia can be related to reflux esophagitis triggered by vomiting from gastroenteritis?  Candidal? taken her medications as a result.  She had AF with RVR in this setting.     Exam:   CV: RRR (ventricular pacing), normal s1 and s2  Pulm: CTAB, no DTP  GI: soft, non tender     Plan:     1) permanent atrial fibrillation  - s/p PPM, AVN ablation  - she has cabg  8. Hyperthyroidism  9. Possible extrinsice esophageal compression from dissection?-Recent EGD  10. Aortic dissection type 1, w/ adjacent hematoma -no surgical intervention/medical mgmt from past hospitalization  11. Severe protein malnutrition  12.  O mL/hr (08/24/20 1338)   • sodium chloride 100 mL/hr at 08/26/20 0135         Assessment:  · Atrial fibrillation RVR, (hx PAF), s/p Medtronic Bi V pacemaker and AV node ablation 7/27/20.  Admitted with recurrent Afib w/ RVR.  Site looks good.  Not an antico HOSPITALIST NOTE  Hosp Day # 2 PCP Juan Escoto MD      Chief Complaint: nausea vomiting diarrhea     S: Patient feels better overall     Review of Systems:   10 point ROS completed and was negative, except for pertinent positive and negatives stated in rosas DIAGNOSIS/INDICATION:  1. Dysphagia  POSTOPERTATIVE DIAGNOSIS:  1. Tortuous esophagus  2. Small hiatal hernia  3.  Normal stomach and duodenum  PROCEDURE PERFORMED: EGD   INFORMED CONSENT: Once a brief history and physical examination was performed, the ris

## 2020-08-26 NOTE — PROGRESS NOTES
[facility]  Cardiology Progress Note    Jose Carroll Patient Status:  Inpatient    1950 MRN [id number]   [facility] 2NE-A Attending Alf Ramirez MD   Hosp Day # 2 PCP Dallin Beltre MD     Subjective:  No cardiac complaints, hedy • methimazole  5 mg Oral BID   • metoprolol Tartrate  5 mg Intravenous Q6H   • pantoprazole (PROTONIX) IV push  40 mg Intravenous Q12H   • sucralfate  1 g Oral TID AC and HS   • potassium chloride  20 mEq Oral Once     • diltiazem 10 mg/hr (08/26/20 0741 PO intake  - no anticoagulation due to type A aortic dissection    2) type A aortic dissection  - this is being managed non-operatively as previously described    3) difficulty with PO intake  - may need PEG tube  - speech/swallow evaluation  - s/p EGD

## 2020-08-26 NOTE — PLAN OF CARE
Pt denies c/o pain, malaise, or cardiac symptoms. A&Ox3, oriented to person, place, and situation, disoriented to time. Lungs clear bilaterally with equal expansion, CPAP at night otherwise on room air. Pt afib/v-paced on monitor.  Abdomen soft and non-tend Advance diet as tolerated, if ordered  - Obtain nutritional consult as needed  - Evaluate fluid balance  Outcome: Progressing  Goal: Maintains or returns to baseline bowel function  Description  INTERVENTIONS:  - Assess bowel function  - Maintain adequate

## 2020-08-26 NOTE — OPERATIVE REPORT
Operative Report-Esophagogastroduodenoscopy      PREOPERATIVE DIAGNOSIS/INDICATION:  1. Dysphagia  POSTOPERTATIVE DIAGNOSIS:  1. Tortuous esophagus  2. Small hiatal hernia  3.  Normal stomach and duodenum  PROCEDURE PERFORMED: EGD overall clinical picture.   CC Report:     Chiquita Cabrera  8/26/2020  12:17 PM

## 2020-08-26 NOTE — PROGRESS NOTES
[facility]  Progress Note     Addyn Light Patient Status:  Inpatient    1950 MRN [id number]   [facility] 2NE-A Attending Tabby Silva MD   Hosp Day # 2 PCP Pema Wilkins MD     Chief Complaint: nausea vomiting diarrhea    S Imaging data reviewed in Epic.     Medications:   • magnesium sulfate  2 g Intravenous Once   • atorvastatin  40 mg Oral Nightly   • hydrALAZINE HCl  100 mg Oral BID   • methimazole  5 mg Oral BID   • metoprolol Tartrate  5 mg Intravenous Q6H   • pantoprazo

## 2020-08-26 NOTE — RESPIRATORY THERAPY NOTE
EITAN : EQUIPMENT USE: DAILY SUMMARY                                            SET MODE: CPAP                                          USAGE IN HOURS:10:12                                          90% EXP. PRESSURE(E

## 2020-08-26 NOTE — PROGRESS NOTES
Patient alert and oriented x3, disoriented to time. Lung sounds diminished bilaterally. Patient requested 1L nasal cannula placed for comfort. Saturating 100%. Patient denies any pain. Mepilex placed on sacrum, clean dry and intact.  Medications administere

## 2020-08-27 LAB
HAV IGM SER QL: 1.9 MG/DL (ref 1.6–2.6)
T3 SERPL-MCNC: 175 NG/DL (ref 60–181)
T4 FREE SERPL-MCNC: 3.6 NG/DL (ref 0.8–1.7)
TSI SER-ACNC: <0.005 MIU/ML (ref 0.36–3.74)

## 2020-08-27 PROCEDURE — 99232 SBSQ HOSP IP/OBS MODERATE 35: CPT | Performed by: HOSPITALIST

## 2020-08-27 PROCEDURE — 99232 SBSQ HOSP IP/OBS MODERATE 35: CPT | Performed by: INTERNAL MEDICINE

## 2020-08-27 NOTE — PLAN OF CARE
Pt denies c/o pain, malaise, or cardiac symptoms. A&Ox4. Lungs clear bilaterally with equal expansion, on 1L O2 for comfort, CPAP at night. Pt v-paced/afib on monitor with occasional PVCs.  Abdomen soft and non-tender with active bowel sounds in all four qu to baseline bowel function  Description  INTERVENTIONS:  - Assess bowel function  - Maintain adequate hydration with IV or PO as ordered and tolerated  - Evaluate effectiveness of GI medications  - Encourage mobilization and activity  - Obtain nutritional

## 2020-08-27 NOTE — RESPIRATORY THERAPY NOTE
EITAN Equipment Usage Summary :            Set Mode :AUTO CPAP W FLEX          Usage in Hours:12;39          90% Pressure (EPAP) : 11.5           90% Insp Pressure (IPAP);           AHI : 23          Supplemental Oxygen LPM :          Auto cpap ( MAX PRESSURE

## 2020-08-27 NOTE — PROGRESS NOTES
[facility]                       Gastroenterology Follow up [address] [facility] Gastroenterology    Zacarias Lion Patient Status:  Inpatient    1950 MRN [id number]   [facility] 2NE-A Attending Leo Perdomo MD   [facility] Day # 3 dilated thoracic aorta stable.    Dictated by (CST): Cyd Kehr, MD on 8/24/2020 at 5:19 PM     Finalized by (CST): Cyd Kehr, MD on 8/24/2020 at 5:28 PM       AST   Date/Time Value Ref Range Status   08/25/2020 06:37 AM 11 (L) 15 - 37 U/L Final

## 2020-08-27 NOTE — PLAN OF CARE
Alert & oriented x4. Answers questions appropriately, but has a flat affect & does seem to have some dementia. Psych consult placed. V.paced on tele w/ underlying a.fib. Cardizem gtt @ 10mg/hr w/ rates in mid 80's.  Attempted to wean to 5mg per protocol, bu vomiting  Description  INTERVENTIONS:  - Maintain adequate hydration with IV or PO as ordered and tolerated  - Nasogastric tube to low intermittent suction as ordered  - Evaluate effectiveness of ordered antiemetic medications  - Provide nonpharmacologic c Outcome: Progressing

## 2020-08-27 NOTE — PROGRESS NOTES
[facility]    Electrophysiology Follow Up Note      Brie Kern Patient Status:  Inpatient    1950 MRN [id number]   [facility] 2NE-A Attending Raina Rowell MD   Hosp Day # 3 PCP Mart Bhakta MD     Reason for oz      General: awake, interactive, fatigued  Psych: A+O to person and place  Neuro: no focal deficits  HEENT: MMM  Cardiac: RRR (v-pacing), normal s1 and s2  Lungs: CTAB, no DTP  Abdomen: Soft, NT, ND, BS+  Extremities: Warm, well perfused, normal cap re Hypokalemia     Obesity (BMI 30-39. 9)     Altered mental status     Fatigue     Benign essential HTN     Dyslipidemia     Altered mental status, unspecified altered mental status type     Leukocytosis     Weakness     RIVERA (dyspnea on exertion)     Chest pa

## 2020-08-27 NOTE — PROGRESS NOTES
[facility]  Progress Note     Daniloivette Loo Patient Status:  Inpatient    1950 MRN [id number]   [facility] 2NE-A Attending Nikki Rudd MD   Hosp Day # 3 PCP Nancy Miller MD     Chief Complaint: nausea vomiting diarrhea    S the last 168 hours. Recent Labs   Lab 08/24/20  1329   TROP <0.045            Imaging: Imaging data reviewed in Epic.     Medications:   • atorvastatin  40 mg Oral Nightly   • hydrALAZINE HCl  100 mg Oral BID   • methimazole  5 mg Oral BID   • metoprolol

## 2020-08-28 ENCOUNTER — APPOINTMENT (OUTPATIENT)
Dept: CT IMAGING | Facility: HOSPITAL | Age: 70
DRG: 682 | End: 2020-08-28
Attending: PHYSICIAN ASSISTANT
Payer: MEDICARE

## 2020-08-28 PROBLEM — R47.01 APHASIA: Status: ACTIVE | Noted: 2020-08-28

## 2020-08-28 LAB
ALBUMIN SERPL-MCNC: 2.8 G/DL (ref 3.4–5)
ALBUMIN/GLOB SERPL: 0.9 {RATIO} (ref 1–2)
ALP LIVER SERPL-CCNC: 74 U/L (ref 55–142)
ALT SERPL-CCNC: 13 U/L (ref 13–56)
ANION GAP SERPL CALC-SCNC: 7 MMOL/L (ref 0–18)
AST SERPL-CCNC: 16 U/L (ref 15–37)
BASOPHILS # BLD AUTO: 0.03 X10(3) UL (ref 0–0.2)
BASOPHILS NFR BLD AUTO: 0.4 %
BILIRUB SERPL-MCNC: 1.3 MG/DL (ref 0.1–2)
BUN BLD-MCNC: 12 MG/DL (ref 7–18)
BUN/CREAT SERPL: 9.6 (ref 10–20)
CALCIUM BLD-MCNC: 9.4 MG/DL (ref 8.5–10.1)
CHLORIDE SERPL-SCNC: 121 MMOL/L (ref 98–112)
CO2 SERPL-SCNC: 15 MMOL/L (ref 21–32)
CREAT BLD-MCNC: 1.25 MG/DL (ref 0.55–1.02)
DEPRECATED RDW RBC AUTO: 66.4 FL (ref 35.1–46.3)
EOSINOPHIL # BLD AUTO: 0.18 X10(3) UL (ref 0–0.7)
EOSINOPHIL NFR BLD AUTO: 2.3 %
ERYTHROCYTE [DISTWIDTH] IN BLOOD BY AUTOMATED COUNT: 18.6 % (ref 11–15)
GLOBULIN PLAS-MCNC: 3.1 G/DL (ref 2.8–4.4)
GLUCOSE BLD-MCNC: 93 MG/DL (ref 70–99)
HCT VFR BLD AUTO: 32.5 % (ref 35–48)
HGB BLD-MCNC: 9.7 G/DL (ref 12–16)
IMM GRANULOCYTES # BLD AUTO: 0.03 X10(3) UL (ref 0–1)
IMM GRANULOCYTES NFR BLD: 0.4 %
LYMPHOCYTES # BLD AUTO: 2.7 X10(3) UL (ref 1–4)
LYMPHOCYTES NFR BLD AUTO: 33.9 %
M PROTEIN MFR SERPL ELPH: 5.9 G/DL (ref 6.4–8.2)
MCH RBC QN AUTO: 29 PG (ref 26–34)
MCHC RBC AUTO-ENTMCNC: 29.8 G/DL (ref 31–37)
MCV RBC AUTO: 97.3 FL (ref 80–100)
MONOCYTES # BLD AUTO: 0.78 X10(3) UL (ref 0.1–1)
MONOCYTES NFR BLD AUTO: 9.8 %
NEUTROPHILS # BLD AUTO: 4.25 X10 (3) UL (ref 1.5–7.7)
NEUTROPHILS # BLD AUTO: 4.25 X10(3) UL (ref 1.5–7.7)
NEUTROPHILS NFR BLD AUTO: 53.2 %
OSMOLALITY SERPL CALC.SUM OF ELEC: 295 MOSM/KG (ref 275–295)
PLATELET # BLD AUTO: 132 10(3)UL (ref 150–450)
PLATELET MORPHOLOGY: NORMAL
POTASSIUM SERPL-SCNC: 3.5 MMOL/L (ref 3.5–5.1)
RBC # BLD AUTO: 3.34 X10(6)UL (ref 3.8–5.3)
SODIUM SERPL-SCNC: 143 MMOL/L (ref 136–145)
WBC # BLD AUTO: 8 X10(3) UL (ref 4–11)

## 2020-08-28 PROCEDURE — 99223 1ST HOSP IP/OBS HIGH 75: CPT | Performed by: NURSE PRACTITIONER

## 2020-08-28 PROCEDURE — 99232 SBSQ HOSP IP/OBS MODERATE 35: CPT | Performed by: HOSPITALIST

## 2020-08-28 PROCEDURE — 99232 SBSQ HOSP IP/OBS MODERATE 35: CPT | Performed by: INTERNAL MEDICINE

## 2020-08-28 PROCEDURE — 99223 1ST HOSP IP/OBS HIGH 75: CPT | Performed by: OTHER

## 2020-08-28 PROCEDURE — 70450 CT HEAD/BRAIN W/O DYE: CPT | Performed by: PHYSICIAN ASSISTANT

## 2020-08-28 RX ORDER — DIPHENHYDRAMINE HYDROCHLORIDE 50 MG/ML
50 INJECTION INTRAMUSCULAR; INTRAVENOUS ONCE
Status: COMPLETED | OUTPATIENT
Start: 2020-08-28 | End: 2020-08-29

## 2020-08-28 RX ORDER — METHYLPREDNISOLONE SODIUM SUCCINATE 40 MG/ML
40 INJECTION, POWDER, LYOPHILIZED, FOR SOLUTION INTRAMUSCULAR; INTRAVENOUS EVERY 6 HOURS
Status: COMPLETED | OUTPATIENT
Start: 2020-08-28 | End: 2020-08-29

## 2020-08-28 RX ORDER — POTASSIUM CHLORIDE 20 MEQ/1
40 TABLET, EXTENDED RELEASE ORAL EVERY 4 HOURS
Status: DISPENSED | OUTPATIENT
Start: 2020-08-28 | End: 2020-08-28

## 2020-08-28 NOTE — PROGRESS NOTES
[facility]  Progress Note     [facility] Patient Status:  Inpatient    1950 MRN [id number]   [facility] 2NE-A Attending Prince Levy MD   Hosp Day # 4 PCP Aranza Singh MD     Chief Complaint: nonverbal    S: averbal    Re Epic.    Medications:   • Potassium Chloride ER  40 mEq Oral Q4H   • atorvastatin  40 mg Oral Nightly   • hydrALAZINE HCl  100 mg Oral BID   • methimazole  5 mg Oral BID   • metoprolol Tartrate  5 mg Intravenous Q6H   • pantoprazole (PROTONIX) IV push  40

## 2020-08-28 NOTE — PROGRESS NOTES
[facility]  Progress Note     Anne Vasquez Patient Status:  Inpatient    1950 MRN [id number]   [facility] 2NE-A Attending Robinson Dalton MD   Hosp Day # 4 PCP Chad Amin MD     Chief Complaint: nonverbal    S: Patient nonve 3.5   * 120*  --  121*   CO2 20.0* 17.0*  --  15.0*   ALKPHO 90 67  --  74   AST 15 11*  --  16   ALT 26 19  --  13   BILT 1.4 1.1  --  1.3   TP 7.0 5.7*  --  5.9*       Estimated Creatinine Clearance: 34.1 mL/min (A) (based on SCr of 1.25 mg/dL (H)) stroke  1. OP neuropsych eval as outpatient was recommended in past and reviewed this with dtr past few admissions, she is requesting neuro eval here.      Quality:  · DVT Prophylaxis: scds  · CODE status: full - reviewed on past admission w/ dtr.    · Nikolai

## 2020-08-28 NOTE — CONSULTS
[address] [facility]  [id number]  Hospital Day #4  Date of Consult:   8/28/2020        Reason for Consultation:      Consult requested by Osmany MENDOZA for evaluation of palliative care needs,  Go • Cardiomegaly    • Chronic atrial fibrillation (HCC)    • Congestive heart disease (HCC)    • COPD (chronic obstructive pulmonary disease) ([facility]    • Coronary atherosclerosis    • Essential hypertension    • Heart attack ([facility] 10/2017   • High blood pre 5/2020  Radiology Contrast *    NAUSEA AND VOMITING    Comment:PT HAD VOMITING    Medications:       Current Facility-Administered Medications:   •  Potassium Chloride ER (K-DUR M20) CR tab 40 mEq, 40 mEq, Oral, Q4H  •  atorvastatin (LIPITOR) tab 40 mg, 40 Not Detected 07/17/2020    COVID19 Not Detected 06/23/2020        Cardiac  Recent Labs   Lab 08/24/20  1329   TROP <0.045          Imaging:  CT BRAIN OR HEAD (24309)  Narrative: PROCEDURE:  CT BRAIN OR HEAD ([id number])     COMPARISON:  [facility] , CT, CT BRAIN OR ischemic changes in the cerebral white matter. Stable small old infarcts in the basal ganglia. No evidence of acute territorial infarction.            Dictated by (CST): Viraj Siddiqui MD on 8/28/2020 at 12:50 PM       Finalized by (CST): Viraj Siddiqui, work  coma  Max Assist  Total Care Mouth care Drowsy or coma   0 Death     Palliative Care Assessment     Goals of Care: When I entered the room Mr. Flavio Mello was sitting at the bedside. I met with him in the room.  Palliative care meet with the family in Jul although has been declining based on the poor appetite, dysphagia, decreased alertness and decreased physical abilities.  \"I have been seeing the changes, this is what happen to my mother, I just did not want to accept it\" Emotional support provided and e discussed the risks and potential side effects related to feeding tubes.   -Possible agitation resulting in use of restraints  -Risk of recurrent aspiration pneumonia   -In frail patients if PEG is placed risk of irritation or leaking at the site, anesthes distress or if no spontaneous breaths, \" allow natural death. \"    The POLST discussion advanced to the difference between selective treatment vs comfort care  Selective treatment is appropriate for on going medical management and treatment for new potent care. 2. CODE STATUS: FULL CODE    Code status was reviewed with Paula Lopez today over the phone. She feels given her mother's decline she needs to re discuss full code vs DNAR with selective treatment.  She is aware the nurse will have the POLST for her to r

## 2020-08-28 NOTE — PLAN OF CARE
A/o x4, likely some hx dementia. Vpaced on tele. WNL on RA with CPAP @ noc. Denies any pain or SOB at this time. Flat affect, did refuse to swallow hydralazine this evening. Cardizem gtt infusing @ 10ml/hr with HR in 80s-90s. 0.9 NS infusing @ 50ml/hr.  Ezio function  Description  INTERVENTIONS:  - Assess bowel function  - Maintain adequate hydration with IV or PO as ordered and tolerated  - Evaluate effectiveness of GI medications  - Encourage mobilization and activity  - Obtain nutritional consult as needed

## 2020-08-28 NOTE — PLAN OF CARE
Assumed care of patient at 0730. Alert, unable to assess orientation. Patient completely non-verbal.   V-pacing/underlying Afib. Rates controlled-90s. Cardizem gtt maintained  . Refusing all oral medications and food. Patient at bedside.   Reports hector

## 2020-08-28 NOTE — CM/SW NOTE
Reviewed chart. Pt is current w/[facility]. Resume orders entered in the event pt decides to dc home w/them. PC consulted. Family undecided in regards to PEG tube. Apparently pt is leaning against it. Awaiting PC outcome. Will follow up regarding this.

## 2020-08-28 NOTE — PROGRESS NOTES
[facility]  Progress Note    Mavis Flores Patient Status:  Inpatient    1950 MRN [id number]   [facility] 2NE-A Attending Afsaneh Folod MD   [facility] Day # 4 PCP Dutch Galvez MD     Assessment:  1.   Persistent afib s/p BiV PPM + AVN Component Value Date    INR 1.13 (H) 07/17/2020     Lab Results   Component Value Date     08/28/2020    K 3.5 08/28/2020     08/28/2020    CO2 15.0 08/28/2020    BUN 12 08/28/2020    CREATSERUM 1.25 08/28/2020    GLU 93 08/28/2020    CA 9.4

## 2020-08-28 NOTE — RESPIRATORY THERAPY NOTE
EITAN - Equipment Use Daily Summary:  · Set Mode CFLEX  · Usage in hours: 7:35  · 90% Pressure (EPAP) level: 9.0  · 90% Insp Pressure (IPAP):   · AHI: 7.1  · Supplemental Oxygen:   · Comments:

## 2020-08-28 NOTE — PROGRESS NOTES
[id number] [facility] Neurology Initial Evaluation    Burton Mohs Patient Status:  Inpatient    1950 MRN [id number]   [facility] 2NE-A Attending Nereida Dudley MD   Hosp Day # 4 PCP Blayne Phipps MD     REASON FOR CONSULTATION:  Non-v • Osteoarthritis    • Pulmonary emphysema (HCC)    • Renal disorder     ckd   • Shortness of breath    • Sleep apnea     cpap   • Visual impairment     glasses       PAST SURGICAL HISTORY:  Past Surgical History:   Procedure Laterality Date   • BRAIN JAY mouth daily. , Disp: , Rfl: , 8/24/2020 at 0900  methimazole 5 MG Oral Tab, Take 5 mg by mouth 2 (two) times a day., Disp: , Rfl: , 8/24/2020 at 0900  spironolactone 25 MG Oral Tab, Take 25 mg by mouth daily. , Disp: , Rfl: , 8/24/2020 at 1200  Pantoprazole kg)   SpO2 95%   BMI 24.11 kg/m²   GENERAL:  Patient is a 77year old female in no acute distress. NEUROLOGICAL:  This patient is alert. No attempts at verbal response made. Does not follow any commands. PERRL +2 brisk.   No gaze deviation noted, but do

## 2020-08-28 NOTE — PROGRESS NOTES
[facility]                       Gastroenterology Follow up [address] [facility] Gastroenterology    Festus Reil Patient Status:  Inpatient    1950 MRN [id number]   [facility] 2NE-A Attending Fernanda Rowley MD   [facility] Day # 4 Discussed this with daughter Delfino Garcia 8/26, and to consider PEG if patient and family agree  Plan:   1. Palliative measures being considered  2.  Will follow peripherally over the weekend, call if PEG is desired    Stephanie Hardy MD  11:01 AM  8/28/2020

## 2020-08-28 NOTE — DIETARY MALNUTRITION NOTE
[facility]    NUTRITION ASSESSMENT    Pt meets severe malnutrition criteria.     CRITERIA FOR MALNUTRITION DIAGNOSIS:  Criteria for severe malnutrition diagnosis: chronic illness related to energy intake less than 75% for greater than 1 month, body fat feedings. Pt with hx of dementia, poor intake could be related to decline in cognition. Pt's  reported further decline since last admission. Pt unable to verbally answer any questions during interview.  She shook her head no when asked if swallowin breakdown  4.  Maintain lean body mass    MEDICATIONS:  Noted    LABS:  noted    Pt is at high nutrition risk    FOLLOW-UP DATE: 9/1    Belén Castillo RD, LDN  Clinical Dietitian  Pager - R Gonzales Hackett

## 2020-08-29 ENCOUNTER — APPOINTMENT (OUTPATIENT)
Dept: CT IMAGING | Facility: HOSPITAL | Age: 70
DRG: 682 | End: 2020-08-29
Attending: Other
Payer: MEDICARE

## 2020-08-29 PROCEDURE — 70498 CT ANGIOGRAPHY NECK: CPT | Performed by: OTHER

## 2020-08-29 PROCEDURE — 70496 CT ANGIOGRAPHY HEAD: CPT | Performed by: OTHER

## 2020-08-29 PROCEDURE — 99232 SBSQ HOSP IP/OBS MODERATE 35: CPT | Performed by: HOSPITALIST

## 2020-08-29 PROCEDURE — 99233 SBSQ HOSP IP/OBS HIGH 50: CPT | Performed by: OTHER

## 2020-08-29 NOTE — CONSULTS
[facility]    Report of Consultation    Lisa Sample Patient Status:  Inpatient    1950 MRN [id number]   [facility] 2NE-A Attending Autumn Hernandez MD   [facility] Day # 4 PCP Ashley Russ MD     Date of Admission:  2020  [facility])    • COPD (chronic obstructive pulmonary disease) ([facility]    • Coronary atherosclerosis    • Essential hypertension    • Heart attack ([facility] 10/2017   • High blood pressure    • High cholesterol    • History of blood transfusion    • Hyperlipide VOMITING    Medications:    Current Facility-Administered Medications:   •  atorvastatin (LIPITOR) tab 40 mg, 40 mg, Oral, Nightly  •  hydrALAzine HCl (APRESOLINE) tab 100 mg, 100 mg, Oral, BID  •  methimazole (TAPAZOLE) tab 5 mg, 5 mg, Oral, BID  •  metop 121 08/28/2020    CO2 15.0 08/28/2020    BUN 12 08/28/2020    CREATSERUM 1.25 08/28/2020    GLU 93 08/28/2020    CA 9.4 08/28/2020    ALKPHO 74 08/28/2020    ALT 13 08/28/2020    AST 16 08/28/2020    BILT 1.3 08/28/2020    ALB 2.8 08/28/2020    TP 5.9 08/2 unspecified type     Atrial fibrillation with controlled ventricular response (HCC)     Thrombocytopenia (HCC)     Hyperglycemia     Rapid atrial fibrillation (HCC)     Pneumonia     Urinary tract infection without hematuria, site unspecified     Pneumonia

## 2020-08-29 NOTE — PLAN OF CARE
Patient non-verbal not following commands at times.    moves all extremities  spontaneously,limited rom  Cta brain and neck done,seen by dr. Jona Monroe and aware of results  Patient dependent to staff ,encouraged patient to feed self does not try; patient a hygiene and moisture  - Encourage food from home; allow for food preferences  - Enhance eating environment  Outcome: Not Progressing     Problem: NEUROLOGICAL - ADULT  Goal: Achieves stable or improved neurological status  Description  INTERVENTIONS  - Ass

## 2020-08-29 NOTE — PLAN OF CARE
Pt is nonverbal today, refusing to speak and hardly making eye contact. Will not follow commands. Refusing to take oral medications this evening, IV medications were given. Refusing food and liquids this evening. WNL on RA, CPAP at night.  No statement or e balance  Outcome: Progressing  Goal: Maintains or returns to baseline bowel function  Description  INTERVENTIONS:  - Assess bowel function  - Maintain adequate hydration with IV or PO as ordered and tolerated  - Evaluate effectiveness of GI medications  - Chronic hypotension

## 2020-08-29 NOTE — PROGRESS NOTES
[id number] [facility] Neurology Progress Note    Savanah Kareen Patient Status:  Inpatient    1950 MRN [id number]   [facility] 2NE-A Attending Kalpesh Hawk MD   Hosp Day # 5 PCP Bryan Banegas MD       [facility]      Neurol ago), HTN, HLD, hx MI, who initially presented with N/V/D. During admission, she has become gradually slower to respond and yesterday stopped speaking, and thus Neurology was consulted. She just returned from CTA.   She currently remains non-verbal per extremities but none to command. She drops arms when they are lifted, but appears a soft drop with some resistance. Appears intact to light touch. Imaging/Diagnostics:  CTA H/N 8/29/20:  CONCLUSION:    1.  Type a dissection involving the ascending ao

## 2020-08-29 NOTE — PROGRESS NOTES
Tele reviewed. Shows afib w/ CVR. Continue present therapy. Cardiology will follow telemetry over weekend and return to visit Monday.     ALYCIA Duran

## 2020-08-29 NOTE — RESPIRATORY THERAPY NOTE
EITAN : EQUIPMENT USE: DAILY SUMMARY                                            SET MODE: CPAP WITH CFLEX                                          USAGE IN HOURS:10:37                                          90% EXP

## 2020-08-30 PROBLEM — R47.01 MUTISM: Status: ACTIVE | Noted: 2020-08-30

## 2020-08-30 PROCEDURE — 99231 SBSQ HOSP IP/OBS SF/LOW 25: CPT | Performed by: HOSPITALIST

## 2020-08-30 PROCEDURE — 99233 SBSQ HOSP IP/OBS HIGH 50: CPT | Performed by: OTHER

## 2020-08-30 NOTE — RESPIRATORY THERAPY NOTE
EITAN : EQUIPMENT USE: DAILY SUMMARY                                            SET MODE: CPAP WITH CFLEX                                          USAGE IN HOURS:10:36                                          90% EXP

## 2020-08-30 NOTE — PROGRESS NOTES
[facility]  Progress Note     Vignesh Im Patient Status:  Inpatient    1950 MRN [id number]   [facility] 2NE-A Attending Vandana Cook MD   [facility] Day # 6 PCP Gail Hester MD     Chief Complaint: nonverbal    S: averbal    Re Epic.    Medications:   • atorvastatin  40 mg Oral Nightly   • hydrALAZINE HCl  100 mg Oral BID   • methimazole  5 mg Oral BID   • metoprolol Tartrate  5 mg Intravenous Q6H   • pantoprazole (PROTONIX) IV push  40 mg Intravenous Q12H   • sucralfate  1 g Ora

## 2020-08-30 NOTE — PLAN OF CARE
Assumed patient care at 1900, patient drowsy, oriented to self. Patient is withdrawn, looks away when spoken to. Patient will state her name.  Patient follows very little commands  Medications given crushed in ice cream  Patient appears to be resting comfor Evaluate fluid balance  Outcome: Progressing

## 2020-08-30 NOTE — PLAN OF CARE
Pt received at 0730 resting in bed. A&Ox0, does not state her name or speak any discernible words for this shift so far. V paced with underlying a fib on the monitor. HR controlled, cardizem gtt infusing at 10mg/hr.  Eating breakfast this morning with some ordered  - Obtain nutritional consult as needed  - Evaluate fluid balance  Outcome: Progressing  Goal: Maintains or returns to baseline bowel function  Description  INTERVENTIONS:  - Assess bowel function  - Maintain adequate hydration with IV or PO as ord parameters to optimize cerebral perfusion and minimize risk of hemorrhage  - Monitor temperature, glucose, and sodium.  Initiate appropriate interventions as ordered  Outcome: Progressing     Problem: SKIN/TISSUE INTEGRITY - ADULT  Goal: Skin integrity cristóbal

## 2020-08-30 NOTE — PROGRESS NOTES
[id number] Jade Alejandro Neurology Progress Note    Festus Butt Patient Status:  Inpatient    1950 MRN [id number]   [facility] 2NE-A Attending Gustavo Marie MD   [facility] Day # 6 PCP Yvonne Scott MD       [facility]      Neurol become gradually slower to respond and yesterday stopped speaking, and thus Neurology was consulted. She remains non-verbal.  She tracks and regards this author, and is feeding herself breakfast and appears to be watching television.          MEDICATIONS: patent without significant stenosis or aneurysmal dilatation. 3. 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  This is causing significant beam hardening artifact.   4. An aneurysm extending in a caudal direction off the supra clinoid right internal

## 2020-08-31 PROCEDURE — 99232 SBSQ HOSP IP/OBS MODERATE 35: CPT | Performed by: HOSPITALIST

## 2020-08-31 PROCEDURE — 99233 SBSQ HOSP IP/OBS HIGH 50: CPT | Performed by: INTERNAL MEDICINE

## 2020-08-31 RX ORDER — METOPROLOL TARTRATE 5 MG/5ML
5 INJECTION INTRAVENOUS ONCE
Status: COMPLETED | OUTPATIENT
Start: 2020-08-31 | End: 2020-08-31

## 2020-08-31 RX ORDER — DILTIAZEM HYDROCHLORIDE 5 MG/ML
10 INJECTION INTRAVENOUS ONCE
Status: COMPLETED | OUTPATIENT
Start: 2020-08-31 | End: 2020-08-31

## 2020-08-31 RX ORDER — METOPROLOL TARTRATE 5 MG/5ML
5 INJECTION INTRAVENOUS EVERY 6 HOURS
Status: DISCONTINUED | OUTPATIENT
Start: 2020-08-31 | End: 2020-09-01

## 2020-08-31 NOTE — PROGRESS NOTES
Assumed care 0730 resting in bed. A&Ox0, does not state her name or speak any discernible words for this shift so far. V paced with underlying a fib on the monitor. HR 110s, cardizem gtt infusing at 20mg/hr.  at bedside.  Per , family has deci function  Description  INTERVENTIONS:  - Assess bowel function  - Maintain adequate hydration with IV or PO as ordered and tolerated  - Evaluate effectiveness of GI medications  - Encourage mobilization and activity  - Obtain nutritional consult as needed interventions as ordered  Outcome: Progressing     Problem: SKIN/TISSUE INTEGRITY - ADULT  Goal: Skin integrity remains intact  Description  INTERVENTIONS  - Assess and document risk factors for pressure ulcer development  - Assess and document skin integr

## 2020-08-31 NOTE — PROGRESS NOTES
Spoke with hospice RN - continue currently mgmt/tune up HR and likely transition home w/ family and hospice services at discharge.

## 2020-08-31 NOTE — PROGRESS NOTES
[facility]  Cardiology Progress Note    Subjective:  No chest pain or shortness of breath. Developed afib w/ accelerated rates today.     Objective:  /79 (BP Location: Left arm)   Pulse 111   Temp 98.7 °F (37.1 °C) (Oral)   Resp 18   Wt 136 lb 9 vomiting. Crissy Anderson, CRUZITO  8/31/2020  11:01 AM      =======================================================  Patient seen and examined independently. Note reviewed and labs reviewed. Agree with above assessment and plan. Mute.   CV exam, irregular

## 2020-08-31 NOTE — PROGRESS NOTES
[facility]ITAL[facility]  Progress Note     Arizona Dixon Patient Status:  Inpatient    1950 MRN [id number]   [facility] 2NE-A Attending Prince Levy MD   [facility] Day # 7 PCP Aranza Singh MD     Chief Complaint: nonverbal    S: pt not speaki mg/dL (H)). No results for input(s): PTP, INR in the last 168 hours. Recent Labs   Lab 08/24/20  1329   TROP <0.045            Imaging: Imaging data reviewed in Epic.     Medications:   • metoprolol Tartrate  5 mg Intravenous Q6H   • atorvastatin  40

## 2020-08-31 NOTE — PROGRESS NOTES
Thyroid level still elevated however improved from 7/2020. Pt can continue the methimazole 10mg daily and repeat TFTs one week prior to next appt - already ordered.

## 2020-08-31 NOTE — PALLIATIVE CARE NOTE
Chart reviewed. When I went to see Slim Pantoja, no family was in the room and Slim Pantoja can not provide update information. I called Uriah FLEMING and she informed me once the medical team states her mother can be discharged they will transition to home with hospice.

## 2020-08-31 NOTE — DIETARY MALNUTRITION NOTE
[facility]    NUTRITION ASSESSMENT    Pt meets severe malnutrition criteria.     CRITERIA FOR MALNUTRITION DIAGNOSIS:  Criteria for severe malnutrition diagnosis: chronic illness related to energy intake less than 75% for greater than 1 month, body fat mouth, then having to spit it out. Family is deciding whether PEG placement is to be pursued. RD spoke to pt's  and daughter to answer any questions they may have about tube feedings.  Pt with hx of dementia, poor intake could be related to decline i discretion    MONITOR AND EVALUATE/NUTRITION GOALS:    1. PO intake to meet at least 75% patient nutrition prescription  2. At least 75% intake of oral supplements  3. No signs of skin breakdown  4.  Maintain lean body mass    MEDICATIONS:  Noted    LABS:

## 2020-08-31 NOTE — CM/SW NOTE
Order written yesterday for hospice. SW [facility].     Renetta Arias LCSW  /Discharge Planner  [phone]

## 2020-08-31 NOTE — PLAN OF CARE
Assumed pt care at [facility]. A&Ox1, delayed responses, expressive aphasia. RA, denies chest pain/SOB, lung sounds clear, VSS, Vpaced w/ afib underlying on tele. Voids. Up with 2/pivot. Sacrum red, mepilex placed.  POC cardizem gtt, IVF, hospice/psych to see, PO function  Description  INTERVENTIONS:  - Assess bowel function  - Maintain adequate hydration with IV or PO as ordered and tolerated  - Evaluate effectiveness of GI medications  - Encourage mobilization and activity  - Obtain nutritional consult as needed interventions as ordered  Outcome: Progressing     Problem: SKIN/TISSUE INTEGRITY - ADULT  Goal: Skin integrity remains intact  Description  INTERVENTIONS  - Assess and document risk factors for pressure ulcer development  - Assess and document skin integr

## 2020-08-31 NOTE — RESPIRATORY THERAPY NOTE
EITAN - Equipment Use Daily Summary:  · Set Mode CPAP  · Usage in hours: 7:40  · 90% Pressure (EPAP) level: 5.0  · 90% Insp Pressure (IPAP):   · AHI: 6.5  · Supplemental Oxygen:   · Comments:

## 2020-08-31 NOTE — CM/SW NOTE
SW  Met with pt,  and daughter by phone to discuss hospice services and goals of care.  Pt in alert but nonverbal.  Daughter is poa and is interested in hospice services since this has been a cycle However, want to continue to have everything done an

## 2020-09-01 PROCEDURE — 99232 SBSQ HOSP IP/OBS MODERATE 35: CPT | Performed by: HOSPITALIST

## 2020-09-01 PROCEDURE — 99233 SBSQ HOSP IP/OBS HIGH 50: CPT | Performed by: NURSE PRACTITIONER

## 2020-09-01 RX ORDER — METOPROLOL TARTRATE 5 MG/5ML
2.5 INJECTION INTRAVENOUS ONCE
Status: DISCONTINUED | OUTPATIENT
Start: 2020-09-01 | End: 2020-09-01

## 2020-09-01 RX ORDER — METOPROLOL TARTRATE 5 MG/5ML
5 INJECTION INTRAVENOUS EVERY 6 HOURS
Status: DISCONTINUED | OUTPATIENT
Start: 2020-09-01 | End: 2020-09-02

## 2020-09-01 RX ORDER — METOPROLOL TARTRATE 5 MG/5ML
7.5 INJECTION INTRAVENOUS EVERY 6 HOURS
Status: DISCONTINUED | OUTPATIENT
Start: 2020-09-01 | End: 2020-09-01

## 2020-09-01 RX ORDER — HYDRALAZINE HYDROCHLORIDE 20 MG/ML
10 INJECTION INTRAMUSCULAR; INTRAVENOUS
Status: DISCONTINUED | OUTPATIENT
Start: 2020-09-01 | End: 2020-09-03

## 2020-09-01 NOTE — PLAN OF CARE
Assumed pt care at [address]. A&Ox1, delayed responses, expressive aphasia. RA, denies chest pain/SOB, lung sounds clear, VSS, Vpaced w/ afib underlying on tele. Voids. Up with 2/pivot. Sacrum red, mepilex placed. POC cardizem gtt, IVF.  POC updated with pt and function  Description  INTERVENTIONS:  - Assess bowel function  - Maintain adequate hydration with IV or PO as ordered and tolerated  - Evaluate effectiveness of GI medications  - Encourage mobilization and activity  - Obtain nutritional consult as needed interventions as ordered  Outcome: Progressing     Problem: SKIN/TISSUE INTEGRITY - ADULT  Goal: Skin integrity remains intact  Description  INTERVENTIONS  - Assess and document risk factors for pressure ulcer development  - Assess and document skin integr

## 2020-09-01 NOTE — PROGRESS NOTES
Assumed care 0730 resting in bed. A&Ox0, does not state her name or speak any discernible words for this shift so far. V paced with underlying a fib on the monitor. HR 110s, cardizem gtt infusing at 20mg/hr to continue. Started metoprolol as order.  adequate hydration with IV or PO as ordered and tolerated  - Evaluate effectiveness of GI medications  - Encourage mobilization and activity  - Obtain nutritional consult as needed  - Establish a toileting routine/schedule  - Consider collaborating with ph ADULT  Goal: Skin integrity remains intact  Description  INTERVENTIONS  - Assess and document risk factors for pressure ulcer development  - Assess and document skin integrity  - Monitor for areas of redness and/or skin breakdown  - Initiate interventions,

## 2020-09-01 NOTE — PROGRESS NOTES
[facility]  Cardiology Progress Note    Luciano Morales Patient Status:  Inpatient    1950 MRN [id number]   [facility] 2NE-A Attending Maddy Douglass MD   [facility] Day # 8 PCP Bebe Nava MD     Subjective:  Sitting up in chair.  Shake · Hyperthyroidism, TSH less than 0.005 - on methimazole per endo  · Chronic type A aortic dissection, medical management  · Chronic dysphagia with severe protein malnutrition.  Patient's family does not want PEG and plans to meet with hospice nurse today

## 2020-09-01 NOTE — PROGRESS NOTES
[facility]  Progress Note     Vero Huang Patient Status:  Inpatient    1950 MRN [id number]   [facility] 2NE-A Attending Britta Berumen MD   [facility] Day # 8 PCP Ayesha Camara MD     Chief Complaint: nonverbal    S: pt up in [facility] tartrate  25 mg Oral QID Beta Blocker   • atorvastatin  40 mg Oral Nightly   • hydrALAZINE HCl  100 mg Oral BID   • methimazole  5 mg Oral BID   • pantoprazole (PROTONIX) IV push  40 mg Intravenous Q12H   • sucralfate  1 g Oral TID AC and HS   • potassium

## 2020-09-01 NOTE — RESPIRATORY THERAPY NOTE
EITAN Equipment Usage Summary :            Set Mode :AUTO CPAP W FLEX          Usage in GODSD:38;89          90% Pressure (EPAP) : 10.5           90% Insp Pressure (IPAP);           AHI : 7.6          Supplemental Oxygen LPM :

## 2020-09-02 LAB — GLUCOSE BLD-MCNC: 94 MG/DL (ref 70–99)

## 2020-09-02 PROCEDURE — 99232 SBSQ HOSP IP/OBS MODERATE 35: CPT | Performed by: HOSPITALIST

## 2020-09-02 PROCEDURE — 99233 SBSQ HOSP IP/OBS HIGH 50: CPT | Performed by: CLINICAL NURSE SPECIALIST

## 2020-09-02 RX ORDER — METOPROLOL TARTRATE 5 MG/5ML
5 INJECTION INTRAVENOUS EVERY 4 HOURS
Status: DISCONTINUED | OUTPATIENT
Start: 2020-09-02 | End: 2020-09-03

## 2020-09-02 NOTE — CM/SW NOTE
Went to check on status of pt. Spoke with daughter Peterson Castillo. Daughter still hoping to manage pts heart rate better. Will talk with REJI Dawkins to assist with determine care plan.

## 2020-09-02 NOTE — PLAN OF CARE
Assumed care at 0730. Pt is A&O x1 with delayed responses. RA, denies chest pain, SOB. CPAP at night. Vpaced with afib, -120s continue to monitor. Purewick in use. Denies pain. Up x2 stand and pivot.      POC: daily weight, fall precautions, cardizem meal consumed  - Identify factors contributing to decreased intake, treat as appropriate  - Assist with meals as needed  - Monitor I&O, WT and lab values  - Obtain nutritional consult as needed  - Optimize oral hygiene and moisture  - Encourage food from h

## 2020-09-02 NOTE — PROGRESS NOTES
[facility]PITAL[facility]  Progress Note     Vipul Box Patient Status:  Inpatient    1950 MRN [id number]   [facility] 2NE-A Attending Michelle Meza MD   [facility] Day # 9 PCP Kelsey Reyes MD     Chief Complaint: nonverbal    S: pt awake, say atorvastatin  40 mg Oral Nightly   • methimazole  5 mg Oral BID   • pantoprazole (PROTONIX) IV push  40 mg Intravenous Q12H   • sucralfate  1 g Oral TID AC and HS   • potassium chloride  20 mEq Oral Once       ASSESSMENT / PLAN:     1.  Severe protein malnu

## 2020-09-02 NOTE — RESPIRATORY THERAPY NOTE
EITAN Equipment Usage Summary :            Set Mode :CPAP W FLEX          Usage in Hours:7;23          90% Pressure (EPAP) : 5           90% Insp Pressure (IPAP);           AHI : 8          Supplemental Oxygen LPM :

## 2020-09-02 NOTE — PROGRESS NOTES
[facility] Cardiology Progress Note    Subjective:  She is answering me with a few words. She told me she brought up her family in [facility]. Following simple commands.      Objective:  /87 (BP Location: Left arm)   Pulse (!) 121   Temp 98.1 °F (36.7 °

## 2020-09-02 NOTE — PLAN OF CARE
Assumed pt care at 1. A&Ox1, delayed responses, expressive aphasia. RA, denies chest pain/SOB, lung sounds clear, VSS, Vpaced w/ afib underlying on tele. Voids. Up with 2/pivot. Sacrum red, mepilex placed. POC cardizem gtt, IVF, IV lopressor.  POC update function  Description  INTERVENTIONS:  - Assess bowel function  - Maintain adequate hydration with IV or PO as ordered and tolerated  - Evaluate effectiveness of GI medications  - Encourage mobilization and activity  - Obtain nutritional consult as needed interventions as ordered  Outcome: Progressing     Problem: SKIN/TISSUE INTEGRITY - ADULT  Goal: Skin integrity remains intact  Description  INTERVENTIONS  - Assess and document risk factors for pressure ulcer development  - Assess and document skin integr

## 2020-09-03 PROCEDURE — 99233 SBSQ HOSP IP/OBS HIGH 50: CPT | Performed by: CLINICAL NURSE SPECIALIST

## 2020-09-03 PROCEDURE — 99232 SBSQ HOSP IP/OBS MODERATE 35: CPT | Performed by: HOSPITALIST

## 2020-09-03 RX ORDER — PANTOPRAZOLE SODIUM 40 MG/1
40 TABLET, DELAYED RELEASE ORAL
Status: DISCONTINUED | OUTPATIENT
Start: 2020-09-03 | End: 2020-09-05

## 2020-09-03 RX ORDER — DILTIAZEM HYDROCHLORIDE 60 MG/1
120 TABLET, FILM COATED ORAL EVERY 12 HOURS SCHEDULED
Status: DISCONTINUED | OUTPATIENT
Start: 2020-09-03 | End: 2020-09-05

## 2020-09-03 RX ORDER — DILTIAZEM HYDROCHLORIDE 120 MG/1
120 TABLET, FILM COATED ORAL EVERY 12 HOURS SCHEDULED
Qty: 30 TABLET | Refills: 3 | Status: SHIPPED | OUTPATIENT
Start: 2020-09-03

## 2020-09-03 RX ORDER — METOPROLOL TARTRATE 100 MG/1
100 TABLET ORAL
Qty: 60 TABLET | Refills: 3 | Status: SHIPPED | OUTPATIENT
Start: 2020-09-03

## 2020-09-03 RX ORDER — METOPROLOL TARTRATE 100 MG/1
100 TABLET ORAL
Status: DISCONTINUED | OUTPATIENT
Start: 2020-09-03 | End: 2020-09-05

## 2020-09-03 RX ORDER — METOPROLOL SUCCINATE 100 MG/1
200 TABLET, EXTENDED RELEASE ORAL
Status: DISCONTINUED | OUTPATIENT
Start: 2020-09-03 | End: 2020-09-03

## 2020-09-03 NOTE — PROGRESS NOTES
[facility] Note: Route Optimization for Pantoprazole (PROTONIX)    Patient is currently on Pantoprazole (PROTONIX) 40 mg IV every 12 hours.    The patient meets the criteria to convert to the oral equivalent as established by the IV to Oral conversion pro

## 2020-09-03 NOTE — DIETARY MALNUTRITION NOTE
[facility]  NUTRITION ASSESSMENT    Pt meets severe malnutrition criteria.     CRITERIA FOR MALNUTRITION DIAGNOSIS:  Criteria for severe malnutrition diagnosis: chronic illness related to energy intake less than 75% for greater than 1 month, body fat s last seen on 7/22/20. Per current wt pt has lost 19 lbs over 3 months, 14% wt loss. SLP eval was being conducted during visit. Pt holding most food in her mouth, then having to spit it out. Family is deciding whether PEG placement is to be pursued.  LIS parks calories/day (25-30 calories per kg)  Protein: 72-82 grams protein/day (1.3-1.5 grams protein per kg)  Fluid: ~1 ml/kcal or per MD discretion    MONITOR AND EVALUATE/NUTRITION GOALS:  1. PO intake to meet at least 75% patient nutrition prescription  2.  At

## 2020-09-03 NOTE — PLAN OF CARE
Received report on this Pt. , with bedside rounding at 1600. Pt., awake, was sitting up in the chair, A&O to self, delayed response, spoke only a few words, denied any pain or distress.  Pt., is V-Paced on Tele monitor, sats greater than 92% on RA during the

## 2020-09-03 NOTE — PLAN OF CARE
Assumed care at 0730. Pt is A&O x1 with delayed responses. RA, denies chest pain, SOB. CPAP at night. Vpaced underlying afib, HR fluctuates in the 90s. D/c cardidem gtt, being toprol PO. Continue to monitor. Purewick in use. Denies pain.  Up x2 stand and pi function  Description  INTERVENTIONS:  - Assess bowel function  - Maintain adequate hydration with IV or PO as ordered and tolerated  - Evaluate effectiveness of GI medications  - Encourage mobilization and activity  - Obtain nutritional consult as needed interventions as ordered  Outcome: Progressing     Problem: SKIN/TISSUE INTEGRITY - ADULT  Goal: Skin integrity remains intact  Description  INTERVENTIONS  - Assess and document risk factors for pressure ulcer development  - Assess and document skin integr

## 2020-09-03 NOTE — PLAN OF CARE
Assumed care of pt around 1930. Pt Aox1. Ra. Denies pain. Afib on tele. Rates 90-110s. Cadrizem gtt infusing per protocol, will wean as able. VSS. Will continue to monitor.        Problem: CARDIOVASCULAR - ADULT  Goal: Maintains optimal cardiac output and h mobilization and activity  - Obtain nutritional consult as needed  - Establish a toileting routine/schedule  - Consider collaborating with pharmacy to review patient's medication profile  Outcome: Progressing  Goal: Maintains adequate nutritional intake (u pressure ulcer development  - Assess and document skin integrity  - Monitor for areas of redness and/or skin breakdown  - Initiate interventions, skin care algorithm/standards of care as needed  Outcome: Progressing  Goal: Oral mucous membranes remain [place] ADULT  Goal: Minimal or absence of nausea and vomiting  Description  INTERVENTIONS:  - Maintain adequate hydration with IV or PO as ordered and tolerated  - Nasogastric tube to low intermittent suction as ordered  - Evaluate effectiveness of ordered antiem or wet/gurgly vocal quality is noted   Outcome: Progressing     Problem: NEUROLOGICAL - ADULT  Goal: Achieves stable or improved neurological status  Description  INTERVENTIONS  - Assess for and report changes in neurological status  - Initiate measures to

## 2020-09-03 NOTE — PROGRESS NOTES
[facility]  Progress Note     Marielle Don Patient Status:  Inpatient    1950 MRN [id number]   [facility] 2NE-A Attending Wilver Souza MD   Hosp Day # 8 PCP Rylan Hutton MD     Chief Complaint: nonverbal    S: pt awake up mg Oral Nightly   • methimazole  5 mg Oral BID   • sucralfate  1 g Oral TID AC and HS   • potassium chloride  20 mEq Oral Once       ASSESSMENT / PLAN:     1. Severe protein malnutrition, chronic N/V, chronic dysphagia, FTT  1.  Causing recurrent hospitaliz LUANNE MONTOYA - [place]  Internal Medicine Hospitalist  Pager [phone]  [facility] [phone]

## 2020-09-03 NOTE — PROGRESS NOTES
[facility] Cardiology Progress Note    Subjective:  She says she is fine. Up in chair, family at bedside.      Objective:  /90 (BP Location: Left arm)   Pulse 90   Temp 97.5 °F (36.4 °C) (Axillary)   Resp 17   Wt 141 lb (64 kg)   SpO2 98%   BMI 25.79 kg/

## 2020-09-03 NOTE — RESPIRATORY THERAPY NOTE
EITAN Equipment Usage Summary :            Set Mode : CPAP W FLEX          Usage in Hours:11;06          90% Pressure (EPAP) : 5           90% Insp Pressure (IPAP);           AHI : 14.1

## 2020-09-04 PROCEDURE — 99232 SBSQ HOSP IP/OBS MODERATE 35: CPT | Performed by: NURSE PRACTITIONER

## 2020-09-04 PROCEDURE — 99232 SBSQ HOSP IP/OBS MODERATE 35: CPT | Performed by: HOSPITALIST

## 2020-09-04 NOTE — PLAN OF CARE
Received patient this am alert to self. Able to answer some yes/ no questions  With much encouragement, patient was able to take cardiac meds, declining to take other meds on occasion- education provided.    Declines pain   Able to eat some peaches for lun

## 2020-09-04 NOTE — PROGRESS NOTES
[facility]  Progress Note     Luellen Light Patient Status:  Inpatient    1950 MRN [id number]   [facility] 2NE-A Attending Tabby Silva MD   [address] Day # 6 PCP Pema Wilkins MD     Chief Complaint: nonverbal    S: pt awake in atorvastatin  40 mg Oral Nightly   • methimazole  5 mg Oral BID   • sucralfate  1 g Oral TID AC and HS   • potassium chloride  20 mEq Oral Once       ASSESSMENT / PLAN:     1. Severe protein malnutrition, chronic N/V, chronic dysphagia, FTT  1.  Causing rec signs of distress; vitals stable  Cardiovascular: irreg irreg.  Rates in 80s  Respiratory: clear to ausculation bilaterally; no labored breathing pattern  Abdomen: nontender; nondistended        Assessment/Plan:  I have had a face-to-face encounter with [facility]

## 2020-09-04 NOTE — CM/SW NOTE
TRISTAN spoke with daughter Aixa Ewing and  felix to finalize discharge plans for hospice/. Consents were signed by email. Equipment ordered and plan for discharge tomorrow at 1pm by ambulance.

## 2020-09-04 NOTE — PLAN OF CARE
Assumed care of pt around 1930. Pt Aox1. RA, cpap overnight. Afib, vpaced on tele. VSS. Aspiration precautions maintained. Will continue to monitor.        0600: attempted to give patient morning meds, patient refusing stating she just wants to be left yaw with IV or PO as ordered and tolerated  - Evaluate effectiveness of GI medications  - Encourage mobilization and activity  - Obtain nutritional consult as needed  - Establish a toileting routine/schedule  - Consider collaborating with pharmacy to review pa integrity remains intact  Description  INTERVENTIONS  - Assess and document risk factors for pressure ulcer development  - Assess and document skin integrity  - Monitor for areas of redness and/or skin breakdown  - Initiate interventions, skin care algorit

## 2020-09-04 NOTE — DISCHARGE SUMMARY
[facility] HOSPITAL[facility]  DISCHARGE SUMMARY     Jeremias Piña Patient Status:  Inpatient    1950 MRN [id number]   [facility] 2NE-A Attending Kelsi Adams MD   [address] Day # 15 PCP Jona Bellamy MD     Date of Admission: 2020  Date of anticoagulation candidate due to her prior aortic dissection. Palliative care evaluated. Patient now DNAR. Family transitioning pt to hospice at discharge. Reviewed d/c plan with dtr/hospice RN on 9/4.   Encourage pt family to get list of caregivers in hydrALAZINE HCl 100 MG Tabs  Commonly known as:  APRESOLINE        Metoprolol Succinate  MG Tb24  Commonly known as:  TOPROL-XL        Potassium Chloride ER 10 MEQ Tbcr  Commonly known as:  K-DUR        spironolactone 25 MG Tabs  Commonly known as:

## 2020-09-04 NOTE — PROGRESS NOTES
[facility]  Cardiology Progress Note    Lukasz Garcia Patient Status:  Inpatient    1950 MRN [id number]   [facility] 2NE-A Attending Jasper Jones MD   [facility] Day # 6 PCP Kiara Campbell MD     Subjective:  No cardiac complaints, Oral BID AC   • metoprolol tartrate  100 mg Oral 2x Daily(Beta Blocker)   • dilTIAZem HCl  120 mg Oral 2 times per day   • atorvastatin  40 mg Oral Nightly   • methimazole  5 mg Oral BID   • sucralfate  1 g Oral TID AC and HS   • potassium chloride  20 mEq

## 2020-09-05 VITALS
WEIGHT: 141 LBS | BODY MASS INDEX: 26 KG/M2 | HEART RATE: 102 BPM | SYSTOLIC BLOOD PRESSURE: 146 MMHG | TEMPERATURE: 98 F | DIASTOLIC BLOOD PRESSURE: 87 MMHG | OXYGEN SATURATION: 97 % | RESPIRATION RATE: 19 BRPM

## 2020-09-05 PROCEDURE — 99239 HOSP IP/OBS DSCHRG MGMT >30: CPT | Performed by: HOSPITALIST

## 2020-09-05 PROCEDURE — 99232 SBSQ HOSP IP/OBS MODERATE 35: CPT | Performed by: NURSE PRACTITIONER

## 2020-09-05 RX ORDER — HYDRALAZINE HYDROCHLORIDE 20 MG/ML
5 INJECTION INTRAMUSCULAR; INTRAVENOUS ONCE
Status: COMPLETED | OUTPATIENT
Start: 2020-09-05 | End: 2020-09-05

## 2020-09-05 NOTE — RESPIRATORY THERAPY NOTE
EITAN - Equipment Use Daily Summary:  · Set Mode CPAP  · Usage in hours: 7:45  · 90% Pressure (EPAP) level: 5.0  · 90% Insp Pressure (IPAP):   · AHI: 4.4  · Supplemental Oxygen:   · Comments:

## 2020-09-05 NOTE — PROGRESS NOTES
[facility]  Cardiology Progress Note    Maged Graf Patient Status:  Inpatient    1950 MRN [id number]   [facility] 2NE-A Attending Bernhard Severance, MD   Hosp Day # 12 PCP Tori Rowland MD     Subjective:  Non verbal, appears co Blocker)   • dilTIAZem HCl  120 mg Oral 2 times per day   • atorvastatin  40 mg Oral Nightly   • methimazole  5 mg Oral BID   • sucralfate  1 g Oral TID AC and HS   • potassium chloride  20 mEq Oral Once         Assessment:  · Atrial fibrillation RVR, HR i

## 2020-09-05 NOTE — PLAN OF CARE
Received patient at 0730. Alert and orientated x1-2, slow to respond, direct eye contact. Tele Rhythm Afib/V-paced. BP elevated this AM, 179/105. Cardiology aware, attempted to give AM dose of cardizem.  Pt held crushed medication in mouth for 15 minutes th Provide nonpharmacologic comfort measures as appropriate  - Advance diet as tolerated, if ordered  - Obtain nutritional consult as needed  - Evaluate fluid balance  Outcome: Progressing  Goal: Maintains or returns to baseline bowel function  Description  I intracranial pressure  - Maintain blood pressure and fluid volume within ordered parameters to optimize cerebral perfusion and minimize risk of hemorrhage  - Monitor temperature, glucose, and sodium.  Initiate appropriate interventions as ordered  Outcome:

## 2020-09-05 NOTE — PLAN OF CARE
Pt denies c/o pain, malaise, or cardiac symptoms. A&Ox1, oriented to person, disoriented to place, time, and situation. Delayed responses, apathetic to situation. Lungs clear bilaterally with equal expansion, room air with CPAP at night.  Pt afib/vpaced on comfort measures as appropriate  - Advance diet as tolerated, if ordered  - Obtain nutritional consult as needed  - Evaluate fluid balance  Outcome: Progressing  Goal: Maintains or returns to baseline bowel function  Description  INTERVENTIONS:  - Assess b Maintain blood pressure and fluid volume within ordered parameters to optimize cerebral perfusion and minimize risk of hemorrhage  - Monitor temperature, glucose, and sodium.  Initiate appropriate interventions as ordered  Outcome: Progressing     Problem:

## 2020-09-05 NOTE — CM/SW NOTE
finalizing dc plans. Arranged ambulance transport for 1pm .  Spoke with daughter about discharge time .

## 2020-09-05 NOTE — DISCHARGE PLANNING
NURSING DISCHARGE NOTE    Discharged Home via Ambulance. Accompanied by Support staff  Belongings Taken by patient/family. Patient denies pain, sob, and dizziness. Cleared by all consults to discharge home with [facility].  Discharged to

## 2020-09-08 NOTE — PROGRESS NOTES
Telephone Information:  Home Phone      [phone]  Mobile          [phone]  Patient informed of Dr. Mi Jesus result note. Patient verbalized understanding and agrees with plan.     Pt requests we also call and inform her daughter, Gracie Pitch [phone]

## 2020-09-08 NOTE — PAYOR COMM NOTE
--------------  DISCHARGE REVIEW    Payor: [employer]  Subscriber #:  [id number]  Authorization Number: [id number]    Admit date: 8/24/20  Admit time:  1801  Discharge Date: 9/5/2020  3:45 PM     Admitting Physician: Yanelis Partida MD  Attendin hospitalizations this year due to malnutrition, GI upset, dysphagia, dementia, failure to thrive, A. fib RVR and aortic dissection. She has had extensive GI work-up and cardiology is evaluated multiple times this year.     She was admitted for further work tablet  Refills:  3        CONTINUE taking these medications      Instructions Prescription details   atorvastatin 40 MG Tabs  Commonly known as:  LIPITOR      Take 1 tablet (40 mg total) by mouth nightly.    Quantity:  30 tablet  Refills:  5     Citalopram complaint: aphasic     Subjective:  Talking minimally; denies pain/sob.     ROS:   5 point ROS negative except for that stated in subjective.     Objective:  Constitutional: no signs of distress; vitals stable  Cardiovascular: irreg irreg.  Rates in 80s  Re

## 2020-09-29 PROBLEM — R53.1 WEAKNESS: Status: RESOLVED | Noted: 2017-11-13 | Resolved: 2020-09-29

## 2020-09-29 PROBLEM — N28.9 RENAL INSUFFICIENCY: Status: RESOLVED | Noted: 2020-07-17 | Resolved: 2020-09-29

## 2020-09-29 PROBLEM — R79.89 AZOTEMIA: Status: RESOLVED | Noted: 2020-04-09 | Resolved: 2020-09-29

## 2020-09-29 PROBLEM — R07.9 CHEST PAIN OF UNCERTAIN ETIOLOGY: Status: RESOLVED | Noted: 2020-02-26 | Resolved: 2020-09-29

## 2020-09-29 PROBLEM — R94.31 ABNORMAL EKG: Status: RESOLVED | Noted: 2020-02-26 | Resolved: 2020-09-29

## 2020-09-29 PROBLEM — R63.0 POOR APPETITE: Status: RESOLVED | Noted: 2020-07-17 | Resolved: 2020-09-29

## 2020-09-29 PROBLEM — J18.9 PNEUMONIA: Status: RESOLVED | Noted: 2020-06-23 | Resolved: 2020-09-29

## 2020-09-29 PROBLEM — I72.0 CAROTID ARTERY ANEURYSM (HCC): Status: ACTIVE | Noted: 2020-09-29

## 2020-09-29 PROBLEM — R07.9 ACUTE CHEST PAIN: Status: RESOLVED | Noted: 2017-10-19 | Resolved: 2020-09-29

## 2020-09-29 PROBLEM — R41.82 ALTERED MENTAL STATUS: Status: RESOLVED | Noted: 2017-11-13 | Resolved: 2020-09-29

## 2020-09-29 PROBLEM — D72.829 LEUKOCYTOSIS: Status: RESOLVED | Noted: 2017-11-13 | Resolved: 2020-09-29

## 2020-09-29 PROBLEM — I77.9 CAROTID ARTERIAL DISEASE (HCC): Status: ACTIVE | Noted: 2020-09-29

## 2020-09-29 PROBLEM — R11.2 NAUSEA VOMITING AND DIARRHEA: Status: RESOLVED | Noted: 2020-08-24 | Resolved: 2020-09-29

## 2020-09-29 PROBLEM — R19.7 NAUSEA VOMITING AND DIARRHEA: Status: RESOLVED | Noted: 2020-08-24 | Resolved: 2020-09-29

## 2020-11-12 ENCOUNTER — TELEPHONE (OUTPATIENT)
Dept: CARDIOLOGY | Age: 70
End: 2020-11-12

## 2020-11-19 ENCOUNTER — TELEPHONE (OUTPATIENT)
Dept: CARDIOLOGY | Age: 70
End: 2020-11-19

## 2020-11-23 ENCOUNTER — TELEPHONE (OUTPATIENT)
Dept: CARDIOLOGY | Age: 70
End: 2020-11-23

## 2020-11-25 ENCOUNTER — V-VISIT (OUTPATIENT)
Dept: CARDIOLOGY | Age: 70
End: 2020-11-25

## 2020-11-25 DIAGNOSIS — I27.20 PULMONARY HYPERTENSION (CMD): ICD-10-CM

## 2020-11-25 DIAGNOSIS — I50.9 CONGESTIVE HEART FAILURE, UNSPECIFIED HF CHRONICITY, UNSPECIFIED HEART FAILURE TYPE (CMD): ICD-10-CM

## 2020-11-25 DIAGNOSIS — I50.9 CONGESTIVE HEART FAILURE, UNSPECIFIED HF CHRONICITY, UNSPECIFIED HEART FAILURE TYPE (CMD): Primary | ICD-10-CM

## 2020-11-25 PROCEDURE — 99213 OFFICE O/P EST LOW 20 MIN: CPT | Performed by: INTERNAL MEDICINE

## 2020-11-25 RX ORDER — FUROSEMIDE 20 MG/1
20 TABLET ORAL PRN
Qty: 30 TABLET | Refills: 3 | Status: SHIPPED | OUTPATIENT
Start: 2020-11-25 | End: 2020-11-27

## 2020-11-25 ASSESSMENT — ENCOUNTER SYMPTOMS
NEAR-SYNCOPE: 0
WEIGHT GAIN: 0
ORTHOPNEA: 1
LIGHT-HEADEDNESS: 1
SLEEP DISTURBANCES DUE TO BREATHING: 1
ALLERGIC/IMMUNOLOGIC NEGATIVE: 1
SNORING: 1
SHORTNESS OF BREATH: 0
LOSS OF BALANCE: 1
BLOATING: 1
SYNCOPE: 0
COUGH: 0
EYES NEGATIVE: 1
PSYCHIATRIC NEGATIVE: 1
DIZZINESS: 1
ENDOCRINE NEGATIVE: 1

## 2020-11-27 RX ORDER — FUROSEMIDE 20 MG/1
TABLET ORAL
Qty: 90 TABLET | Refills: 0 | Status: SHIPPED | OUTPATIENT
Start: 2020-11-27 | End: 2021-01-11 | Stop reason: SDUPTHER

## 2020-12-19 ENCOUNTER — APPOINTMENT (OUTPATIENT)
Dept: CT IMAGING | Facility: HOSPITAL | Age: 70
End: 2020-12-19
Attending: EMERGENCY MEDICINE
Payer: MEDICARE

## 2020-12-19 ENCOUNTER — HOSPITAL ENCOUNTER (EMERGENCY)
Facility: HOSPITAL | Age: 70
Discharge: HOME | End: 2020-12-19
Attending: EMERGENCY MEDICINE
Payer: MEDICARE

## 2020-12-19 VITALS
OXYGEN SATURATION: 96 % | TEMPERATURE: 99 F | HEART RATE: 80 BPM | RESPIRATION RATE: 28 BRPM | SYSTOLIC BLOOD PRESSURE: 151 MMHG | DIASTOLIC BLOOD PRESSURE: 121 MMHG | BODY MASS INDEX: 26 KG/M2 | WEIGHT: 143.31 LBS

## 2020-12-19 DIAGNOSIS — S30.1XXA ABDOMINAL WALL HEMATOMA, INITIAL ENCOUNTER: Primary | ICD-10-CM

## 2020-12-19 DIAGNOSIS — I50.9 ACUTE ON CHRONIC CONGESTIVE HEART FAILURE, UNSPECIFIED HEART FAILURE TYPE (HCC): ICD-10-CM

## 2020-12-19 PROCEDURE — 93005 ELECTROCARDIOGRAM TRACING: CPT

## 2020-12-19 PROCEDURE — 96374 THER/PROPH/DIAG INJ IV PUSH: CPT

## 2020-12-19 PROCEDURE — 74177 CT ABD & PELVIS W/CONTRAST: CPT | Performed by: EMERGENCY MEDICINE

## 2020-12-19 PROCEDURE — 80053 COMPREHEN METABOLIC PANEL: CPT | Performed by: EMERGENCY MEDICINE

## 2020-12-19 PROCEDURE — 70450 CT HEAD/BRAIN W/O DYE: CPT | Performed by: EMERGENCY MEDICINE

## 2020-12-19 PROCEDURE — 96375 TX/PRO/DX INJ NEW DRUG ADDON: CPT

## 2020-12-19 PROCEDURE — 93010 ELECTROCARDIOGRAM REPORT: CPT

## 2020-12-19 PROCEDURE — 85025 COMPLETE CBC W/AUTO DIFF WBC: CPT | Performed by: EMERGENCY MEDICINE

## 2020-12-19 PROCEDURE — 99285 EMERGENCY DEPT VISIT HI MDM: CPT

## 2020-12-19 RX ORDER — FUROSEMIDE 10 MG/ML
40 INJECTION INTRAMUSCULAR; INTRAVENOUS ONCE
Status: COMPLETED | OUTPATIENT
Start: 2020-12-19 | End: 2020-12-19

## 2020-12-19 RX ORDER — ONDANSETRON 2 MG/ML
4 INJECTION INTRAMUSCULAR; INTRAVENOUS ONCE
Status: COMPLETED | OUTPATIENT
Start: 2020-12-19 | End: 2020-12-19

## 2020-12-19 RX ORDER — FUROSEMIDE 40 MG/1
40 TABLET ORAL DAILY
Qty: 30 TABLET | Refills: 0 | Status: SHIPPED | OUTPATIENT
Start: 2020-12-19 | End: 2021-01-18

## 2020-12-19 RX ORDER — DIPHENHYDRAMINE HYDROCHLORIDE 50 MG/ML
25 INJECTION INTRAMUSCULAR; INTRAVENOUS ONCE
Status: COMPLETED | OUTPATIENT
Start: 2020-12-19 | End: 2020-12-19

## 2020-12-19 NOTE — ED PROVIDER NOTES
Patient Seen in: [facility] Emergency Department      History   Patient presents with:  Fall    Stated Complaint: fall    HPI    Patient is a 81-year-old female presents with her  by EMS for evaluation of a fall yesterday.    heard her Surgical History:   Procedure Laterality Date   • BRAIN SURGERY      FOR ANEURYSM   • BYPASS SURGERY  10/24/2017    cabg x 3   •      • CABG  10/2017   • CHOLECYSTECTOMY     • ESOPHAGOGASTRODUODENOSCOPY (EGD) N/A 2020    Performed by well-appearing. HEENT: Normocephalic, atraumatic. Moist mucous membranes. Pupils equal round reactive to light accommodation, extraocular motion is intact, sclerae white, conjunctiva is pink.   Oropharynx is unremarkable, no exudate, dentition intact, no RDW 17.8 (*)     RDW-SD 62.1 (*)     All other components within normal limits   CBC WITH DIFFERENTIAL WITH PLATELET    Narrative: The following orders were created for panel order CBC WITH DIFFERENTIAL WITH PLATELET.   Procedure No sign of acute sinusitis. MASTOIDS:          No sign of acute inflammation. SKULL:             No evidence for fracture or osseous abnormality. OTHER:             None. CONCLUSION:  1.  There is no acute intracranial abnormality on the no lesion, fluid collection, ductal dilatation, or atrophy. SPLEEN:  No enlargement or focal lesion. KIDNEYS:  No mass, obstruction, or calcification. ADRENALS:  No mass or enlargement. AORTA/VASCULAR:  No aneurysm or dissection.   RETROPERITONEUM:  No mas enlargement of the right heart. Findings may be due to right heart decompensation. Correlate clinically as well for hypoalbuminemic states or 3rd spacing. There is a right pleural effusion. No acute intra-abdominal process is otherwise demonstrated.   R congestive heart failure, unspecified heart failure type [facility])    Disposition:  Discharge  12/19/2020  4:37 pm    Follow-up:  Hudson Chapman, [address]  [phone]      As needed    Leon Garcia  TO

## 2020-12-19 NOTE — ED INITIAL ASSESSMENT (HPI)
Pt arrives via EMS, hospice patient with dementia, lives with , aaox2, reports tripping and falling last night, reports low back pain and left abdomen/hip pain, pt was able to ambulate and walk afterwards with walker.  Unknown of loc, head injury, or

## 2020-12-19 NOTE — ED NOTES
CT Tech states that patient is typically given benadryl along with zofran prior to CT contrast.  MD notified.

## 2021-01-06 ENCOUNTER — TELEPHONE (OUTPATIENT)
Dept: CARDIOLOGY | Age: 71
End: 2021-01-06

## 2021-01-11 ENCOUNTER — TELEPHONE (OUTPATIENT)
Dept: CARDIOLOGY | Age: 71
End: 2021-01-11

## 2021-01-11 DIAGNOSIS — I50.9 CONGESTIVE HEART FAILURE, UNSPECIFIED HF CHRONICITY, UNSPECIFIED HEART FAILURE TYPE (CMD): ICD-10-CM

## 2021-01-11 RX ORDER — FUROSEMIDE 20 MG/1
20 TABLET ORAL PRN
Qty: 45 TABLET | Refills: 0 | Status: SHIPPED | OUTPATIENT
Start: 2021-01-11 | End: 2021-01-11

## 2021-01-11 RX ORDER — FUROSEMIDE 20 MG/1
TABLET ORAL
Qty: 90 TABLET | OUTPATIENT
Start: 2021-01-11

## 2021-01-11 RX ORDER — FUROSEMIDE 20 MG/1
TABLET ORAL
Qty: 45 TABLET | Refills: 0 | Status: SHIPPED | OUTPATIENT
Start: 2021-01-11 | End: 2021-02-10 | Stop reason: SDUPTHER

## 2021-02-01 DIAGNOSIS — I50.9 CONGESTIVE HEART FAILURE, UNSPECIFIED HF CHRONICITY, UNSPECIFIED HEART FAILURE TYPE (CMD): ICD-10-CM

## 2021-02-01 RX ORDER — FUROSEMIDE 20 MG/1
TABLET ORAL
Qty: 45 TABLET | Refills: 0 | OUTPATIENT
Start: 2021-02-01

## 2021-02-09 ENCOUNTER — ANCILLARY PROCEDURE (OUTPATIENT)
Dept: CARDIOLOGY | Age: 71
End: 2021-02-09
Attending: INTERNAL MEDICINE

## 2021-02-09 ENCOUNTER — TELEPHONE (OUTPATIENT)
Dept: CARDIOLOGY | Age: 71
End: 2021-02-09

## 2021-02-09 ENCOUNTER — ANCILLARY ORDERS (OUTPATIENT)
Dept: CARDIOLOGY | Age: 71
End: 2021-02-09

## 2021-02-09 DIAGNOSIS — I50.9 CONGESTIVE HEART FAILURE, UNSPECIFIED HF CHRONICITY, UNSPECIFIED HEART FAILURE TYPE (CMD): ICD-10-CM

## 2021-02-09 DIAGNOSIS — Z95.0 CARDIAC PACEMAKER IN SITU: ICD-10-CM

## 2021-02-09 DIAGNOSIS — Z23 NEED FOR VACCINATION: ICD-10-CM

## 2021-02-09 PROCEDURE — X1114 CARDIAC DEVICE HOME CHECK - REMOTE UNSCHEDULED: HCPCS | Performed by: INTERNAL MEDICINE

## 2021-02-09 PROCEDURE — 93294 REM INTERROG EVL PM/LDLS PM: CPT | Performed by: INTERNAL MEDICINE

## 2021-02-10 RX ORDER — FUROSEMIDE 20 MG/1
20 TABLET ORAL DAILY
Qty: 90 TABLET | Refills: 3 | Status: SHIPPED | OUTPATIENT
Start: 2021-02-10

## 2021-04-16 ENCOUNTER — TELEPHONE (OUTPATIENT)
Dept: CARDIOLOGY | Age: 71
End: 2021-04-16

## 2021-04-20 ENCOUNTER — TELEPHONE (OUTPATIENT)
Dept: CARDIOLOGY | Age: 71
End: 2021-04-20

## 2021-12-22 NOTE — PAYOR COMM NOTE
--------------  CONTINUED STAY REVIEW    [employer] [place] [employer]  Subscriber #:  [id number]  Authorization Number: [id number]    Admit date: 6/23/20  Admit time: 15[address]    Admitting Physician: Crystal Gale MD  Attending Physician:  Rogers Green MD Report to Alexa RODGERS 4A.      Hardik Nava RN  12/22/21 9423 conservation/work simplification techniques;ADL training;Functional transfer training;UE strengthening/ROM; Endurance training;Patient/Family education;Patient/Family training; Compensatory technique education  Rehab Potential : Good  Frequency (Obs): 3-5x/w 24 hr tab 200 mg     Date Action Dose Route User    6/26/2020 0525 Given 200 mg Oral Neha Richardson RN      Pantoprazole Sodium (PROTONIX) 40 mg in Sodium Chloride 0.9 % 10 mL IV push     Date Action Dose Route User    6/25/2020 1830 Given 40 mg Rosibel Kapoor

## 2022-07-27 NOTE — PROGRESS NOTES
Pt had small emesis at dinner time, c/o stomach cramping, non tender, relieved by zofran iv. Cpm. Initiate Treatment: Epsolay 5 % topical cream Samples Given: sunscreen Plan: Will plan to d/c Oracea at f/u if well controlled Continue Regimen: Oracea 40 mg capsule,immediate - delay release QD\\nSoolantra 1 % topical \\Satnam LS 10 %-2 % topical cleanser Detail Level: Zone

## 2023-05-05 NOTE — PROGRESS NOTES
[facility] Progress Note    Subjective:     Patient is here for a check of his toe. I had seen him 2 weeks ago for an injury to the left great toe. He feels that he is doing well and just got back from a trip over in [place] with a lot of walking        Objective:     Minimal to no pain. Nail is starting to grow out and there is no sign of infection drainage or bleeding                  Assessment/Plan:     Diagnoses and all orders for this visit:    Subungual hematoma of toe of left foot, subsequent encounter        He is doing well and can expect the new nail to start growing out. Protect the old nail and can expect to possibly fall off. If it gives him any trouble we can numb the toe and remove the nail if needed. Follow-up in several weeks to see how he is doing            Daryle King. Fredis Toro DPM  [facility]    [employer] speech recognition software was used to prepare this note. If a word or phrase is confusing, it is likely do to a failure of recognition. Please contact me with any questions or clarifications. JOSELYN HOSPITALIST  Progress Note     Lukasz Garcia Patient Status:  Inpatient    1950 MRN [id number]   [facility] Kiara Campbell MD     Chief Complaint: Palpitations    S: --   --   --    ALT 13  --   --   --   --   --    BILT 0.7  --   --   --   --   --    TP 7.6  --   --   --   --   --        Estimated Creatinine Clearance: 44.1 mL/min (based on SCr of 1.01 mg/dL). Recent Labs   Lab 05/21/20  1455   PTP 15.3*   INR 1.

## 2023-06-10 NOTE — PLAN OF CARE
CARDIOVASCULAR - ADULT    • Maintains optimal cardiac output and hemodynamic stability Progressing    • Absence of cardiac arrhythmias or at baseline Progressing        Patient/Family Goals    • Patient/Family Long Term Goal Progressing    • Patient/Family None

## 2023-11-07 NOTE — ED NOTES
Patient would like to be seen by Dr Nikita Soriano, patient is having balance issues, patient has fallen 2x's recently.   Please assist Pt tolerated staright cath.  Specimen collected and sent to lab

## 2025-03-15 NOTE — PROGRESS NOTES
+++VM left for pt to call Gissel Moss RN to complete f/u questions. - LG                                                           Follow Up Phone Call    1. How are you doing now that you are home? 2. Have there been any changes in your wound/incision since going home? 3. Is your pain manageable at home? 4. Are you following the walking routine given to you in the hospital?     5. Are you continuing to use your incentive spirometer? 6. Do you have your appointments for Chest Xray? 11/8/17 @ 1130/1200                                                              Primary MD? Dr. Erick Bloom                                                              Cardiologist?  11/8/17 @ 1330 w/[address]YC[address] Dr[address] 11/29/17 @ 1130 w/ALYCIA Nixon                                                              Cardiac Rehab?  11/28/17 @ 0830    7. Do you have any other questions or concerns today?         Bert Cohn  11/3/2017  10:27 AM Subjective:      Chief Complaint   Patient presents with    Annual    Low Back Pain     Did not do MRI - states pain is on the left side    Numbness     Left arm and left leg - states after exercise it comes back to normal     HISTORY OF PRESENT ILLNESS  HPI  HPI obtained per patient report.  Sage Modi is a pleasant 31 year old male presenting for his annual physical.     He still has intermittent L LBP pain. This symptom was exacerbated 2 months ago while working on his care. It now also radiates down his L leg and is associated with intermittent L leg numbness. His L leg numbness resolves with exercising. He denies associated tingling/weakness/incontinence.    He also reports occasional L arm  numbness when he sleeps on his L side, but no associated neck pain or LUE tingling/weakness. This symptom resolves spontaneously with shaking out his L arm.    His LUQ pain has significantly improved with dietary modifications and the regimen prescribed during his LOV. He now only takes omeprazole as needed, approximately once per month.       PAST PATIENT HISTORY  Past Medical History:    Elevated fasting blood sugar     No past surgical history on file.    CURRENT MEDICATIONS  Medications Taking[1]    HEALTH MAINTENANCE  Immunization History   Administered Date(s) Administered    Covid-19 Vaccine Pfizer 30 mcg/0.3 ml 07/16/2021, 08/07/2021   Deferred Date(s) Deferred    FLUZONE 6 months and older PFS 0.5 ml (31794) 01/15/2024       ALLERGIES AND DRUG REACTIONS  Allergies[2]    Family History   Problem Relation Age of Onset    Hypertension Father     Diabetes Mother     Diabetes Maternal Grandfather      Social History     Socioeconomic History    Marital status:    Tobacco Use    Smoking status: Never    Smokeless tobacco: Never   Vaping Use    Vaping status: Never Used   Substance and Sexual Activity    Alcohol use: Yes     Comment: ocassionally    Drug use: Never       Review of Systems   Musculoskeletal:   Positive for back pain.   Neurological:  Positive for numbness.   All other systems reviewed and are negative.         Objective:      /72   Pulse 68   Temp 97.6 °F (36.4 °C) (Temporal)   Resp 16   Ht 6' (1.829 m)   Wt 215 lb 4 oz (97.6 kg)   SpO2 98%   BMI 29.19 kg/m²   Body mass index is 29.19 kg/m².    Physical Exam  Vitals reviewed.   Constitutional:       General: He is not in acute distress.     Appearance: He is not ill-appearing, toxic-appearing or diaphoretic.   HENT:      Head: Normocephalic and atraumatic.   Eyes:      General: No scleral icterus.        Right eye: No discharge.         Left eye: No discharge.      Extraocular Movements: Extraocular movements intact.      Conjunctiva/sclera: Conjunctivae normal.   Cardiovascular:      Rate and Rhythm: Normal rate and regular rhythm.      Pulses: Normal pulses.      Heart sounds: Normal heart sounds.   Pulmonary:      Effort: Pulmonary effort is normal.      Breath sounds: Normal breath sounds.   Abdominal:      General: Abdomen is flat. Bowel sounds are normal. There is no distension.      Palpations: Abdomen is soft. There is no mass.      Tenderness: There is no abdominal tenderness. There is no guarding or rebound.      Hernia: No hernia is present.   Musculoskeletal:         General: No swelling, tenderness or deformity. Normal range of motion.      Cervical back: Neck supple.      Right lower leg: No edema.      Left lower leg: No edema.   Skin:     General: Skin is warm and dry.      Coloration: Skin is not jaundiced or pale.      Findings: No bruising, erythema or rash.   Neurological:      General: No focal deficit present.      Mental Status: He is alert and oriented to person, place, and time.      Comments: Straight leg raise test negative     Psychiatric:         Mood and Affect: Mood normal.            Assessment and Plan:      1. Well adult exam (Primary)  -     CBC With Differential With Platelet; Future; Expected date:  03/15/2025  -     Comp Metabolic Panel (14); Future; Expected date: 03/15/2025  -     Lipid Panel; Future; Expected date: 03/15/2025  -     TSH W Reflex To Free T4; Future; Expected date: 03/15/2025  -     Hemoglobin A1C; Future; Expected date: 03/15/2025  2. Screening cholesterol level  -     Lipid Panel; Future; Expected date: 03/15/2025  3. IFG (impaired fasting glucose)  -     TSH W Reflex To Free T4; Future; Expected date: 03/15/2025  -     Hemoglobin A1C; Future; Expected date: 03/15/2025  4. Chronic midline low back pain with left-sided sciatica  -     MRI SPINE LUMBAR (CPT=72148); Future; Expected date: 03/15/2025  -     OP REFERRAL TO [facility]  -     Cyclobenzaprine HCl; Take 1 tablet (10 mg total) by mouth 3 (three) times daily as needed for Muscle spasms.  Dispense: 30 tablet; Refill: 0  5. Lumbar radiculopathy  -     MRI SPINE LUMBAR (CPT=72148); Future; Expected date: 03/15/2025  -     OP REFERRAL TO [facility]  -     Cyclobenzaprine HCl; Take 1 tablet (10 mg total) by mouth 3 (three) times daily as needed for Muscle spasms.  Dispense: 30 tablet; Refill: 0  6. Left leg numbness  -     MRI SPINE LUMBAR (CPT=72148); Future; Expected date: 03/15/2025  -     OP REFERRAL TO [facility]  -     Cyclobenzaprine HCl; Take 1 tablet (10 mg total) by mouth 3 (three) times daily as needed for Muscle spasms.  Dispense: 30 tablet; Refill: 0  7. Gastritis without bleeding, unspecified chronicity, unspecified gastritis type  -     Omeprazole; Take 1 capsule (40 mg total) by mouth daily as needed.  Dispense: 90 capsule; Refill: 0    Return if symptoms worsen or fail to improve.      Physical  - annual lab orders discussed and updated for him today  - continue low-carbohydrate dietary plan and regular exercise    Chronic LBP  - with recent exacerbation of symptoms, now including intermittent LLE numbness  - per pt report, a lumbar spine XR was completed in  the past and WNL  - recommended local heat application, gentle stretching as tolerated, ibuprofen and flexeril as needed, PT, and lumbar spine MRI     History of gastritis  - much improved  - may continue occasional omeprazole PRN     Patient verbalized understanding of assessment and recommendations. All questions and concerns were addressed.    Electronically signed by Filomena Lang MD         [1]   Outpatient Medications Marked as Taking for the 3/15/25 encounter (Office Visit) with Filomena Lang MD   Medication Sig Dispense Refill    Omeprazole 40 MG Oral Capsule Delayed Release Take 1 capsule (40 mg total) by mouth daily as needed. 90 capsule 0    cyclobenzaprine 10 MG Oral Tab Take 1 tablet (10 mg total) by mouth 3 (three) times daily as needed for Muscle spasms. 30 tablet 0   [2] No Known Allergies

## (undated) DEVICE — FILTERLINE NASAL ADULT O2/CO2

## (undated) DEVICE — ENDOSCOPY PACK UPPER: Brand: MEDLINE INDUSTRIES, INC.

## (undated) DEVICE — 1200CC GUARDIAN II: Brand: GUARDIAN

## (undated) DEVICE — FORCEP BIOPSY RJ4 LG CAP W/ND

## (undated) DEVICE — SOLUTION SURG DURA PREP HAZMAT

## (undated) DEVICE — Device

## (undated) DEVICE — 3M™ RED DOT™ MONITORING ELECTRODE WITH FOAM TAPE AND STICKY GEL, 50/BAG, 20/CASE, 72/PLT 2570: Brand: RED DOT™

## (undated) DEVICE — SUTURE VICRYL 3-0 PS-1

## (undated) DEVICE — Device: Brand: DEFENDO AIR/WATER/SUCTION AND BIOPSY VALVE

## (undated) DEVICE — SUTURE SILK 2-0

## (undated) DEVICE — HEMOCLIP HORIZON SM MULTI

## (undated) DEVICE — CELL SAVER 5/5+ BOWL KIT-225ML: Brand: HAEMONETICS CELL SAVER 5/5+ SYSTEMS

## (undated) DEVICE — BLADE STERNAL SAW BULK PACK

## (undated) DEVICE — DRAPE SLUSH/WARMER W/DISC

## (undated) DEVICE — MEDI-VAC NON-CONDUCTIVE SUCTION TUBING: Brand: CARDINAL HEALTH

## (undated) DEVICE — SOL LACT RINGERS 1000ML

## (undated) DEVICE — CELL SAVER RESERVOIR BRAT

## (undated) DEVICE — PDS II VLT 0 107CM AG ST3: Brand: ENDOLOOP

## (undated) DEVICE — TIBURON DRAPE TOWELS: Brand: CONVERTORS

## (undated) DEVICE — SOL  .9 1000ML BTL

## (undated) DEVICE — TRAY ENDOVEIN KTV16 HARVESTING

## (undated) DEVICE — GLOVE SURG TRIUMPH SZ 8

## (undated) DEVICE — SUTURE PROLENE 7-0 CC

## (undated) DEVICE — TRANSPOSAL ULTRAFLEX DUO/QUAD ULTRA CART MANIFOLD

## (undated) DEVICE — FIXED CORE WIRE GUIDE SAFE-T-J, CURVED: Brand: COOK

## (undated) DEVICE — VIOLET BRAIDED (POLYGLACTIN 910), SYNTHETIC ABSORBABLE SUTURE: Brand: COATED VICRYL

## (undated) DEVICE — SUTURE PROLENE 3-0 SH

## (undated) DEVICE — CV PACK-LF: Brand: MEDLINE INDUSTRIES, INC.

## (undated) DEVICE — MEDI-VAC SUCTION HANDLE REGULAR CAPACITY: Brand: CARDINAL HEALTH

## (undated) DEVICE — GAUZE SPONGES,12 PLY: Brand: CURITY

## (undated) DEVICE — HEMOCLIP MED 24 CLIP/CARTRIDGE

## (undated) DEVICE — OPEN HEART: Brand: MEDLINE INDUSTRIES, INC.

## (undated) DEVICE — SUTURE PROLENE 6-0 C-1

## (undated) DEVICE — SUTURE VICRYL 2-0 CT-1

## (undated) DEVICE — SUTURE POLYDEK 2-0

## (undated) DEVICE — CELL SAVER BAG 600ML 4R2023

## (undated) DEVICE — STERILE POLYISOPRENE POWDER-FREE SURGICAL GLOVES: Brand: PROTEXIS

## (undated) NOTE — LETTER
Patient Name: Lisa Luz        : 1950       Medical Record #: [id number]    CONSENT FOR PROCEDURES/SEDATION    Date: 2020       Time: 10:03 PM        1.  I authorize the performance upon Lisa Luz the followin [address]

## (undated) NOTE — LETTER
[facility] [address], [address]  Consent for Procedure/Sedation    Date: 07/27/2020    Time: 0737      1.  I authorize the performance upon [facility] Sample the following:  ATRIOVENTRICULAR NODE ABLATION, AND PERMANENT [address] [facility] Printed: 2020   7:38 AM  Patient Name: Abbi Sweet        : 1950       Medical Record #: [id number]

## (undated) NOTE — IP AVS SNAPSHOT
Patient Demographics     Address  [place] [address]. Phone  [phone] (Home) *Preferred*  [phone] [facility]) E-mail Address  [email or web address]      Emergency Contact(s)     Name Relation Home Work Mobile    Kamran Tello [address] [facility] [address]  [address], [address] [facility].     Specialty:  NEUROLOGY  Why:  follow up with neurology for cognition on 4/13 at 11:40 AM     Contact information:  [address] Commonly known as:  APRESOLINE  Next dose due:  3/24/2020 9pm      Take 3 tablets (75 mg total) by mouth every 8 (eight) hours.    CRUZITO Simpson         lisinopril 40 MG Tabs  Start taking on:  March 25, 2020  Next dose due:  3/25/2020 9am      Ta [id number] Fluticasone Furoate-Vilanterol (BREO ELLIPTA) 100-25 MCG/INH inhaler 1 puff 03/24/20 0830 Given      [id number] Metoprolol Succinate ER (Toprol XL) 24 hr tab 100 mg 03/24/20 0500 Given      [id number] Metoprolol Succinate ER (Toprol XL) 24 hr tab SpO2  99 % Filed at 03/24/2020 1224      Patient's Most Recent Weight       Most Recent Value   Patient Weight  77.4 kg (170 lb 9.6 oz)      CPAP Settings (Inpatient)       Most Recent Value   Mode  Spontaneous/Timed   Interface  Full face mask   Mask size Urine Culture, Routine Once [[id number]] Collected:  03/07/20 1630    Order Status:  Completed Lab Status:  Final result Updated:  03/08/20 1843    Specimen:  Urine, clean catch      Urine Culture <10,000 cfu/ml Multiple species present- probable contamina Order Status:  Completed Lab Status:  Final result Updated:  03/04/20 2158    Specimen:  Stool      C.  Difficile Toxin B Gene Negative    Influenza A/B And Rsv Pcr Once [[id number]]  (Normal) Collected:  03/04/20 1216    Order Status:  Completed Lab Status of breath. No nausea or vomiting (did vomit in the ER). Patient was diaphoretic. Symptoms similar to prior cardiac issues. Patient felt palpitations, lightheaded and dizzy as though she was going to pass out, but did not.  Patient seen in the HF Clinic and Radiology Contrast *    NAUSEA AND VOMITING    Comment:PT HAD VOMITING[PT. 2]    Medications:[PT.1]  No current facility-administered medications on file prior to encounter. aspirin 81 MG Oral Tab, Take 81 mg by mouth daily. , Disp: , Rfl:   apixaban 2.5 M Cardiovascular: S1, S2. Regular   Chest and Back:[PT.1] Reproducible anterior, sternal chest wall pain[PT. 3]  Abdomen: Soft, nontender, nondistended. Positive bowel sounds. No rebound, guarding or organomegaly. Musculoskeletal: Moves all extremities.   Ex PT.1 Mert Bucio MD on 2/26/2020 11:08 AM  PT. 2 - Felipa Sharp MD on 2/26/2020  5:08 PM  PT. 3 - Felipa Sharp MD on 2/26/2020  5:04 PM                        Consults - MD Consult Notes      Consults signed by Wallace Soulier, MD at 3/18/2020  3 2. Mild pulmonary vascular congestion. 3. Stable cardiomegaly and enlarged aortic arch    Needed BiPAP and hiflow O2 but weaned off supplemental O2 needs now.     Seen by ID and Pulm for Acute hypoxic respiratory failure due to pneumonia, possible hospital Past Medical History:  @Medical hx@  Past Medical History:   Diagnosis Date   • A-fib ([facility] [address]     WITH RAPID VENTRICULAR RESPONSE   • Arrhythmia 2016    hx of A-Fib   • Blood disorder     thrombocytopenia   • Brain aneurysm 1999   • Chronic atrial fib [COMPLETED] methylPREDNISolone Sodium Succ (Solu-MEDROL) injection 40 mg, 40 mg, Intravenous, Q6H  [COMPLETED] diphenhydrAMINE HCl (BENADRYL) injection 50 mg, 50 mg, Intravenous, Once  ipratropium-albuterol (DUONEB) nebulizer solution 3 mL, 3 mL, Nebulizat [COMPLETED] digoxin (LANOXIN) 250 MCG/ML injection 250 mcg, 250 mcg, Intravenous, Once  [COMPLETED] digoxin (LANOXIN) 250 MCG/ML injection 250 mcg, 250 mcg, Intravenous, Once  [COMPLETED] metoprolol Tartrate (LOPRESSOR) tab 25 mg, 25 mg, Oral, Once  [COMPL [COMPLETED] morphINE sulfate (PF) 4 MG/ML injection 4 mg, 4 mg, Intravenous, Once  [COMPLETED] morphINE sulfate (PF) 4 MG/ML injection 2 mg, 2 mg, Intravenous, Once        Social History    Tobacco Use      Smoking status: Former Smoker        Packs/day: 0 Eyes:  Conjunctivae/lids clear. PERRL, EOMs intact. Vision functional.   Ears/Nose/Throat: Hearing intact. Lips, mucosa, and tongue normal. Teeth and gums normal. Moist mucous membranes. Neck: No neck masses or thyroid enlargement/tenderness/nodules. 24 hr rehab nursing also beneficial for medication management, pressure sore prevention, bowel and bladder management, skin management.      Physiatric medical oversight to monitor kidney function, cardiac function, pulmonary status, neurologic status, 9. Essential hypertension   10. Dyslipidemia  11. Chronic kidney disease  12. COPD  15. Anemia, iron deficiency  14. EITAN   15.  Left sided abdominal pain, CT unremarkable    History of Present Illness: Kerry Blake is a 77year old female with a history Quantity:  30 capsule  Refills:  3     lidocaine-menthol 4-1 % Ptch  Start taking on:  March 4, 2020      Place 1 patch onto the skin daily.    Quantity:  30 patch  Refills:  0        CHANGE how you take these medications      Instructions Prescription det Take 1 tablet (20 mg total) by mouth daily.    Quantity:  30 tablet  Refills:  1        STOP taking these medications    aspirin 81 MG Tabs              Where to Get Your Medications      These [facility] Registration desk.[PT.2]                    Vital signs:[PT.1]  Temp:  [97.8 °F (36.6 °C)-99.9 °F (37.7 °C)] 97.8 °F (36.6 °C)  Pulse:  [] 129  Resp:  [18-20] 18  BP: (113-172)/() 113/72[PT. 2]    Physical Exam:    General: No acute dis status and placed on bipap. Pt transferred to cardiac telemetry on 3/14/20 on 5L Hi-flow. On 3/15/20 general surgery consulted for abdominal pain with 3 day history of emesis and lack of bowel movement.  Pt was diagnosed with ileus vs. small bowel obstruct [facility]/24/2017    Performed by Abeba De MD at [address] N/A 10/24/2017    Performed by Abeba De MD at [address] [facility] Skilled Therapy Provided:     Pt presented in supine upon PT arrival. Pt willing to participate in session, working towards increased functional mobility and independence.   Discussed GOC, and short term/long term goals for optimal functional independence Goal #1 Patient is able to demonstrate supine - sit EOB @ level: minimum assistance      Goal #2 Patient is able to demonstrate transfers Sit to/from Stand at assistance level: supervision      Goal #3 Patient is able to ambulate 50 feet with assist device Therapy significant imaging (date, test, result):  3/15/20, CTA chest: 1. 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    2. Small amount of perihepatic ascites.   3. Small bowel dilatation.  This could be secondary to il • BRAIN SURGERY      FOR ANEURYSM   • BYPASS SURGERY  10/24/2017    cabg x 3   •      • CABG  10/2017   • CHOLECYSTECTOMY     • ESOPHAGOGASTRODUODENOSCOPY (EGD) N/A 2020    Performed by Lisa Falcon MD at [place] [address]. sitting for HR control. Reminders to adjust posture given. Following mobility pt up in the chair. Pt educated on HEP for BUE and BLE. Encouraged pt to be OOB and continue to keep UEs moving during the day. Pt in agreement.  No additional questions voic OT Goals:   ADL Goals   Patient will perform upper body dressing:  with supervision  Patient will perform lower body dressing:  with supervision  Patient will perform toileting: with supervision     Functional Transfer Goals  Patient will transfer from [facility] Diet Recommendations - Solids: Regular  Diet Recommendations - Liquid:  Thin    Compensatory Strategies Recommended: Small bites and sips  Aspiration Precautions: Slow rate  Medication Administration Recommendations: One pill at a time    Patient Experienci Description:  Patient's Long Term Goal: home or acute rehab    Interventions:  - Follow up in heart failure clinic  - See additional Care Plan goals for specific interventions      Patient/Family Short Term Goal   (Resolved)     Interdisciplinary Completed

## (undated) NOTE — ED AVS SNAPSHOT
Yuliavedadelano Hu   MRN: [id number]    Department:  [facility] Emergency Department   Date of Visit:  1/13/2019           Disclosure     Insurance plans vary and the physician(s) referred by the ER may not be covered by your plan.  Please contact you tell this physician (or your personal doctor if your instructions are to return to your personal doctor) about any new or lasting problems. The primary care or specialist physician will see patients referred from the [facility] Emergency Department.  Jackelyn Savage

## (undated) NOTE — LETTER
[facility] [address], [address] [place] [address]    Consent for Anesthesia   1.    Kennedi Smoker agree to be cared for by a physician anesthesiologist alone and/or with a nurse anesthetist, who is specially trained to monitor me and give me allergic reactions to medications, injury to my airway, heart, lungs, vision, nerves, or muscles and in extremely rare instances death. 5. My doctor has explained to me other choices available to me for my care (alternatives).   6. Pregnant Patients (“epid Printed: 2/27/2020 at 3:39 PM    Medical Record #: [id number]                                            Page 1 of 1

## (undated) NOTE — LETTER
Chelly Hightower M.D., F.A.C.S. Palmer Herring M.D., F.A.C.S. Brandin Russell M.D., Sonia Humphreys. SUBHASH Quintana M.D., F.A.C.S. Sharmaine Gitelman. Elizabeth Marr M.D., F.A.C.S. BETTY Gonsalez M. Sherrlyn Maple A.D. Goble Sanfilippo, M.D., F. chance to have all of your questions and concerns answered. If there are any issues which have not been adequately addressed, we ask you to bring them forward so that we can thoroughly address them.     A patient who is fully informed and understands their treatment, among other options and the risks and benefits of the different treatment options:    Yes _____ No _____    A CSA surgeon as explained to me that if I should so desire, he/she is willing to explain my case and the surgical and non-surgical optio

## (undated) NOTE — LETTER
Sandra Clemens [address] [facility] [address], [address] [facility]  Authorization for Surgical Operation and Procedure   Date:___________                                                                                            Time:__________  1.  I hereby aut potential risks that can occur: fever and allergic reactions, hemolytic reactions, transmission of diseases such as Hepatitis, AIDS and Cytomegalovirus (CMV) and fluid overload.   In the event that I wish to have an autologous transfusion of my own blood, o attending physician will determine when the applicable recovery period ends for purposes of reinstating the DNAR order.   10. Patients having a sterilization procedure: I understand that if the procedure is successful the results will be permanent and it wi

## (undated) NOTE — LETTER
Alis Conklin M.D., F.A.C.S. Paco Mary M.D., F.A.C.S. Constantin Baca M.D., Sudha Meyer M.D., F.A.C.S. Giselle Palafox. Hawa Whitaker M.D., F.A.C.S. Flaca Jones M.D. AURORA Cunningham M.D., F. chance to have all of your questions and concerns answered. If there are any issues which have not been adequately addressed, we ask you to bring them forward so that we can thoroughly address them.     A patient who is fully informed and understands their treatment options:    Yes _____ No _____    A CSA surgeon as explained to me that if I should so desire, he/she is willing to explain my case and the surgical and non-surgical options to family members:             Yes _____ No _____    A CSA surgeon has an

## (undated) NOTE — ED AVS SNAPSHOT
Quinton Baron   MRN: [id number]    Department:  [facility] Emergency Department   Date of Visit:  11/10/2017           Disclosure     Insurance plans vary and the physician(s) referred by the ER may not be covered by your plan.  Please contact yo If you have been prescribed any medication(s), please fill your prescription right away and begin taking the medication(s) as directed    If the emergency physician has read X-rays, these will be re-interpreted by a radiologist.  If there is a significant

## (undated) NOTE — LETTER
[facility] [address], [address]  Consent for Procedure/Sedation    Date: 03/03/2020   Time: 2100 1.  I authorize the performance upon Bertram Freed the following:  Transesophageal echocardiogram with direct current cardiov to patient:    ________________________________    ___________________    Witness: _________________________      Date: ___________________    Printed: 3/3/2020   7:32 PM  Patient Name: Kerry Blake        : 1950       Medical Record #: [id number]

## (undated) NOTE — LETTER
[facility] [address], [address] [facility]    Consent for Anesthesia   1.    Donell Hannah agree to be cared for by a physician anesthesiologist alone and/or with a nurse anesthetist, who is specially trained to monitor me and give me allergic reactions to medications, injury to my airway, heart, lungs, vision, nerves, or muscles and in extremely rare instances death. 5. My doctor has explained to me other choices available to me for my care (alternatives).   6. Pregnant Patients (“epid Printed: 7/20/2020 at 3:57 PM    Medical Record #: [id number]                                            Page 1 of 1

## (undated) NOTE — IP AVS SNAPSHOT
[address]            (For Outpatient Use Only) Initial Admit Date: 6/23/2020   Inpt/Obs Admit Date: Inpt: 6/23/20 / Obs: N/A   Discharge Date:    Panda Jones:  [de-identified]   MRN: [de-identified]   CSN: [id number]   CEID: [id number] Group Number:  Insurance Type:    Subscriber Name:  Subscriber :    Subscriber ID:  Pt Rel to Subscriber:    Hospital Account Financial Class: Medicare Advantage    2020

## (undated) NOTE — IP AVS SNAPSHOT
[address]            (For Outpatient Use Only) Initial Admit Date: 2/26/2020   Inpt/Obs Admit Date: Inpt: 2/26/20 / Obs: N/A   Discharge Date:    Bette Later:  [de-identified]   MRN: [de-identified]   CSN: [id number]   CEID: [id number] Subscriber ID:  Pt Rel to Subscriber:    Hospital Account Financial Class: Medicare Advantage    March 24, 2020

## (undated) NOTE — LETTER
Sandra Clemens 182 [address]  June, [address] [place] [address]    Consent for Operation  Date: __________________                                Time: _______________    1.  I authorize the performance upon Vipul Box the following operation:  Procedure procedure has been videotaped, the surgeon will obtain the original videotape. The hospital will not be responsible for storage or maintenance of this tape.   7. For the purpose of advancing medical education, I consent to the admittance of observers to the STATEMENTS REQUIRING INSERTION OR COMPLETION WERE FILLED IN.     Signature of Patient:   ___________________________    When the patient is a minor or mentally incompetent to give consent:  Signature of person authorized to consent for patient: ____________ supplements, and pills I can buy without a prescription (including street drugs/illegal medications). Failure to inform my anesthesiologist about these medicines may increase my risk of anesthetic complications. iv.  If I am allergic to anything or have ha Anesthesiologist Signature     Date   Time  I have discussed the procedure and information above with the patient (or patient’s representative) and answered their questions. The patient or their representative has agreed to have anesthesia services.     ___

## (undated) NOTE — LETTER
[address] [facility] FOR BLOOD OR BLOOD COMPONENTS      In the course of your treatment, it may become necessary to administer a transfusion of blood or blood components.  This form provides basic information concerning this proc explain the alternatives to you if it has not already been done. I,Deepa Tello, have read/had read to me the above. I understand the matters bearing on the decision whether or not to authorize a transfusion of blood or blood components.  I have no ques

## (undated) NOTE — LETTER
Sandra Clemens 182 [address] [facility] [address], [address] [facility]    Consent for Operation  1. Date: 02/27/2020                                Time: 1500  2. I authorize the performance upon Burton Mohs the following operation:  Procedure(s):  3.  23 Sandra Meier procedure has been videotaped, the surgeon will obtain the original videotape. The hospital will not be responsible for storage or maintenance of this tape.   8. For the purpose of advancing medical education, I consent to the admittance of observers to the STATEMENTS REQUIRING INSERTION OR COMPLETION WERE FILLED IN.     Signature of Patient:   ___________________________    When the patient is a minor or mentally incompetent to give consent:  Signature of person authorized to consent for patient: ____________ supplements, and pills I can buy without a prescription (including street drugs/illegal medications). Failure to inform my anesthesiologist about these medicines may increase my risk of anesthetic complications. iv.  If I am allergic to anything or have ha Anesthesiologist Signature     Date   Time  I have discussed the procedure and information above with the patient (or patient’s representative) and answered their questions. The patient or their representative has agreed to have anesthesia services.     ___

## (undated) NOTE — IP AVS SNAPSHOT
[address]            (For Outpatient Use Only) Initial Admit Date: 4/9/2020   Inpt/Obs Admit Date: Inpt: N/A / Obs: 04/09/20   Discharge Date:    No Rubio:  [de-identified]   MRN: [de-identified]   CSN: [id number]   CEID: [id number] Subscriber Name:  Karthik Mora :    Subscriber ID:  Pt Rel to Subscriber:    Hospital Account Financial Class: Medicare Advantage    2020

## (undated) NOTE — LETTER
[facility] [address], [address] [place] [address]    Consent for Anesthesia   1.    Medina Laura agree to be cared for by a physician anesthesiologist alone and/or with a nurse anesthetist, who is specially trained to monitor me and give me allergic reactions to medications, injury to my airway, heart, lungs, vision, nerves, or muscles and in extremely rare instances death. 5. My doctor has explained to me other choices available to me for my care (alternatives).   6. Pregnant Patients (“epid Printed: 8/25/2020 at 4:51 PM    Medical Record #: [id number]                                            Page 1 of 1

## (undated) NOTE — LETTER
Zuleyka Anthony M.D., F.SOPHIA.C.S. Milly Boeck, M.D., F.A.C.S. Lopez Saleh M.D., Don Lechuga. SUBHASH Miller M.D., F.A.CMELY Sanders. Aster Pruitt M.D., F.A.C.S. Fox Mann M.D. Charmayne Harvey, M. Lavetta Gammon A.D. Dail Postal, M.D., FGabo chance to have all of your questions and concerns answered. If there are any issues which have not been adequately addressed, we ask you to bring them forward so that we can thoroughly address them.     A patient who is fully informed and understands their treatment options:    Yes _____ No _____    A CSA surgeon as explained to me that if I should so desire, he/she is willing to explain my case and the surgical and non-surgical options to family members:             Yes _____ No _____    A CSA surgeon has an

## (undated) NOTE — LETTER
Sandra Clemens 182  [address] [facility] [address], [address] [facility]  Authorization for Surgical Operation and Procedure     Date:___________                                                                                                         Time:__________ potential risks that can occur: fever and allergic reactions, hemolytic reactions, transmission of diseases such as Hepatitis, AIDS and Cytomegalovirus (CMV) and fluid overload.   In the event that I wish to have an autologous transfusion of my own blood, o attending physician will determine when the applicable recovery period ends for purposes of reinstating the DNAR order.   10. Patients having a sterilization procedure: I understand that if the procedure is successful the results will be permanent and it wi a. Allow the anesthesiologist (anesthesia doctor) to give me medicine and do additional procedures as necessary.  Some examples are: Starting or using an “IV” to give me medicine, fluids or blood during my procedure, and having a breathing tube placed to he 7. Regional Anesthesia (“spinal”, “epidural”, & “nerve blocks”): I understand that rare but potential complications include headache, bleeding, infection, seizure, irregular heart rhythms, and nerve injury.     I can change my mind about having anesthesia

## (undated) NOTE — LETTER
[facility]  Glenda Teran [address] [facility], [address] [place] [address]    Consent for Operation    Date: ______10/24/17____________    Time: __________1501_____    1.  I authorize the performance upon Tyson Singh the following operation:    Procedure(s):   Jamison Alfaro procedure has been videotaped, the surgeon will obtain the original videotape. The hospital will not be responsible for storage or maintenance of this tape.     6. For the purpose of advancing medical education, I consent to the admittance of observers to t STATEMENTS REQUIRING INSERTION OR COMPLETION WERE FILLED IN.     Signature of Patient:   ___________________________    When the patient is a minor or mentally incompetent to give consent:  Signature of person authorized to consent for patient: ____________ drugs/illegal medications). Failure to inform my anesthesiologist about these medicines may increase my risk of anesthetic complications. · If I am allergic to anything or have had a reaction to anesthesia before.     3. I understand how the anesthesia med I have discussed the procedure and information above with the patient (or patient’s representative) and answered their questions. The patient or their representative has agreed to have anesthesia services.     _______________________________________________

## (undated) NOTE — IP AVS SNAPSHOT
[address]            (For Outpatient Use Only) Initial Admit Date: 8/24/2020   Inpt/Obs Admit Date: Inpt: 8/24/20 / Obs: N/A   Discharge Date:    Felix Captain:  [de-identified]   MRN: [de-identified]   CSN: [id number]   CEID: [id number] Group Number:  Insurance Type:    Subscriber Name:  Subscriber :    Subscriber ID:  Pt Rel to Subscriber:    Hospital Account Financial Class: Medicare Advantage    2020

## (undated) NOTE — LETTER
[facility] [address], [address]  Consent for Procedure/Sedation    Date:     Time:       1. I authorize the performance upon Luciano Morales the following:  RIGHT HEART CATHETERIZATION     2.  I authorize Dr. Vincent Brantley (and whomever ________________________________    ___________________    Witness: _________________________      Date: ___________________    Printed: 9/10/2019   10:55 AM  Patient Name: Bertram Freed        : 1950       Medical Record #: [id number]

## (undated) NOTE — IP AVS SNAPSHOT
Patient Demographics     Address  [address] [place] [address]. Phone  [phone] (Home) *Preferred*  [phone] [facility]) E-mail Address  [email or web address]      Emergency Contact(s)     Name Relation Home Work Mobile    Kamran Tello Take 650 mg by mouth every 4 (four) hours as needed for Pain. albuterol sulfate (2.5 MG/3ML) 0.083% Nebu  Commonly known as:  VENTOLIN      Take 2.5 mg by nebulization every 6 (six) hours as needed for Wheezing.           atorvastatin 40 MG Tabs Commonly known as:  PROTONIX  Next dose due:  Next dose due this evening before dinner. Take 1 tablet (40 mg total) by mouth 2 (two) times daily before meals for 120 doses.   Stop taking on:  Brenna 10, 2020   Cody Vu [id number] hydrALAzine HCl (APRESOLINE) tab 75 mg 04/10/20 2150 Given      [id number] hydrALAzine HCl (APRESOLINE) tab 75 mg 04/11/20 0634 Given      [id number] hydrALAzine HCl (APRESOLINE) tab 75 mg 04/11/20 1407 Given      [id number] lisinopril tab 40 mg 04 PLATELET COUNT [[id number]] (Abnormal)  Resulted: 04/11/20 0759, Result status: Final result   Ordering provider:  REJI Elizabeth  04/11/20 0744 Resulting lab:  [facility]    Specimen Information    Type Source Collected On   Blood — 04/11/20 0711 biotin serum concentrations >100 ng/mL. It is recommended that physicians ask all patients who may be on biotin supplementation to stop biotin consumption at least 72 hours prior to collection of a new sample.             HEMOGLOBIN [[id number]] (Abnormal) • A-fib ([facility] [address]     WITH RAPID VENTRICULAR RESPONSE   • Arrhythmia 2016    hx of A-Fib   • Blood disorder     thrombocytopenia   • Brain aneurysm 1999   • Chronic atrial fibrillation [facility])    • Coronary atherosclerosis    • Essential hypertension    • Disp: 30 tablet, Rfl: 3  digoxin 0.125 MG Oral Tab, Take 1 tablet (125 mcg total) by mouth daily. , Disp: 30 tablet, Rfl: 3  dilTIAZem HCl ER Coated Beads 360 MG Oral Capsule SR 24 Hr, Take 1 capsule (360 mg total) by mouth daily. , Disp: 30 capsule, Rfl: 3 Cardiovascular: S1, S2. Regular rate and rhythm. No murmurs, rubs or gallops. Equal pulses. Chest and Back: No tenderness or deformity. Abdomen: Soft, nontender, nondistended. Positive bowel sounds. No rebound, guarding or organomegaly.   Neurologic: No Filed:  4/10/2020  1:26 PM Date of Service:  4/10/2020  1:16 PM Status:  Signed    :  Eleuterio Brenner MD (Physician)     Consult Orders    1. Consult to Gastroenterology [[id number]] ordered by Aylin Meadows MD at 04/09/20 1534     2.  ED Consu cabg x 3   •      • CABG  10/2017   • CHOLECYSTECTOMY     • ESOPHAGOGASTRODUODENOSCOPY (EGD) N/A 2020    Performed by Janelle Guerrero MD at [facility] N/A 10/24/2017    Performed by Lesvia Roper •  0.9% NaCl infusion, , Intravenous, Continuous  •  ondansetron HCl (ZOFRAN) injection 4 mg, 4 mg, Intravenous, Q6H PRN  •  Metoclopramide HCl (REGLAN) injection 10 mg, 10 mg, Intravenous, Q8H PRN  •  Pantoprazole Sodium (PROTONIX) 40 mg in Sodium Chlorid Coronary artery disease involving native heart without angina pectoris     Atrial fibrillation (HCC)     Stress hyperglycemia     Hypernatremia     Acute blood loss anemia     S/P CABG (coronary artery bypass graft)     ELEAZAR (acute kidney injury) ([facility] [address] GP.2 Delano Hardy MD on 4/10/2020  1:17 PM                     D/C Summary    No notes of this type exist for this encounter.         Physical Therapy Notes (last 72 hours) (Notes from 4/8/2020  3:43 PM through 4/11/2020  3:43 PM)      Physical Ther • High cholesterol    • Hyperlipidemia    • Muscle weakness    • Osteoarthritis    • Pulmonary emphysema (HCC)    • Renal disorder     ckd   • Shortness of breath    • Sleep apnea     cpap   • Visual impairment     glasses[SP.3]       Past Surgical History · Arousal/Alertness:  delayed responses to stimuli and generalized responses  · Attention Span:  attends with cues to redirect and difficulty attending to directions  · Following Commands:  follows one step commands consistently  · Safety Judgement:  decre Approx Degree of Impairment: 54.16%   Standardized Score (AM-PAC Scale): 40.78   CMS Modifier (G-Code): CK[SP.3]    FUNCTIONAL ABILITY STATUS  Gait Assessment[SP.1]   Gait Assistance: Minimum assistance  Distance (ft): 100  Assistive Device: Rolling walker who is in agreement with plan.[SP.2]  Based on this evaluation, patient's clinical presentation is[SP.1] stable[SP.2] and overall the evaluation complexity is considered[SP.1] low[SP.2].   These impairments and comorbidities manifest themselves as functiona [facility]      Multidisciplinary Problems     Active Goals        Problem: Patient/Family Goals    Goal Priority Disciplines Outcome Interventions   Patient/Family Long Term Goal     Interdisciplinary Adequate for Discharge    Description:  Patient's Oneal

## (undated) NOTE — ED AVS SNAPSHOT
Mavis Flores   MRN: [id number]    Department:  [facility] Emergency Department   Date of Visit:  4/7/2019           Disclosure     Insurance plans vary and the physician(s) referred by the ER may not be covered by your plan.  Please contact your tell this physician (or your personal doctor if your instructions are to return to your personal doctor) about any new or lasting problems. The primary care or specialist physician will see patients referred from the [facility] Emergency Department.  Mukund Guy

## (undated) NOTE — IP AVS SNAPSHOT
Patient Demographics     Address  [address] [place] [address] Phone  [phone] [facility])  [phone] (Mobile) *Preferred* E-mail Address  [email or web address]      Emergency Contact(s)     Name Relation Home Work Mobile    Kamran Tello Take 1 tablet (100 mg total) by mouth 2x Daily(Beta Blocker). Ramy Nascimento MD         Pantoprazole Sodium 40 MG Tbec  Commonly known as:  PROTONIX  Next dose due:   Tonight  9/5      Take 1 tablet (40 mg total) by mouth 2 (two) times daily before meal Resp  19 Filed at 09/05/2020 1300   Temp  98 °F (36.7 °C) Filed at 09/05/2020 1300   SpO2  97 % Filed at 09/05/2020 1300      Patient's Most Recent Weight       Most Recent Value   Patient Weight  64 kg (141 lb)      CPAP Settings (Inpatient)       Most Re toxin. If clinically indicated order separate test for C.difficile toxin.      Campylobacter Pcr Negative     Plesiomonas Shigelloides Pcr Negative     Salmonella Pcr Negative     Vibrio Pcr Negative     Vibrio Cholera Pcr Negative     Yersinia Entercolitic Filed:  8/24/2020  5:20 PM Date of Service:  8/24/2020  2:49 PM Status:  Addendum    :   Chad Jaimes MD (Physician)    Related Notes:  Original Note by Chad Jaimes MD (Physician) filed at 8/24/2020  5:20 PM         [facility]  History Performed by Zac Cedeno MD at [address] [facility] ENDOSCOPY   • ESOPHAGOGASTRODUODENOSCOPY (EGD) N/A 2/28/2020    Performed by Yu Miller MD at [facility] N/A 10/24/2017    Performed by Larry Lester MD at [address] as needed for Pain., Disp: , Rfl:   albuterol sulfate (2.5 MG/3ML) 0.083% Inhalation Nebu Soln, Take 2.5 mg by nebulization every 6 (six) hours as needed for Wheezing., Disp: , Rfl:   furosemide 20 MG Oral Tab, Take 1 tablet (20 mg total) by mouth daily. , No results for input(s): PTP, INR in the last 168 hours. Recent Labs   Lab 08/24/20  1329   TROP <0.045       Imaging: Imaging data reviewed in Epic. ASSESSMENT / PLAN:     1.  Vomiting/diarrhea-rule out cdiff[JM.1]; gentle hydration[JM.2]  2. afib 2020  5:20 PM         [facility]PITAL[facility]  History and Physical     Dayanara Parikh Patient Status:  Emergency    1950 MRN [id number]   Location [address] Attending Mayte Narvaez MD   Hosp Day # 0 PCP Brent Junior MD Performed by Anupam George MD at [address] N/A 10/24/2017    Performed by Anupam George MD at [address]     • HYSTEROSCOPY     • REMOVAL GALLBLADDER     • TOTAL ABDOM HYSTERECTOMY         Social History:  reports that furosemide 20 MG Oral Tab, Take 1 tablet (20 mg total) by mouth daily. , Disp: 30 tablet, Rfl: 3  folic acid 1 MG Oral Tab, Take 1 tablet (1 mg total) by mouth daily. , Disp: 90 tablet, Rfl: 0  atorvastatin 40 MG Oral Tab, Take 1 tablet (40 mg total) by mout 1. Vomiting/diarrhea-rule out cdiff[JM.1]; gentle hydration[JM.2]  2. afib with RVR; hx ablation/ppm[JM.1]; per cards rianna blockers IV; hold warfarin until we know dissection is stable and then will need permission from cards to resume anticoag  3.  Dyspha ADMISSION DATE:       08/24/2020      CONSULT DATE:  08/24/2020    REPORT OF CONSULTATION    HISTORY OF PRESENT ILLNESS:  The patient is a pleasant 79-year-old female known to our cardiovascular service who was just discharged home last week.   She has a ch HEART:  S1, S2, irregular, rapid, distant tones. Soft murmur, aortic pole. ABDOMEN:  Positive bowel sounds. Slightly tender epigastric region. EXTREMITIES:  Without edema. Radial pulses equal.  NEUROLOGIC:  He is moving all extremities.   Patient is ve SLP Note - SLP Notes      SLP Note signed by TEO Murcia at 8/25/2020  3:41 PM  Version 1 of 1    Author:  TEO Murcia Service:  Rehab Author Type:  SPEECH-LANGUAGE PATHOLOGIST    Filed:  8/25/2020  3:41 PM Date of Service:  8/25/2 that swallowing impairment is a result of impaired cognition due to advancing dementia. Discussed with patient a PEG tube placement for nutrition and hydration with continuation of po when she would like to have oral presentations.  Patient shook her he • COPD (chronic obstructive pulmonary disease) (HCC)    • Coronary atherosclerosis    • Essential hypertension    • Heart attack ([facility] [address] 10/2017   • High blood pressure    • High cholesterol    • History of blood transfusion    • Hyperlipidemia    • Muscle w Videofluoroscopic Swallow Study is required to rule-out silent aspiration.)    Esophageal Phase of Swallow: Complaints consistent with possible esophageal involvement                GOALS  Goals are not warranted as patient's swallowing deficits are likely

## (undated) NOTE — LETTER
[facility] [address], [address]  Consent for Procedure/Sedation    Date: 7/27/2020    Time: 0730      1. I authorize the performance upon [facility] Sample the following:  AV node ablation and PPM insertion     2.  I authorize  Printed: 2020   7:33 AM  Patient Name: Lukasz Garcia        : 1950       Medical Record #: [id number]

## (undated) NOTE — LETTER
Date: 2020  Patient Name:  Yenny Boo             Address: Vanita Parsons  [address] [place] [address] Road [address]    : 1950    Dear Yenny Boo,      I hope this letter finds you doing well. I am writing to inform you of the following:      The bi

## (undated) NOTE — LETTER
Sandra Clemens [address]  [address] [facility] [address], [address] [facility]  Authorization for Surgical Operation and Procedure     Date:___________                                                                                                         Time:__________ potential risks that can occur: fever and allergic reactions, hemolytic reactions, transmission of diseases such as Hepatitis, AIDS and Cytomegalovirus (CMV) and fluid overload.   In the event that I wish to have an autologous transfusion of my own blood, o attending physician will determine when the applicable recovery period ends for purposes of reinstating the DNAR order.   10. Patients having a sterilization procedure: I understand that if the procedure is successful the results will be permanent and it wi a. Allow the anesthesiologist (anesthesia doctor) to give me medicine and do additional procedures as necessary.  Some examples are: Starting or using an “IV” to give me medicine, fluids or blood during my procedure, and having a breathing tube placed to he 7. Regional Anesthesia (“spinal”, “epidural”, & “nerve blocks”): I understand that rare but potential complications include headache, bleeding, infection, seizure, irregular heart rhythms, and nerve injury.     I can change my mind about having anesthesia

## (undated) NOTE — LETTER
[facility] [address], [address]  Consent for Procedure/Sedation    Date: March 10,2020    Time: 1600      1.  I authorize the performance upon Yenny Boo the following: TRANSESOPHAGEAL ECHOCARDIOGRAPHY(CLAUDIO) AND CARDIOVERSION to patient:    ________________________________    ___________________    Witness: _________________________      Date: ___________________    Printed: 3/10/2020   3:42 PM  Patient Name: Viviane Quintero        : 1950       Medical Record #: [id number]

## (undated) NOTE — IP AVS SNAPSHOT
Patient Demographics     Address  [address] [place] [address]. Phone  [phone] (Home) *Preferred*  [phone] [facility]) E-mail Address  [email or web address] "[email or web address]      Emergency Contact(s)     Name Relation Home Work Mobile    Kamran Tello Instructions Authorizing Provider Morning Afternoon Evening As Needed   acetaminophen 325 MG Tabs  Commonly known as:  TYLENOL      Take 650 mg by mouth every 4 (four) hours as needed for Pain.           albuterol sulfate (2.5 MG/3ML) 0.083% Nebu  Common Please  your prescriptions at the location directed by your doctor or nurse    Bring a paper prescription for each of these medications  methimazole 5 MG Tabs           430-430-A - MAR ACTION REPORT  (last 24 hrs)    ** SITE UNKNOWN **     Order ID [id number], FREE (S) [[id number]] (Abnormal)  Resulted: 06/26/20 1357, Result status: Final result   Ordering provider:  Adilene Anthony MD  06/26/20 1317 Resulting lab:  [facility])    Specimen Information    Type Source Collected O not been shown to typically cause significant interference; however, high dose daily dietary supplements may contain biotin concentrations greater than 150 times (5–10 mg) the RDA.   It is recommended that physicians ask all patients who may be on biotin rosas Rapid SARS-CoV-2 by PCR Not Detected      Pending Labs     Order Current Status    BLOOD CULTURE Preliminary result    BLOOD CULTURE Preliminary result         H&P - H&P Note      H&P signed by Dejah Jeronimo MD at 6/23/2020 11:15 PM  Version 2 of 2    A • Heart attack ([facility] [address] 10/2017   • High blood pressure    • High cholesterol    • History of blood transfusion    • Hyperlipidemia    • Muscle weakness    • Osteoarthritis    • Pulmonary emphysema (HCC)    • Renal disorder     ckd   • Shortness of breath Potassium Chloride ER 10 MEQ Oral Tab CR, Take 10 mEq by mouth 2 (two) times daily. , Disp: , Rfl:   potassium chloride (KLOR-CON) 20 MEQ Oral Powd Pack, Take 20 mEq by mouth daily. , Disp: 30 packet, Rfl: 3  hydrALAzine HCl 100 MG Oral Tab, Take 1 tablet (1 General: No acute distress. Alert and oriented x 3. HEENT: Normocephalic atraumatic. Moist mucous membranes. EOM-I. PERRLA. Anicteric. Neck: No lymphadenopathy. No JVD. No carotid bruits. Respiratory: Clear to auscultation bilaterally. No wheezes.  No rh · CODE status: Full  · Rangel: no    Plan of care discussed with pt and RN. Guilherme Agrawal MD  6/23/2020[DK. 1]                        Electronically signed by Alejo Esteban MD on 6/23/2020 11:15 PM   Attribution Key    DK. 1 - Alejo Esteban MD on 6/23/20 • Congestive heart disease (HCC)    • [facility]    • Coronary atherosclerosis    • Essential hypertension    • Heart attack ([facility] 10/2017   • High blood pressure    • High cholesterol    • History of blood transfusi Comment:PT HAD VOMITING[DK. 2]    Medications:[DK.1]  No current facility-administered medications on file prior to encounter. Potassium Chloride ER 10 MEQ Oral Tab CR, Take 10 mEq by mouth 2 (two) times daily. , Disp: , Rfl:   potassium chloride (KLOR-C BP (!) 185/104   Pulse 66   Temp 98.7 °F (37.1 °C)   Resp 16   Ht 5' 3\" (1.6 m)   Wt 141 lb (64 kg)   SpO2 100%   BMI 24.98 kg/m²[DK. 2]   General: No acute distress. Alert and oriented x 3. HEENT: Normocephalic atraumatic. Moist mucous membranes.  EOM-I. 11. CKD, Cr stable, 1.3 - 1.5 at baseline  12. Recent duodenal ulcer/UGIB, cont PPi  13. Chronic HFpEF, compensated    Quality:  · DVT Prophylaxis: SCDs, recent GI bleed  · CODE status: Full  · Rangel: no    Plan of care discussed with pt and RN.     Milly Harrison 77year old female with PMHx sig for CAD s/p CABG in October 2017, AF, HFpEF pulmonary pretension, HTN, HL, EITAN, chronic type I aortic dissection not a surgical candidate consulted for AF.     AF  -Patient has a history of paroxysmal AF now rate controlled • HEART CORONARY ARTERY BYPASS GRAFT N/A 10/24/2017    Performed by Deandre Grande MD at [address] [place] N/A 10/24/2017    Performed by Deandre Grande MD at [address]     • HYSTEROSCOPY     • REMOVAL GALLBLADDER     • TOTAL ABD nebulization every 6 (six) hours as needed for Wheezing. lisinopril 40 MG Oral Tab, Take 1 tablet (40 mg total) by mouth daily. digoxin 0.125 MG Oral Tab, Take 1 tablet (125 mcg total) by mouth daily.   furosemide 20 MG Oral Tab, Take 1 tablet (20 mg tota :   Alessandra Spatz, PT (Physical Therapist)             PHYSICAL THERAPY EVALUATION - INPATIENT     Room Number: 430/430-A  Evaluation Date: 6/25/2020  Type of Evaluation: Initial  Physician Order: PT Eval and Treat    Presenting Problem: UTI, pn FOR ANEURYSM   • BYPASS SURGERY  10/24/2017    cabg x 3   •      • CABG  10/2017   • CHOLECYSTECTOMY     • ESOPHAGOGASTRODUODENOSCOPY (EGD) N/A 4/10/2020    Performed by Jim Zelaya MD at [facility] ENDOSCOPY   • ESOPHAGOGASTRODUODENOSCOPY (EG Static Standing: Poor +  Dynamic Standing: Poor    ADDITIONAL TESTS                                    NEUROLOGICAL FINDINGS                      ACTIVITY TOLERANCE                         O2 WALK                  AM-PAC '6-Clicks' INPATIENT SHORT FORM - B Patient End of Session: Up in chair;Needs met;Call light within reach;RN aware of session/findings; All patient questions and concerns addressed    ASSESSMENT   Patient is a 77year old female admitted on 6/23/2020 with nausea and vomiting.  Pt presenting wi Goal #3 Patient is able to ambulate  feet with assist device:[KW.1] walker - rolling[KW.3] at assistance level:[KW.1] minimum assistance[KW. 3]     Goal #4    Goal #5    Goal #6    Goal Comments: Goals established on 6/25/2020[KW. 1]     Attribution Key    K WITH RAPID VENTRICULAR RESPONSE   • Arrhythmia 2016    hx of A-Fib   • Asthma    • Blood disorder     thrombocytopenia, anemia   • Brain aneurysm 1999   • Calculus of kidney    • Cardiomegaly    • Chronic atrial fibrillation (HCC)    • Congestive heart di Patient self-stated goal is not stated     OBJECTIVE  Precautions: Bed/chair alarm  Fall Risk: High fall risk    WEIGHT BEARING RESTRICTION  Weight Bearing Restriction: None                PAIN ASSESSMENT  Ratin  Location: none reported       COGNITION Pt seen semi supine. Mod (A) to EOB. Initial min (A) sitting balance, able to achieve SBA with increased time. Total (A) to don B socks. Sit to stand min (A) via RW. Ambulates mod (A) to bathroom via RW. Toilet transfer mod (A), cueing for grab bar use.  At Occupational Profile/Medical History LOW - Brief history including review of medical or therapy records    Specific performance deficits impacting engagement in ADL/IADL LOW  1 - 3 performance deficits    Client Assessment/Performance Deficits LOW - No com Pt seen at bedside this PM for speech therapy services. Pt cooperative, pleasant, and alert. Per RN, pt tolerating diet well with no concerns of aspiration.  Pt advanced to full liquids per MD. Trial thin liquids via cup, self presented, with no overt s/s o Attribution Jones    LO. 1 - Melvina Lama, SLP on 6/25/2020  3:05 PM                     Future Appointments        Provider Alida Dunaway    7/1/2020 10:00 AM HEART FAILURE APN Leyla Rivera [address] for [address]      Multidisciplinary Problem

## (undated) NOTE — LETTER
[facility] [address], [address]  Consent for Procedure/Sedation    Date: 10/20/2017    Time: 09:20 AM      1.  I authorize the performance upon Pina Hu the following:cardiac catheterization, left ventricular cineangiograp period, the physician will determine when the applicable recovery period ends for purposes of reinstating the Do Not Resuscitate (DNR) order.     Signature of Patient: ____________________________________________________    Signature of person authorized

## (undated) NOTE — LETTER
[facility] [address], [address]    Consent for Operation    Date: 10/22/2017   Time: 1200     1. I authorize the performance upon Oletha Nails the following operation:    CABG    2.  I authorize Dr. Mynor Li (and whomever is design of this tape. 6. For the purpose of advancing medical education, I consent to the admittance of observers to the Operating Room.     7. I authorize the use of any specimen, organs, tissues, body parts or foreign objects that may be removed from my body d When the patient is a minor or mentally incompetent to give consent:  Signature of person authorized to consent for patient: ___________________________   Relationship to patient: ____________________________________________________    Signature of Witness these medicines may increase my risk of anesthetic complications. · If I am allergic to anything or have had a reaction to anesthesia before. 3. I understand how the anesthesia medicine will help me (benefits).     4. I understand that with any type of patient’s representative) and answered their questions. The patient or their representative has agreed to have anesthesia services.     _____________________________________________________________________________  Witness        Date   Time  I have yari